# Patient Record
Sex: FEMALE | Race: WHITE | NOT HISPANIC OR LATINO | Employment: OTHER | URBAN - METROPOLITAN AREA
[De-identification: names, ages, dates, MRNs, and addresses within clinical notes are randomized per-mention and may not be internally consistent; named-entity substitution may affect disease eponyms.]

---

## 2017-05-01 ENCOUNTER — ALLSCRIPTS OFFICE VISIT (OUTPATIENT)
Dept: OTHER | Facility: OTHER | Age: 57
End: 2017-05-01

## 2017-06-11 ENCOUNTER — APPOINTMENT (OUTPATIENT)
Dept: LAB | Facility: MEDICAL CENTER | Age: 57
End: 2017-06-11
Payer: COMMERCIAL

## 2017-06-11 ENCOUNTER — TRANSCRIBE ORDERS (OUTPATIENT)
Dept: ADMINISTRATIVE | Facility: HOSPITAL | Age: 57
End: 2017-06-11

## 2017-06-11 DIAGNOSIS — E23.7 DISEASE OF PITUITARY GLAND (HCC): Primary | ICD-10-CM

## 2017-06-11 DIAGNOSIS — E23.7 DISEASE OF PITUITARY GLAND (HCC): ICD-10-CM

## 2017-06-11 LAB
ALBUMIN SERPL BCP-MCNC: 3.9 G/DL (ref 3.5–5)
ALP SERPL-CCNC: 78 U/L (ref 46–116)
ALT SERPL W P-5'-P-CCNC: 30 U/L (ref 12–78)
ANION GAP SERPL CALCULATED.3IONS-SCNC: 7 MMOL/L (ref 4–13)
AST SERPL W P-5'-P-CCNC: 20 U/L (ref 5–45)
BACTERIA UR QL AUTO: ABNORMAL /HPF
BASOPHILS # BLD AUTO: 0.05 THOUSANDS/ΜL (ref 0–0.1)
BASOPHILS NFR BLD AUTO: 1 % (ref 0–1)
BILIRUB SERPL-MCNC: 0.8 MG/DL (ref 0.2–1)
BILIRUB UR QL STRIP: NEGATIVE
BUN SERPL-MCNC: 18 MG/DL (ref 5–25)
CALCIUM SERPL-MCNC: 8.9 MG/DL (ref 8.3–10.1)
CHLORIDE SERPL-SCNC: 107 MMOL/L (ref 100–108)
CHOLEST SERPL-MCNC: 217 MG/DL (ref 50–200)
CLARITY UR: ABNORMAL
CO2 SERPL-SCNC: 28 MMOL/L (ref 21–32)
COLOR UR: ABNORMAL
CORTIS AM PEAK SERPL-MCNC: 15 UG/DL (ref 4.2–22.4)
CREAT SERPL-MCNC: 0.73 MG/DL (ref 0.6–1.3)
EOSINOPHIL # BLD AUTO: 0.33 THOUSAND/ΜL (ref 0–0.61)
EOSINOPHIL NFR BLD AUTO: 6 % (ref 0–6)
ERYTHROCYTE [DISTWIDTH] IN BLOOD BY AUTOMATED COUNT: 12.9 % (ref 11.6–15.1)
EST. AVERAGE GLUCOSE BLD GHB EST-MCNC: 103 MG/DL
GFR SERPL CREATININE-BSD FRML MDRD: >60 ML/MIN/1.73SQ M
GLUCOSE P FAST SERPL-MCNC: 84 MG/DL (ref 65–99)
GLUCOSE UR STRIP-MCNC: NEGATIVE MG/DL
HBA1C MFR BLD: 5.2 % (ref 4.2–6.3)
HCT VFR BLD AUTO: 44 % (ref 34.8–46.1)
HDLC SERPL-MCNC: 75 MG/DL (ref 40–60)
HGB BLD-MCNC: 14.4 G/DL (ref 11.5–15.4)
HGB UR QL STRIP.AUTO: ABNORMAL
KETONES UR STRIP-MCNC: NEGATIVE MG/DL
LDLC SERPL CALC-MCNC: 128 MG/DL (ref 0–100)
LEUKOCYTE ESTERASE UR QL STRIP: ABNORMAL
LYMPHOCYTES # BLD AUTO: 1.69 THOUSANDS/ΜL (ref 0.6–4.47)
LYMPHOCYTES NFR BLD AUTO: 31 % (ref 14–44)
MAGNESIUM SERPL-MCNC: 2.4 MG/DL (ref 1.6–2.6)
MCH RBC QN AUTO: 31 PG (ref 26.8–34.3)
MCHC RBC AUTO-ENTMCNC: 32.7 G/DL (ref 31.4–37.4)
MCV RBC AUTO: 95 FL (ref 82–98)
MONOCYTES # BLD AUTO: 0.7 THOUSAND/ΜL (ref 0.17–1.22)
MONOCYTES NFR BLD AUTO: 13 % (ref 4–12)
NEUTROPHILS # BLD AUTO: 2.75 THOUSANDS/ΜL (ref 1.85–7.62)
NEUTS SEG NFR BLD AUTO: 49 % (ref 43–75)
NITRITE UR QL STRIP: NEGATIVE
NON-SQ EPI CELLS URNS QL MICRO: ABNORMAL /HPF
NRBC BLD AUTO-RTO: 0 /100 WBCS
OTHER STN SPEC: ABNORMAL
PH UR STRIP.AUTO: 6 [PH] (ref 4.5–8)
PHOSPHATE SERPL-MCNC: 3.5 MG/DL (ref 2.7–4.5)
PLATELET # BLD AUTO: 196 THOUSANDS/UL (ref 149–390)
PMV BLD AUTO: 12 FL (ref 8.9–12.7)
POTASSIUM SERPL-SCNC: 3.7 MMOL/L (ref 3.5–5.3)
PROT SERPL-MCNC: 7.6 G/DL (ref 6.4–8.2)
PROT UR STRIP-MCNC: ABNORMAL MG/DL
RBC # BLD AUTO: 4.64 MILLION/UL (ref 3.81–5.12)
RBC #/AREA URNS AUTO: ABNORMAL /HPF
SODIUM SERPL-SCNC: 142 MMOL/L (ref 136–145)
SP GR UR STRIP.AUTO: 1.02 (ref 1–1.03)
T4 FREE SERPL-MCNC: 1.03 NG/DL (ref 0.76–1.46)
TRIGL SERPL-MCNC: 71 MG/DL
TSH SERPL DL<=0.05 MIU/L-ACNC: 1.57 UIU/ML (ref 0.36–3.74)
UROBILINOGEN UR QL STRIP.AUTO: 0.2 E.U./DL
WBC # BLD AUTO: 5.52 THOUSAND/UL (ref 4.31–10.16)
WBC #/AREA URNS AUTO: ABNORMAL /HPF

## 2017-06-11 PROCEDURE — 83036 HEMOGLOBIN GLYCOSYLATED A1C: CPT

## 2017-06-11 PROCEDURE — 82024 ASSAY OF ACTH: CPT

## 2017-06-11 PROCEDURE — 80061 LIPID PANEL: CPT

## 2017-06-11 PROCEDURE — 84100 ASSAY OF PHOSPHORUS: CPT

## 2017-06-11 PROCEDURE — 82533 TOTAL CORTISOL: CPT

## 2017-06-11 PROCEDURE — 80053 COMPREHEN METABOLIC PANEL: CPT

## 2017-06-11 PROCEDURE — 84443 ASSAY THYROID STIM HORMONE: CPT

## 2017-06-11 PROCEDURE — 36415 COLL VENOUS BLD VENIPUNCTURE: CPT

## 2017-06-11 PROCEDURE — 81001 URINALYSIS AUTO W/SCOPE: CPT | Performed by: SPECIALIST

## 2017-06-11 PROCEDURE — 83735 ASSAY OF MAGNESIUM: CPT

## 2017-06-11 PROCEDURE — 85025 COMPLETE CBC W/AUTO DIFF WBC: CPT

## 2017-06-11 PROCEDURE — 82941 ASSAY OF GASTRIN: CPT

## 2017-06-11 PROCEDURE — 84439 ASSAY OF FREE THYROXINE: CPT

## 2017-06-13 LAB
ACTH PLAS-MCNC: 15.9 PG/ML (ref 7.2–63.3)
GASTRIN SERPL-MCNC: 51 PG/ML (ref 0–115)

## 2017-07-13 ENCOUNTER — ALLSCRIPTS OFFICE VISIT (OUTPATIENT)
Dept: OTHER | Facility: OTHER | Age: 57
End: 2017-07-13

## 2017-07-29 ENCOUNTER — TRANSCRIBE ORDERS (OUTPATIENT)
Dept: ADMINISTRATIVE | Facility: HOSPITAL | Age: 57
End: 2017-07-29

## 2017-07-29 ENCOUNTER — APPOINTMENT (OUTPATIENT)
Dept: LAB | Facility: MEDICAL CENTER | Age: 57
End: 2017-07-29
Payer: COMMERCIAL

## 2017-07-29 DIAGNOSIS — R89.6 ABNORMAL CYTOLOGY: Primary | ICD-10-CM

## 2017-07-29 LAB
CREAT UR-MCNC: 124 MG/DL
MICROALBUMIN UR-MCNC: 6.6 MG/L (ref 0–20)
MICROALBUMIN/CREAT 24H UR: 5 MG/G CREATININE (ref 0–30)

## 2017-07-29 PROCEDURE — 82043 UR ALBUMIN QUANTITATIVE: CPT | Performed by: SPECIALIST

## 2017-07-29 PROCEDURE — 82570 ASSAY OF URINE CREATININE: CPT | Performed by: SPECIALIST

## 2017-08-02 ENCOUNTER — APPOINTMENT (OUTPATIENT)
Dept: LAB | Facility: MEDICAL CENTER | Age: 57
End: 2017-08-02
Payer: COMMERCIAL

## 2017-08-02 DIAGNOSIS — R89.6 ABNORMAL CYTOLOGY: ICD-10-CM

## 2017-08-02 DIAGNOSIS — R89.6 ABNORMAL BLADDER CYTOLOGY: Primary | ICD-10-CM

## 2017-08-02 LAB
BILIRUB UR QL STRIP: NEGATIVE
CLARITY UR: CLEAR
COLOR UR: YELLOW
GLUCOSE UR STRIP-MCNC: NEGATIVE MG/DL
HGB UR QL STRIP.AUTO: NEGATIVE
KETONES UR STRIP-MCNC: NEGATIVE MG/DL
LEUKOCYTE ESTERASE UR QL STRIP: NEGATIVE
NITRITE UR QL STRIP: NEGATIVE
PH UR STRIP.AUTO: 6.5 [PH] (ref 4.5–8)
PROT UR STRIP-MCNC: NEGATIVE MG/DL
SP GR UR STRIP.AUTO: 1.01 (ref 1–1.03)
UROBILINOGEN UR QL STRIP.AUTO: 0.2 E.U./DL

## 2017-08-02 PROCEDURE — 81003 URINALYSIS AUTO W/O SCOPE: CPT

## 2017-08-02 PROCEDURE — 88112 CYTOPATH CELL ENHANCE TECH: CPT

## 2017-10-17 ENCOUNTER — ALLSCRIPTS OFFICE VISIT (OUTPATIENT)
Dept: OTHER | Facility: OTHER | Age: 57
End: 2017-10-17

## 2017-10-18 NOTE — PROGRESS NOTES
Assessment  Assessed    1  Dyslipidemia (272 4) (E78 5)    Plan  Dyslipidemia    · (1) CK (CPK); Status:Active; Requested for:27Feb2018; Perform:[facility]; HEA:25VCK9038;DZVMMZT; For:Dyslipidemia; Ordered By:Laureano Jasmine;   · (1) COMPREHENSIVE METABOLIC PANEL; Status:Active; Requested for:27Feb2018; Perform:[facility]; EHY:84MWL6170;OEMAXNK; For:Dyslipidemia; Ordered By:Laureano Jasmine;   · (1) LIPID PANEL, FASTING; Status:Active; Requested for:27Feb2018; Perform:[facility]; CQL:09WBE8033;HVFGJTR; For:Dyslipidemia; Ordered By:Giamber, Eleuterio Scheuermann;   · EKG/ECG- POC; Status:Active - Perform Order; Requested for: With next appointment;    Perform: In Office; Due:17Oct2018; Ordered; For:Dyslipidemia; Ordered By:Laureano Jasmine;   · Follow-up visit in 1 year Evaluation and Treatment  Follow-up  Status: Hold For -  Scheduling  Requested for: 18SKX3619   Ordered; For: Dyslipidemia; Ordered By: Kay Schaefer Performed:  Due: 13JKC1325  Palpitations    · EKG/ECG- POC; Status:Complete;   Done: 73HVX7625   Perform: In Office; Due:17Oct2018; Last Updated Jacob Lyons; 10/17/2017 3:33:23 PM;Ordered; For:Palpitations; Ordered By:Giamber, Eleuterio Scheuermann;    Discussion/Summary  Cardiology Discussion Summary Free Text Note Form [facility]:   Patient sees us and rare occasion because of a elevated cholesterol level  Her LDL is 108  She's no documented vascular disease  He does not have diabetes  Her issues are with iron deficiency anemia and at the tertiary adenoma  We will see her on a when necessary basis or in a year  She uses a statin just several times per week  Primarily discussion of her issues  Her EKG is normal  seen October 17, 2017  LDL is 128 on Lipitor 10 mg every other day  There will be increased to 10 mg daily  She is asymptomatic  Her hiatal hernia repaired  She's no longer anemic  Chief Complaint  Chief Complaint Free Text Note Form: Patient is here for a yearly follow up   She has no cardiac complaints today  History of Present Illness  Cardiology [facility] Free Text Note Form [facility]: Patient sees us at long-term intervals  She recently saw for positive family history of vascular disease and showed an LDL of 192  She is no documented vascular disease  She is on Lipitor several times a week and her LDL is down to 108  I made no changes  our standpoint we've very little input to her care  issues recently have been severe iron deficiency anemia secondary to GERD and they're considering a procedure to reduce her hiatal hernia  addition she has a pituitary adenomatous being followed by endocrine  seen October 17, 2017  seen because of positive family history coronary disease and she has significantly elevated LDL  LDL is 128 on 10 mg of Lipitor every other day  Her increasing to take back to daily  did have significant anemia secondary to a hiatal hernia  She did have diet hernia repair  Fairly significant operation  She's done well since  Review of Systems  Cardiology Female ROS:     Cardiac: No complaints of chest pain, no palpitations, no fainting  Skin: No complaints of nonhealing sores or skin rash  Genitourinary: No complaints of recurrent urinary tract infections, frequent urination at night, difficult urination, blood in urine, kidney stones, loss of bladder control, kidney problems, denies any birth control or hormone replacement, is not post menopausal, not currently pregnant  Psychological: No complaints of feeling depressed, anxiety, panic attacks, or difficulty concentrating  Active Problems  Problems    1  Abnormal glandular Papanicolaou smear of cervix (795 00) (R87 619)   2  Acid reflux disease with ulcer (530 81,707 9) (K21 9)   3  Tiburcio ulcer (531 90) (K25 9)   4  Dyslipidemia (272 4) (E78 5)   5  Encounter for cervical Pap smear with pelvic exam (V76 2,V72 31) (Z01 419)   6   Encounter for routine gynecological examination with Papanicolaou smear of cervix (V72 31,V76 2) (Z01 419)   7  Encounter for screening mammogram for malignant neoplasm of breast (V76 12)   (Z12 31)   8  Hiatal hernia (553 3) (K44 9)   9  Hyperlipidemia (272 4) (E78 5)   10  Iron deficiency anemia due to chronic blood loss (280 0) (D50 0)   11  Menopausal symptoms (627 2) (N95 1)   12  Migraine, unspecified, not intractable, without status migrainosus (346 90) (G43 909)   13  Palpitations (785 1) (R00 2)   14  Pituitary adenoma (227 3) (D35 2)    Past Medical History  Problems    1  History of Hypercholesterolemia (272 0) (E78 00)   2  History of Pituitary adenoma (227 3) (D35 2)    Surgical History  Problems    1  History of Breast Surgery   2  History of Hernia Repair  Surgical History Reviewed: The surgical history was reviewed and updated today  Family History  Mother    1  Family history of Anxiety   2  Family history of Arthritis   3  Family history of Hypercholesterolemia   4  Family history of Hypertension  Father    5  Family history of Cancer of prostate   6  Family history of Dementia   7  Family history of Parkinson disease  Maternal Grandmother    8  Family history of   Paternal Grandmother    5  Family history of   Maternal Grandfather    10  Family history of   Paternal Grandfather    6  Family history of   Family History    12  Family history of Coronary Artery Disease (V17 49)    Social History  Problems    · Caffeine use (V49 89) (F15 90)   · Lives independently with spouse   · Lives with parents   · Marital history   · Never smoker   · Occupation   · Social alcohol use (Z78 9)  Social History Reviewed: The social history was reviewed and is unchanged  Current Meds   1  Aspirin 81 MG TABS; TAKE 1 TABLET DAILY; Therapy: [phone]) to Recorded   2  Atorvastatin Calcium 10 MG Oral Tablet; TAKE ONE TABLET BY MOUTH EVERY OTHER   DAY OR AS DIRECTED;    Therapy: 78RVZ0370 to (Evaluate:2017)  Requested for: 40STT0400; Last Rx:94Ntr1906 Ordered   3  Cymbalta 20 MG Oral Capsule Delayed Release Particles; TAKE 1 CAPSULE DAILY; Therapy: ([phone]) to Recorded   4  Multivitamins Oral Capsule; TAKE 1 CAPSULE Daily Recorded   5  Prempro 0 3-1 5 MG Oral Tablet; Take 1 tablet by mouth daily; Therapy: 19IUG6963 to (Sarahelvazoila Valentine)  Requested for: 44UDE4130; Last   Rx:28Nov2016 Ordered   6  SUMAtriptan Succinate 100 MG Oral Tablet; TAKE 1 TABLET BY MOUTH FOR MIGRAINE   RELIEF  MAY REPEAT 2 HOURS LATER  MAXIMUM 200MG/DAY; Therapy: 79KMZ1229 to (Last JV:33WTH2026)  Requested for: 50EBC7754 Ordered   7  Vitamin C 500 MG Oral Tablet; TAKE 1 TABLET DAILY; Therapy: ([phone]) to Recorded   8  Vitamin D 2000 UNIT Oral Tablet; TAKE 1 PO QD; Therapy: ([phone]) to Recorded   9  Vitamin E CAPS; Therapy: (Recorded:02Jun2016) to Recorded   10  Wellbutrin  MG Oral Tablet Extended Release 24 Hour; Take 1 tablet daily    Recorded    Allergies  Medication    1  No Known Drug Allergies    Vitals  Vital Signs    Recorded: 60JWW4983 03:32PM   Heart Rate 89   Systolic 970, LUE, Sitting   Diastolic 78, LUE, Sitting   Height 5 ft 6 in   Weight 157 lb 4 oz   BMI Calculated 25 38   BSA Calculated 1 81     Physical Exam    Constitutional   General appearance: No acute distress, well appearing and well nourished  Pulmonary   Auscultation of lungs: Clear to auscultation, no rales, no rhonchi, no wheezing, good air movement  Skin - Skin and subcutaneous tissue: Normal without rashes or lesions  Skin is warm and well perfused, normal turgor  Psychiatric - Orientation to person, place, and time: Normal -- Mood and affect: Normal       Future Appointments    Date/Time Provider Specialty Site   06/01/2018 09:00 AM Marilynn Das MD Neurology [facility] NEUROLOGY [facility]   01/12/2018 09:00 AM Dionicia Severin, M D   General Surgery [facility]   12/08/2017 08:30 AM Ender Henry DO Obstetrics/Gynecology [facility]  [address] [facility]     Signatures   Electronically signed by : KHADIJAH Mckeon ; Oct 17 2017  3:53PM EST                       (Author)

## 2017-11-30 ENCOUNTER — GENERIC CONVERSION - ENCOUNTER (OUTPATIENT)
Dept: OTHER | Facility: OTHER | Age: 57
End: 2017-11-30

## 2017-11-30 ENCOUNTER — HOSPITAL ENCOUNTER (OUTPATIENT)
Dept: MAMMOGRAPHY | Facility: MEDICAL CENTER | Age: 57
Discharge: HOME | End: 2017-11-30
Payer: COMMERCIAL

## 2017-11-30 ENCOUNTER — TRANSCRIBE ORDERS (OUTPATIENT)
Dept: ADMINISTRATIVE | Facility: HOSPITAL | Age: 57
End: 2017-11-30

## 2017-11-30 DIAGNOSIS — R92.8 OTHER ABNORMAL AND INCONCLUSIVE FINDINGS ON DIAGNOSTIC IMAGING OF BREAST: ICD-10-CM

## 2017-11-30 DIAGNOSIS — Z12.31 VISIT FOR SCREENING MAMMOGRAM: Primary | ICD-10-CM

## 2017-11-30 DIAGNOSIS — Z12.31 ENCOUNTER FOR SCREENING MAMMOGRAM FOR MALIGNANT NEOPLASM OF BREAST: ICD-10-CM

## 2017-11-30 DIAGNOSIS — Z12.31 VISIT FOR SCREENING MAMMOGRAM: ICD-10-CM

## 2017-11-30 PROCEDURE — G0202 SCR MAMMO BI INCL CAD: HCPCS

## 2017-11-30 PROCEDURE — 77063 BREAST TOMOSYNTHESIS BI: CPT

## 2017-12-01 ENCOUNTER — HOSPITAL ENCOUNTER (OUTPATIENT)
Dept: MAMMOGRAPHY | Facility: CLINIC | Age: 57
Discharge: HOME | End: 2017-12-01

## 2017-12-01 ENCOUNTER — HOSPITAL ENCOUNTER (OUTPATIENT)
Dept: ULTRASOUND IMAGING | Facility: CLINIC | Age: 57
Discharge: HOME | End: 2017-12-01
Payer: COMMERCIAL

## 2017-12-01 DIAGNOSIS — R92.8 OTHER ABNORMAL AND INCONCLUSIVE FINDINGS ON DIAGNOSTIC IMAGING OF BREAST: ICD-10-CM

## 2017-12-01 PROCEDURE — 76642 ULTRASOUND BREAST LIMITED: CPT

## 2017-12-27 ENCOUNTER — ALLSCRIPTS OFFICE VISIT (OUTPATIENT)
Dept: OTHER | Facility: OTHER | Age: 57
End: 2017-12-27

## 2017-12-27 PROCEDURE — G0145 SCR C/V CYTO,THINLAYER,RESCR: HCPCS | Performed by: OBSTETRICS & GYNECOLOGY

## 2017-12-28 ENCOUNTER — LAB REQUISITION (OUTPATIENT)
Dept: LAB | Facility: HOSPITAL | Age: 57
End: 2017-12-28
Payer: COMMERCIAL

## 2017-12-28 DIAGNOSIS — Z01.419 ENCOUNTER FOR GYNECOLOGICAL EXAMINATION WITHOUT ABNORMAL FINDING: ICD-10-CM

## 2018-01-02 LAB
LAB AP GYN PRIMARY INTERPRETATION: NORMAL
Lab: NORMAL

## 2018-01-05 ENCOUNTER — TRANSCRIBE ORDERS (OUTPATIENT)
Dept: ADMINISTRATIVE | Facility: HOSPITAL | Age: 58
End: 2018-01-05

## 2018-01-05 DIAGNOSIS — D35.2 BENIGN NEOPLASM OF PITUITARY GLAND AND CRANIOPHARYNGEAL DUCT (POUCH) (HCC): Primary | ICD-10-CM

## 2018-01-05 DIAGNOSIS — D35.3 BENIGN NEOPLASM OF PITUITARY GLAND AND CRANIOPHARYNGEAL DUCT (POUCH) (HCC): Primary | ICD-10-CM

## 2018-01-09 NOTE — MISCELLANEOUS
Message  Pt called office today because after she advanced her diet as advised by Dr Ridge Mayo She is having issues  She has to run to the bathroom 20 minutes after eating and this happens about 4-5 times a day  She still has left sided pain  It is sharp, stabbing, burning pain  She had some dry heaves yesterday but no vomiting  Denies fever  She said when she swallows it feels like something is moving up and down  I spoke to Dr Ridge Mayo and he said to advise her to take advil for the next 3-4 days 600mg around the clock  He said the Diarrhea can be normal after surgery because of the vegus nerve  He said if it isn't better in a week we will do a stool test for c-diff  Told pt if symptoms get worse or she develops a fever she should call the office immediately or go to the ER  Pt understood and said she would  Active Problems    1  Abnormal cervical Papanicolaou smear (795 00) (R87 619)   2  Acid reflux disease with ulcer (530 81,707 9) (K21 9)   3  Tiburcio ulcer (531 90) (K25 9)   4  Dyslipidemia (272 4) (E78 5)   5  Encounter for routine gynecological examination with Papanicolaou smear of cervix   (V72 31,V76 2) (Z01 419)   6  Encounter for screening mammogram for malignant neoplasm of breast (V76 12)   (Z12 31)   7  Hiatal hernia (553 3) (K44 9)   8  Hyperlipidemia (272 4) (E78 5)   9  Iron deficiency anemia due to chronic blood loss (280 0) (D50 0)   10  Menopausal symptoms (627 2) (N95 1)   11  Migraine headache (346 90) (G43 909)   12  Palpitations (785 1) (R00 2)   13  Pituitary adenoma (227 3) (D35 2)    Current Meds   1  Aspirin 81 MG TABS; TAKE 1 TABLET DAILY; Therapy: [phone]) to Recorded   2  Atorvastatin Calcium 10 MG Oral Tablet; TAKE ONE TABLET BY MOUTH EVERY OTHER   DAY OR AS DIRECTED; Therapy: 64OXQ5976 to (Evaluate:17Nov2016)  Requested for: 53GNW5676; Last   Rx:23Nov2015 Ordered   3   Cymbalta 20 MG Oral Capsule Delayed Release Particles (DULoxetine HCl); TAKE 1   CAPSULE DAILY; Therapy: ([phone]) to Recorded   4  Multivitamins Oral Capsule; TAKE 1 CAPSULE Daily Recorded   5  Pantoprazole Sodium 40 MG Oral Tablet Delayed Release; TAKE 1 TABLET TWICE   DAILY 30 MINUTES BEFORE BREAKFAST AND DINNER; Therapy: ([phone] 15) to Recorded   6  Prempro 0 3-1 5 MG Oral Tablet; TAKE 1 TABLET DAILY  Requested for: 10XNX0453;   Last Rx:17Mar2016 Ordered   7  SUMAtriptan Succinate 100 MG Oral Tablet; TAKE 1 TABLET BY MOUTH FOR MIGRAINE   RELIEF  MAY REPEAT 2 HOURS LATER  MAXIMUM 200MG/DAY; Therapy: 94CPU4091 to (Last JL:64GDO3659)  Requested for: 39HPX1566 Ordered   8  Vitamin C 500 MG Oral Tablet; TAKE 1 TABLET DAILY; Therapy: ([phone] 63 15) to Recorded   9  Vitamin D 2000 UNIT Oral Tablet; TAKE 1 PO QD; Therapy: (031 339 63 15) to Recorded   10  Vitamin E CAPS; Therapy: (Recorded:02Jun2016) to Recorded   11  Wellbutrin  MG Oral Tablet Extended Release 24 Hour (BuPROPion HCl ER (XL)); Take 1 tablet daily Recorded    Allergies    1   No Known Drug Allergies    Signatures   Electronically signed by : Norris Arias LPN; Sep 16 9270  7:84GN EST                       (Author)

## 2018-01-12 ENCOUNTER — HOSPITAL ENCOUNTER (OUTPATIENT)
Dept: ULTRASOUND IMAGING | Facility: MEDICAL CENTER | Age: 58
Discharge: HOME | End: 2018-01-12
Payer: COMMERCIAL

## 2018-01-12 ENCOUNTER — APPOINTMENT (OUTPATIENT)
Dept: LAB | Facility: MEDICAL CENTER | Age: 58
End: 2018-01-12
Payer: COMMERCIAL

## 2018-01-12 ENCOUNTER — GENERIC CONVERSION - ENCOUNTER (OUTPATIENT)
Dept: OTHER | Facility: OTHER | Age: 58
End: 2018-01-12

## 2018-01-12 VITALS
HEIGHT: 66 IN | BODY MASS INDEX: 25.23 KG/M2 | WEIGHT: 157 LBS | HEART RATE: 80 BPM | DIASTOLIC BLOOD PRESSURE: 70 MMHG | RESPIRATION RATE: 20 BRPM | TEMPERATURE: 98 F | SYSTOLIC BLOOD PRESSURE: 124 MMHG

## 2018-01-12 VITALS
BODY MASS INDEX: 25.27 KG/M2 | HEART RATE: 89 BPM | HEIGHT: 66 IN | SYSTOLIC BLOOD PRESSURE: 110 MMHG | WEIGHT: 157.25 LBS | DIASTOLIC BLOOD PRESSURE: 78 MMHG

## 2018-01-12 DIAGNOSIS — D35.2 BENIGN NEOPLASM OF PITUITARY GLAND AND CRANIOPHARYNGEAL DUCT (POUCH) (HCC): ICD-10-CM

## 2018-01-12 DIAGNOSIS — D35.3 BENIGN NEOPLASM OF PITUITARY GLAND AND CRANIOPHARYNGEAL DUCT (POUCH) (HCC): ICD-10-CM

## 2018-01-12 LAB
ALBUMIN SERPL BCP-MCNC: 3.6 G/DL (ref 3.5–5)
ALP SERPL-CCNC: 76 U/L (ref 46–116)
ALT SERPL W P-5'-P-CCNC: 26 U/L (ref 12–78)
ANION GAP SERPL CALCULATED.3IONS-SCNC: 5 MMOL/L (ref 4–13)
AST SERPL W P-5'-P-CCNC: 15 U/L (ref 5–45)
BACTERIA UR QL AUTO: ABNORMAL /HPF
BASOPHILS # BLD AUTO: 0.09 THOUSANDS/ΜL (ref 0–0.1)
BASOPHILS NFR BLD AUTO: 2 % (ref 0–1)
BILIRUB SERPL-MCNC: 0.43 MG/DL (ref 0.2–1)
BILIRUB UR QL STRIP: NEGATIVE
BUN SERPL-MCNC: 15 MG/DL (ref 5–25)
CALCIUM SERPL-MCNC: 8.9 MG/DL (ref 8.3–10.1)
CHLORIDE SERPL-SCNC: 109 MMOL/L (ref 100–108)
CLARITY UR: CLEAR
CO2 SERPL-SCNC: 28 MMOL/L (ref 21–32)
COLOR UR: YELLOW
CREAT SERPL-MCNC: 0.66 MG/DL (ref 0.6–1.3)
EOSINOPHIL # BLD AUTO: 0.37 THOUSAND/ΜL (ref 0–0.61)
EOSINOPHIL NFR BLD AUTO: 6 % (ref 0–6)
ERYTHROCYTE [DISTWIDTH] IN BLOOD BY AUTOMATED COUNT: 12.8 % (ref 11.6–15.1)
EST. AVERAGE GLUCOSE BLD GHB EST-MCNC: 108 MG/DL
GFR SERPL CREATININE-BSD FRML MDRD: 97 ML/MIN/1.73SQ M
GLUCOSE SERPL-MCNC: 80 MG/DL (ref 65–140)
GLUCOSE UR STRIP-MCNC: NEGATIVE MG/DL
HBA1C MFR BLD: 5.4 % (ref 4.2–6.3)
HCT VFR BLD AUTO: 40.3 % (ref 34.8–46.1)
HGB BLD-MCNC: 13.4 G/DL (ref 11.5–15.4)
HGB UR QL STRIP.AUTO: NEGATIVE
HYALINE CASTS #/AREA URNS LPF: ABNORMAL /LPF
KETONES UR STRIP-MCNC: NEGATIVE MG/DL
LEUKOCYTE ESTERASE UR QL STRIP: ABNORMAL
LYMPHOCYTES # BLD AUTO: 2.12 THOUSANDS/ΜL (ref 0.6–4.47)
LYMPHOCYTES NFR BLD AUTO: 36 % (ref 14–44)
MAGNESIUM SERPL-MCNC: 2.2 MG/DL (ref 1.6–2.6)
MCH RBC QN AUTO: 31.3 PG (ref 26.8–34.3)
MCHC RBC AUTO-ENTMCNC: 33.3 G/DL (ref 31.4–37.4)
MCV RBC AUTO: 94 FL (ref 82–98)
MONOCYTES # BLD AUTO: 0.68 THOUSAND/ΜL (ref 0.17–1.22)
MONOCYTES NFR BLD AUTO: 11 % (ref 4–12)
NEUTROPHILS # BLD AUTO: 2.68 THOUSANDS/ΜL (ref 1.85–7.62)
NEUTS SEG NFR BLD AUTO: 45 % (ref 43–75)
NITRITE UR QL STRIP: NEGATIVE
NON-SQ EPI CELLS URNS QL MICRO: ABNORMAL /HPF
NRBC BLD AUTO-RTO: 0 /100 WBCS
PH UR STRIP.AUTO: 6 [PH] (ref 4.5–8)
PHOSPHATE SERPL-MCNC: 3.4 MG/DL (ref 2.7–4.5)
PLATELET # BLD AUTO: 208 THOUSANDS/UL (ref 149–390)
PMV BLD AUTO: 11.9 FL (ref 8.9–12.7)
POTASSIUM SERPL-SCNC: 3.5 MMOL/L (ref 3.5–5.3)
PROT SERPL-MCNC: 7.2 G/DL (ref 6.4–8.2)
PROT UR STRIP-MCNC: NEGATIVE MG/DL
RBC # BLD AUTO: 4.28 MILLION/UL (ref 3.81–5.12)
RBC #/AREA URNS AUTO: ABNORMAL /HPF
SODIUM SERPL-SCNC: 142 MMOL/L (ref 136–145)
SP GR UR STRIP.AUTO: 1.02 (ref 1–1.03)
T4 FREE SERPL-MCNC: 0.97 NG/DL (ref 0.76–1.46)
TSH SERPL DL<=0.05 MIU/L-ACNC: 1.59 UIU/ML (ref 0.36–3.74)
UROBILINOGEN UR QL STRIP.AUTO: 0.2 E.U./DL
WBC # BLD AUTO: 5.95 THOUSAND/UL (ref 4.31–10.16)
WBC #/AREA URNS AUTO: ABNORMAL /HPF

## 2018-01-12 PROCEDURE — 85025 COMPLETE CBC W/AUTO DIFF WBC: CPT

## 2018-01-12 PROCEDURE — 84100 ASSAY OF PHOSPHORUS: CPT

## 2018-01-12 PROCEDURE — 76770 US EXAM ABDO BACK WALL COMP: CPT

## 2018-01-12 PROCEDURE — 80053 COMPREHEN METABOLIC PANEL: CPT

## 2018-01-12 PROCEDURE — 83735 ASSAY OF MAGNESIUM: CPT

## 2018-01-12 PROCEDURE — 81001 URINALYSIS AUTO W/SCOPE: CPT

## 2018-01-12 PROCEDURE — 83036 HEMOGLOBIN GLYCOSYLATED A1C: CPT

## 2018-01-12 PROCEDURE — 88112 CYTOPATH CELL ENHANCE TECH: CPT

## 2018-01-12 PROCEDURE — 84443 ASSAY THYROID STIM HORMONE: CPT

## 2018-01-12 PROCEDURE — 36415 COLL VENOUS BLD VENIPUNCTURE: CPT

## 2018-01-12 PROCEDURE — 84439 ASSAY OF FREE THYROXINE: CPT

## 2018-01-12 NOTE — CONSULTS
Chief Complaint  Patient is here for a yearly follow up  She has no cardiac complaints today  History of Present Illness  Patient sees us at long-term intervals  She recently saw for positive family history of vascular disease and showed an LDL of 192  She is no documented vascular disease  She is on Lipitor several times a week and her LDL is down to 108  I made no changes  From our standpoint we've very little input to her care  Her issues recently have been severe iron deficiency anemia secondary to GERD and they're considering a procedure to reduce her hiatal hernia  In addition she has a pituitary adenomatous being followed by endocrine  Patient seen October 17, 2017  Patient seen because of positive family history coronary disease and she has significantly elevated LDL  LDL is 128 on 10 mg of Lipitor every other day  Her increasing to take back to daily  She did have significant anemia secondary to a hiatal hernia  She did have diet hernia repair  Fairly significant operation  She's done well since  Review of Systems      Cardiac: No complaints of chest pain, no palpitations, no fainting  Skin: No complaints of nonhealing sores or skin rash  Genitourinary: No complaints of recurrent urinary tract infections, frequent urination at night, difficult urination, blood in urine, kidney stones, loss of bladder control, kidney problems, denies any birth control or hormone replacement, is not post menopausal, not currently pregnant  Psychological: No complaints of feeling depressed, anxiety, panic attacks, or difficulty concentrating  Active Problems    1  Abnormal glandular Papanicolaou smear of cervix (795 00) (R87 619)   2  Acid reflux disease with ulcer (530 81,707 9) (K21 9)   3  Tiburcio ulcer (531 90) (K25 9)   4  Dyslipidemia (272 4) (E78 5)   5  Encounter for cervical Pap smear with pelvic exam (V76 2,V72 31) (Z01 419)   6   Encounter for routine gynecological examination with Papanicolaou smear of cervix   (V72 31,V76 2) (Z01 419)   7  Encounter for screening mammogram for malignant neoplasm of breast (V76 12)   (Z12 31)   8  Hiatal hernia (553 3) (K44 9)   9  Hyperlipidemia (272 4) (E78 5)   10  Iron deficiency anemia due to chronic blood loss (280 0) (D50 0)   11  Menopausal symptoms (627 2) (N95 1)   12  Migraine, unspecified, not intractable, without status migrainosus (346 90) (G43 909)   13  Palpitations (785 1) (R00 2)   14  Pituitary adenoma (227 3) (D35 2)    Past Medical History    · History of Hypercholesterolemia (272 0) (E78 00)   · History of Pituitary adenoma (227 3) (D35 2)    Surgical History    · History of Breast Surgery   · History of Hernia Repair    The surgical history was reviewed and updated today  Family History    · Family history of Anxiety   · Family history of Arthritis   · Family history of Hypercholesterolemia   · Family history of Hypertension    · Family history of Cancer of prostate   · Family history of Dementia   · Family history of Parkinson disease    · Family history of     · Family history of     · Family history of     · Family history of     · Family history of Coronary Artery Disease (V17 49)    Social History    · Caffeine use (V49 89) (F15 90)   · Lives independently with spouse   · Lives with parents   · Marital history   · Never smoker   · Occupation   · Social alcohol use (Z78 9)  The social history was reviewed and is unchanged  Current Meds   1  Aspirin 81 MG TABS; TAKE 1 TABLET DAILY; Therapy: ([phone]) to Recorded   2  Atorvastatin Calcium 10 MG Oral Tablet; TAKE ONE TABLET BY MOUTH EVERY OTHER   DAY OR AS DIRECTED; Therapy: 72ENE3300 to (Evaluate:2017)  Requested for: 27KMW8997; Last   Rx:2016 Ordered   3  Cymbalta 20 MG Oral Capsule Delayed Release Particles; TAKE 1 CAPSULE DAILY; Therapy: ([phone]) to Recorded   4   Multivitamins Oral Capsule; TAKE 1 CAPSULE Daily Recorded   5  Prempro 0 3-1 5 MG Oral Tablet; Take 1 tablet by mouth daily; Therapy: 79ZVY4042 to (Marina Cortes)  Requested for: 37PFU1586; Last   Rx:28Nov2016 Ordered   6  SUMAtriptan Succinate 100 MG Oral Tablet; TAKE 1 TABLET BY MOUTH FOR MIGRAINE   RELIEF  MAY REPEAT 2 HOURS LATER  MAXIMUM 200MG/DAY; Therapy: 67QIR5142 to (Last UD:03PCS8059)  Requested for: 60AUH5709 Ordered   7  Vitamin C 500 MG Oral Tablet; TAKE 1 TABLET DAILY; Therapy: () to Recorded   8  Vitamin D 2000 UNIT Oral Tablet; TAKE 1 PO QD; Therapy: () to Recorded   9  Vitamin E CAPS; Therapy: (Recorded:02Jun2016) to Recorded   10  Wellbutrin  MG Oral Tablet Extended Release 24 Hour; Take 1 tablet daily    Recorded    Allergies    1  No Known Drug Allergies    Vitals   Recorded: 27FJI5468 03:32PM   Heart Rate 89   Systolic 222, LUE, Sitting   Diastolic 78, LUE, Sitting   Height 5 ft 6 in   Weight 157 lb 4 oz   BMI Calculated 25 38   BSA Calculated 1 81     Physical Exam    Constitutional   General appearance: No acute distress, well appearing and well nourished  Pulmonary   Auscultation of lungs: Clear to auscultation, no rales, no rhonchi, no wheezing, good air movement  Skin - Skin and subcutaneous tissue: Normal without rashes or lesions  Skin is warm and well perfused, normal turgor  Psychiatric - Orientation to person, place, and time: Normal  Mood and affect: Normal       Assessment    1  Dyslipidemia (272 4) (E78 5)    Plan  Dyslipidemia    · (1) CK (CPK); Status:Active; Requested for:27Feb2018; Perform:[facility]; NYO:40QBJ3162;NXTNLBG; For:Dyslipidemia; Ordered By:Laureano Jasmine;   · (1) COMPREHENSIVE METABOLIC PANEL; Status:Active; Requested for:27Feb2018; Perform:[facility]; PAX:56DZM2118;LSSVSEF; For:Dyslipidemia; Ordered By:Laureano Jasmine;   · (1) LIPID PANEL, FASTING; Status:Active; Requested for:27Feb2018;     Perform:  [facility]; ZLK:93TWI8764;ESSHLEX; For:Dyslipidemia; Ordered By:Germaine Jasmine;   · EKG/ECG- POC; Status:Active - Perform Order; Requested for: With next appointment;    Perform: In Office; Due:17Oct2018; Ordered; For:Dyslipidemia; Ordered By:Laureano Jasmine;   · Follow-up visit in 1 year Evaluation and Treatment  Follow-up  Status: Hold For -  Scheduling  Requested for: 77CXV5198   Ordered; For: Dyslipidemia; Ordered By: Segun Johnson Performed:  Due: 98DOC7154  Palpitations    · EKG/ECG- POC; Status:Complete;   Done: 82PYR1950   Perform: In Office; Due:17Oct2018; Last Updated Beth Tapia; 10/17/2017 3:33:23 PM;Ordered; For:Palpitations; Ordered By:Laureano Jasmine;    Discussion/Summary    Patient sees us and rare occasion because of a elevated cholesterol level  Her LDL is 108  She's no documented vascular disease  He does not have diabetes  Her issues are with iron deficiency anemia and at the tertiary adenoma  We will see her on a when necessary basis or in a year  She uses a statin just several times per week  Primarily discussion of her issues  Her EKG is normal    Patient seen October 17, 2017  LDL is 128 on Lipitor 10 mg every other day  There will be increased to 10 mg daily  She is asymptomatic  Her hiatal hernia repaired  She's no longer anemic        Future Appointments    Signatures   Electronically signed by : KHADIJAH Matthews ; Oct 17 2017  3:53PM EST                       (Author)

## 2018-01-13 VITALS
DIASTOLIC BLOOD PRESSURE: 60 MMHG | HEART RATE: 88 BPM | BODY MASS INDEX: 25.57 KG/M2 | HEIGHT: 66 IN | SYSTOLIC BLOOD PRESSURE: 102 MMHG | RESPIRATION RATE: 16 BRPM | WEIGHT: 159.13 LBS | OXYGEN SATURATION: 99 %

## 2018-01-13 NOTE — MISCELLANEOUS
Message  Return to work or school:   Itz Pop is under my professional care  She was seen in my office on 07/29/2016   She is able to return to work on  09/21/2016    She can perform work without limitations           Signatures   Electronically signed by : Mac Burton, ; Aug 25 2016  8:58AM EST                       (Author)

## 2018-01-13 NOTE — PROGRESS NOTES
Discussion/Summary  Discussion Summary:   Met with pt per Dr Gene Quiroga request to review post-operative diet progression with pt  Pt is not a bariatric surgery patient but is scheduled for paraesophageal hernia repair and fundoplication for hiatal hernia and GERD on 8/31/16  Printed out [employer] progression handout and reviewed progression in-detail with pt, making adjustments considering pt is not a bariatric surgery pt  (ok to have fruit juice, larger portions than 1/4 cup per meal, does not need protein drinks ) Provided written dates for diet stages on handouts  Pt verbalized understanding, no further questions at present time  Provided pt with contact information for future questions/concerns  Active Problems    1  Abnormal cervical Papanicolaou smear (795 00) (R87 619)   2  Acid reflux disease with ulcer (530 81,707 9) (K21 9)   3  Tiburcio ulcer (531 90) (K25 9)   4  Dyslipidemia (272 4) (E78 5)   5  Encounter for routine gynecological examination with Papanicolaou smear of cervix   (V72 31,V76 2) (Z01 419)   6  Encounter for screening mammogram for malignant neoplasm of breast (V76 12)   (Z12 31)   7  Hiatal hernia (553 3) (K44 9)   8  Hyperlipidemia (272 4) (E78 5)   9  Iron deficiency anemia due to chronic blood loss (280 0) (D50 0)   10  Menopausal symptoms (627 2) (N95 1)   11  Migraine headache (346 90) (G43 909)   12  Palpitations (785 1) (R00 2)   13  Pituitary adenoma (227 3) (D35 2)    Past Medical History    1  History of Hypercholesterolemia (272 0) (E78 0)   2  History of Pituitary adenoma (227 3) (D35 2)    Surgical History    1  History of Breast Surgery    Family History  Mother    1  Family history of Anxiety   2  Family history of Arthritis   3  Family history of Hypercholesterolemia   4  Family history of Hypertension  Father    5  Family history of Cancer of prostate   6  Family history of Dementia   7  Family history of Parkinson disease  Maternal Grandmother    8   Family history of   Paternal Grandmother    5  Family history of   Maternal Grandfather    10  Family history of   Paternal Grandfather    6  Family history of   Family History    12  Family history of Coronary Artery Disease (V17 49)    Social History    · Caffeine use (V49 89) (F15 90)   · Lives independently with spouse   · Lives with parents   · Marital history   · Never smoker   · Occupation   · Social alcohol use (Z78 9)    Current Meds   1  Aspirin 81 MG TABS; TAKE 1 TABLET DAILY; Therapy: ( ) to Recorded   2  Atorvastatin Calcium 10 MG Oral Tablet; TAKE ONE TABLET BY MOUTH EVERY OTHER   DAY OR AS DIRECTED; Therapy: 87ZTK6239 to (Evaluate:2016)  Requested for: 09XWE4831; Last   Rx:2015 Ordered   3  Cymbalta 20 MG Oral Capsule Delayed Release Particles; TAKE 1 CAPSULE DAILY; Therapy: ( ) to Recorded   4  Multivitamins Oral Capsule; TAKE 1 CAPSULE Daily Recorded   5  Oxycodone-Acetaminophen 5-325 MG Oral Tablet; TAKE 1 TABLET EVERY 4 TO 6   HOURS AS NEEDED FOR PAIN;   Therapy: 84TWO3628 to (Complete:60Mze1553); Last Rx:46Oxg8350; Status: ACTIVE -   Retrospective By Protocol Authorization Ordered   6  Pantoprazole Sodium 40 MG Oral Tablet Delayed Release; TAKE 1 TABLET TWICE DAILY   30 MINUTES BEFORE BREAKFAST AND DINNER; Therapy: ( ) to Recorded   7  Prempro 0 3-1 5 MG Oral Tablet; TAKE 1 TABLET DAILY  Requested for: 32EDL6020; Last   Rx:2016 Ordered   8  SUMAtriptan Succinate 100 MG Oral Tablet; TAKE 1 TABLET BY MOUTH FOR MIGRAINE   RELIEF  MAY REPEAT 2 HOURS LATER  MAXIMUM 200MG/DAY; Therapy: 59XGT3262 to (Last EX:40HLI5875)  Requested for: 57LDH9332 Ordered   9  Vitamin C 500 MG Oral Tablet; TAKE 1 TABLET DAILY; Therapy: ( ) to Recorded   10  Vitamin D 2000 UNIT Oral Tablet; TAKE 1 PO QD; Therapy: ( ) to Recorded   11  Vitamin E CAPS;     Therapy: (Recorded:2016) to Recorded   12  Wellbutrin  MG Oral Tablet Extended Release 24 Hour; Take 1 tablet daily    Recorded    Allergies    1  No Known Drug Allergies    Vitals  Signs   Recorded: 64BKJ4719 12:26PM   Height: 5 ft 6 in  Weight: 180 lb 8 oz  BMI Calculated: 29 13  BSA Calculated: 1 91    Future Appointments    Date/Time Provider Specialty Site   05/01/2017 04:00 PM Ivan Marr MD Neurology [facility] NEUROLOGY [facility]   09/08/2016 09:00 AM KHADIJAH Lou  [address] [facility]   08/16/2016 08:00 AM KHADIJAH العلي  Hematology Oncology [facility] MEDICAL ONCOLOGY   08/15/2016 03:20 PM Omero Casillas MD Gastroenterology Adult [facility] 9     Signatures   Electronically signed by : PATRICIA Moran RD; Jul 29 2016 12:25PM EST                       (Author)    Electronically signed by :  KHADIJAH Espino ; Aug  4 2016 10:10AM EST

## 2018-01-15 NOTE — MISCELLANEOUS
Message  9/6/2016 @ 1350 - post op follow up phone call completed  Pt is sipping liquids, using IS as instructed, reinforced importance of using IS to help prevent pneumonia  Ambulating about home without difficulty  Pain controlled with analgesia  Reaffirmed examples of liquid diet over the next week  Pt stated understanding about discharge instructions and medication adjustments  Pt educated on [id number] [facility]  Follow up appt with surgeon scheduled for the end of this week  Instructed to call with any additional questions or concerns  Active Problems    1  Abnormal cervical Papanicolaou smear (795 00) (R87 619)   2  Acid reflux disease with ulcer (530 81,707 9) (K21 9)   3  Tiburcio ulcer (531 90) (K25 9)   4  Dyslipidemia (272 4) (E78 5)   5  Encounter for routine gynecological examination with Papanicolaou smear of cervix   (V72 31,V76 2) (Z01 419)   6  Encounter for screening mammogram for malignant neoplasm of breast (V76 12)   (Z12 31)   7  Hiatal hernia (553 3) (K44 9)   8  Hyperlipidemia (272 4) (E78 5)   9  Iron deficiency anemia due to chronic blood loss (280 0) (D50 0)   10  Menopausal symptoms (627 2) (N95 1)   11  Migraine headache (346 90) (G43 909)   12  Palpitations (785 1) (R00 2)   13  Pituitary adenoma (227 3) (D35 2)    Current Meds   1  Aspirin 81 MG TABS; TAKE 1 TABLET DAILY; Therapy: ([phone]) to Recorded   2  Atorvastatin Calcium 10 MG Oral Tablet; TAKE ONE TABLET BY MOUTH EVERY OTHER   DAY OR AS DIRECTED; Therapy: 03VSR5133 to (Evaluate:17Nov2016)  Requested for: 67ZCP9963; Last   Rx:23Nov2015 Ordered   3  Cymbalta 20 MG Oral Capsule Delayed Release Particles (DULoxetine HCl); TAKE 1   CAPSULE DAILY; Therapy: ([phone]) to Recorded   4  Multivitamins Oral Capsule; TAKE 1 CAPSULE Daily Recorded   5  Pantoprazole Sodium 40 MG Oral Tablet Delayed Release; TAKE 1 TABLET TWICE   DAILY 30 MINUTES BEFORE BREAKFAST AND DINNER;    Therapy: (JNXCJHIU:44BQD0482) to Recorded   6  Prempro 0 3-1 5 MG Oral Tablet; TAKE 1 TABLET DAILY  Requested for: 37XNL4069;   Last Rx:17Mar2016 Ordered   7  SUMAtriptan Succinate 100 MG Oral Tablet; TAKE 1 TABLET BY MOUTH FOR MIGRAINE   RELIEF  MAY REPEAT 2 HOURS LATER  MAXIMUM 200MG/DAY; Therapy: 68YHN9575 to (Last XX:87DKG5566)  Requested for: 26MVF8353 Ordered   8  Vitamin C 500 MG Oral Tablet; TAKE 1 TABLET DAILY; Therapy: (40 159 282) to Recorded   9  Vitamin D 2000 UNIT Oral Tablet; TAKE 1 PO QD; Therapy: (85 604 282) to Recorded   10  Vitamin E CAPS; Therapy: (Recorded:02Jun2016) to Recorded   11  Wellbutrin  MG Oral Tablet Extended Release 24 Hour (BuPROPion HCl ER (XL)); Take 1 tablet daily Recorded    Allergies    1   No Known Drug Allergies    Signatures   Electronically signed by : Osman Cali, ; Sep  6 2016  1:58PM EST                       (Author)

## 2018-01-16 NOTE — RESULT NOTES
Verified Results  (1) TISSUE EXAM [id number] 07:50AM Elisa Francis     Test Name Result Flag Reference   LAB AP CASE REPORT (Report)     Surgical Pathology Report             Case: [id number]                   Authorizing Provider: Ellen Dawson MD      Collected:      05/11/2016 0750        Ordering Location:   [facility]    Received:      05/11/2016 [address]                            Pathologist:      Carolyn Black MD                                 Specimens:  A) - Duodenum, Duodenum r/o celiac cold biopsy                             B) - Stomach, Random gastric cold biopsy r/o H pylori                         C) - Polyp, Stomach/Small Intestine, Gastric polyp cold biopsy   LAB AP FINAL DIAGNOSIS (Report)     A  Duodenum (biopsy):  - Duodenal mucosa with no diagnostic abnormality  - No Marsh lesion  - Negative for dysplasia     B  Stomach (biopsy):  - Mild chronic inactive oxyntic and antral gastritis  - Detached fragment of intestinal/duodenal mucosa  - No H pylori identified on immunohistochemistry stain  - Negative for dysplasia     C  Stomach polyp (biopsy):  - Fundic gland polyp  - Negative for dysplasia     Interpretation performed at [facility], [address] [facility], [facility] [address]   LAB AP SURGICAL ADDITIONAL INFORMATION (Report)     These tests were developed and their performance characteristics   determined by [facility] or [facility]  They may not be cleared or approved by the [place]  Food and   Drug Administration  The FDA has determined that such clearance or   approval is not necessary  These tests are used for clinical purposes  They should not be regarded as investigational or for research  This   laboratory has been approved by CLIA 88, designated as a high-complexity   laboratory and is qualified to perform these tests  LAB AP GROSS DESCRIPTION (Report)     A   The specimen is received in formalin, labeled with the patient's name   and medical record number, and is designated Duodenum rule out celiac   biopsy  The specimen consists of 2 rubbery, tan-brown tissue fragments   measuring from 0 2 up to 0 3 cm in greatest dimension  Entirely submitted  One cassette  B  The specimen is received in formalin, labeled with the patient's name   and medical record number, and is designated random gastric biopsy rule   out H  pylori  The specimen consists of a single, rubbery, tan-brown   tissue fragment measuring up to 0 5 cm in greatest dimension  Entirely   submitted  One cassette  C  The specimen is received in formalin, labeled with the patient's name   and medical record number, and is designated gastric polyp biopsy  The   specimen consists of 2 rubbery and friable, tan-brown tissue fragments   measuring from 0 2 up to 0 3 cm in greatest dimension  Entirely submitted  One cassette  Note: The estimated total formalin fixation time based upon information   provided by the submitting clinician and the standard processing schedule   is 20 5 hours  SRS   LAB AP CLINICAL INFORMATION (Report)     Duodenum cold biopsy r/o celiac    EGD: Esophagus: The esophagus appeared to be normal  GE junction:   There was a 10 cm and large hiatus hernia visible in the GE junction  Stomach: Tiburcio's erosions  Multiple random biopsies was taken  A few   small polyps was found in the body of the stomach  Multiple biopsies was   taken   Duodenum: The duodenum appeared to be normal    Duodenum cold  biopsy r/o celiac

## 2018-01-18 ENCOUNTER — GENERIC CONVERSION - ENCOUNTER (OUTPATIENT)
Dept: OTHER | Facility: OTHER | Age: 58
End: 2018-01-18

## 2018-01-18 ENCOUNTER — HOSPITAL ENCOUNTER (OUTPATIENT)
Dept: RADIOLOGY | Facility: HOSPITAL | Age: 58
Discharge: HOME | End: 2018-01-18
Attending: SPECIALIST
Payer: COMMERCIAL

## 2018-01-18 DIAGNOSIS — D35.2 BENIGN NEOPLASM OF PITUITARY GLAND AND CRANIOPHARYNGEAL DUCT (POUCH) (HCC): ICD-10-CM

## 2018-01-18 DIAGNOSIS — D35.3 BENIGN NEOPLASM OF PITUITARY GLAND AND CRANIOPHARYNGEAL DUCT (POUCH) (HCC): ICD-10-CM

## 2018-01-18 PROCEDURE — A9585 GADOBUTROL INJECTION: HCPCS | Performed by: SPECIALIST

## 2018-01-18 PROCEDURE — 70553 MRI BRAIN STEM W/O & W/DYE: CPT

## 2018-01-18 RX ADMIN — GADOBUTROL 7 ML: 604.72 INJECTION INTRAVENOUS at 11:26

## 2018-01-18 NOTE — MISCELLANEOUS
Message  spoke with Hamzah Mccormack from GI lab/[place]  Gave her all info to call patient to set up [facility] and for Dr Nik Merchant to read results  [phone] (phone through to GI Lab)    Will wait for patient to call us back with dates she has for this study and also her UGI (she wanted to schedule this at home said she will call back with both dates for me to note )      Active Problems    1  Abnormal cervical Papanicolaou smear (795 00) (R87 619)   2  Acid reflux disease (530 81) (K21 9)   3  Dyslipidemia (272 4) (E78 5)   4  Encounter for routine gynecological examination with Papanicolaou smear of cervix   (V72 31,V76 2) (Z01 419)   5  Encounter for screening mammogram for malignant neoplasm of breast (V76 12)   (Z12 31)   6  Hiatal hernia (553 3) (K44 9)   7  Hyperlipidemia (272 4) (E78 5)   8  Iron deficiency anemia due to chronic blood loss (280 0) (D50 0)   9  Menopausal symptoms (627 2) (N95 1)   10  Migraine headache (346 90) (G43 909)   11  Palpitations (785 1) (R00 2)   12  Pituitary adenoma (227 3) (D35 2)    Current Meds   1  Aspirin 81 MG TABS; TAKE 1 TABLET DAILY; Therapy: [phone]) to Recorded   2  Atorvastatin Calcium 10 MG Oral Tablet; TAKE ONE TABLET BY MOUTH EVERY OTHER   DAY OR AS DIRECTED; Therapy: 97QWT3016 to (Evaluate:17Nov2016)  Requested for: 82ZEC7342; Last   Rx:23Nov2015 Ordered   3  Cymbalta 20 MG Oral Capsule Delayed Release Particles (DULoxetine HCl); TAKE 1   CAPSULE DAILY; Therapy: [phone]) to Recorded   4  Multivitamins Oral Capsule; TAKE 1 CAPSULE Daily Recorded   5  Pantoprazole Sodium 40 MG Oral Tablet Delayed Release; TAKE 1 TABLET TWICE   DAILY 30 MINUTES BEFORE BREAKFAST AND DINNER; Therapy: [phone]) to Recorded   6  Prempro 0 3-1 5 MG Oral Tablet; TAKE 1 TABLET DAILY  Requested for: 69JJZ9623;   Last Rx:17Mar2016 Ordered   7   SUMAtriptan Succinate 100 MG Oral Tablet; TAKE 1 TABLET BY MOUTH FOR MIGRAINE   RELIEF  MAY REPEAT 2 HOURS LATER  MAXIMUM 200MG/DAY; Therapy: 17MHS7165 to (Last AX:46ETR6291)  Requested for: 24SRH2013 Ordered   8  Suprep Bowel Prep Oral Solution; USE AS DIRECTED; Therapy: 22Apr2016 to (Last Rx:22Apr2016)  Requested for: 22Apr2016 Ordered   9  Vitamin C 500 MG Oral Tablet; TAKE 1 TABLET DAILY; Therapy: (29-29-69-33) to Recorded   10  Vitamin D 2000 UNIT Oral Tablet; TAKE 1 PO QD; Therapy: (29-29-69-33) to Recorded   11  Vitamin E CAPS; Therapy: (Recorded:02Jun2016) to Recorded   12  Wellbutrin  MG Oral Tablet Extended Release 24 Hour (BuPROPion HCl ER (XL)); Take 1 tablet daily Recorded    Allergies    1   No Known Drug Allergies    Signatures   Electronically signed by : Chaitanya Pritchard, ; Naresh  3 2016  8:45AM EST                       (Author)

## 2018-01-23 VITALS
DIASTOLIC BLOOD PRESSURE: 70 MMHG | SYSTOLIC BLOOD PRESSURE: 116 MMHG | BODY MASS INDEX: 25.39 KG/M2 | WEIGHT: 158 LBS | HEIGHT: 66 IN

## 2018-01-23 NOTE — CONSULTS
Chief Complaint  The patient presents for her routine exam       History of Present Illness  HPI: here for yearly gyn preventive , presently with cold symptoms   GYN HM, Adult Female [address]: The patient is being seen for a health maintenance evaluation  The last health maintenance visit was 12 month(s) ago  General Health: The patient's health since the last visit is described as good  She has regular dental visits  She denies vision problems  She denies hearing loss  Immunizations status: up to date  Lifestyle:  She consumes a diverse and healthy diet  She does not have any weight concerns  She exercises regularly  She does not use tobacco  She denies alcohol use  She denies drug use  Reproductive health:  she reports no menstrual problems  Screening: cancer screening reviewed and current  metabolic screening reviewed and current  risk screening reviewed and current  Review of Systems    Constitutional: No fever, no chills, feels well, no tiredness, no recent weight gain or loss  ENT: no ear ache, no loss of hearing, no nosebleeds or nasal discharge, no sore throat or hoarseness  Cardiovascular: no complaints of slow or fast heart rate, no chest pain, no palpitations, no leg claudication or lower extremity edema  Respiratory: no complaints of shortness of breath, no wheezing, no dyspnea on exertion, no orthopnea or PND  Breasts: no complaints of breast pain, breast lump or nipple discharge  Gastrointestinal: no complaints of abdominal pain, no constipation, no nausea or diarrhea, no vomiting, no bloody stools  Genitourinary: no complaints of dysuria, no incontinence, no pelvic pain, no dysmenorrhea, no vaginal discharge or abnormal vaginal bleeding  Musculoskeletal: no complaints of arthralgia, no myalgia, no joint swelling or stiffness, no limb pain or swelling  Integumentary: no complaints of skin rash or lesion, no itching or dry skin, no skin wounds     Neurological: no complaints of headache, no confusion, no numbness or tingling, no dizziness or fainting  Active Problems    1  Abnormal finding on breast imaging (793 89) (R92 8)   2  Abnormal glandular Papanicolaou smear of cervix (795 00) (R87 619)   3  Acid reflux disease with ulcer (530 81,707 9) (K21 9)   4  Tiburcio ulcer (531 90) (K25 9)   5  Dyslipidemia (272 4) (E78 5)   6  Encounter for cervical Pap smear with pelvic exam (V76 2,V72 31) (Z01 419)   7  Encounter for routine gynecological examination with Papanicolaou smear of cervix   (V72 31,V76 2) (Z01 419)   8  Encounter for screening mammogram for malignant neoplasm of breast (V76 12)   (Z12 31)   9  Hiatal hernia (553 3) (K44 9)   10  Hyperlipidemia (272 4) (E78 5)   11  Iron deficiency anemia due to chronic blood loss (280 0) (D50 0)   12  Menopausal symptoms (627 2) (N95 1)   13  Migraine, unspecified, not intractable, without status migrainosus (346 90) (G43 909)   14  Palpitations (785 1) (R00 2)   15  Pituitary adenoma (227 3) (D35 2)    Past Medical History    · History of Hypercholesterolemia (272 0) (E78 00)   · History of Pituitary adenoma (227 3) (D35 2)    Surgical History    · History of Breast Surgery   · History of Hernia Repair    Family History    · Family history of Anxiety   · Family history of Arthritis   · Family history of Hypercholesterolemia   · Family history of Hypertension    · Family history of Cancer of prostate   · Family history of Dementia   · Family history of Parkinson disease    · Family history of     · Family history of     · Family history of     · Family history of     · Family history of Coronary Artery Disease (V17 49)    Social History    · Caffeine use (V49 89) (F15 90)   · Lives independently with spouse   · Lives with parents   · Marital history   ·    · Never smoker   · Occupation   · R N    · Social alcohol use (Z78 9)    Current Meds   1   Aspirin 81 MG TABS; TAKE 1 TABLET DAILY; Therapy: ([phone]) to Recorded   2  Atorvastatin Calcium 10 MG Oral Tablet; TAKE ONE TABLET BY MOUTH EVERY OTHER   DAY OR AS DIRECTED; Therapy: 24OAL9648 to (Evaluate:12Nov2017)  Requested for: 04AJC4843; Last   Rx:17Nov2016 Ordered   3  Cymbalta 20 MG Oral Capsule Delayed Release Particles; TAKE 1 CAPSULE DAILY; Therapy: ([phone]) to Recorded   4  Multivitamins Oral Capsule; TAKE 1 CAPSULE Daily Recorded   5  Prempro 0 3-1 5 MG Oral Tablet; Take 1 tablet by mouth daily; Therapy: 36WEJ3237 to (Natty Lout)  Requested for: 20OKT1227; Last   Rx:28Nov2016 Ordered   6  SUMAtriptan Succinate 100 MG Oral Tablet; TAKE 1 TABLET BY MOUTH FOR MIGRAINE   RELIEF  MAY REPEAT 2 HOURS LATER  MAXIMUM 200MG/DAY; Therapy: 48SPJ8664 to (Last DL:18YAA2421)  Requested for: 57PQR8583 Ordered   7  Vitamin C 500 MG Oral Tablet; TAKE 1 TABLET DAILY; Therapy: ([phone]) to Recorded   8  Vitamin D 2000 UNIT Oral Tablet; TAKE 1 PO QD; Therapy: ([phone]) to Recorded   9  Vitamin E CAPS; Therapy: (Recorded:02Jun2016) to Recorded   10  Wellbutrin  MG Oral Tablet Extended Release 24 Hour; Take 1 tablet daily    Recorded    Allergies    1  No Known Drug Allergies    Vitals   Recorded: 82MHJ5614 77:31WZ   Systolic 241   Diastolic 70   Height 5 ft 6 in   Weight 158 lb    BMI Calculated 25 5   BSA Calculated 1 81   LMP 2007     Physical Exam    Constitutional   General appearance: No acute distress, well appearing and well nourished  Neck   Neck: Normal, supple, trachea midline, no masses  Thyroid: Normal, no thyromegaly  Pulmonary   Respiratory effort: No increased work of breathing or signs of respiratory distress  Auscultation of lungs: Clear to auscultation  Cardiovascular   Auscultation of heart: Normal rate and rhythm, normal S1 and S2, no murmurs  Peripheral vascular exam: Normal pulses Throughout      Genitourinary   External genitalia: Normal and no lesions appreciated  Vagina: Normal, no lesions or dryness appreciated  Urethra: Normal     Urethral meatus: Normal     Bladder: Normal, soft, non-tender and no prolapse or masses appreciated  Cervix: Normal, no palpable masses  Uterus: Normal, non-tender, not enlarged, and no palpable masses  Adnexa/parametria: Normal, non-tender and no fullness or masses appreciated  Anus, perineum, and rectum: Normal sphincter tone, no masses, and no prolapse  Chest   Breasts: Normal and no dimpling or skin changes noted  Abdomen   Abdomen: Normal, non-tender, and no organomegaly noted  Liver and spleen: No hepatomegaly or splenomegaly  Examination for hernias: No hernias appreciated  Stool sample for occult blood: Negative  Lymphatic   Palpation of lymph nodes in neck, axillae, groin and/or other locations: No lymphadenopathy or masses noted  Skin   Skin and subcutaneous tissue: Normal skin turgor and no rashes  Palpation of skin and subcutaneous tissue: Normal     Psychiatric   Orientation to person, place, and time: Normal     Mood and affect: Normal        Assessment    1  Encounter for cervical Pap smear with pelvic exam (V76 2,V72 31) (Z01 419)   2  Menopausal symptoms (627 2) (N95 1)   3  Screening for colon cancer (V76 51) (Z12 11)    Plan  Encounter for screening mammogram for malignant neoplasm of breast    · * MAMMO SCREENING BILATERAL W CAD; Status:Hold For - Scheduling,Retrospective  By Protocol Authorization; Requested for:98Beh2877;    Perform:[facility]; ZOF:[id number]; Last Updated By:Ivonne Herrera; 12/27/2017 4:30:45 PM;Ordered;  For:Encounter for screening mammogram for malignant neoplasm of breast; Ordered By:Denys Rodrigues;  Menopausal symptoms    · Prempro 0 3-1 5 MG Oral Tablet; Take 1 tablet by mouth daily   Rx By: Ernst Figueroa; Dispense: 90 Days ; #:90 Tablet;  Refill: 3; For: Menopausal symptoms; TANVI = N; Sent To: [facility] for colon cancer    · Follow-up visit in 1 year Evaluation and Treatment  Follow-up  Status: Hold For -  Scheduling  Requested for: 98Zox6344   Ordered; For: Screening for colon cancer; Ordered By: Angel Mccarthy Performed:  Due: 40CWQ5185    Discussion/Summary    Reviewed potential gyn emergencies; Rx for next screening mammogram and Rx for tee/prop sent to [facility] mail order; will heed pap; RTO 1year; 10 month old grandson with RSV        Future Appointments    Signatures   Electronically signed by : Alexis Mendoza DO; Dec 27 2017  5:09PM EST                       (Author)

## 2018-02-07 RX ORDER — BUPROPION HYDROCHLORIDE 300 MG/1
1 TABLET ORAL DAILY
COMMUNITY
End: 2021-10-01 | Stop reason: SDUPTHER

## 2018-02-08 ENCOUNTER — OFFICE VISIT (OUTPATIENT)
Dept: BARIATRICS | Facility: CLINIC | Age: 58
End: 2018-02-08
Payer: COMMERCIAL

## 2018-02-08 VITALS
WEIGHT: 151 LBS | SYSTOLIC BLOOD PRESSURE: 102 MMHG | HEIGHT: 66 IN | HEART RATE: 72 BPM | DIASTOLIC BLOOD PRESSURE: 68 MMHG | TEMPERATURE: 98.2 F | RESPIRATION RATE: 20 BRPM | BODY MASS INDEX: 24.27 KG/M2

## 2018-02-08 DIAGNOSIS — K21.9 GASTROESOPHAGEAL REFLUX DISEASE, ESOPHAGITIS PRESENCE NOT SPECIFIED: Primary | ICD-10-CM

## 2018-02-08 PROCEDURE — 99214 OFFICE O/P EST MOD 30 MIN: CPT | Performed by: SURGERY

## 2018-02-08 RX ORDER — OSELTAMIVIR PHOSPHATE 75 MG/1
CAPSULE ORAL
COMMUNITY
Start: 2018-01-23 | End: 2018-07-26

## 2018-02-08 NOTE — PROGRESS NOTES
Follow Up - Bariatric Surgery   Zora Renee 59 y o  female MRN: [id number]  Unit/Bed#:  Encounter: [id number]            Subjective:  Doing very well except for some occasional dry heaving denies chest pain regurgitation or heartburn    Objective:         Lab, Imaging and other studies: I have personally reviewed pertinent labs  Family History: non-contributory    Meds/Allergies   all medications and allergies reviewed    Vitals: Blood pressure 102/68, pulse 72, temperature 98 2 °F (36 8 °C), resp  rate 20, height 5' 6" (1 676 m), weight 68 5 kg (151 lb)  ,Body mass index is 24 37 kg/m²  [unfilled]    Invasive Devices          No matching active lines, drains, or airways          Physical Exam:     NAD  Abdomen soft non-tender, incisions well healed  No palpable hernias    Assessment/Plan:    patient is s/p paraesophageal hernia repair and Nissen fundoplication that was performed in August 2016 patient continues to do very well she is currently off medication and she denies chest pain heartburn or regurgitation    Follow-up in 1 year              Laverne Pulido MD

## 2018-02-22 DIAGNOSIS — G43.909 MIGRAINE WITHOUT STATUS MIGRAINOSUS, NOT INTRACTABLE, UNSPECIFIED MIGRAINE TYPE: Primary | ICD-10-CM

## 2018-02-22 RX ORDER — SUMATRIPTAN 100 MG/1
TABLET, FILM COATED ORAL
Qty: 27 TABLET | Refills: 0 | Status: SHIPPED | OUTPATIENT
Start: 2018-02-22 | End: 2018-05-15 | Stop reason: SDUPTHER

## 2018-02-27 DIAGNOSIS — E78.5 HYPERLIPIDEMIA: ICD-10-CM

## 2018-03-08 ENCOUNTER — TELEPHONE (OUTPATIENT)
Dept: NEUROLOGY | Facility: CLINIC | Age: 58
End: 2018-03-08

## 2018-03-12 ENCOUNTER — TELEPHONE (OUTPATIENT)
Dept: NEUROLOGY | Facility: CLINIC | Age: 58
End: 2018-03-12

## 2018-03-13 ENCOUNTER — TELEPHONE (OUTPATIENT)
Dept: NEUROLOGY | Facility: CLINIC | Age: 58
End: 2018-03-13

## 2018-05-15 DIAGNOSIS — G43.909 MIGRAINE WITHOUT STATUS MIGRAINOSUS, NOT INTRACTABLE, UNSPECIFIED MIGRAINE TYPE: ICD-10-CM

## 2018-05-15 RX ORDER — SUMATRIPTAN 100 MG/1
TABLET, FILM COATED ORAL
Qty: 27 TABLET | Refills: 0 | Status: SHIPPED | OUTPATIENT
Start: 2018-05-15 | End: 2018-07-26 | Stop reason: SDUPTHER

## 2018-07-26 ENCOUNTER — OFFICE VISIT (OUTPATIENT)
Dept: NEUROLOGY | Facility: CLINIC | Age: 58
End: 2018-07-26
Payer: COMMERCIAL

## 2018-07-26 VITALS
SYSTOLIC BLOOD PRESSURE: 97 MMHG | DIASTOLIC BLOOD PRESSURE: 58 MMHG | HEIGHT: 66 IN | HEART RATE: 80 BPM | WEIGHT: 150 LBS | BODY MASS INDEX: 24.11 KG/M2

## 2018-07-26 DIAGNOSIS — D35.2 PITUITARY ADENOMA (HCC): Primary | ICD-10-CM

## 2018-07-26 DIAGNOSIS — G43.909 MIGRAINE WITHOUT STATUS MIGRAINOSUS, NOT INTRACTABLE, UNSPECIFIED MIGRAINE TYPE: ICD-10-CM

## 2018-07-26 PROCEDURE — 99213 OFFICE O/P EST LOW 20 MIN: CPT | Performed by: PSYCHIATRY & NEUROLOGY

## 2018-07-26 RX ORDER — SUMATRIPTAN 100 MG/1
TABLET, FILM COATED ORAL
Qty: 27 TABLET | Refills: 1 | Status: SHIPPED | OUTPATIENT
Start: 2018-07-26 | End: 2019-01-25 | Stop reason: SDUPTHER

## 2018-07-26 NOTE — ASSESSMENT & PLAN NOTE
Overall doing well  Imitrex continues to work well for abortive treatment  If increase in frequency we can add a prophylactic medication

## 2018-07-26 NOTE — PROGRESS NOTES
Patient ID: Terrance Buckner is a 59 y o  female  Assessment/Plan:    Migraine  Overall doing well  Imitrex continues to work well for abortive treatment  If increase in frequency we can add a prophylactic medication  Pituitary adenoma ([address] )  MRI bria from Jan 2018 reviewed  No new symptoms  Diagnoses and all orders for this visit:    Pituitary adenoma ([address] )    Migraine without status migrainosus, not intractable, unspecified migraine type  -     SUMAtriptan (IMITREX) 100 mg tablet; Take 1 tablet by mouth for migraine relief  May repeat 2 hours later  Maximum 200mg/day  Subjective:    Ms Terrance Buckenr is a right handed woman with a history of a pituitary adenoma and migraines who presents today for follow up  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 on MRI which has been stable  She is seen by Dr Cyndie Andrea at  and the MRI is faxed down to them yearly  Surgery was not suggested given small size  She continues to follow with Dr Pilar Donovan in endocrinology  She sees Dr Little Suarez, neuroophthalmology in [place] twice a year  He recently found two cotton wool spots  To review, migraines started 25 years ago  MRI of the brain in Nov 2016 was stable  She continues to do well  She has migraines 4-5 times monthly  Typically posterior and temporal and dull but strong in nature with nausea and photophobia  Migraines vary in severity  Imitrex 100mg and Motrin 600mg is used as abortive treatment with good results within 45min  On rare occasion Imitrex does not work and it can continue to the next day  She lays down and covers her eye  Imitrex can cause tiredness     Stress and fatigue will bring on migraines  The following portions of the patient's history were reviewed and updated as appropriate: allergies, current medications and problem list          Objective:    Blood pressure 97/58, pulse 80, height 5' 6" (1 676 m), weight 68 kg (150 lb)      Physical Exam Constitutional: She appears well-developed  Eyes: EOM are normal  Pupils are equal, round, and reactive to light  Neurological: She has normal strength and normal reflexes  Gait normal    Psychiatric: Her speech is normal    Vitals reviewed  Neurological Exam    Mental Status  The patient is alert and oriented to person, place, time, and situation  Her recent and remote memory are normal  Her speech is normal  Her language is fluent with no aphasia  She has normal attention span and concentration  She follows multi-step commands  She has a normal fund of knowledge  Cranial Nerves  CN I: The patient has not tested  CN II: The patient's visual acuity and visual fields are normal   CN III, IV, VI: The patient's pupils are equally round and reactive to light and ocular movements are normal   CN V: The patient has normal facial sensation  CN VII:  The patient has symmetric facial movement  CN VIII:  The patient's hearing is normal   CN IX, X: The patient has symmetric palate movement  CN XI: The patient's shoulder shrug strength is normal   CN XII: The patient's tongue is midline without atrophy or fasciculations  Motor   Her overall muscle tone is normal throughout  Her strength is 5/5 throughout all four extremities  Sensory  The patient's sensation is to light touch  Reflexes  Deep tendon reflexes are 2+ and symmetric in all four extremities with downgoing toes bilaterally  Gait and Coordination  The patient has normal gait and station            ROS:    Review of Systems

## 2018-08-23 ENCOUNTER — TRANSCRIBE ORDERS (OUTPATIENT)
Dept: ADMINISTRATIVE | Facility: HOSPITAL | Age: 58
End: 2018-08-23

## 2018-08-23 ENCOUNTER — APPOINTMENT (OUTPATIENT)
Dept: LAB | Facility: MEDICAL CENTER | Age: 58
End: 2018-08-23
Payer: COMMERCIAL

## 2018-08-23 DIAGNOSIS — E23.7 PITUITARY ABNORMALITY (HCC): ICD-10-CM

## 2018-08-23 DIAGNOSIS — E78.5 HYPERLIPIDEMIA: ICD-10-CM

## 2018-08-23 DIAGNOSIS — Z00.8 HEALTH EXAMINATION IN POPULATION SURVEY: ICD-10-CM

## 2018-08-23 DIAGNOSIS — E23.7 PITUITARY ABNORMALITY (HCC): Primary | ICD-10-CM

## 2018-08-23 DIAGNOSIS — Z00.8 HEALTH EXAMINATION IN POPULATION SURVEY: Primary | ICD-10-CM

## 2018-08-23 LAB
ALBUMIN SERPL BCP-MCNC: 3.6 G/DL (ref 3.5–5)
ALP SERPL-CCNC: 77 U/L (ref 46–116)
ALT SERPL W P-5'-P-CCNC: 29 U/L (ref 12–78)
ANION GAP SERPL CALCULATED.3IONS-SCNC: 6 MMOL/L (ref 4–13)
AST SERPL W P-5'-P-CCNC: 17 U/L (ref 5–45)
BILIRUB SERPL-MCNC: 0.53 MG/DL (ref 0.2–1)
BUN SERPL-MCNC: 15 MG/DL (ref 5–25)
CALCIUM SERPL-MCNC: 8.9 MG/DL (ref 8.3–10.1)
CHLORIDE SERPL-SCNC: 105 MMOL/L (ref 100–108)
CHOLEST SERPL-MCNC: 177 MG/DL (ref 50–200)
CK SERPL-CCNC: 99 U/L (ref 26–192)
CO2 SERPL-SCNC: 29 MMOL/L (ref 21–32)
CORTIS AM PEAK SERPL-MCNC: 12.9 UG/DL (ref 4.2–22.4)
CREAT SERPL-MCNC: 0.73 MG/DL (ref 0.6–1.3)
EST. AVERAGE GLUCOSE BLD GHB EST-MCNC: 105 MG/DL
GFR SERPL CREATININE-BSD FRML MDRD: 90 ML/MIN/1.73SQ M
GLUCOSE P FAST SERPL-MCNC: 82 MG/DL (ref 65–99)
HBA1C MFR BLD: 5.3 % (ref 4.2–6.3)
HDLC SERPL-MCNC: 71 MG/DL (ref 40–60)
LDLC SERPL CALC-MCNC: 96 MG/DL (ref 0–100)
NONHDLC SERPL-MCNC: 106 MG/DL
POTASSIUM SERPL-SCNC: 4 MMOL/L (ref 3.5–5.3)
PROT SERPL-MCNC: 7.3 G/DL (ref 6.4–8.2)
SODIUM SERPL-SCNC: 140 MMOL/L (ref 136–145)
TRIGL SERPL-MCNC: 48 MG/DL

## 2018-08-23 PROCEDURE — 80061 LIPID PANEL: CPT

## 2018-08-23 PROCEDURE — 36415 COLL VENOUS BLD VENIPUNCTURE: CPT

## 2018-08-23 PROCEDURE — 82550 ASSAY OF CK (CPK): CPT

## 2018-08-23 PROCEDURE — 80053 COMPREHEN METABOLIC PANEL: CPT

## 2018-08-23 PROCEDURE — 82533 TOTAL CORTISOL: CPT

## 2018-08-23 PROCEDURE — 82024 ASSAY OF ACTH: CPT

## 2018-08-23 PROCEDURE — 83036 HEMOGLOBIN GLYCOSYLATED A1C: CPT

## 2018-08-24 LAB — ACTH PLAS-MCNC: 13.1 PG/ML (ref 7.2–63.3)

## 2018-10-09 ENCOUNTER — OFFICE VISIT (OUTPATIENT)
Dept: CARDIOLOGY CLINIC | Facility: CLINIC | Age: 58
End: 2018-10-09
Payer: COMMERCIAL

## 2018-10-09 VITALS
BODY MASS INDEX: 24.64 KG/M2 | DIASTOLIC BLOOD PRESSURE: 72 MMHG | HEIGHT: 66 IN | OXYGEN SATURATION: 98 % | SYSTOLIC BLOOD PRESSURE: 108 MMHG | WEIGHT: 153.3 LBS | HEART RATE: 73 BPM

## 2018-10-09 DIAGNOSIS — E78.5 DYSLIPIDEMIA: Primary | ICD-10-CM

## 2018-10-09 DIAGNOSIS — E78.2 MIXED HYPERLIPIDEMIA: ICD-10-CM

## 2018-10-09 PROCEDURE — 99214 OFFICE O/P EST MOD 30 MIN: CPT | Performed by: INTERNAL MEDICINE

## 2018-10-09 PROCEDURE — 93000 ELECTROCARDIOGRAM COMPLETE: CPT | Performed by: INTERNAL MEDICINE

## 2018-10-09 NOTE — PROGRESS NOTES
Follow-up - Cardiology   Abdias Oakley 59 y o  female MRN: [id number]        Problems    Problem List Items Addressed This Visit     None      Visit Diagnoses     Dyslipidemia    -  Primary    Relevant Orders    POCT ECG    Mixed hyperlipidemia                Plan        History of Present Illness  Cardiology HPI Free Text Note Form [facility]: Patient sees us at long-term intervals  She recently saw for positive family history of vascular disease and showed an LDL of 192  She is no documented vascular disease  She is on Lipitor several times a week and her LDL is down to 108  I made no changes  our standpoint we've very little input to her care  issues recently have been severe iron deficiency anemia secondary to GERD and they're considering a procedure to reduce her hiatal hernia  addition she has a pituitary adenomatous being followed by endocrine  seen October 17, 2017  seen because of positive family history coronary disease and she has significantly elevated LDL  LDL is 128 on 10 mg of Lipitor every other day  Her increasing to take back to daily  did have significant anemia secondary to a hiatal hernia  She did have diet hernia repair  Fairly significant operation  She's done well since  Patient seen October 9, 2018  See her primarily for mild hyperlipidemia positive family history coronary disease  She also has a very small pituitary tumor is followed by Endocrine  It is not endocrine active  Her LDL is 96 on therapy  She is basically asymptomatic  She has some fleeting type of chest pain which is a very very low probability being anginal in nature  She seen basically on a yearly basis  Blood pressures been excellent  HPI: Abdias Oakley is a 59y o  year old female        Review of Systems   Constitutional: Negative  Endocrine: Negative  Genitourinary: Negative  Neurological: Negative  Hematological: Negative            Past Medical History:   Diagnosis Date    Anemia     iron deficiency, pt gets infusions once a week    Anxiety     Tiburcio ulcer     Depression     GERD (gastroesophageal reflux disease)     Hiatal hernia     Hyperlipidemia     Migraine     Mitral valve prolapse     Murmur     Palpitations     occas    Pituitary adenoma (HCC)     Sleep apnea     mild, no CPAP    Vitamin B12 deficiency     Wears glasses      History   Alcohol Use    Yes     Comment: Social     History   Drug Use No     History   Smoking Status    Never Smoker   Smokeless Tobacco    Never Used       Allergies:  No Known Allergies    Medications:     Current Outpatient Prescriptions:     ascorbic acid (VITAMIN C) 500 mg tablet, Take 500 mg by mouth 2 (two) times a day  , Disp: , Rfl:     aspirin 81 MG tablet, Take 1 tablet by mouth daily, Disp: , Rfl:     atorvastatin (LIPITOR) 10 mg tablet, Take 10 mg by mouth daily  , Disp: , Rfl:     buPROPion (WELLBUTRIN XL) 300 mg 24 hr tablet, Take 1 tablet by mouth daily, Disp: , Rfl:     Cholecalciferol (VITAMIN D) 2000 UNITS CAPS, Take 1 tablet by mouth daily  , Disp: , Rfl:     DULoxetine (CYMBALTA) 20 mg capsule, Take 20 mg by mouth daily  , Disp: , Rfl:     estrogen, conjugated,-medroxyprogesterone (PREMPRO) 0 3-1 5 MG per tablet, Take 1 tablet by mouth daily, Disp: , Rfl:     multivitamin (THERAGRAN) TABS, Take 1 tablet by mouth daily  , Disp: , Rfl:     SUMAtriptan (IMITREX) 100 mg tablet, Take 1 tablet by mouth for migraine relief  May repeat 2 hours later  Maximum 200mg/day , Disp: 27 tablet, Rfl: 1    vitamin E, tocopherol, 400 units capsule, Take 400 Units by mouth daily  , Disp: , Rfl:       Physical Exam   Constitutional: She is oriented to person, place, and time  She appears well-developed  Cardiovascular: Regular rhythm  No murmur heard  Pulmonary/Chest: Breath sounds normal    Musculoskeletal: She exhibits no edema  Neurological: She is oriented to person, place, and time           Laboratory Studies:  CMP:    NT-proBNP: No results found for: NTBNP   Coags:    Lipid Profile:   Lab Results   Component Value Date    CHOL 175 06/27/2015     Lab Results   Component Value Date    HDL 71 (H) 08/23/2018     Lab Results   Component Value Date    LDLCALC 96 08/23/2018     Lab Results   Component Value Date    TRIG 48 08/23/2018       Cardiac testing:     EKG reviewed personally:   Normal EKG    No results found for this or any previous visit  No results found for this or any previous visit  No results found for this or any previous visit  No results found for this or any previous visit  Lala Garza MD    Portions of the record may have been created with voice recognition software   Occasional wrong word or "sound a like" substitutions may have occurred due to the inherent limitations of voice recognition software   Read the chart carefully and recognize, using context, where substitutions have occurred

## 2018-11-29 ENCOUNTER — OFFICE VISIT (OUTPATIENT)
Dept: GASTROENTEROLOGY | Facility: MEDICAL CENTER | Age: 58
End: 2018-11-29
Payer: COMMERCIAL

## 2018-11-29 VITALS
DIASTOLIC BLOOD PRESSURE: 62 MMHG | SYSTOLIC BLOOD PRESSURE: 112 MMHG | HEIGHT: 66 IN | WEIGHT: 153 LBS | BODY MASS INDEX: 24.59 KG/M2 | HEART RATE: 75 BPM | TEMPERATURE: 97.3 F

## 2018-11-29 DIAGNOSIS — D50.9 IRON DEFICIENCY ANEMIA, UNSPECIFIED IRON DEFICIENCY ANEMIA TYPE: ICD-10-CM

## 2018-11-29 DIAGNOSIS — K44.9 PARAESOPHAGEAL HERNIA: Primary | ICD-10-CM

## 2018-11-29 DIAGNOSIS — K21.9 GASTROESOPHAGEAL REFLUX DISEASE WITHOUT ESOPHAGITIS: ICD-10-CM

## 2018-11-29 PROCEDURE — 99214 OFFICE O/P EST MOD 30 MIN: CPT | Performed by: INTERNAL MEDICINE

## 2018-11-29 NOTE — LETTER
November 29, 2018     Denise Meyer, [address] [place] [id number]    Patient: Darina Primrose   YOB: 1960   Date of Visit: 11/29/2018       Dear Dr Brigette Hardin:    Thank you for referring Darina Primrose to me for evaluation  Below are my notes for this consultation  If you have questions, please do not hesitate to call me  I look forward to following your patient along with you  Sincerely,        Steve Sorensen MD        CC: MD Alyce Lorenzo MD Lunda Genin, MD  11/29/2018  8:59 AM  Sign at close encounter  [facility] - Outpatient Follow-up Note  Darina Primrose 59 y o  female MRN: [id number]  Encounter: [id number]          ASSESSMENT AND PLAN:      1  Paraesophageal hernia  Her paraesophageal hernia was surgically repaired and she seems to be doing well clinically since then  I suspect the Tiburcio's erosions due to the large hernia with the cause of her iron deficiency anemia  Her occasional diarrhea symptoms after meals may be due to an exaggerated gastrocolic reflex after her Nissen fundoplication  2  Iron deficiency anemia, unspecified iron deficiency anemia type  I will check her iron studies and her fecal immunochemical test because of her history of iron deficiency anemia and to make sure this has resolved  If not, we could consider repeating her upper endoscopy and colonoscopy  If not, it would be reasonable to repeat her colonoscopy in 2021 which would be five years after her last one   - Occult Bloood,Fecal Immunochemical; Future  - Iron Panel; Future    3   Gastroesophageal reflux disease without esophagitis  Since her reflux symptoms are mild, she can take Zantac or Pepcid as needed but I would not suggest a regular proton pump inhibitor     ______________________________________________________________________    SUBJECTIVE:  She presents for follow-up of her iron deficiency anemia, reflux, and her history of paraesophageal hernia  During her last upper endoscopy about two years ago she was noted to have a large hiatal hernia with Tiburcio's erosions and her colonoscopy was unremarkable  She had a Nissen fundoplication performed and has not noted any bleeding since then  Her hemoglobin was normal at 13 4 the last time it was checked  She has not had any melena, rectal bleeding, or hematemesis  She occasionally has reflux symptoms for which she has not been taking any regular medication she denies any dysphagia symptoms but occasionally has diarrhea after eating  REVIEW OF SYSTEMS IS OTHERWISE NEGATIVE  Historical Information   Past Medical History:   Diagnosis Date    Anemia     iron deficiency, pt gets infusions once a week    Anxiety     Tiburcio ulcer     Depression     GERD (gastroesophageal reflux disease)     Hiatal hernia     Hyperlipidemia     Migraine     Mitral valve prolapse     Murmur     Palpitations     occas    Pituitary adenoma (HCC)     Sleep apnea     mild, no CPAP    Vitamin B12 deficiency     Wears glasses      Past Surgical History:   Procedure Laterality Date    BREAST SURGERY      L breast cyst removal-benign    DE QUERVAIN'S RELEASE Bilateral     EGD AND COLONOSCOPY N/A 5/11/2016    Procedure: EGD AND COLONOSCOPY;  Surgeon: Perla Zarate MD;  Location: [facility] GI LAB;   Service:     AK LAP, REPAIR PARAESOPHAGEAL HERNIA, INCL FUNDOPLASTY W/ MESH N/A 8/31/2016    Procedure: REPAIR HERNIA PARAESOPHAGEAL  with bio A mesh LAPAROSCOPIC NISSEN FUNDOPLICATION Laparoscopic Gastroplexy Intraop EGD #[id number];  Surgeon: Catherine Duggan MD;  Location: [facility] OR;  Service: Bariatrics    TONSILLECTOMY      WRIST SURGERY Bilateral     daquero vein release     Social History   History   Alcohol Use    Yes     Comment: Social     History   Drug Use No     History   Smoking Status    Never Smoker   Smokeless Tobacco    Never Used     Family History   Problem Relation Age of Onset    Anxiety disorder Mother     Arthritis Mother     Hyperlipidemia Mother     Hypertension Mother     Prostate cancer Father     Dementia Father     Parkinsonism Father        Meds/Allergies       Current Outpatient Prescriptions:     ascorbic acid (VITAMIN C) 500 mg tablet    aspirin 81 MG tablet    atorvastatin (LIPITOR) 10 mg tablet    buPROPion (WELLBUTRIN XL) 300 mg 24 hr tablet    Cholecalciferol (VITAMIN D) 2000 UNITS CAPS    DULoxetine (CYMBALTA) 20 mg capsule    estrogen, conjugated,-medroxyprogesterone (PREMPRO) 0 3-1 5 MG per tablet    multivitamin (THERAGRAN) TABS    SUMAtriptan (IMITREX) 100 mg tablet    vitamin E, tocopherol, 400 units capsule    No Known Allergies        Objective     Blood pressure 112/62, pulse 75, temperature (!) 97 3 °F (36 3 °C), temperature source Tympanic, height 5' 6" (1 676 m), weight 69 4 kg (153 lb)  Body mass index is 24 69 kg/m²  PHYSICAL EXAM:      General Appearance:   Alert, cooperative, no distress   HEENT:   Normocephalic, atraumatic, anicteric      Neck:  Supple, symmetrical, trachea midline   Lungs:   Clear to auscultation bilaterally; no rales, rhonchi or wheezing; respirations unlabored    Heart[de-identified]   Regular rate and rhythm; no murmur, rub, or gallop  Abdomen:   Soft, non-tender, non-distended; normal bowel sounds; no masses, no organomegaly    Genitalia:   Deferred    Rectal:   Deferred    Extremities:  No cyanosis, clubbing or edema    Pulses:  2+ and symmetric    Skin:  No jaundice, rashes, or lesions    Lymph nodes:  No palpable cervical lymphadenopathy        Lab Results:   No visits with results within 1 Day(s) from this visit  Latest known visit with results is:   Appointment on 08/23/2018   Component Date Value    Hemoglobin A1C 08/23/2018 5 3     EAG 08/23/2018 105          Radiology Results:   No results found

## 2018-11-29 NOTE — PROGRESS NOTES
[facility] - Outpatient Follow-up Note  Pedro Pablo Perez 59 y o  female MRN: [id number]  Encounter: [id number]          ASSESSMENT AND PLAN:      1  Paraesophageal hernia  Her paraesophageal hernia was surgically repaired and she seems to be doing well clinically since then  I suspect the Tiburcio's erosions due to the large hernia with the cause of her iron deficiency anemia  Her occasional diarrhea symptoms after meals may be due to an exaggerated gastrocolic reflex after her Nissen fundoplication  2  Iron deficiency anemia, unspecified iron deficiency anemia type  I will check her iron studies and her fecal immunochemical test because of her history of iron deficiency anemia and to make sure this has resolved  If not, we could consider repeating her upper endoscopy and colonoscopy  If not, it would be reasonable to repeat her colonoscopy in 2021 which would be five years after her last one   - Occult Bloood,Fecal Immunochemical; Future  - Iron Panel; Future    3  Gastroesophageal reflux disease without esophagitis  Since her reflux symptoms are mild, she can take Zantac or Pepcid as needed but I would not suggest a regular proton pump inhibitor     ______________________________________________________________________    SUBJECTIVE:  She presents for follow-up of her iron deficiency anemia, reflux, and her history of paraesophageal hernia  During her last upper endoscopy about two years ago she was noted to have a large hiatal hernia with Tiburcio's erosions and her colonoscopy was unremarkable  She had a Nissen fundoplication performed and has not noted any bleeding since then  Her hemoglobin was normal at 13 4 the last time it was checked  She has not had any melena, rectal bleeding, or hematemesis    She occasionally has reflux symptoms for which she has not been taking any regular medication she denies any dysphagia symptoms but occasionally has diarrhea after eating  REVIEW OF SYSTEMS IS OTHERWISE NEGATIVE  Historical Information   Past Medical History:   Diagnosis Date    Anemia     iron deficiency, pt gets infusions once a week    Anxiety     Tiburcio ulcer     Depression     GERD (gastroesophageal reflux disease)     Hiatal hernia     Hyperlipidemia     Migraine     Mitral valve prolapse     Murmur     Palpitations     occas    Pituitary adenoma (HCC)     Sleep apnea     mild, no CPAP    Vitamin B12 deficiency     Wears glasses      Past Surgical History:   Procedure Laterality Date    BREAST SURGERY      L breast cyst removal-benign    DE QUERVAIN'S RELEASE Bilateral     EGD AND COLONOSCOPY N/A 5/11/2016    Procedure: EGD AND COLONOSCOPY;  Surgeon: Tony Han MD;  Location: [facility] GI LAB;   Service:     IA LAP, REPAIR PARAESOPHAGEAL HERNIA, INCL FUNDOPLASTY W/ MESH N/A 8/31/2016    Procedure: REPAIR HERNIA PARAESOPHAGEAL  with bio A mesh LAPAROSCOPIC NISSEN FUNDOPLICATION Laparoscopic Gastroplexy Intraop EGD #[id number];  Surgeon: Gi Salcedo MD;  Location: [facility] OR;  Service: Bariatrics    TONSILLECTOMY      WRIST SURGERY Bilateral     daquero vein release     Social History   History   Alcohol Use    Yes     Comment: Social     History   Drug Use No     History   Smoking Status    Never Smoker   Smokeless Tobacco    Never Used     Family History   Problem Relation Age of Onset    Anxiety disorder Mother     Arthritis Mother     Hyperlipidemia Mother     Hypertension Mother     Prostate cancer Father     Dementia Father     Parkinsonism Father        Meds/Allergies       Current Outpatient Prescriptions:     ascorbic acid (VITAMIN C) 500 mg tablet    aspirin 81 MG tablet    atorvastatin (LIPITOR) 10 mg tablet    buPROPion (WELLBUTRIN XL) 300 mg 24 hr tablet    Cholecalciferol (VITAMIN D) 2000 UNITS CAPS    DULoxetine (CYMBALTA) 20 mg capsule    estrogen, conjugated,-medroxyprogesterone (PREMPRO) 0 3-1 5 MG per tablet    multivitamin (THERAGRAN) TABS    SUMAtriptan (IMITREX) 100 mg tablet    vitamin E, tocopherol, 400 units capsule    No Known Allergies        Objective     Blood pressure 112/62, pulse 75, temperature (!) 97 3 °F (36 3 °C), temperature source Tympanic, height 5' 6" (1 676 m), weight 69 4 kg (153 lb)  Body mass index is 24 69 kg/m²  PHYSICAL EXAM:      General Appearance:   Alert, cooperative, no distress   HEENT:   Normocephalic, atraumatic, anicteric      Neck:  Supple, symmetrical, trachea midline   Lungs:   Clear to auscultation bilaterally; no rales, rhonchi or wheezing; respirations unlabored    Heart[de-identified]   Regular rate and rhythm; no murmur, rub, or gallop  Abdomen:   Soft, non-tender, non-distended; normal bowel sounds; no masses, no organomegaly    Genitalia:   Deferred    Rectal:   Deferred    Extremities:  No cyanosis, clubbing or edema    Pulses:  2+ and symmetric    Skin:  No jaundice, rashes, or lesions    Lymph nodes:  No palpable cervical lymphadenopathy        Lab Results:   No visits with results within 1 Day(s) from this visit  Latest known visit with results is:   Appointment on 08/23/2018   Component Date Value    Hemoglobin A1C 08/23/2018 5 3     EAG 08/23/2018 105          Radiology Results:   No results found

## 2018-12-04 ENCOUNTER — HOSPITAL ENCOUNTER (OUTPATIENT)
Dept: MAMMOGRAPHY | Facility: MEDICAL CENTER | Age: 58
Discharge: HOME | End: 2018-12-04
Payer: COMMERCIAL

## 2018-12-04 VITALS — HEIGHT: 67 IN | WEIGHT: 150 LBS | BODY MASS INDEX: 23.54 KG/M2

## 2018-12-04 DIAGNOSIS — Z12.31 VISIT FOR SCREENING MAMMOGRAM: ICD-10-CM

## 2018-12-04 PROCEDURE — 77063 BREAST TOMOSYNTHESIS BI: CPT

## 2018-12-04 PROCEDURE — 77067 SCR MAMMO BI INCL CAD: CPT

## 2018-12-05 ENCOUNTER — ANNUAL EXAM (OUTPATIENT)
Dept: OBGYN CLINIC | Facility: MEDICAL CENTER | Age: 58
End: 2018-12-05
Payer: COMMERCIAL

## 2018-12-05 VITALS — BODY MASS INDEX: 23.49 KG/M2 | WEIGHT: 150 LBS | DIASTOLIC BLOOD PRESSURE: 80 MMHG | SYSTOLIC BLOOD PRESSURE: 122 MMHG

## 2018-12-05 DIAGNOSIS — Z12.4 ENCOUNTER FOR PAPANICOLAOU SMEAR FOR CERVICAL CANCER SCREENING: Primary | ICD-10-CM

## 2018-12-05 DIAGNOSIS — Z01.419 ENCNTR FOR GYN EXAM (GENERAL) (ROUTINE) W/O ABN FINDINGS: ICD-10-CM

## 2018-12-05 DIAGNOSIS — Z12.39 SCREENING FOR MALIGNANT NEOPLASM OF BREAST: ICD-10-CM

## 2018-12-05 PROBLEM — M18.9 OSTEOARTHRITIS OF CARPOMETACARPAL (CMC) JOINT OF THUMB: Status: ACTIVE | Noted: 2018-01-17

## 2018-12-05 PROCEDURE — 99396 PREV VISIT EST AGE 40-64: CPT | Performed by: OBSTETRICS & GYNECOLOGY

## 2018-12-05 PROCEDURE — G0145 SCR C/V CYTO,THINLAYER,RESCR: HCPCS | Performed by: OBSTETRICS & GYNECOLOGY

## 2018-12-05 NOTE — PROGRESS NOTES
ASSESSMENT & PLAN: Connie Merrill was seen today for gynecologic exam     Diagnoses and all orders for this visit:    Encounter for Papanicolaou smear for cervical cancer screening  -     Liquid-based pap, screening    Encntr for gyn exam (general) (routine) w/o abn findings    Screening for malignant neoplasm of breast  -     Mammo screening bilateral w 3d & cad; Future    Discussion/Summary:  Pt  Here for yearly gyn preventive exam and requests a yearly pap smear; no new issues since last visit; will heed pap  1   Routine well woman exam done today  2  Pap and HPV:  The patient's pap is up to date  Pap and cotesting was done today  Current ASCCP Guidelines reviewed  3   Mammogram was ordered  4  Colonoscopy is up to date  4  The following were reviewed in today's visit: breast self exam, adequate intake of calcium and vitamin D, exercise and healthy diet  5  F/u 1 year      CC:  Annual Gynecologic Examination    HPI: Jeromy Alamo is a 59 y o  who presents for annual gynecologic examination  She has the following concerns:  none    Health Maintenance:    Patient describes her health as good  Patient does not have weight concerns  She exercises 7 days per week with work and caretaker   She doeswears her seatbelt routinely  She does perform regular monthly self breast exams  She does feel safe at home  Patients does not follow a  diet  Patient gets 4 servings of dairy or calcium rich foods a day      Last pap: 2017  Last mammogram: 2018  Last colonoscopy: on 5 year plan  Patient Active Problem List   Diagnosis    Paraesophageal hernia    Migraine    Pituitary adenoma ([address] )    Gastroesophageal reflux disease without esophagitis    Iron deficiency anemia    Acid reflux disease with ulcer    Tiburcio ulcer    Dyslipidemia    Hiatal hernia    Hyperlipidemia    Iron deficiency anemia due to chronic blood loss    Menopausal symptoms    Migraine, unspecified, not intractable, without status migrainosus    Osteoarthritis of carpometacarpal (CMC) joint of thumb       Past Medical History:   Diagnosis Date    Anemia     iron deficiency, pt gets infusions once a week    Anxiety     Tiburcio ulcer     Depression     GERD (gastroesophageal reflux disease)     Hiatal hernia     Hyperlipidemia     Migraine     Mitral valve prolapse     Murmur     Palpitations     occas    Pituitary adenoma (HCC)     Sleep apnea     mild, no CPAP    Vitamin B12 deficiency     Wears glasses        Past Surgical History:   Procedure Laterality Date    BREAST BIOPSY Left 1996    benign    BREAST SURGERY      L breast cyst removal-benign    DE QUERVAIN'S RELEASE Bilateral     EGD AND COLONOSCOPY N/A 5/11/2016    Procedure: EGD AND COLONOSCOPY;  Surgeon: Roberto Edwards MD;  Location: [facility] GI LAB; Service:     RI LAP, REPAIR PARAESOPHAGEAL HERNIA, INCL FUNDOPLASTY W/ MESH N/A 8/31/2016    Procedure: REPAIR HERNIA PARAESOPHAGEAL  with bio A mesh LAPAROSCOPIC NISSEN FUNDOPLICATION Laparoscopic Gastroplexy Intraop EGD #[id number];  Surgeon: Liz Baltazar MD;  Location: [facility] OR;  Service: Bariatrics    TONSILLECTOMY      WRIST SURGERY Bilateral     daquero vein release       Past OB/Gyn History:    History of abnormal pap smears: No    Patient is currently sexually active  monogamous    Patient's family planning method is post menopausal status      Family History   Problem Relation Age of Onset    Anxiety disorder Mother     Arthritis Mother     Hyperlipidemia Mother     Hypertension Mother     Prostate cancer Father 76    Dementia Father    Roseanna Hunter Parkinsonism Father     Other Father 80        bladder cancer    Ovarian cancer Maternal Grandmother 72    Breast cancer Paternal Aunt 66    Breast cancer Cousin 36    Colon cancer Paternal Aunt 46    Pancreatic cancer Paternal Aunt 67       Social History:  Social History     Social History    Marital status: /Civil Union     Spouse name: N/A  Number of children: N/A    Years of education: N/A     Occupational History    Not on file  Social History Main Topics    Smoking status: Never Smoker    Smokeless tobacco: Never Used    Alcohol use Yes      Comment: Social    Drug use: No    Sexual activity: Yes     Other Topics Concern    Not on file     Social History Narrative    No narrative on file     Presently lives with family  Patient is currently employed   No Known Allergies      Current Outpatient Prescriptions:     ascorbic acid (VITAMIN C) 500 mg tablet, Take 500 mg by mouth 2 (two) times a day  , Disp: , Rfl:     aspirin 81 MG tablet, Take 1 tablet by mouth daily, Disp: , Rfl:     atorvastatin (LIPITOR) 10 mg tablet, Take 10 mg by mouth daily  , Disp: , Rfl:     buPROPion (WELLBUTRIN XL) 300 mg 24 hr tablet, Take 1 tablet by mouth daily, Disp: , Rfl:     Cholecalciferol (VITAMIN D) 2000 UNITS CAPS, Take 1 tablet by mouth daily  , Disp: , Rfl:     DULoxetine (CYMBALTA) 20 mg capsule, Take 20 mg by mouth daily  , Disp: , Rfl:     estrogen, conjugated,-medroxyprogesterone (PREMPRO) 0 3-1 5 MG per tablet, Take 1 tablet by mouth daily, Disp: , Rfl:     multivitamin (THERAGRAN) TABS, Take 1 tablet by mouth daily  , Disp: , Rfl:     SUMAtriptan (IMITREX) 100 mg tablet, Take 1 tablet by mouth for migraine relief  May repeat 2 hours later  Maximum 200mg/day , Disp: 27 tablet, Rfl: 1    vitamin E, tocopherol, 400 units capsule, Take 400 Units by mouth daily  , Disp: , Rfl:     Review of Systems  Constitutional :no fever, feels well, no tiredness, no recent weight gain or loss  ENT: no ear ache, no loss of hearing, no nosebleeds or nasal discharge, no sore throat or hoarseness  Cardiovascular: no complaints of slow or fast heart beat, no chest pain, no palpitations, no leg claudication or lower extremity edema    Respiratory: no complaints of shortness of shortness of breath, no AGUAYO  Breasts:no complaints of breast pain, breast lump, or nipple discharge  Gastrointestinal: no complaints of abdominal pain, constipation, nausea, vomiting, or diarrhea or bloody stools  Genitourinary : no complaints of dysuria, incontinence, pelvic pain, no dysmenorrhea, vaginal discharge or abnormal vaginal bleeding and as noted in HPI  Musculoskeletal: no complaints of arthralgia, no myalgia, no joint swelling or stiffness, no limb pain or swelling  Integumentary: no complaints of skin rash or lesion, itching or dry skin  Neurological: no complaints of headache, no confusion, no numbness or tingling, no dizziness or fainting    Physical Exam:     /80   Wt 68 kg (150 lb)   BMI 23 49 kg/m²     General: appears stated age, cooperative, alert normal mood and affect   Psychiatric oriented to person, place and time  Mood and affect normal   Neck: normal, supple,trachea midline, no masses  Thyroid: normal, no thyromegaly   Heart: regular rate and rhythm, S1, S2 normal, no murmur, click, rub or gallop   Lungs: clear to auscultation bilaterally, no increased work of breathing or signs of respiratory distress   Breasts: normal, no dimpling or skin changes noted   Abdomen: soft, non-tender, without masses or organomegaly   Vulva: normal , no lesions   Vagina: normal , no lesions or dryness   Urethra: normal   Urethal meatus normal   Bladder Normal, soft, non-tender and no prolapse or masses appreciated   Cervix: normal, no palpable masses ;mecand c pap done by itz Mark  Student, Thomas (with my direct supervision)   Uterus: normal , non-tender, not enlarged, no palpable masses   Adnexa: normal, non-tender without fullness or masses;hemoccult neg  Lymphatic Palpation of lymph nodes in neck, axilla, groin and/or other locations: no lymphadenopathy or masses noted   Skin Normal skin turgor and no rashes    Palpation of skin and subcutaneous tissue normal

## 2018-12-05 NOTE — PATIENT INSTRUCTIONS
Works home care at [employer]; has 2 grand children , ages 1 5 and 3 5; parents live with pt   And , 78 and 89

## 2018-12-10 LAB
LAB AP GYN PRIMARY INTERPRETATION: NORMAL
Lab: NORMAL

## 2019-01-14 ENCOUNTER — TRANSCRIBE ORDERS (OUTPATIENT)
Dept: ADMINISTRATIVE | Facility: HOSPITAL | Age: 59
End: 2019-01-14

## 2019-01-14 DIAGNOSIS — E23.7 DISEASE OF PITUITARY GLAND (HCC): Primary | ICD-10-CM

## 2019-01-17 ENCOUNTER — LAB (OUTPATIENT)
Dept: LAB | Facility: MEDICAL CENTER | Age: 59
End: 2019-01-17
Payer: COMMERCIAL

## 2019-01-17 ENCOUNTER — TRANSCRIBE ORDERS (OUTPATIENT)
Dept: ADMINISTRATIVE | Facility: HOSPITAL | Age: 59
End: 2019-01-17

## 2019-01-17 DIAGNOSIS — D50.9 IRON DEFICIENCY ANEMIA, UNSPECIFIED IRON DEFICIENCY ANEMIA TYPE: ICD-10-CM

## 2019-01-17 DIAGNOSIS — E23.7 DISEASE OF PITUITARY GLAND (HCC): ICD-10-CM

## 2019-01-17 DIAGNOSIS — E23.7 DISEASE OF PITUITARY GLAND (HCC): Primary | ICD-10-CM

## 2019-01-17 DIAGNOSIS — E78.2 MIXED HYPERLIPIDEMIA: ICD-10-CM

## 2019-01-17 LAB
ALBUMIN SERPL BCP-MCNC: 3.7 G/DL (ref 3.5–5)
ALP SERPL-CCNC: 81 U/L (ref 46–116)
ALT SERPL W P-5'-P-CCNC: 28 U/L (ref 12–78)
ANION GAP SERPL CALCULATED.3IONS-SCNC: 7 MMOL/L (ref 4–13)
AST SERPL W P-5'-P-CCNC: 15 U/L (ref 5–45)
BASOPHILS # BLD AUTO: 0.06 THOUSANDS/ΜL (ref 0–0.1)
BASOPHILS NFR BLD AUTO: 1 % (ref 0–1)
BILIRUB SERPL-MCNC: 0.52 MG/DL (ref 0.2–1)
BUN SERPL-MCNC: 16 MG/DL (ref 5–25)
CALCIUM SERPL-MCNC: 8.6 MG/DL (ref 8.3–10.1)
CHLORIDE SERPL-SCNC: 105 MMOL/L (ref 100–108)
CO2 SERPL-SCNC: 27 MMOL/L (ref 21–32)
CORTIS AM PEAK SERPL-MCNC: 17.2 UG/DL (ref 4.2–22.4)
CREAT SERPL-MCNC: 0.71 MG/DL (ref 0.6–1.3)
CREAT UR-MCNC: 198 MG/DL
EOSINOPHIL # BLD AUTO: 0.16 THOUSAND/ΜL (ref 0–0.61)
EOSINOPHIL NFR BLD AUTO: 3 % (ref 0–6)
ERYTHROCYTE [DISTWIDTH] IN BLOOD BY AUTOMATED COUNT: 12.1 % (ref 11.6–15.1)
EST. AVERAGE GLUCOSE BLD GHB EST-MCNC: 114 MG/DL
FERRITIN SERPL-MCNC: 52 NG/ML (ref 8–388)
GFR SERPL CREATININE-BSD FRML MDRD: 93 ML/MIN/1.73SQ M
GLUCOSE P FAST SERPL-MCNC: 109 MG/DL (ref 65–99)
HBA1C MFR BLD: 5.6 % (ref 4.2–6.3)
HCT VFR BLD AUTO: 41.2 % (ref 34.8–46.1)
HGB BLD-MCNC: 13.2 G/DL (ref 11.5–15.4)
IMM GRANULOCYTES # BLD AUTO: 0.01 THOUSAND/UL (ref 0–0.2)
IMM GRANULOCYTES NFR BLD AUTO: 0 % (ref 0–2)
IRON SATN MFR SERPL: 21 %
IRON SERPL-MCNC: 67 UG/DL (ref 50–170)
LYMPHOCYTES # BLD AUTO: 1.92 THOUSANDS/ΜL (ref 0.6–4.47)
LYMPHOCYTES NFR BLD AUTO: 35 % (ref 14–44)
MAGNESIUM SERPL-MCNC: 2.4 MG/DL (ref 1.6–2.6)
MCH RBC QN AUTO: 31.4 PG (ref 26.8–34.3)
MCHC RBC AUTO-ENTMCNC: 32 G/DL (ref 31.4–37.4)
MCV RBC AUTO: 98 FL (ref 82–98)
MICROALBUMIN UR-MCNC: 12.5 MG/L (ref 0–20)
MICROALBUMIN/CREAT 24H UR: 6 MG/G CREATININE (ref 0–30)
MONOCYTES # BLD AUTO: 0.44 THOUSAND/ΜL (ref 0.17–1.22)
MONOCYTES NFR BLD AUTO: 8 % (ref 4–12)
NEUTROPHILS # BLD AUTO: 2.93 THOUSANDS/ΜL (ref 1.85–7.62)
NEUTS SEG NFR BLD AUTO: 53 % (ref 43–75)
NRBC BLD AUTO-RTO: 0 /100 WBCS
PHOSPHATE SERPL-MCNC: 4.1 MG/DL (ref 2.3–4.1)
PLATELET # BLD AUTO: 181 THOUSANDS/UL (ref 149–390)
PMV BLD AUTO: 11.6 FL (ref 8.9–12.7)
POTASSIUM SERPL-SCNC: 3.6 MMOL/L (ref 3.5–5.3)
PROLACTIN SERPL-MCNC: 14.8 NG/ML
PROT SERPL-MCNC: 7.2 G/DL (ref 6.4–8.2)
RBC # BLD AUTO: 4.21 MILLION/UL (ref 3.81–5.12)
SODIUM SERPL-SCNC: 139 MMOL/L (ref 136–145)
T4 FREE SERPL-MCNC: 0.95 NG/DL (ref 0.76–1.46)
TIBC SERPL-MCNC: 322 UG/DL (ref 250–450)
TSH SERPL DL<=0.05 MIU/L-ACNC: 1.79 UIU/ML (ref 0.36–3.74)
WBC # BLD AUTO: 5.52 THOUSAND/UL (ref 4.31–10.16)

## 2019-01-17 PROCEDURE — 82728 ASSAY OF FERRITIN: CPT

## 2019-01-17 PROCEDURE — 83003 ASSAY GROWTH HORMONE (HGH): CPT

## 2019-01-17 PROCEDURE — 82043 UR ALBUMIN QUANTITATIVE: CPT | Performed by: SPECIALIST

## 2019-01-17 PROCEDURE — 85025 COMPLETE CBC W/AUTO DIFF WBC: CPT

## 2019-01-17 PROCEDURE — 84305 ASSAY OF SOMATOMEDIN: CPT

## 2019-01-17 PROCEDURE — 36415 COLL VENOUS BLD VENIPUNCTURE: CPT

## 2019-01-17 PROCEDURE — 83036 HEMOGLOBIN GLYCOSYLATED A1C: CPT

## 2019-01-17 PROCEDURE — 82024 ASSAY OF ACTH: CPT

## 2019-01-17 PROCEDURE — 82570 ASSAY OF URINE CREATININE: CPT | Performed by: SPECIALIST

## 2019-01-17 PROCEDURE — 84439 ASSAY OF FREE THYROXINE: CPT

## 2019-01-17 PROCEDURE — 80053 COMPREHEN METABOLIC PANEL: CPT

## 2019-01-17 PROCEDURE — 84100 ASSAY OF PHOSPHORUS: CPT

## 2019-01-17 PROCEDURE — 83540 ASSAY OF IRON: CPT

## 2019-01-17 PROCEDURE — 83735 ASSAY OF MAGNESIUM: CPT

## 2019-01-17 PROCEDURE — 84146 ASSAY OF PROLACTIN: CPT

## 2019-01-17 PROCEDURE — 84443 ASSAY THYROID STIM HORMONE: CPT

## 2019-01-17 PROCEDURE — 82533 TOTAL CORTISOL: CPT

## 2019-01-17 PROCEDURE — 83550 IRON BINDING TEST: CPT

## 2019-01-18 LAB — ACTH PLAS-MCNC: 11.8 PG/ML (ref 7.2–63.3)

## 2019-01-19 LAB
GH SERPL-MCNC: 4 NG/ML (ref 0–10)
IGF-I SERPL-MCNC: 98 NG/ML (ref 46–172)

## 2019-01-25 DIAGNOSIS — N95.1 VASOMOTOR SYMPTOMS DUE TO MENOPAUSE: Primary | ICD-10-CM

## 2019-01-25 DIAGNOSIS — G43.909 MIGRAINE WITHOUT STATUS MIGRAINOSUS, NOT INTRACTABLE, UNSPECIFIED MIGRAINE TYPE: ICD-10-CM

## 2019-01-25 RX ORDER — ESTROGEN,CON/M-PROGEST ACET 0.3-1.5MG
TABLET ORAL
Qty: 84 TABLET | Refills: 0 | Status: SHIPPED | OUTPATIENT
Start: 2019-01-25 | End: 2019-04-30 | Stop reason: SDUPTHER

## 2019-01-28 ENCOUNTER — HOSPITAL ENCOUNTER (OUTPATIENT)
Dept: RADIOLOGY | Facility: HOSPITAL | Age: 59
Discharge: HOME | End: 2019-01-28
Attending: SPECIALIST
Payer: COMMERCIAL

## 2019-01-28 DIAGNOSIS — E23.7 DISEASE OF PITUITARY GLAND (HCC): ICD-10-CM

## 2019-01-28 PROCEDURE — 70553 MRI BRAIN STEM W/O & W/DYE: CPT

## 2019-01-28 PROCEDURE — A9585 GADOBUTROL INJECTION: HCPCS | Performed by: SPECIALIST

## 2019-01-28 RX ORDER — SUMATRIPTAN 100 MG/1
TABLET, FILM COATED ORAL
Qty: 27 TABLET | Refills: 0 | Status: SHIPPED | OUTPATIENT
Start: 2019-01-28 | End: 2021-05-26 | Stop reason: SDUPTHER

## 2019-01-28 RX ADMIN — GADOBUTROL 8 ML: 604.72 INJECTION INTRAVENOUS at 16:20

## 2019-02-07 ENCOUNTER — OFFICE VISIT (OUTPATIENT)
Dept: BARIATRICS | Facility: CLINIC | Age: 59
End: 2019-02-07
Payer: COMMERCIAL

## 2019-02-07 VITALS
HEIGHT: 66 IN | HEART RATE: 80 BPM | SYSTOLIC BLOOD PRESSURE: 118 MMHG | WEIGHT: 150.5 LBS | TEMPERATURE: 97.9 F | BODY MASS INDEX: 24.19 KG/M2 | DIASTOLIC BLOOD PRESSURE: 82 MMHG

## 2019-02-07 DIAGNOSIS — K44.9 PARAESOPHAGEAL HERNIA: ICD-10-CM

## 2019-02-07 DIAGNOSIS — K21.9 GASTROESOPHAGEAL REFLUX DISEASE WITHOUT ESOPHAGITIS: ICD-10-CM

## 2019-02-07 DIAGNOSIS — K21.9 GASTROESOPHAGEAL REFLUX DISEASE, ESOPHAGITIS PRESENCE NOT SPECIFIED: ICD-10-CM

## 2019-02-07 DIAGNOSIS — K44.9 HIATAL HERNIA: Primary | ICD-10-CM

## 2019-02-07 PROCEDURE — 99213 OFFICE O/P EST LOW 20 MIN: CPT | Performed by: SURGERY

## 2019-02-07 NOTE — PROGRESS NOTES
Assessment/Plan:     Patient ID: Oral Tolbert is a 59 y o  female  Paraesophageal Hernia  -S/p Paraesophageal Hernia Repair with Nissen Fundoplication and Mesh with Dr Marie Figures on 8/31/2016  Overall doing Well  - Reports no dysphagia, reflux, N/V  - Tolerating regular diet  - Not currently on anti-acid medication  - Maintaining healthy weight  - Follow up as needed     Subjective:      Patient ID: Oral Tolbert is a 59 y o  female  -s/p Paraesophageal Hernia Repair with Nissen Fundoplication with Dr Marie Figures 8/31/2016  Tolerating regular diet without dysphagia, reflux, N/V  Reports family has recently complained that she is loosing too much weight and appears "gaunt" since passing of her father in July  Our records indicate that patient weighted within 1-2 pounds of weight recorded in our office at last year's annual check up  Discussed with patient that she was overweight at initial pre-op screening with a BMI of 29 and now has a healthy BMI that she should maintain  Current BMI is Body mass index is 24 29 kg/m²  The following portions of the patient's history were reviewed and updated as appropriate: allergies, current medications, past family history, past medical history, past social history, past surgical history and problem list     Review of Systems   Constitutional: Positive for appetite change  Negative for chills, fatigue, fever and unexpected weight change  HENT: Negative for drooling, postnasal drip, trouble swallowing and voice change  Respiratory: Negative for chest tightness and shortness of breath  Cardiovascular: Negative for chest pain  Gastrointestinal: Negative for abdominal distention, abdominal pain, blood in stool, constipation, diarrhea, nausea and vomiting  Musculoskeletal: Negative  Neurological: Negative for dizziness, syncope, weakness, light-headedness and numbness  Psychiatric/Behavioral: Negative    Negative for decreased concentration, self-injury and suicidal ideas  The patient is not nervous/anxious  Objective:    /82 (BP Location: Left arm, Patient Position: Sitting, Cuff Size: Adult)   Pulse 80   Temp 97 9 °F (36 6 °C) (Tympanic)   Ht 5' 6" (1 676 m)   Wt 68 3 kg (150 lb 8 oz)   BMI 24 29 kg/m²      Physical Exam   Constitutional: She is oriented to person, place, and time  She appears well-developed and well-nourished  No distress  HENT:   Head: Normocephalic and atraumatic  Eyes: Conjunctivae and EOM are normal  No scleral icterus  Neck: Normal range of motion  Neck supple  Pulmonary/Chest: Effort normal  No respiratory distress  Abdominal: Soft  She exhibits no distension and no mass  There is no tenderness  Musculoskeletal: Normal range of motion  Neurological: She is alert and oriented to person, place, and time  Skin: Skin is warm and dry  Psychiatric: She has a normal mood and affect  Her behavior is normal    Vitals reviewed  BARRIERS: None identified    GOALS:   · Continued/Maintain healthy weight loss with good nutrition intakes  · Adequate hydration with at least 64oz  fluid intake  · Exercise as tolerated  · Follow-up as needed if symptoms worsen  · Call our office if you have any problems with abdominal pain especially associated with fever, chills, nausea, vomiting or any other concerns  Keep up the good work!

## 2019-03-19 DIAGNOSIS — E78.5 HYPERLIPIDEMIA, UNSPECIFIED HYPERLIPIDEMIA TYPE: Primary | ICD-10-CM

## 2019-03-19 RX ORDER — ATORVASTATIN CALCIUM 10 MG/1
10 TABLET, FILM COATED ORAL DAILY
Qty: 90 TABLET | Refills: 3 | Status: SHIPPED | OUTPATIENT
Start: 2019-03-19 | End: 2020-04-13

## 2019-04-30 DIAGNOSIS — N95.1 VASOMOTOR SYMPTOMS DUE TO MENOPAUSE: ICD-10-CM

## 2019-04-30 RX ORDER — ESTROGEN,CON/M-PROGEST ACET 0.3-1.5MG
TABLET ORAL
Qty: 84 TABLET | Refills: 0 | Status: SHIPPED | OUTPATIENT
Start: 2019-04-30 | End: 2019-07-29 | Stop reason: SDUPTHER

## 2019-06-17 ENCOUNTER — OFFICE VISIT (OUTPATIENT)
Dept: URGENT CARE | Facility: MEDICAL CENTER | Age: 59
End: 2019-06-17
Payer: COMMERCIAL

## 2019-06-17 VITALS
HEIGHT: 67 IN | HEART RATE: 84 BPM | OXYGEN SATURATION: 99 % | RESPIRATION RATE: 16 BRPM | WEIGHT: 150 LBS | DIASTOLIC BLOOD PRESSURE: 76 MMHG | BODY MASS INDEX: 23.54 KG/M2 | SYSTOLIC BLOOD PRESSURE: 119 MMHG | TEMPERATURE: 98.7 F

## 2019-06-17 DIAGNOSIS — R05.9 COUGH: ICD-10-CM

## 2019-06-17 DIAGNOSIS — J02.9 SORE THROAT: ICD-10-CM

## 2019-06-17 DIAGNOSIS — J02.9 PHARYNGITIS, UNSPECIFIED ETIOLOGY: Primary | ICD-10-CM

## 2019-06-17 DIAGNOSIS — J02.9 ACUTE PHARYNGITIS, UNSPECIFIED ETIOLOGY: ICD-10-CM

## 2019-06-17 LAB — S PYO AG THROAT QL: NEGATIVE

## 2019-06-17 PROCEDURE — S9088 SERVICES PROVIDED IN URGENT: HCPCS | Performed by: FAMILY MEDICINE

## 2019-06-17 PROCEDURE — 99213 OFFICE O/P EST LOW 20 MIN: CPT | Performed by: FAMILY MEDICINE

## 2019-06-17 PROCEDURE — 87070 CULTURE OTHR SPECIMN AEROBIC: CPT | Performed by: FAMILY MEDICINE

## 2019-06-17 PROCEDURE — 87880 STREP A ASSAY W/OPTIC: CPT | Performed by: FAMILY MEDICINE

## 2019-06-17 RX ORDER — LIDOCAINE HYDROCHLORIDE 20 MG/ML
15 SOLUTION OROPHARYNGEAL 4 TIMES DAILY PRN
Qty: 200 ML | Refills: 0 | Status: SHIPPED | OUTPATIENT
Start: 2019-06-17 | End: 2019-11-13 | Stop reason: ALTCHOICE

## 2019-06-20 LAB — BACTERIA THROAT CULT: NORMAL

## 2019-07-26 DIAGNOSIS — N95.1 VASOMOTOR SYMPTOMS DUE TO MENOPAUSE: ICD-10-CM

## 2019-07-29 DIAGNOSIS — N95.1 VASOMOTOR SYMPTOMS DUE TO MENOPAUSE: ICD-10-CM

## 2019-07-30 RX ORDER — ESTROGEN,CON/M-PROGEST ACET 0.3-1.5MG
TABLET ORAL
Qty: 84 TABLET | Refills: 0 | Status: SHIPPED | OUTPATIENT
Start: 2019-07-30 | End: 2019-09-26 | Stop reason: SDUPTHER

## 2019-09-26 DIAGNOSIS — N95.1 VASOMOTOR SYMPTOMS DUE TO MENOPAUSE: ICD-10-CM

## 2019-09-26 RX ORDER — ESTROGEN,CON/M-PROGEST ACET 0.3-1.5MG
TABLET ORAL
Qty: 84 TABLET | Refills: 0 | Status: SHIPPED | OUTPATIENT
Start: 2019-09-26 | End: 2019-12-30 | Stop reason: SDUPTHER

## 2019-10-01 DIAGNOSIS — N95.1 VASOMOTOR SYMPTOMS DUE TO MENOPAUSE: ICD-10-CM

## 2019-10-28 ENCOUNTER — TRANSCRIBE ORDERS (OUTPATIENT)
Dept: ADMINISTRATIVE | Facility: HOSPITAL | Age: 59
End: 2019-10-28

## 2019-10-28 DIAGNOSIS — Z12.31 ENCOUNTER FOR SCREENING MAMMOGRAM FOR MALIGNANT NEOPLASM OF BREAST: Primary | ICD-10-CM

## 2019-11-01 ENCOUNTER — APPOINTMENT (OUTPATIENT)
Dept: LAB | Facility: MEDICAL CENTER | Age: 59
End: 2019-11-01
Payer: COMMERCIAL

## 2019-11-01 ENCOUNTER — TRANSCRIBE ORDERS (OUTPATIENT)
Dept: ADMINISTRATIVE | Facility: HOSPITAL | Age: 59
End: 2019-11-01

## 2019-11-01 DIAGNOSIS — E78.2 MIXED HYPERLIPIDEMIA: Primary | ICD-10-CM

## 2019-11-01 DIAGNOSIS — E78.2 MIXED HYPERLIPIDEMIA: ICD-10-CM

## 2019-11-01 LAB
ALBUMIN SERPL BCP-MCNC: 3.8 G/DL (ref 3.5–5)
ALP SERPL-CCNC: 70 U/L (ref 46–116)
ALT SERPL W P-5'-P-CCNC: 28 U/L (ref 12–78)
ANION GAP SERPL CALCULATED.3IONS-SCNC: 5 MMOL/L (ref 4–13)
AST SERPL W P-5'-P-CCNC: 17 U/L (ref 5–45)
BASOPHILS # BLD AUTO: 0.07 THOUSANDS/ΜL (ref 0–0.1)
BASOPHILS NFR BLD AUTO: 1 % (ref 0–1)
BILIRUB SERPL-MCNC: 0.65 MG/DL (ref 0.2–1)
BUN SERPL-MCNC: 15 MG/DL (ref 5–25)
CALCIUM SERPL-MCNC: 8.8 MG/DL (ref 8.3–10.1)
CHLORIDE SERPL-SCNC: 109 MMOL/L (ref 100–108)
CHOLEST SERPL-MCNC: 167 MG/DL (ref 50–200)
CK SERPL-CCNC: 116 U/L (ref 26–192)
CO2 SERPL-SCNC: 26 MMOL/L (ref 21–32)
CREAT SERPL-MCNC: 0.67 MG/DL (ref 0.6–1.3)
EOSINOPHIL # BLD AUTO: 0.31 THOUSAND/ΜL (ref 0–0.61)
EOSINOPHIL NFR BLD AUTO: 5 % (ref 0–6)
ERYTHROCYTE [DISTWIDTH] IN BLOOD BY AUTOMATED COUNT: 12 % (ref 11.6–15.1)
GFR SERPL CREATININE-BSD FRML MDRD: 95 ML/MIN/1.73SQ M
GLUCOSE P FAST SERPL-MCNC: 84 MG/DL (ref 65–99)
HCT VFR BLD AUTO: 40.6 % (ref 34.8–46.1)
HDLC SERPL-MCNC: 80 MG/DL
HGB BLD-MCNC: 13 G/DL (ref 11.5–15.4)
IMM GRANULOCYTES # BLD AUTO: 0.01 THOUSAND/UL (ref 0–0.2)
IMM GRANULOCYTES NFR BLD AUTO: 0 % (ref 0–2)
LDLC SERPL CALC-MCNC: 79 MG/DL (ref 0–100)
LYMPHOCYTES # BLD AUTO: 1.91 THOUSANDS/ΜL (ref 0.6–4.47)
LYMPHOCYTES NFR BLD AUTO: 34 % (ref 14–44)
MCH RBC QN AUTO: 31.6 PG (ref 26.8–34.3)
MCHC RBC AUTO-ENTMCNC: 32 G/DL (ref 31.4–37.4)
MCV RBC AUTO: 99 FL (ref 82–98)
MONOCYTES # BLD AUTO: 0.64 THOUSAND/ΜL (ref 0.17–1.22)
MONOCYTES NFR BLD AUTO: 11 % (ref 4–12)
NEUTROPHILS # BLD AUTO: 2.76 THOUSANDS/ΜL (ref 1.85–7.62)
NEUTS SEG NFR BLD AUTO: 49 % (ref 43–75)
NONHDLC SERPL-MCNC: 87 MG/DL
NRBC BLD AUTO-RTO: 0 /100 WBCS
PLATELET # BLD AUTO: 169 THOUSANDS/UL (ref 149–390)
PMV BLD AUTO: 11.3 FL (ref 8.9–12.7)
POTASSIUM SERPL-SCNC: 3.6 MMOL/L (ref 3.5–5.3)
PROT SERPL-MCNC: 7.3 G/DL (ref 6.4–8.2)
RBC # BLD AUTO: 4.12 MILLION/UL (ref 3.81–5.12)
SODIUM SERPL-SCNC: 140 MMOL/L (ref 136–145)
TRIGL SERPL-MCNC: 42 MG/DL
WBC # BLD AUTO: 5.7 THOUSAND/UL (ref 4.31–10.16)

## 2019-11-01 PROCEDURE — 85025 COMPLETE CBC W/AUTO DIFF WBC: CPT

## 2019-11-01 PROCEDURE — 36415 COLL VENOUS BLD VENIPUNCTURE: CPT

## 2019-11-01 PROCEDURE — 82550 ASSAY OF CK (CPK): CPT

## 2019-11-01 PROCEDURE — 80061 LIPID PANEL: CPT

## 2019-11-01 PROCEDURE — 80053 COMPREHEN METABOLIC PANEL: CPT

## 2019-11-05 ENCOUNTER — OFFICE VISIT (OUTPATIENT)
Dept: CARDIOLOGY CLINIC | Facility: CLINIC | Age: 59
End: 2019-11-05

## 2019-11-05 VITALS
BODY MASS INDEX: 25.43 KG/M2 | OXYGEN SATURATION: 98 % | SYSTOLIC BLOOD PRESSURE: 110 MMHG | HEIGHT: 66 IN | DIASTOLIC BLOOD PRESSURE: 70 MMHG | WEIGHT: 158.2 LBS | HEART RATE: 77 BPM

## 2019-11-05 DIAGNOSIS — R07.89 PRESSURE IN CHEST: ICD-10-CM

## 2019-11-05 DIAGNOSIS — E78.5 HYPERLIPIDEMIA, UNSPECIFIED HYPERLIPIDEMIA TYPE: Primary | ICD-10-CM

## 2019-11-05 PROCEDURE — 99214 OFFICE O/P EST MOD 30 MIN: CPT | Performed by: INTERNAL MEDICINE

## 2019-11-05 NOTE — PROGRESS NOTES
Follow-up - Cardiology   Jose J Abraham 62 y o  female MRN: [id number]        Problems    Problem List Items Addressed This Visit        Other    Hyperlipidemia - Primary            Plan patient seen November 5, 2019  She has been followed by a pituitary tumor  MRIs coming up  Endocrine is following  She had hiatal hernia surgery years ago for iron deficiency anemia  She is doing well in that regard  She has hyperlipidemia  That is being treated  Last LDL is 79  She has been having some chest pressure  It is not necessarily related to exertion  We will order a stress echo  She did have cataract surgery as well as hand surgery  HPI: Jose J Abraham is a 62y o  year old female    History of Present Illness  Cardiology HPI Free Text Note Form [facility]: Patient sees us at long-term intervals  She recently saw for positive family history of vascular disease and showed an LDL of 192  She is no documented vascular disease  She is on Lipitor several times a week and her LDL is down to 108  I made no changes  our standpoint we've very little input to her care  issues recently have been severe iron deficiency anemia secondary to GERD and they're considering a procedure to reduce her hiatal hernia  addition she has a pituitary adenomatous being followed by endocrine  seen October 17, 2017  seen because of positive family history coronary disease and she has significantly elevated LDL  LDL is 128 on 10 mg of Lipitor every other day  Her increasing to take back to daily  did have significant anemia secondary to a hiatal hernia  She did have diet hernia repair  Fairly significant operation  She's done well since          Patient seen October 9, 2018  See her primarily for mild hyperlipidemia positive family history coronary disease  She also has a very small pituitary tumor is followed by Endocrine  It is not endocrine active  Her LDL is 96 on therapy  She is basically asymptomatic    She has some fleeting type of chest pain which is a very very low probability being anginal in nature  She seen basically on a yearly basis  Blood pressures been excellent            Review of Systems   Constitutional: Negative  Eyes:        Scheduled for cataracts   Respiratory: Negative  Cardiovascular: Negative  Endocrine:        Pituitary to her being followed with MRIs   Hematological: Negative  Past Medical History:   Diagnosis Date    Anemia     iron deficiency, pt gets infusions once a week    Anxiety     Tiburcio ulcer     Depression     GERD (gastroesophageal reflux disease)     Hiatal hernia     Hyperlipidemia     Migraine     Mitral valve prolapse     Murmur     Palpitations     occas    Paraesophageal hernia     Pituitary adenoma (HCC)     Sleep apnea     mild, no CPAP    Vitamin B12 deficiency     Wears glasses      Social History     Substance and Sexual Activity   Alcohol Use Yes    Comment: social     Social History     Substance and Sexual Activity   Drug Use No     Social History     Tobacco Use   Smoking Status Never Smoker   Smokeless Tobacco Never Used       Allergies:  No Known Allergies    Medications:     Current Outpatient Medications:     ascorbic acid (VITAMIN C) 500 mg tablet, Take 500 mg by mouth 2 (two) times a day  , Disp: , Rfl:     aspirin 81 MG tablet, Take 1 tablet by mouth daily, Disp: , Rfl:     atorvastatin (LIPITOR) 10 mg tablet, Take 1 tablet (10 mg total) by mouth daily, Disp: 90 tablet, Rfl: 3    buPROPion (WELLBUTRIN XL) 300 mg 24 hr tablet, Take 1 tablet by mouth daily, Disp: , Rfl:     Cholecalciferol (VITAMIN D) 2000 UNITS CAPS, Take 1 tablet by mouth daily  , Disp: , Rfl:     DULoxetine (CYMBALTA) 20 mg capsule, Take 20 mg by mouth daily  , Disp: , Rfl:     estrogen, conjugated,-medroxyprogesterone (PREMPRO) 0 3-1 5 MG per tablet, Take 1 tablet by mouth daily, Disp: 84 tablet, Rfl: 3    Lidocaine Viscous HCl (XYLOCAINE) 2 % mucosal solution, Swish and spit 15 mL 4 (four) times a day as needed for mild pain or moderate pain, Disp: 200 mL, Rfl: 0    multivitamin (THERAGRAN) TABS, Take 1 tablet by mouth daily  , Disp: , Rfl:     PREMPRO 0 3-1 5 MG per tablet, TAKE ONE TABLET BY MOUTH EVERY DAY, Disp: 84 tablet, Rfl: 0    SUMAtriptan (IMITREX) 100 mg tablet, Take 1 tablet by mouth for migraine relief  May repeat 2 hours later  Maximum 200mg/day , Disp: 27 tablet, Rfl: 0    vitamin E, tocopherol, 400 units capsule, Take 400 Units by mouth daily  , Disp: , Rfl:       Physical Exam   Constitutional: She is oriented to person, place, and time  She appears well-developed and well-nourished  No distress  Cardiovascular: Normal rate, regular rhythm and normal heart sounds  Exam reveals no friction rub  No murmur heard  Pulmonary/Chest: Effort normal and breath sounds normal    Musculoskeletal: Normal range of motion  She exhibits no edema, tenderness or deformity  Neurological: She is alert and oriented to person, place, and time  Skin: Skin is warm and dry  No rash noted  No erythema  No pallor  Laboratory Studies:  CMP:  Results from last 7 days   Lab Units 11/01/19  1318   POTASSIUM mmol/L 3 6   CHLORIDE mmol/L 109*   CO2 mmol/L 26   BUN mg/dL 15   CREATININE mg/dL 0 67   CALCIUM mg/dL 8 8   AST U/L 17   ALT U/L 28   ALK PHOS U/L 70   EGFR ml/min/1 73sq m 95     NT-proBNP: No results found for: NTBNP   Coags:    Lipid Profile:   Lab Results   Component Value Date    CHOL 175 06/27/2015     Lab Results   Component Value Date    HDL 80 11/01/2019     Lab Results   Component Value Date    LDLCALC 79 11/01/2019     Lab Results   Component Value Date    TRIG 42 11/01/2019       Cardiac testing:     EKG reviewed personally:  EKG is normal    No results found for this or any previous visit  No results found for this or any previous visit  No results found for this or any previous visit  No results found for this or any previous visit      Jenny Starch Felicitas Luis MD    Portions of the record may have been created with voice recognition software   Occasional wrong word or "sound a like" substitutions may have occurred due to the inherent limitations of voice recognition software   Read the chart carefully and recognize, using context, where substitutions have occurred

## 2019-11-13 NOTE — PRE-PROCEDURE INSTRUCTIONS
Pre-Surgery Instructions:   Medication Instructions    ascorbic acid (VITAMIN C) 500 mg tablet Patient was instructed by Physician and understands   aspirin 81 MG tablet Patient was instructed by Physician and understands   atorvastatin (LIPITOR) 10 mg tablet Patient was instructed by Physician and understands   buPROPion (WELLBUTRIN XL) 300 mg 24 hr tablet Patient was instructed by Physician and understands   Cholecalciferol (VITAMIN D) 2000 UNITS CAPS Patient was instructed by Physician and understands   DULoxetine (CYMBALTA) 20 mg capsule Patient was instructed by Physician and understands   multivitamin [facility]) TABS Patient was instructed by Physician and understands   PREMPRO 0 3-1 5 MG per tablet Patient was instructed by Physician and understands   SUMAtriptan (IMITREX) 100 mg tablet Patient was instructed by Physician and understands   vitamin E, tocopherol, 400 units capsule Patient was instructed by Physician and understands  [facility] preop instructions reviewed with pt  Pt has surgical soap

## 2019-11-20 ENCOUNTER — ANESTHESIA EVENT (OUTPATIENT)
Dept: PERIOP | Facility: HOSPITAL | Age: 59
End: 2019-11-20
Payer: COMMERCIAL

## 2019-11-21 ENCOUNTER — ANESTHESIA (OUTPATIENT)
Dept: PERIOP | Facility: HOSPITAL | Age: 59
End: 2019-11-21
Payer: COMMERCIAL

## 2019-11-21 ENCOUNTER — HOSPITAL ENCOUNTER (OUTPATIENT)
Dept: RADIOLOGY | Facility: HOSPITAL | Age: 59
Setting detail: OUTPATIENT SURGERY
Discharge: HOME | End: 2019-11-21
Payer: COMMERCIAL

## 2019-11-21 ENCOUNTER — HOSPITAL ENCOUNTER (OUTPATIENT)
Facility: HOSPITAL | Age: 59
Setting detail: OUTPATIENT SURGERY
Discharge: HOME | End: 2019-11-21
Attending: ORTHOPAEDIC SURGERY | Admitting: ORTHOPAEDIC SURGERY
Payer: COMMERCIAL

## 2019-11-21 VITALS
TEMPERATURE: 97.4 F | DIASTOLIC BLOOD PRESSURE: 73 MMHG | BODY MASS INDEX: 24.33 KG/M2 | HEART RATE: 84 BPM | SYSTOLIC BLOOD PRESSURE: 118 MMHG | HEIGHT: 67 IN | WEIGHT: 155 LBS | OXYGEN SATURATION: 100 % | RESPIRATION RATE: 16 BRPM

## 2019-11-21 DIAGNOSIS — M18.12 UNILATERAL PRIMARY OSTEOARTHRITIS OF FIRST CARPOMETACARPAL JOINT, LEFT HAND: ICD-10-CM

## 2019-11-21 PROCEDURE — 73120 X-RAY EXAM OF HAND: CPT

## 2019-11-21 PROCEDURE — C1713 ANCHOR/SCREW BN/BN,TIS/BN: HCPCS | Performed by: ORTHOPAEDIC SURGERY

## 2019-11-21 DEVICE — ANCHOR SUT 3.5 X 8.5MM W/ FORKED EYELET SWIVELOCK SL: Type: IMPLANTABLE DEVICE | Site: HAND | Status: FUNCTIONAL

## 2019-11-21 RX ORDER — FENTANYL CITRATE/PF 50 MCG/ML
25 SYRINGE (ML) INJECTION
Status: DISCONTINUED | OUTPATIENT
Start: 2019-11-21 | End: 2019-11-21 | Stop reason: HOSPADM

## 2019-11-21 RX ORDER — FENTANYL CITRATE 50 UG/ML
INJECTION, SOLUTION INTRAMUSCULAR; INTRAVENOUS
Status: COMPLETED | OUTPATIENT
Start: 2019-11-21 | End: 2019-11-21

## 2019-11-21 RX ORDER — EPHEDRINE SULFATE 50 MG/ML
INJECTION INTRAVENOUS AS NEEDED
Status: DISCONTINUED | OUTPATIENT
Start: 2019-11-21 | End: 2019-11-21 | Stop reason: SURG

## 2019-11-21 RX ORDER — MIDAZOLAM HYDROCHLORIDE 2 MG/2ML
INJECTION, SOLUTION INTRAMUSCULAR; INTRAVENOUS
Status: COMPLETED | OUTPATIENT
Start: 2019-11-21 | End: 2019-11-21

## 2019-11-21 RX ORDER — PROPOFOL 10 MG/ML
INJECTION, EMULSION INTRAVENOUS CONTINUOUS PRN
Status: DISCONTINUED | OUTPATIENT
Start: 2019-11-21 | End: 2019-11-21 | Stop reason: SURG

## 2019-11-21 RX ORDER — CEFAZOLIN SODIUM 1 G/50ML
1000 SOLUTION INTRAVENOUS ONCE
Status: COMPLETED | OUTPATIENT
Start: 2019-11-21 | End: 2019-11-21

## 2019-11-21 RX ORDER — ONDANSETRON 2 MG/ML
4 INJECTION INTRAMUSCULAR; INTRAVENOUS ONCE AS NEEDED
Status: DISCONTINUED | OUTPATIENT
Start: 2019-11-21 | End: 2019-11-21 | Stop reason: HOSPADM

## 2019-11-21 RX ORDER — ROPIVACAINE HYDROCHLORIDE 5 MG/ML
INJECTION, SOLUTION EPIDURAL; INFILTRATION; PERINEURAL
Status: COMPLETED | OUTPATIENT
Start: 2019-11-21 | End: 2019-11-21

## 2019-11-21 RX ORDER — ONDANSETRON 2 MG/ML
INJECTION INTRAMUSCULAR; INTRAVENOUS AS NEEDED
Status: DISCONTINUED | OUTPATIENT
Start: 2019-11-21 | End: 2019-11-21 | Stop reason: SURG

## 2019-11-21 RX ORDER — SODIUM CHLORIDE 9 MG/ML
125 INJECTION, SOLUTION INTRAVENOUS CONTINUOUS
Status: DISCONTINUED | OUTPATIENT
Start: 2019-11-21 | End: 2019-11-21 | Stop reason: HOSPADM

## 2019-11-21 RX ORDER — MAGNESIUM HYDROXIDE 1200 MG/15ML
LIQUID ORAL AS NEEDED
Status: DISCONTINUED | OUTPATIENT
Start: 2019-11-21 | End: 2019-11-21 | Stop reason: HOSPADM

## 2019-11-21 RX ADMIN — SODIUM CHLORIDE 125 ML/HR: 0.9 INJECTION, SOLUTION INTRAVENOUS at 12:24

## 2019-11-21 RX ADMIN — EPHEDRINE SULFATE 5 MG: 50 INJECTION, SOLUTION INTRAVENOUS at 15:00

## 2019-11-21 RX ADMIN — EPHEDRINE SULFATE 10 MG: 50 INJECTION, SOLUTION INTRAVENOUS at 14:33

## 2019-11-21 RX ADMIN — EPHEDRINE SULFATE 15 MG: 50 INJECTION, SOLUTION INTRAVENOUS at 14:52

## 2019-11-21 RX ADMIN — EPHEDRINE SULFATE 5 MG: 50 INJECTION, SOLUTION INTRAVENOUS at 14:34

## 2019-11-21 RX ADMIN — CEFAZOLIN SODIUM 1000 MG: 1 SOLUTION INTRAVENOUS at 13:42

## 2019-11-21 RX ADMIN — SODIUM CHLORIDE: 0.9 INJECTION, SOLUTION INTRAVENOUS at 14:20

## 2019-11-21 RX ADMIN — LIDOCAINE HYDROCHLORIDE 60 MG: 20 INJECTION, SOLUTION INTRAVENOUS at 14:06

## 2019-11-21 RX ADMIN — ONDANSETRON 4 MG: 2 INJECTION INTRAMUSCULAR; INTRAVENOUS at 14:56

## 2019-11-21 RX ADMIN — FENTANYL CITRATE 50 MCG: 50 INJECTION, SOLUTION INTRAMUSCULAR; INTRAVENOUS at 12:58

## 2019-11-21 RX ADMIN — EPHEDRINE SULFATE 15 MG: 50 INJECTION, SOLUTION INTRAVENOUS at 14:39

## 2019-11-21 RX ADMIN — PROPOFOL 120 MCG/KG/MIN: 10 INJECTION, EMULSION INTRAVENOUS at 14:06

## 2019-11-21 RX ADMIN — MIDAZOLAM 4 MG: 1 INJECTION INTRAMUSCULAR; INTRAVENOUS at 12:58

## 2019-11-21 RX ADMIN — ROPIVACAINE HYDROCHLORIDE 30 ML: 5 INJECTION, SOLUTION EPIDURAL; INFILTRATION; PERINEURAL at 12:58

## 2019-11-21 NOTE — ANESTHESIA PROCEDURE NOTES
Peripheral Block    Patient location during procedure: holding area  Start time: 11/21/2019 12:58 PM  Reason for block: at surgeon's request  Staffing  Anesthesiologist: Karolynn Bosworth, DO  Performed: anesthesiologist   Preanesthetic Checklist  Completed: patient identified, site marked, surgical consent, pre-op evaluation, timeout performed, IV checked, risks and benefits discussed and monitors and equipment checked  Peripheral Block  Patient position: supine  Prep: ChloraPrep  Patient monitoring: heart rate, cardiac monitor, continuous pulse ox and frequent blood pressure checks  Block type: supraclavicular  Laterality: left  Injection technique: single-shot  Procedures: ultrasound guided, Ultrasound guidance required for the procedure to increase accuracy and safety of medication placement and decrease risk of complications   and nerve stimulator  Ultrasound permanent image savedropivacaine (NAROPIN) 0 5 % perineural infiltration, 30 mL  midazolam (VERSED) 2 mg/2 mL IV, 4 mg  fentaNYL 50 mcg/mL IV, 50 mcg  Needle  Needle type: Stimuplex   Needle gauge: 22 G  Needle length: 5 cm  Needle localization: nerve stimulator and ultrasound guidance  Test dose: negative  Assessment  Injection assessment: incremental injection  Paresthesia pain: none  Heart rate change: no  Slow fractionated injection: yes  Post-procedure:  site cleaned  patient tolerated the procedure well with no immediate complications

## 2019-11-21 NOTE — ANESTHESIA POSTPROCEDURE EVALUATION
Post-Op Assessment Note    CV Status:  Stable  Pain Score: 1    Pain management: adequate     Mental Status:  Alert and awake   Hydration Status:  Euvolemic   PONV Controlled:  Controlled   Airway Patency:  Patent   Post Op Vitals Reviewed: Yes      Staff: Anesthesiologist           BP      Temp     Pulse     Resp      SpO2

## 2019-11-21 NOTE — ANESTHESIA PREPROCEDURE EVALUATION
Review of Systems/Medical History  Patient summary reviewed  Chart reviewed      Cardiovascular  Negative cardio ROS Hyperlipidemia,    Pulmonary  No sleep apnea ,        GI/Hepatic    PUD, GERD well controlled,  Hiatal hernia,             Endo/Other  Negative endo/other ROS      GYN       Hematology  Anemia iron deficiency anemia,     Musculoskeletal    Arthritis     Neurology    Headaches,   Comment: Pituitary adenoma Psychology   Anxiety, Depression ,              Physical Exam    Airway    Mallampati score: II  TM Distance: >3 FB  Neck ROM: full     Dental   No notable dental hx     Cardiovascular  Comment: Negative ROS, Rhythm: regular, Rate: normal, Cardiovascular exam normal    Pulmonary  Pulmonary exam normal Breath sounds clear to auscultation,     Other Findings        Anesthesia Plan  ASA Score- 2     Anesthesia Type- general and regional with ASA Monitors  Additional Monitors:   Airway Plan:         Plan Factors-Patient not instructed to abstain from smoking on day of procedure  Patient did not smoke on day of surgery  Induction- intravenous  Postoperative Plan-     Informed Consent- Anesthetic plan and risks discussed with patient and spouse

## 2019-11-21 NOTE — INTERIM OP NOTE
THUMB CMC ARTHROPLASTY, RELEASE DEQUERVAINS AND TENDON GRAFT  Postoperative Note  PATIENT NAME: Jessie Funez  : 1960  MRN: [id number]  [facility] OR ROOM 03    Surgery Date: 2019    Preop Diagnosis:  Unilateral primary osteoarthritis of first carpometacarpal joint, left hand [M18 12]    Post-Op Diagnosis Codes:     * Unilateral primary osteoarthritis of first carpometacarpal joint, left hand [M18 12]    Procedure(s) (LRB):  THUMB CMC ARTHROPLASTY (Left)  RELEASE DEQUERVAINS AND TENDON GRAFT (Left)    Surgeon(s) and Role:     * Barron Botello MD - Primary    Specimens:  None  Estimated Blood Loss:   Minimal    Anesthesia Type:   Choice     Findings:   Complications:   None    SIGNATURE: Barron Botello MD   DATE: 2019   TIME: 12:34 PM

## 2019-12-04 ENCOUNTER — OFFICE VISIT (OUTPATIENT)
Dept: OCCUPATIONAL THERAPY | Facility: MEDICAL CENTER | Age: 59
End: 2019-12-04
Payer: COMMERCIAL

## 2019-12-04 DIAGNOSIS — M18.12 PRIMARY OSTEOARTHRITIS OF FIRST CARPOMETACARPAL JOINT OF LEFT HAND: Primary | ICD-10-CM

## 2019-12-04 PROCEDURE — L3808 WHFO, RIGID W/O JOINTS: HCPCS | Performed by: OCCUPATIONAL THERAPIST

## 2019-12-04 NOTE — LETTER
January 22, 2020    MD [address] [place] [address]    Patient: Jose J Abraham   YOB: 1960   Date of Visit: 12/4/2019     Encounter Diagnosis     ICD-10-CM    1  Primary osteoarthritis of first carpometacarpal joint of left hand M18 12        Dear Dr Erik Robbins:    Thank you for your recent referral of Jose J Abraham  Please review the attached evaluation summary from Corrina's recent visit  Please verify that you agree with the plan of care by signing the attached order  If you have any questions or concerns, please do not hesitate to call  I sincerely appreciate the opportunity to share in the care of one of your patients and hope to have another opportunity to work with you in the near future  Sincerely,    Yahir Flores, OT      Referring Provider:     I certify that I have read the below Plan of Care and certify the need for these services furnished under this plan of treatment while under my care  MD [address] [facility]: [phone]        Orthosis    Diagnosis:   1  Primary osteoarthritis of first carpometacarpal joint of left hand       Indication: Motion Blocking and Arthroplasty     Location: Left  wrist and thumb  Supplies: Custom Fit Orthotic, Skin coverage  and Dressing   Orthosis type: radial thumb spica  Wearing Schedule: Remove with Protected Technique Only as Needed  Describe Position: neutral with MP in slight flexion    Precautions: Universal (skin contact/breakdown) and CMC TA    Patient or Caregiver expresses understanding of wearing Schedule and Precautions? Yes  Patient or Caregiver able to don/doff orthotic independently? Yes    Written orders provided to patient?  Yes  Orders Obtained: Written  Orders Obtained from: Dr Erik Robbins    Return for evaluation and treatment Yes

## 2019-12-04 NOTE — PROGRESS NOTES
Orthosis    Diagnosis:   1  Primary osteoarthritis of first carpometacarpal joint of left hand       Indication: Motion Blocking and Arthroplasty     Location: Left  wrist and thumb  Supplies: Custom Fit Orthotic, Skin coverage  and Dressing   Orthosis type: radial thumb spica  Wearing Schedule: Remove with Protected Technique Only as Needed  Describe Position: neutral with MP in slight flexion    Precautions: Universal (skin contact/breakdown) and CMC TA    Patient or Caregiver expresses understanding of wearing Schedule and Precautions? Yes  Patient or Caregiver able to don/doff orthotic independently? Yes    Written orders provided to patient?  Yes  Orders Obtained: Written  Orders Obtained from: Dr Corey Melo    Return for evaluation and treatment Yes

## 2019-12-05 ENCOUNTER — ANNUAL EXAM (OUTPATIENT)
Dept: OBGYN CLINIC | Facility: MEDICAL CENTER | Age: 59
End: 2019-12-05
Payer: COMMERCIAL

## 2019-12-05 VITALS
HEIGHT: 66 IN | BODY MASS INDEX: 24.59 KG/M2 | SYSTOLIC BLOOD PRESSURE: 118 MMHG | WEIGHT: 153 LBS | DIASTOLIC BLOOD PRESSURE: 82 MMHG

## 2019-12-05 DIAGNOSIS — Z01.419 ENCOUNTER FOR GYNECOLOGICAL EXAMINATION WITH PAPANICOLAOU SMEAR OF CERVIX: Primary | ICD-10-CM

## 2019-12-05 DIAGNOSIS — Z12.39 SCREENING FOR MALIGNANT NEOPLASM OF BREAST: ICD-10-CM

## 2019-12-05 PROCEDURE — G0145 SCR C/V CYTO,THINLAYER,RESCR: HCPCS | Performed by: OBSTETRICS & GYNECOLOGY

## 2019-12-05 PROCEDURE — 99396 PREV VISIT EST AGE 40-64: CPT | Performed by: OBSTETRICS & GYNECOLOGY

## 2019-12-05 NOTE — OP NOTE
OPERATIVE REPORT  PATIENT NAME: Chencho Chowdhury    :  1960  MRN: [id number]  Pt Location: [facility] OR ROOM 03    SURGERY DATE: 2019    Surgeon(s) and Role:     * Pennie De La Fuente MD - Primary      Preop Diagnosis:  Primary osteoarthritis of first carpometacarpal joint of left hand [M18 12]     Post-Op Diagnosis Codes:     * Primary osteoarthritis of first carpometacarpal joint of left hand [M18 12]     Procedure(s) (LRB):  THUMB CMC ARTHROPLASTY (Left)  RELEASE DEQUERVAINS (Left)  TENDON GRAFT HAND (Left)     Specimen(s):  * No specimens in log *     Estimated Blood Loss:   5 mL     Drains:  * No LDAs found *     Anesthesia Type:   Regional with Sedation     Operative Indications:  Primary osteoarthritis of first carpometacarpal joint of leftt hand [M18 12]     The patient is a 62year old female with trapeziometacarpal arthritis refractory to conservative treatment including injections  Surgical treatment was offered as above  The planned procedure as well as the expected benefits and potential risks of the surgery including, but not limited to infection, bleeding, nerve damage, tendon damage, need for repeat surgery, incomplete release of symptoms, and persistent pain were explained to the patient  The patient was agreeable to surgery  I also discussed the patient's postoperative protocol      Operative Findings:  Cmc Arthritis     Complications:   none     Procedure and Technique:  The patient's operative sites were initialed by me in the preoperative holding area  A regional block was administered to the patient by the anesthesia staff  The patient was then taken to the operating room and placed on the operating table in supine position  After administration of IV sedation, the left upper extremity was prepped and draped in a standard sterile fashion  The limb was exsanguinated  The tourniquet was inflated to 250 mmHg  A longitudinal incision was made over the dorsum of the thumb CMC joint  Incision was carried down through the skin only  Blunt dissection was performed within the subcutaneous tissue to identify, mobilize, and protect any branch of the dorsal radial sensory nerve  The EPB tendon was identified and noted to be a suitable graft source so was tenotomized distally and proximally  A longitudinal capsulotomy was performed  Sharp dissection was performed around the base of the thumb metacarpal and the trapezium  The trapezium was then removed in it's entirety  The FCR tendon was identified and noted to be intact  Firm metacarpal suspension was then achieved with the EPB tendon graft and an arthrex suture tape  Each end was inserted into the bone using a swivel lock according to protocol  One end was placed into the base of the thumb metacarpal and the second into the index finger metacarpal  They were placed according to protocol which involved placing a guide wire, checking position fluoroscopically and then drilling over the guide wire  After insertion of the graft, the thumb metacarpal was well suspended and there was a full arthroplasty space noted  The wound was irrigated and closed  The capsule was closed using a cerclauge PDS suture  The skin was closed using a running subcuticular monocryl stitch  The first dorsal compartment was released in order to facilitate exposure  A sterile dressing and a short-arm thumb spica splint were applied  Tourniquet was released  The patient was then awakened from anesthesia and transferred to the recovery room in stable condition having tolerated this procedure well   Please note the surgery was performed with the assistance of fluoroscopic evaluation             SIGNATURE: Wing Samantha MD  DATE: December 5, 2019  TIME: 4:41 PM

## 2019-12-05 NOTE — PROGRESS NOTES
ASSESSMENT & PLAN: Jermain Garsia was seen today for gynecologic exam     Diagnoses and all orders for this visit:    Encounter for gynecological examination with Papanicolaou smear of cervix    Screening for malignant neoplasm of breast  -     Mammo screening bilateral w 3d & cad; Future    Discussion/Summary:  Patient here for yearly gyn preventive exam and requests yearly pap only  1   Routine well woman exam done today  2  Pap and HPV:  The patient's pap is up to date  Pap and cotesting was done today  Current ASCCP Guidelines reviewed  3   Mammogram was ordered  4  Colorectal cancer screening is up to date  4  The following were reviewed in today's visit: breast self exam, adequate intake of calcium and vitamin D, exercise and healthy diet  5  F/u 1 year  CC:  Annual Gynecologic Examination    HPI: Marli Kimble is a 62 y o  who presents for annual gynecologic examination  She has the following concerns:  None, may retire in   Health Maintenance:    Patient describes her health as good  Patient does not have weight concerns  She exercises 7 days per week with work and walking  She does wear her seatbelt routinely  She does perform regular monthly self breast exams  She does feel safe at home  Patients does not follow a  diet  Patient gets 5 servings of dairy or calcium rich foods a day      Last pap: 2018  Last mammogram: 2018  Last colorectal cancer screenin    Patient Active Problem List   Diagnosis    Migraine    [facility] adenoma ([facility] [address]    Iron deficiency anemia    Tiburcio ulcer    Dyslipidemia    Hyperlipidemia    Iron deficiency anemia due to chronic blood loss    Menopausal symptoms    Migraine, unspecified, not intractable, without status migrainosus    Osteoarthritis of carpometacarpal (CMC) joint of thumb       Past Medical History:   Diagnosis Date    Anxiety     Arthritis     Tiburcio ulcer     Depression     Hyperlipidemia     Migraine     Mitral valve prolapse     Murmur     Pituitary adenoma ([address] )     Wears glasses        Past Surgical History:   Procedure Laterality Date    BREAST BIOPSY Left 1996    benign    BREAST SURGERY      L breast cyst removal-benign    COLONOSCOPY      DE QUERVAIN'S RELEASE Bilateral     EGD AND COLONOSCOPY N/A 5/11/2016    Procedure: EGD AND COLONOSCOPY;  Surgeon: Sixto Arambula MD;  Location: [facility]; Service:     NM INCIS TENDON SHEATH,RADIAL STYLOID Left 11/21/2019    Procedure: Genevie Books AND TENDON GRAFT;  Surgeon: Mariela Cummins MD;  Location: [facility];  Service: Orthopedics    NM LAP, REPAIR PARAESOPHAGEAL HERNIA, INCL FUNDOPLASTY W/ MESH N/A 8/31/2016    Procedure: REPAIR HERNIA PARAESOPHAGEAL  with bio A mesh LAPAROSCOPIC NISSEN FUNDOPLICATION Laparoscopic Gastroplexy Intraop EGD #[id number];  Surgeon: Chance Conley MD;  Location: [facility];  Service: Bariatrics    NM REPAIR INTERCARP/CARP-METACARP JT Left 11/21/2019    Procedure: THUMB [address];  Surgeon: Mariela Cummins MD;  Location: [facility];  Service: Orthopedics    TONSILLECTOMY      WRIST SURGERY Bilateral     daquero vein release       Past OB/Gyn History:    History of abnormal pap smears: No    Patient is currently sexually active  monogamous  Patient's family planning method is post menopausal status      Family History   Problem Relation Age of Onset    Anxiety disorder Mother     Arthritis Mother     Hyperlipidemia Mother     Hypertension Mother    Dilan Butler Prostate cancer Father 76    Dementia Father    Dilan Butler Parkinsonism Father     Other Father 80        bladder cancer    Ovarian cancer Maternal Grandmother 72    Breast cancer Paternal Aunt 66    Breast cancer Cousin 36    Colon cancer Paternal Aunt 46    Pancreatic cancer Paternal Aunt 67       Social History:  Social History     Socioeconomic History    Marital status: /Civil Union     Spouse name: Not on file    Number of children: Not on file  Years of education: Not on file    Highest education level: Not on file   Occupational History    Not on file   Social Needs    Financial resource strain: Not on file    Food insecurity:     Worry: Not on file     Inability: Not on file    Transportation needs:     Medical: Not on file     Non-medical: Not on file   Tobacco Use    Smoking status: Never Smoker    Smokeless tobacco: Never Used   Substance and Sexual Activity    Alcohol use: Yes     Frequency: Monthly or less     Comment: social    Drug use: No    Sexual activity: Yes     Partners: Male     Comment:    Lifestyle    Physical activity:     Days per week: Not on file     Minutes per session: Not on file    Stress: Not on file   Relationships    Social connections:     Talks on phone: Not on file     Gets together: Not on file     Attends Judaism service: Not on file     Active member of club or organization: Not on file     Attends meetings of clubs or organizations: Not on file     Relationship status: Not on file    Intimate partner violence:     Fear of current or ex partner: Not on file     Emotionally abused: Not on file     Physically abused: Not on file     Forced sexual activity: Not on file   Other Topics Concern    Not on file   Social History Narrative    Not on file     Presently lives with   Patient is currently employed   No Known Allergies      Current Outpatient Medications:     ascorbic acid (VITAMIN C) 500 mg tablet, Take 500 mg by mouth 2 (two) times a day  , Disp: , Rfl:     aspirin 81 MG tablet, Take 1 tablet by mouth daily, Disp: , Rfl:     atorvastatin (LIPITOR) 10 mg tablet, Take 1 tablet (10 mg total) by mouth daily, Disp: 90 tablet, Rfl: 3    buPROPion (WELLBUTRIN XL) 300 mg 24 hr tablet, Take 1 tablet by mouth daily, Disp: , Rfl:     Cholecalciferol (VITAMIN D) 2000 UNITS CAPS, Take 1 tablet by mouth daily  , Disp: , Rfl:     DULoxetine (CYMBALTA) 20 mg capsule, Take 20 mg by mouth daily , Disp: , Rfl:     multivitamin (THERAGRAN) TABS, Take 1 tablet by mouth daily  , Disp: , Rfl:     PREMPRO 0 3-1 5 MG per tablet, TAKE ONE TABLET BY MOUTH EVERY DAY, Disp: 84 tablet, Rfl: 0    SUMAtriptan (IMITREX) 100 mg tablet, Take 1 tablet by mouth for migraine relief  May repeat 2 hours later  Maximum 200mg/day  (Patient taking differently: once as needed Take 1 tablet by mouth for migraine relief  May repeat 2 hours later  Maximum 200mg/day ), Disp: 27 tablet, Rfl: 0    vitamin E, tocopherol, 400 units capsule, Take 400 Units by mouth daily  , Disp: , Rfl:     Review of Systems  Constitutional :no fever, feels well, no tiredness, no recent weight gain or loss  ENT: no ear ache, no loss of hearing, no nosebleeds or nasal discharge, no sore throat or hoarseness  Cardiovascular: no complaints of slow or fast heart beat, no chest pain, no palpitations, no leg claudication or lower extremity edema  Respiratory: no complaints of shortness of shortness of breath, no AGUAYO  Breasts:no complaints of breast pain, breast lump, or nipple discharge  Gastrointestinal: no complaints of abdominal pain, constipation, nausea, vomiting, or diarrhea or bloody stools  Genitourinary : no complaints of dysuria, incontinence, pelvic pain, no dysmenorrhea, vaginal discharge or abnormal vaginal bleeding and as noted in HPI  Musculoskeletal: no complaints of arthralgia, no myalgia, no joint swelling or stiffness, no limb pain or swelling  Integumentary: no complaints of skin rash or lesion, itching or dry skin  Neurological: no complaints of headache, no confusion, no numbness or tingling, no dizziness or fainting    Physical Exam:     /82   Ht 5' 5 5" (1 664 m)   Wt 69 4 kg (153 lb)   BMI 25 07 kg/m²     General: appears stated age, cooperative, alert normal mood and affect   Psychiatric oriented to person, place and time  Mood and affect normal   Neck: normal, supple,trachea midline, no masses    Thyroid: normal, no thyromegaly   Heart: regular rate and rhythm, S1, S2 normal, no murmur, click, rub or gallop   Lungs: clear to auscultation bilaterally, no increased work of breathing or signs of respiratory distress   Breasts: normal, no dimpling or skin changes noted   Abdomen: soft, non-tender, without masses or organomegaly   Vulva: normal , no lesions   Vagina: normal , no lesions or dryness   Urethra: normal   Urethal meatus normal   Bladder Normal, soft, non-tender and no prolapse or masses appreciated   Cervix: normal, no palpable masses ; ec and c pap done (no HPV culture)   Uterus: normal , non-tender, not enlarged, no palpable masses   Adnexa: normal, non-tender without fullness or masses   Lymphatic Palpation of lymph nodes in neck, axilla, groin and/or other locations: no lymphadenopathy or masses noted   Skin Normal skin turgor and no rashes    Palpation of skin and subcutaneous tissue normal

## 2019-12-06 ENCOUNTER — OFFICE VISIT (OUTPATIENT)
Dept: GASTROENTEROLOGY | Facility: MEDICAL CENTER | Age: 59
End: 2019-12-06
Payer: COMMERCIAL

## 2019-12-06 ENCOUNTER — HOSPITAL ENCOUNTER (OUTPATIENT)
Dept: MAMMOGRAPHY | Facility: CLINIC | Age: 59
Discharge: HOME | End: 2019-12-06
Payer: COMMERCIAL

## 2019-12-06 VITALS
HEIGHT: 67 IN | SYSTOLIC BLOOD PRESSURE: 114 MMHG | DIASTOLIC BLOOD PRESSURE: 72 MMHG | BODY MASS INDEX: 24.17 KG/M2 | TEMPERATURE: 96.9 F | HEART RATE: 75 BPM | WEIGHT: 154 LBS

## 2019-12-06 VITALS — BODY MASS INDEX: 24.17 KG/M2 | HEIGHT: 67 IN | WEIGHT: 154 LBS

## 2019-12-06 DIAGNOSIS — D50.9 IRON DEFICIENCY ANEMIA, UNSPECIFIED IRON DEFICIENCY ANEMIA TYPE: Primary | ICD-10-CM

## 2019-12-06 DIAGNOSIS — K59.00 CONSTIPATION, UNSPECIFIED CONSTIPATION TYPE: ICD-10-CM

## 2019-12-06 DIAGNOSIS — Z12.31 ENCOUNTER FOR SCREENING MAMMOGRAM FOR MALIGNANT NEOPLASM OF BREAST: ICD-10-CM

## 2019-12-06 PROCEDURE — 77063 BREAST TOMOSYNTHESIS BI: CPT

## 2019-12-06 PROCEDURE — 99214 OFFICE O/P EST MOD 30 MIN: CPT | Performed by: INTERNAL MEDICINE

## 2019-12-06 PROCEDURE — 77067 SCR MAMMO BI INCL CAD: CPT

## 2019-12-06 RX ORDER — SODIUM, POTASSIUM,MAG SULFATES 17.5-3.13G
180 SOLUTION, RECONSTITUTED, ORAL ORAL ONCE
Qty: 180 ML | Refills: 0 | Status: SHIPPED | OUTPATIENT
Start: 2019-12-06 | End: 2020-05-15 | Stop reason: ALTCHOICE

## 2019-12-06 NOTE — PATIENT INSTRUCTIONS
Patient stated that she will call back to schedule her procedure and follow up after    Rosalba Kingdom

## 2019-12-10 LAB
LAB AP GYN PRIMARY INTERPRETATION: NORMAL
Lab: NORMAL

## 2019-12-23 ENCOUNTER — HOSPITAL ENCOUNTER (OUTPATIENT)
Dept: NON INVASIVE DIAGNOSTICS | Facility: CLINIC | Age: 59
Discharge: HOME | End: 2019-12-23
Payer: COMMERCIAL

## 2019-12-23 DIAGNOSIS — E78.5 HYPERLIPIDEMIA, UNSPECIFIED HYPERLIPIDEMIA TYPE: ICD-10-CM

## 2019-12-23 DIAGNOSIS — R07.89 PRESSURE IN CHEST: ICD-10-CM

## 2019-12-23 LAB
CHEST PAIN STATEMENT: NORMAL
MAX DIASTOLIC BP: 88 MMHG
MAX HEART RATE: 157 BPM
MAX PREDICTED HEART RATE: 159 BPM
MAX. SYSTOLIC BP: 148 MMHG
PROTOCOL NAME: NORMAL
REASON FOR TERMINATION: NORMAL
TARGET HR FORMULA: NORMAL
TEST INDICATION: NORMAL
TIME IN EXERCISE PHASE: NORMAL

## 2019-12-23 PROCEDURE — 93350 STRESS TTE ONLY: CPT

## 2019-12-23 PROCEDURE — 93351 STRESS TTE COMPLETE: CPT | Performed by: INTERNAL MEDICINE

## 2019-12-24 ENCOUNTER — TRANSCRIBE ORDERS (OUTPATIENT)
Dept: PHYSICAL THERAPY | Facility: MEDICAL CENTER | Age: 59
End: 2019-12-24

## 2019-12-24 ENCOUNTER — EVALUATION (OUTPATIENT)
Dept: PHYSICAL THERAPY | Facility: MEDICAL CENTER | Age: 59
End: 2019-12-24
Payer: COMMERCIAL

## 2019-12-24 DIAGNOSIS — M18.12 LOCALIZED PRIMARY OSTEOARTHRITIS OF CARPOMETACARPAL JOINT OF LEFT THUMB: Primary | ICD-10-CM

## 2019-12-24 PROCEDURE — 97140 MANUAL THERAPY 1/> REGIONS: CPT

## 2019-12-24 PROCEDURE — G8985 CARRY GOAL STATUS: HCPCS

## 2019-12-24 PROCEDURE — G8984 CARRY CURRENT STATUS: HCPCS

## 2019-12-24 PROCEDURE — 97161 PT EVAL LOW COMPLEX 20 MIN: CPT

## 2019-12-24 NOTE — PROGRESS NOTES
PT Evaluation     Today's date: 2019  Patient name: Chencho Chowdhury  : 1960  MRN: [id number]  Referring provider: Satish Romo MD  Dx:   Encounter Diagnosis     ICD-10-CM    1  Localized primary osteoarthritis of carpometacarpal joint of left thumb M18 12                   Assessment  Assessment details: Pt is a 62year old RHD female who presents to PT following L thumb CMC joint arthroplasty on 19  She presents with good healing of her incision, NEI  She has mild edema to her dorsal thumb and mild to moderate LOM in her thumb with increased stiffness at her MCP jt  She should benefit from skilled PT to help reduce edema and promote healing, increase ROM, improve strength when appropriate, and improve overall functioning to allow her to reutrn to previous activities  Thank you kindly for your referral    Impairments: abnormal or restricted ROM, activity intolerance, impaired physical strength and pain with function  Understanding of Dx/Px/POC: good   Prognosis: good    Goals  STG (2-3 weeks)  1: Decrease pain 2 grades on VAS  2: Decrease edema by 25%  3: Increase Th AROM 10-15 degrees  4: pt independent in HEP    LTG (4-6 weeks)  1: Improve FOTO 10 pts  2: Full AROM thumb  3: Pt able to tolerate light gripping and pinching activities  4:  strength within 10-15 lbs of uninvolved side  5:  At least 10 lbs pinch strength    Plan  Plan details: Initiate PT as per POC  Patient would benefit from: skilled physical therapy  Planned modality interventions: cryotherapy and thermotherapy: hydrocollator packs  Planned therapy interventions: joint mobilization, manual therapy, massage, patient education, strengthening, stretching, therapeutic activities, therapeutic exercise, home exercise program, functional ROM exercises, graded activity, graded exercise, fine motor coordination training and flexibility  Frequency: 2x week  Duration in visits: 12  Duration in weeks: 6  Plan of Care beginning date: 2019  Plan of Care expiration date: 2020  Treatment plan discussed with: patient        Subjective Evaluation    History of Present Illness  Date of onset: 2019  Mechanism of injury: surgery  Mechanism of injury: L th  [facility]  Thumb is feeling really stiff  Able to type at work- light duty  Has been wearing splint with all activities            Not a recurrent problem   Quality of life: good    Pain  Current pain ratin  At best pain ratin  At worst pain ratin  Location: dorsal thumb  Quality: dull ache and discomfort  Relieving factors: rest and support  Progression: improved    Social Support    Employment status: working (intake at hospital- computer work)  Hand dominance: right  Exercise history: reading, baking, household chores    Patient Goals  Patient goals for therapy: decreased pain, increased strength, independence with ADLs/IADLs, increased motion and decreased edema          Objective     Observations     Left Wrist/Hand   Positive for incision       Additional Observation Details  Mild edema to dorsal thumb, healing incision, closed, NEI    Palpation     Additional Palpation Details  Mild tenderness in 1st dorsal compartment  Mild atrophy    Neurological Testing     Sensation     Wrist/Hand   Left   Intact: light touch    Active Range of Motion     Left Wrist   Wrist flexion: 70 degrees   Wrist extension: 55 degrees   Radial deviation: 25 degrees   Ulnar deviation: 30 degrees     Left Thumb   Flexion     MP: 20 degrees    DIP: 40 degrees  Extension     CMC: 45 degrees  Palmar Abduction     CMC: 45 degrees  Opposition: P3 of SF    Right Thumb   Flexion     MP: 45    DIP: 55  Extension     CMC: 50  Palmar Abduction    CMC: 55  Opposition: Base of SF    Passive Range of Motion     Left Thumb   Flexion     MP: 30    DIP: 60    Additional Passive Range of Motion Details  MP joint firm end feel             Precautions: DOS: 19      Manual              STM ELLIOT thumb, wrist                                                        Exercise Diary              Towel bunches             Pegs grasping             Grooved pegs             wrist AROM with cone                                                                                                                                                                                      HEP: Thumb AROM, ELLIOT, STM                                           Modalities              MH

## 2019-12-24 NOTE — LETTER
2019    MD Sharona Perez [address] [place] [address]    Patient: Ashwini Paredes   YOB: 1960   Date of Visit: 2019     Encounter Diagnosis     ICD-10-CM    1  Localized primary osteoarthritis of carpometacarpal joint of left thumb M18 12        Dear Dr Shailesh Garcia:    Thank you for your recent referral of Ashwini Paredes  Please review the attached evaluation summary from Corrina's recent visit  Please verify that you agree with the plan of care by signing the attached order  If you have any questions or concerns, please do not hesitate to call  I sincerely appreciate the opportunity to share in the care of one of your patients and hope to have another opportunity to work with you in the near future  Sincerely,    Majo Burns, PT      Referring Provider:      I certify that I have read the below Plan of Care and certify the need for these services furnished under this plan of treatment while under my care  MD Sharona Perez [address] [facility]: [phone]          PT Evaluation     Today's date: 2019  Patient name: Ashwini Paredes  : 1960  MRN: [id number]  Referring provider: Lord Nicolas MD  Dx:   Encounter Diagnosis     ICD-10-CM    1  Localized primary osteoarthritis of carpometacarpal joint of left thumb M18 12                   Assessment  Assessment details: Pt is a 62year old RHD female who presents to PT following L thumb CMC joint arthroplasty on 19  She presents with good healing of her incision, NEI  She has mild edema to her dorsal thumb and mild to moderate LOM in her thumb with increased stiffness at her MCP jt  She should benefit from skilled PT to help reduce edema and promote healing, increase ROM, improve strength when appropriate, and improve overall functioning to allow her to reutrn to previous activities   Thank you kindly for your referral    Impairments: abnormal or restricted ROM, activity intolerance, impaired physical strength and pain with function  Understanding of Dx/Px/POC: good   Prognosis: good    Goals  STG (2-3 weeks)  1: Decrease pain 2 grades on VAS  2: Decrease edema by 25%  3: Increase Th AROM 10-15 degrees  4: pt independent in HEP    LTG (4-6 weeks)  1: Improve FOTO 10 pts  2: Full AROM thumb  3: Pt able to tolerate light gripping and pinching activities  4:  strength within 10-15 lbs of uninvolved side  5:  At least 10 lbs pinch strength    Plan  Plan details: Initiate PT as per POC  Patient would benefit from: skilled physical therapy  Planned modality interventions: cryotherapy and thermotherapy: hydrocollator packs  Planned therapy interventions: joint mobilization, manual therapy, massage, patient education, strengthening, stretching, therapeutic activities, therapeutic exercise, home exercise program, functional ROM exercises, graded activity, graded exercise, fine motor coordination training and flexibility  Frequency: 2x week  Duration in visits: 12  Duration in weeks: 6  Plan of Care beginning date: 2019  Plan of Care expiration date: 2020  Treatment plan discussed with: patient        Subjective Evaluation    History of Present Illness  Date of onset: 2019  Mechanism of injury: surgery  Mechanism of injury: L th  [facility]  Thumb is feeling really stiff  Able to type at work- light duty  Has been wearing splint with all activities            Not a recurrent problem   Quality of life: good    Pain  Current pain ratin  At best pain ratin  At worst pain ratin  Location: dorsal thumb  Quality: dull ache and discomfort  Relieving factors: rest and support  Progression: improved    Social Support    Employment status: working (intake at hospital- computer work)  Hand dominance: right  Exercise history: reading, baking, household chores    Patient Goals  Patient goals for therapy: decreased pain, increased strength, independence with ADLs/IADLs, increased motion and decreased edema          Objective     Observations     Left Wrist/Hand   Positive for incision       Additional Observation Details  Mild edema to dorsal thumb, healing incision, closed, NEI    Palpation     Additional Palpation Details  Mild tenderness in 1st dorsal compartment  Mild atrophy    Neurological Testing     Sensation     Wrist/Hand   Left   Intact: light touch    Active Range of Motion     Left Wrist   Wrist flexion: 70 degrees   Wrist extension: 55 degrees   Radial deviation: 25 degrees   Ulnar deviation: 30 degrees     Left Thumb   Flexion     MP: 20 degrees    DIP: 40 degrees  Extension     CMC: 45 degrees  Palmar Abduction     CMC: 45 degrees  Opposition: P3 of SF    Right Thumb   Flexion     MP: 45    DIP: 55  Extension     CMC: 50  Palmar Abduction    CMC: 55  Opposition: Base of SF    Passive Range of Motion     Left Thumb   Flexion     MP: 30    DIP: 60    Additional Passive Range of Motion Details  MP joint firm end feel             Precautions: DOS: 11/21/19      Manual              STM             AAROM thumb, wrist                                                        Exercise Diary              Towel bunches             Pegs grasping             Grooved pegs             wrist AROM with cone                                                                                                                                                                                      HEP: Thumb AROM, AAROM, STM                                           Modalities              MH

## 2019-12-26 ENCOUNTER — OFFICE VISIT (OUTPATIENT)
Dept: PHYSICAL THERAPY | Facility: MEDICAL CENTER | Age: 59
End: 2019-12-26
Payer: COMMERCIAL

## 2019-12-26 DIAGNOSIS — M18.12 LOCALIZED PRIMARY OSTEOARTHRITIS OF CARPOMETACARPAL JOINT OF LEFT THUMB: Primary | ICD-10-CM

## 2019-12-26 PROCEDURE — 97010 HOT OR COLD PACKS THERAPY: CPT

## 2019-12-26 PROCEDURE — 97140 MANUAL THERAPY 1/> REGIONS: CPT

## 2019-12-26 PROCEDURE — 97110 THERAPEUTIC EXERCISES: CPT

## 2019-12-26 NOTE — PROGRESS NOTES
Daily Note     Today's date: 2019  Patient name: Amanda Dodge  : 1960  MRN: [id number]  Referring provider: Jodie Terry MD  Dx:   Encounter Diagnosis     ICD-10-CM    1  Localized primary osteoarthritis of carpometacarpal joint of left thumb M18 12                   Subjective: Pt reports her thumb is feeling sore after doing the exercises  Objective: See treatment diary below      Assessment: Tolerated treatment well  Patient exhibited good technique with therapeutic exercises and would benefit from continued PT Initiated program    Pt still has moderate stiffness in MP joint  Pt tolerated exercises well  Plan: Continue per plan of care        Precautions: DOS: 19      Manual              STM 5            AAROM thumb, wrist 10                                       15                Exercise Diary              Towel bunches 2 min            Pegs grasping 3 min            Grooved pegs 1 board            wrist AROM with cone 2x10                                                                                                                                                                                     HEP: Thumb AROM, AAROM, STM                           10                Modalities              MH 10 min

## 2019-12-30 ENCOUNTER — TELEPHONE (OUTPATIENT)
Dept: OBGYN CLINIC | Facility: CLINIC | Age: 59
End: 2019-12-30

## 2019-12-30 DIAGNOSIS — N95.1 VASOMOTOR SYMPTOMS DUE TO MENOPAUSE: ICD-10-CM

## 2019-12-30 NOTE — TELEPHONE ENCOUNTER
Anabelle MONROE 37 calling for a refill on her Prempro 0 3-1 5 mg tablets going to [facility] [address] [facility] mail order [phone] thanks BJ

## 2020-01-02 ENCOUNTER — OFFICE VISIT (OUTPATIENT)
Dept: PHYSICAL THERAPY | Facility: MEDICAL CENTER | Age: 60
End: 2020-01-02
Payer: COMMERCIAL

## 2020-01-02 DIAGNOSIS — M18.12 LOCALIZED PRIMARY OSTEOARTHRITIS OF CARPOMETACARPAL JOINT OF LEFT THUMB: Primary | ICD-10-CM

## 2020-01-02 PROCEDURE — 97010 HOT OR COLD PACKS THERAPY: CPT | Performed by: PHYSICAL THERAPIST

## 2020-01-02 PROCEDURE — 97110 THERAPEUTIC EXERCISES: CPT | Performed by: PHYSICAL THERAPIST

## 2020-01-02 PROCEDURE — 97140 MANUAL THERAPY 1/> REGIONS: CPT | Performed by: PHYSICAL THERAPIST

## 2020-01-02 NOTE — PROGRESS NOTES
Daily Note     Today's date: 2020  Patient name: Son Oquendo  : 1960  MRN: [id number]  Referring provider: David Edward MD  Dx:   Encounter Diagnosis     ICD-10-CM    1  Localized primary osteoarthritis of carpometacarpal joint of left thumb M18 12                   Subjective: Patient reports she followed up with Dr Krystian Hunt on Monday and he was pleased with her progress  She reports she was cleared to begin strengthening  Objective: See treatment diary below      Assessment: Tolerated treatment well  Stiffness in MP joint persists  Challenged with grooved pegs and fatigued with this exercise  Patient will benefit from continued PT for appropriate progression of strengthening  Plan: Continue per plan of care        Precautions: DOS: 19      Manual             STM 5 5           AAROM thumb, wrist 10 10                                      15 15               Exercise Diary             Towel bunches 2 min 2 min           Pegs grasping 3 min 3 min           Grooved pegs 1 board 1 board           wrist AROM with cone 2x10 2x10                                                                                                                                                                                    HEP: Thumb AROM, AAROM, STM                           10                Modalities   1/           MH 10 min 10 min

## 2020-01-07 ENCOUNTER — OFFICE VISIT (OUTPATIENT)
Dept: PHYSICAL THERAPY | Facility: MEDICAL CENTER | Age: 60
End: 2020-01-07
Payer: COMMERCIAL

## 2020-01-07 DIAGNOSIS — M18.12 LOCALIZED PRIMARY OSTEOARTHRITIS OF CARPOMETACARPAL JOINT OF LEFT THUMB: Primary | ICD-10-CM

## 2020-01-07 PROCEDURE — 97110 THERAPEUTIC EXERCISES: CPT

## 2020-01-07 PROCEDURE — 97010 HOT OR COLD PACKS THERAPY: CPT

## 2020-01-07 PROCEDURE — 97140 MANUAL THERAPY 1/> REGIONS: CPT

## 2020-01-07 NOTE — PROGRESS NOTES
Daily Note     Today's date: 2020  Patient name: Khushboo Mann  : 1960  MRN: [id number]  Referring provider: Adalberto Clemente MD  Dx:   Encounter Diagnosis     ICD-10-CM    1  Localized primary osteoarthritis of carpometacarpal joint of left thumb M18 12                   Subjective: Pt reports her thumb has been feeling good  Has stiffness at MP joint of thumb      Objective: See treatment diary below      Assessment: Tolerated treatment well  Patient exhibited good technique with therapeutic exercises and would benefit from continued PT Pt has most limitation at MP joint of her thumb, about 30 degrees flexion, otherwise PROM full opp to base of SF  Plan: Continue per plan of care        Precautions: DOS: 19      Manual            STM 5 5 5          AAROM thumb, wrist 10 10 10                                     15 15 15              Exercise Diary            Towel bunches 2 min 2 min 2 min          Pegs grasping 3 min 3 min NP          Grooved pegs 1 board 1 board 1 board          wrist AROM with cone 2x10 2x10 2x10          Nuts/bolts   4 min                                                                                                                                                                      HEP: Thumb AROM, AAROM, STM                           10  10              Modalities            MH 10 min 10 min 10 min

## 2020-01-09 ENCOUNTER — OFFICE VISIT (OUTPATIENT)
Dept: PHYSICAL THERAPY | Facility: MEDICAL CENTER | Age: 60
End: 2020-01-09
Payer: COMMERCIAL

## 2020-01-09 DIAGNOSIS — M18.12 LOCALIZED PRIMARY OSTEOARTHRITIS OF CARPOMETACARPAL JOINT OF LEFT THUMB: Primary | ICD-10-CM

## 2020-01-09 PROCEDURE — 97140 MANUAL THERAPY 1/> REGIONS: CPT | Performed by: PHYSICAL THERAPIST

## 2020-01-09 PROCEDURE — 97010 HOT OR COLD PACKS THERAPY: CPT | Performed by: PHYSICAL THERAPIST

## 2020-01-09 PROCEDURE — 97110 THERAPEUTIC EXERCISES: CPT | Performed by: PHYSICAL THERAPIST

## 2020-01-09 NOTE — PROGRESS NOTES
Daily Note     Today's date: 2020  Patient name: Francheska Gillis  : 1960  MRN: [id number]  Referring provider: Lester Ivey MD  Dx:   Encounter Diagnosis     ICD-10-CM    1  Localized primary osteoarthritis of carpometacarpal joint of left thumb M18 12                   Subjective: Patient's only complaint at this time is the persistent stiffness  Objective: See treatment diary below      Assessment: Tolerated treatment well  Patient exhibited good technique with therapeutic exercises and would benefit from continued PT MP joint stiffness persists however improved with manual interventions  Plan: Continue per plan of care        Precautions: DOS: 19      Manual           STM 5 5 5 5         AAROM thumb, wrist 10 10 10 10                                    15 15 15 15             Exercise Diary           Towel bunches 2 min 2 min 2 min 2 min         Pegs grasping 3 min 3 min NP NP         Grooved pegs 1 board 1 board 1 board 1 board         wrist AROM with cone 2x10 2x10 2x10 2x10         Nuts/bolts   4 min 4 min                                                                                                                                                                     HEP: Thumb AROM, AAROM, STM                           10  10              Modalities           MH 10 min 10 min 10 min 10 min

## 2020-01-10 ENCOUNTER — TELEPHONE (OUTPATIENT)
Dept: GASTROENTEROLOGY | Facility: MEDICAL CENTER | Age: 60
End: 2020-01-10

## 2020-01-10 NOTE — TELEPHONE ENCOUNTER
Pt is scheduled with dr Celeste Zapata for colon at [facility] on 2/21/20, pt has blue folder with prep instructions from office visit  Patient is aware she will need a  to and from   She will get a call the day before with an exact time for arrival

## 2020-01-13 ENCOUNTER — APPOINTMENT (OUTPATIENT)
Dept: PHYSICAL THERAPY | Facility: MEDICAL CENTER | Age: 60
End: 2020-01-13
Payer: COMMERCIAL

## 2020-01-14 ENCOUNTER — OFFICE VISIT (OUTPATIENT)
Dept: PHYSICAL THERAPY | Facility: MEDICAL CENTER | Age: 60
End: 2020-01-14
Payer: COMMERCIAL

## 2020-01-14 DIAGNOSIS — M18.12 LOCALIZED PRIMARY OSTEOARTHRITIS OF CARPOMETACARPAL JOINT OF LEFT THUMB: Primary | ICD-10-CM

## 2020-01-14 PROCEDURE — 97010 HOT OR COLD PACKS THERAPY: CPT | Performed by: PHYSICAL THERAPIST

## 2020-01-14 PROCEDURE — 97140 MANUAL THERAPY 1/> REGIONS: CPT | Performed by: PHYSICAL THERAPIST

## 2020-01-14 PROCEDURE — 97110 THERAPEUTIC EXERCISES: CPT | Performed by: PHYSICAL THERAPIST

## 2020-01-14 NOTE — PROGRESS NOTES
Daily Note     Today's date: 2020  Patient name: Anabelle Hoffmann  : 1960  MRN: [id number]  Referring provider: Katerina Robertson MD  Dx:   Encounter Diagnosis     ICD-10-CM    1  Localized primary osteoarthritis of carpometacarpal joint of left thumb M18 12                   Subjective: Patient continues to have reports of stiffness from being immobilized  Objective: See treatment diary below      Assessment: Tolerated treatment well  Patient exhibited good technique with therapeutic exercises and would benefit from continued PT  Positive response to manual interventions with decreased stiffness and improved mobility  Patient reported fatigue with grooved pegs when cued to decrease use of shoulder  Plan: Continue per plan of care        Precautions: DOS: 19      Manual          STM 5 5 5 5 5        AAROM thumb, wrist 10 10 10 10 10                                   15 15 15 15 15            Exercise Diary          Towel bunches 2 min 2 min 2 min 2 min 2 min        Pegs grasping 3 min 3 min NP NP NP        Grooved pegs 1 board 1 board 1 board 1 board 1 board        wrist AROM with cone 2x10 2x10 2x10 2x10 2x10        Nuts/bolts   4 min 4 min 4 min                                                                                                                                                                    HEP: Thumb AROM, AAROM, STM                           10  10              Modalities          MH 10 min 10 min 10 min 10 min 10 min

## 2020-01-16 ENCOUNTER — OFFICE VISIT (OUTPATIENT)
Dept: PHYSICAL THERAPY | Facility: MEDICAL CENTER | Age: 60
End: 2020-01-16
Payer: COMMERCIAL

## 2020-01-16 DIAGNOSIS — M18.12 LOCALIZED PRIMARY OSTEOARTHRITIS OF CARPOMETACARPAL JOINT OF LEFT THUMB: Primary | ICD-10-CM

## 2020-01-16 PROCEDURE — 97010 HOT OR COLD PACKS THERAPY: CPT

## 2020-01-16 PROCEDURE — 97140 MANUAL THERAPY 1/> REGIONS: CPT

## 2020-01-16 NOTE — PROGRESS NOTES
Daily Note     Today's date: 2020  Patient name: Paris Mckeon  : 1960  MRN: [id number]  Referring provider: Emelia Cohen MD  Dx:   Encounter Diagnosis     ICD-10-CM    1  Localized primary osteoarthritis of carpometacarpal joint of left thumb M18 12                   Subjective: Pt reports her thumb is feeling better just still stiff when trying to actively move it  Feels almost numb like its not my thumb      Objective: See treatment diary below      Assessment: Tolerated treatment well  Patient exhibited good technique with therapeutic exercises and would benefit from continued PT  Able to passively oppose L thumb to base of SF  Full wrist ROM  Added distal strengthening to digit this visit, pt tolerated well  Advised pt to remove splint when she is resting at home  Will initiate light strengthening next week    Plan: Continue per plan of care        Precautions: DOS: 19      Manual         STM 5 5 5 5 5 5       AAROM thumb, wrist 10 10 10 10 10 10                                  15 15 15 15 15 15           Exercise Diary         Towel bunches 2 min 2 min 2 min 2 min 2 min 2 min       Pegs grasping 3 min 3 min NP NP NP        Grooved pegs 1 board 1 board 1 board 1 board 1 board 1 board       wrist AROM with cone 2x10 2x10 2x10 2x10 2x10 1# 2x10       Nuts/bolts   4 min 4 min 4 min NP       fingerweb      Red 30x                                                                                                                                                      HEP: Thumb AROM, AAROM, STM                           10  10              Modalities         MH 10 min 10 min 10 min 10 min 10 min 10 min

## 2020-01-21 ENCOUNTER — APPOINTMENT (OUTPATIENT)
Dept: PHYSICAL THERAPY | Facility: MEDICAL CENTER | Age: 60
End: 2020-01-21
Payer: COMMERCIAL

## 2020-01-22 ENCOUNTER — APPOINTMENT (OUTPATIENT)
Dept: PHYSICAL THERAPY | Facility: MEDICAL CENTER | Age: 60
End: 2020-01-22
Payer: COMMERCIAL

## 2020-01-23 ENCOUNTER — APPOINTMENT (OUTPATIENT)
Dept: PHYSICAL THERAPY | Facility: MEDICAL CENTER | Age: 60
End: 2020-01-23
Payer: COMMERCIAL

## 2020-01-24 ENCOUNTER — TELEPHONE (OUTPATIENT)
Dept: GASTROENTEROLOGY | Facility: MEDICAL CENTER | Age: 60
End: 2020-01-24

## 2020-01-24 NOTE — TELEPHONE ENCOUNTER
Pt called to reschedule I moved pt to 5/8/20 with joselyn at [facility]

## 2020-01-28 ENCOUNTER — OFFICE VISIT (OUTPATIENT)
Dept: PHYSICAL THERAPY | Facility: MEDICAL CENTER | Age: 60
End: 2020-01-28
Payer: COMMERCIAL

## 2020-01-28 DIAGNOSIS — M18.12 LOCALIZED PRIMARY OSTEOARTHRITIS OF CARPOMETACARPAL JOINT OF LEFT THUMB: Primary | ICD-10-CM

## 2020-01-28 PROCEDURE — 97110 THERAPEUTIC EXERCISES: CPT | Performed by: PHYSICAL THERAPIST

## 2020-01-28 PROCEDURE — 97140 MANUAL THERAPY 1/> REGIONS: CPT | Performed by: PHYSICAL THERAPIST

## 2020-01-28 PROCEDURE — 97010 HOT OR COLD PACKS THERAPY: CPT | Performed by: PHYSICAL THERAPIST

## 2020-01-28 NOTE — PROGRESS NOTES
Daily Note     Today's date: 2020  Patient name: Efren Bernal  : 1960  MRN: [id number]  Referring provider: Sy Khan MD  Dx:   Encounter Diagnosis     ICD-10-CM    1  Localized primary osteoarthritis of carpometacarpal joint of left thumb M18 12                   Subjective: Patient reports improvements in activity performance at home such as light dish washing, washing her hair, and holding a book  She states she feels good but stiffens up when wearing her splint  Objective: See treatment diary below      Assessment: Patient appears to be progressing MCP ROM at this time, improved with manual interventions  Able to oppose thumb to small finger, challenged with fine motor coordination  Patient will benefit from continued PT services to appropriate progression of strengthening  Plan: Continue per plan of care        Precautions: DOS: 19      Manual        STM 5 5 5 5 5 5 5      AAROM thumb, wrist 10 10 10 10 10 10 10                                 15 15 15 15 15 15 15          Exercise Diary        Towel bunches 2 min 2 min 2 min 2 min 2 min 2 min 2 min      Pegs grasping 3 min 3 min NP NP NP        Grooved pegs 1 board 1 board 1 board 1 board 1 board 1 board 1 board      wrist AROM with cone 2x10 2x10 2x10 2x10 2x10 1# 2x10 2# 2x10      Nuts/bolts   4 min 4 min 4 min NP       fingerweb      Red 30x Red 30x                                                                                                                                                     HEP: Thumb AROM, AAROM, STM                           10  10              Modalities        MH 10 min 10 min 10 min 10 min 10 min 10 min 10 min

## 2020-01-30 ENCOUNTER — OFFICE VISIT (OUTPATIENT)
Dept: PHYSICAL THERAPY | Facility: MEDICAL CENTER | Age: 60
End: 2020-01-30
Payer: COMMERCIAL

## 2020-01-30 DIAGNOSIS — M18.12 LOCALIZED PRIMARY OSTEOARTHRITIS OF CARPOMETACARPAL JOINT OF LEFT THUMB: Primary | ICD-10-CM

## 2020-01-30 PROCEDURE — 97110 THERAPEUTIC EXERCISES: CPT | Performed by: PHYSICAL THERAPIST

## 2020-01-30 PROCEDURE — 97010 HOT OR COLD PACKS THERAPY: CPT | Performed by: PHYSICAL THERAPIST

## 2020-01-30 PROCEDURE — 97140 MANUAL THERAPY 1/> REGIONS: CPT | Performed by: PHYSICAL THERAPIST

## 2020-01-30 NOTE — PROGRESS NOTES
Daily Note     Today's date: 2020  Patient name: Genaro Purdy  : 1960  MRN: [id number]  Referring provider: Slime Gillis MD  Dx:   Encounter Diagnosis     ICD-10-CM    1  Localized primary osteoarthritis of carpometacarpal joint of left thumb M18 12                   Subjective: Patient states she feels most stiff when in her splint but overall reports improvement in activity tolerance  Objective: See treatment diary below      Assessment: Positive response to manual interventions with improved mobility and decreased stiffness reported  Progressed light strengthening with good tolerance  Patient demonstrated good technique  Patient fatigued post treatment session  Monitor response nv  Plan: Continue per plan of care        Precautions: DOS: 19      Manual       STM 5 5 5 5 5 5 5 5     AAROM thumb, wrist 10 10 10 10 10 10 10 10                                15 15 15 15 15 15 15 15         Exercise Diary       Towel bunches 2 min 2 min 2 min 2 min 2 min 2 min 2 min 2 min     Pegs grasping 3 min 3 min NP NP NP        Grooved pegs 1 board 1 board 1 board 1 board 1 board 1 board 1 board 1 board     wrist AROM with cone 2x10 2x10 2x10 2x10 2x10 1# 2x10 2# 2x10 2# 2x10     Nuts/bolts   4 min 4 min 4 min NP       fingerweb      Red 30x Red 30x Red x30     Puppet hand        x20     Putty press        yellow x2'     Flex bar        yellow x 15 ea                                                                                                             HEP: Thumb AROM, AAROM, STM                           10  10              Modalities       MH 10 min 10 min 10 min 10 min 10 min 10 min 10 min 10 min

## 2020-02-04 ENCOUNTER — OFFICE VISIT (OUTPATIENT)
Dept: PHYSICAL THERAPY | Facility: MEDICAL CENTER | Age: 60
End: 2020-02-04
Payer: COMMERCIAL

## 2020-02-04 DIAGNOSIS — M18.12 LOCALIZED PRIMARY OSTEOARTHRITIS OF CARPOMETACARPAL JOINT OF LEFT THUMB: Primary | ICD-10-CM

## 2020-02-04 PROCEDURE — 97110 THERAPEUTIC EXERCISES: CPT

## 2020-02-04 PROCEDURE — 97140 MANUAL THERAPY 1/> REGIONS: CPT

## 2020-02-04 PROCEDURE — 97010 HOT OR COLD PACKS THERAPY: CPT

## 2020-02-04 NOTE — PROGRESS NOTES
Daily Note     Today's date: 2020  Patient name: Linda Gaines  : 1960  MRN: [id number]  Referring provider: Pedro Howell MD  Dx:   Encounter Diagnosis     ICD-10-CM    1  Localized primary osteoarthritis of carpometacarpal joint of left thumb M18 12                   Subjective: Pt reports she overworked her thumb over weekend  Was doing thumb exercises, pushing into a ball, a little sore this morning   Objective: See treatment diary below      Assessment: Tolerated treatment well  Patient exhibited good technique with therapeutic exercises and would benefit from continued PT  MP flexion PROM to 40 degrees, gradually improving  Increased resistance to Pt's program, pt tolerated well  Declined CP, planning to use heat at home  Plan: Continue per plan of care        Precautions: DOS: 19  RTD     Manual   2/4    STM 5 5 5 5 5 5 5 5     AAROM thumb, wrist 10 10 10 10 10 10 10 10 10                               15 15 15 15 15 15 15 15 10        Exercise Diary   2/4    Towel bunches 2 min 2 min 2 min 2 min 2 min 2 min 2 min 2 min NP    Pegs grasping 3 min 3 min NP NP NP        Grooved pegs 1 board 1 board 1 board 1 board 1 board 1 board 1 board 1 board NP    wrist AROM with cone 2x10 2x10 2x10 2x10 2x10 1# 2x10 2# 2x10 2# 2x10 2# 2x10    Nuts/bolts   4 min 4 min 4 min NP       fingerweb      Red 30x Red 30x Red x30 Green 30x    Puppet hand        x20 20x    Putty press        yellow x2' yellow3 min    Flex bar sup/pron- towel twist        yellow x 15 ea Red 20x                                                                                                            HEP: Thumb AROM, AAROM, STM                           10  10      15        Modalities   2/4    MH 10 min 10 min 10 min 10 min 10 min 10 min 10 min 10 min 10 min

## 2020-02-06 ENCOUNTER — OFFICE VISIT (OUTPATIENT)
Dept: PHYSICAL THERAPY | Facility: MEDICAL CENTER | Age: 60
End: 2020-02-06
Payer: COMMERCIAL

## 2020-02-06 DIAGNOSIS — M18.12 LOCALIZED PRIMARY OSTEOARTHRITIS OF CARPOMETACARPAL JOINT OF LEFT THUMB: Primary | ICD-10-CM

## 2020-02-06 PROCEDURE — 97110 THERAPEUTIC EXERCISES: CPT

## 2020-02-06 PROCEDURE — 97010 HOT OR COLD PACKS THERAPY: CPT

## 2020-02-06 PROCEDURE — 97112 NEUROMUSCULAR REEDUCATION: CPT

## 2020-02-06 PROCEDURE — 97140 MANUAL THERAPY 1/> REGIONS: CPT

## 2020-02-06 NOTE — PROGRESS NOTES
Daily Note     Today's date: 2020  Patient name: Kirstie Kinney  : 1960  MRN: [id number]  Referring provider: Lisset Hood MD  Dx:   Encounter Diagnosis     ICD-10-CM    1  Localized primary osteoarthritis of carpometacarpal joint of left thumb M18 12                   Subjective: Pt reported she had a lot so soreness in her thumb after last session  Had to take Motrin      Objective: See treatment diary below      Assessment: Tolerated treatment well  Patient exhibited good technique with therapeutic exercises and would benefit from continued PT Corrected her form with putty press-was using more of her thumb  Held green clothespins to help reduce soreness  Pt's MP joint still very stiff but able to passively fully oppose tip of thumb to base of SF  Plan: Continue per plan of care        Precautions: DOS: 19  RTD     Manual   2/4 2/6   STM 5 5 5 5 5 5 5 5     AAROM thumb, wrist 10 10 10 10 10 10 10 10 10 10                              15 15 15 15 15 15 15 15 10 10       Exercise Diary   2/4 2/6   Towel bunches 2 min 2 min 2 min 2 min 2 min 2 min 2 min 2 min NP    /Pegs grasping 3 min 3 min NP NP NP        Grooved pegs min 1 board 1 board 1 board 1 board 1 board 1 board 1 board 1 board NP    wrist AROM with cone 2x10 2x10 2x10 2x10 2x10 1# 2x10 2# 2x10 2# 2x10 2# 2x10 2# 2x10   Nuts/bolts   4 min 4 min 4 min NP       fingerweb      Red 30x Red 30x Red x30 Green 30x Green 30x   Puppet hand        x20 20x 20x   Putty press        yellow x2' yellow3 min yellow3 min   Flex bar sup/pron- towel twist        yellow x 15 ea Red 20x Red 20x   clothespins         Red/green 4 min Red/green 4 min                                                                                              HEP: Thumb AROM, AAROM, STM                           10  10      15 10/10       Modalities   2/4 2/6   MH 10 min 10 min 10 min 10 min 10 min 10 min 10 min 10 min 10 min 10 min

## 2020-02-11 ENCOUNTER — OFFICE VISIT (OUTPATIENT)
Dept: PHYSICAL THERAPY | Facility: MEDICAL CENTER | Age: 60
End: 2020-02-11
Payer: COMMERCIAL

## 2020-02-11 DIAGNOSIS — M18.12 LOCALIZED PRIMARY OSTEOARTHRITIS OF CARPOMETACARPAL JOINT OF LEFT THUMB: Primary | ICD-10-CM

## 2020-02-11 PROCEDURE — 97112 NEUROMUSCULAR REEDUCATION: CPT

## 2020-02-11 PROCEDURE — 97110 THERAPEUTIC EXERCISES: CPT

## 2020-02-11 PROCEDURE — 97140 MANUAL THERAPY 1/> REGIONS: CPT

## 2020-02-11 NOTE — PROGRESS NOTES
Daily Note     Today's date: 2020  Patient name: Linda Gaines  : 1960  MRN: [id number]  Referring provider: Pedro Howell MD  Dx:   Encounter Diagnosis     ICD-10-CM    1  Localized primary osteoarthritis of carpometacarpal joint of left thumb M18 12                   Subjective: Pt reports she has been sore over the past couple days but has been using her L hand much more  Biggest complaint is that it feels weak  Objective: See treatment diary below      Assessment: Tolerated treatment well  Patient exhibited good technique with therapeutic exercises and would benefit from continued PT Added putty exercises for home  Pt able to demonstrate each  Emphasized importance of not overdoing exercises  Added CP post TE's to help reduce soreness  Plan: Continue per plan of care        Precautions: DOS: 19  RTD     Manual              STM 5            AAROM thumb, wrist 10             Ex instruction 5                          20                Exercise Diary              Towel bunches 2 min                                      wrist PRE 2# 2x10                         fingerweb Green 30x            Puppet hand Red 30x            Putty press Yelow 3 min            Flex bar sup/pron- towel twist Red 20x            clothespins Red 3 min                                                                                                       HEP: Thumb AROM, AAROM, STM                           10/10                Modalities              MH 10 min

## 2020-02-13 ENCOUNTER — OFFICE VISIT (OUTPATIENT)
Dept: PHYSICAL THERAPY | Facility: MEDICAL CENTER | Age: 60
End: 2020-02-13
Payer: COMMERCIAL

## 2020-02-13 DIAGNOSIS — M18.12 LOCALIZED PRIMARY OSTEOARTHRITIS OF CARPOMETACARPAL JOINT OF LEFT THUMB: Primary | ICD-10-CM

## 2020-02-13 PROCEDURE — 97140 MANUAL THERAPY 1/> REGIONS: CPT

## 2020-02-13 NOTE — PROGRESS NOTES
Daily Note     Today's date: 2020  Patient name: Jose Fay  : 1960  MRN: [id number]  Referring provider: Darlin oSmmers MD  Dx:   Encounter Diagnosis     ICD-10-CM    1  Localized primary osteoarthritis of carpometacarpal joint of left thumb M18 12                   Subjective: Tried out the putty, went okay  Objective: See treatment diary below      Assessment: Tolerated treatment well  Patient exhibited good technique with therapeutic exercises and would benefit from continued PT ROM stable  Pt tolerated exercises well  Pt reported feeling some locking in thumb while performing clothespins in lateral pinch  Plan: Continue per plan of care        Precautions: DOS: 19  RTD     Manual             STM 5 5           AAROM thumb, wrist 10 10            Ex instruction 5                          20 15               Exercise Diary             Towel bunches 2 min 2 min                                     wrist PRE 2# 2x10 2# 2x10                        fingerweb Green 30x Green 30x           Puppet hand Red 30x Red 30x           Putty press Yelow 3 min yellow 3 min           Flex bar sup/pron- towel twist Red 20x Red 20x           clothespins Red 3 min Red 3 min                                                                                                      HEP: Thumb AROM, AAROM, STM                           10/10 10/10 ( uncharged)               Modalities             MH 10 min 10 min

## 2020-02-18 ENCOUNTER — OFFICE VISIT (OUTPATIENT)
Dept: PHYSICAL THERAPY | Facility: MEDICAL CENTER | Age: 60
End: 2020-02-18
Payer: COMMERCIAL

## 2020-02-18 DIAGNOSIS — M18.12 LOCALIZED PRIMARY OSTEOARTHRITIS OF CARPOMETACARPAL JOINT OF LEFT THUMB: Primary | ICD-10-CM

## 2020-02-18 PROCEDURE — 97110 THERAPEUTIC EXERCISES: CPT

## 2020-02-18 PROCEDURE — 97140 MANUAL THERAPY 1/> REGIONS: CPT

## 2020-02-18 NOTE — PROGRESS NOTES
Daily Note     Today's date: 2020  Patient name: Pavel Amin  : 1960  MRN: [id number]  Referring provider: Becki Keenan MD  Dx:   Encounter Diagnosis     ICD-10-CM    1  Localized primary osteoarthritis of carpometacarpal joint of left thumb M18 12                   Subjective: Pt reports thumb tends to be sore after putty exercises  Objective: See treatment diary below      Assessment: Tolerated treatment well  Patient exhibited good technique with therapeutic exercises and would benefit from continued PT   PROM MP flexion 50 degrees- improvement in MP motion compared to last measurement  Pt declined PC post session  Plan: Continue per plan of care        Precautions: DOS: 19  RTD     Manual            STM 5 5 5          AAROM thumb, wrist 10 10 10           Ex instruction 5                          20 15 15              Exercise Diary            Towel bunches 2 min 2 min 2 min                                    wrist PRE 2# 2x10 2# 2x10 2# 2x10                       fingerweb Green 30x Green 30x Green 30x          Puppet hand Red 30x Red 30x Red 30x          Putty press Yelow 3 min yellow 3 min yellow 3 min          Flex bar sup/pron- towel twist Red 20x Red 20x Red 20x          clothespins Red 3 min Red 3 min Red 3 min                                                                                                     HEP: Thumb AROM, AAROM, STM                           10/10 10/10 ( uncharged) 10/10              Modalities            MH 10 min 10 min 10 min

## 2020-02-20 ENCOUNTER — TRANSCRIBE ORDERS (OUTPATIENT)
Dept: PHYSICAL THERAPY | Facility: MEDICAL CENTER | Age: 60
End: 2020-02-20

## 2020-02-20 ENCOUNTER — OFFICE VISIT (OUTPATIENT)
Dept: PHYSICAL THERAPY | Facility: MEDICAL CENTER | Age: 60
End: 2020-02-20
Payer: COMMERCIAL

## 2020-02-20 DIAGNOSIS — M18.12 LOCALIZED PRIMARY OSTEOARTHRITIS OF CARPOMETACARPAL JOINT OF LEFT THUMB: Primary | ICD-10-CM

## 2020-02-20 PROCEDURE — 97140 MANUAL THERAPY 1/> REGIONS: CPT

## 2020-02-20 NOTE — LETTER
2020    Maria E Bell MD  [facility] [place] [address]    Patient: Francheska Gillis   YOB: 1960   Date of Visit: 2020     Encounter Diagnosis     ICD-10-CM    1  Localized primary osteoarthritis of carpometacarpal joint of left thumb M18 12        Dear Dr Maritza Puri:    Thank you for your recent referral of Francheska Gillis  Please review the attached evaluation summary from Corrina's recent visit  Please verify that you agree with the plan of care by signing the attached order  If you have any questions or concerns, please do not hesitate to call  I sincerely appreciate the opportunity to share in the care of one of your patients and hope to have another opportunity to work with you in the near future  Sincerely,    Malinda Street, PT      Referring Provider:      I certify that I have read the below Plan of Care and certify the need for these services furnished under this plan of treatment while under my care  Maria E Bell MD  [facility] 31: [phone]          PT Re-Evaluation     Today's date: 2020  Patient name: Francheska Gillis  : 1960  MRN: [id number]  Referring provider: Lester Ivey MD  Dx:   Encounter Diagnosis     ICD-10-CM    1  Localized primary osteoarthritis of carpometacarpal joint of left thumb M18 12                   Assessment  Assessment details: Pt has been progressing well with therapy for her L thumb  She has functional ranges in her thumb with residual stiffness in her MP joint  We have been working on her strength in her thumb and wrist, however pt still has some weakness in her  and pinch  Recommend another 2-3 weeks of therapy to better her strength and then D/C to home program  Thank you     Impairments: abnormal or restricted ROM, activity intolerance, impaired physical strength and pain with function  Understanding of Dx/Px/POC: good   Prognosis: good    Goals  STG (2-3 weeks)  1: Decrease pain 2 grades on VAS- met  2: Decrease edema by 25%- met  3: Increase Th AROM 10-15 degrees - met  4: pt independent in HEP- met    LTG (4-6 weeks)  1: Improve FOTO 10 pts- not met  2: Full AROM thumb- partially met  3: Pt able to tolerate light gripping and pinching activities- partially met  4:  strength within 10-15 lbs of uninvolved side- partially met  5: At least 10 lbs pinch strength- not met    Plan  Plan details: Continue PT as per POC  Patient would benefit from: skilled physical therapy  Planned modality interventions: cryotherapy and thermotherapy: hydrocollator packs  Planned therapy interventions: joint mobilization, manual therapy, massage, patient education, strengthening, stretching, therapeutic activities, therapeutic exercise, home exercise program, functional ROM exercises, graded activity, graded exercise, fine motor coordination training and flexibility  Frequency: 2x week  Duration in visits: 6  Duration in weeks: 3  Plan of Care beginning date: 2020  Plan of Care expiration date: 3/12/2020  Treatment plan discussed with: patient        Subjective Evaluation    History of Present Illness  Date of onset: 2019  Mechanism of injury: surgery  Mechanism of injury: L  [facility] joint arthroplasty  Thumb is feeling really stiff  Able to type at work- light duty  Has been wearing splint with all activities  Re-eval: Pt reports her symptoms have been improving  She still has issues with pinching activities, she feels she lacks good strength             Not a recurrent problem   Quality of life: good    Pain  Current pain ratin  At best pain ratin  At worst pain ratin  Location: dorsal thumb  Quality: dull ache and discomfort  Relieving factors: rest and support  Progression: improved    Social Support    Employment status: working (intake at hospital- computer work)  Hand dominance: right  Exercise history: reading, baking, household chores    Patient Goals  Patient goals for therapy: decreased pain, increased strength, independence with ADLs/IADLs, increased motion and decreased edema          Objective     Observations     Additional Observation Details  Incision healed well       Palpation     Additional Palpation Details  Mild tenderness in 1st dorsal compartment      Neurological Testing     Sensation     Wrist/Hand   Left   Intact: light touch    Active Range of Motion     Left Wrist   Wrist flexion: 70 degrees   Wrist extension: 55 degrees   Radial deviation: 25 degrees   Ulnar deviation: 30 degrees     Left Thumb   Flexion     MP: 40 degrees    DIP: 55 degrees  Extension     CMC: 50 degrees  Palmar Abduction     CMC: 50 degrees  Opposition: P1 of SF    Right Thumb   Flexion     MP: 45    DIP: 55  Extension     CMC: 50  Palmar Abduction    CMC: 55  Opposition: Base of SF    Passive Range of Motion     Left Thumb   Flexion     MP: 45    DIP: 60    Additional Passive Range of Motion Details  MP joint firm end feel    Strength/Myotome Testing     Left Wrist/Hand      (2nd hand position)     Trial 1: 35    Thumb Strength  Key/Lateral Pinch     [place] 1: 9  Palmar/Three-Point Pinch     Trial 1: 8    Right Wrist/Hand      (2nd hand position)     Trial 1: 50    Thumb Strength   Key/Lateral Pinch     Trial 1: 12  Palmar/Three-Point Pinch     Trial 1: 13    Additional Strength Details  Th ext 4  abd 4+             Precautions: DOS: 11/21/19      Manual  2/11 2/13 2/18 2/20         STM 5 5 5 5         AAROM thumb, wrist 10 10 10 10          Ex instruction 5                          20 15 15 15             Exercise Diary  2/11 2/13 2/18 2/20         Towel bunches 2 min 2 min 2 min 2 min                                   wrist PRE 2# 2x10 2# 2x10 2# 2x10 2# 2x10                      fingerweb Green 30x Green 30x Green 30x Green 30x         Puppet hand Red 30x Red 30x Red 30x Red 30x         Putty press Renee Jonny 3 min yellow 3 min yellow 3 min yellow 3 min         Flex bar sup/pron- towel twist Red 20x Red 20x Red 20x Red 20x         clothespins Red 3 min Red 3 min Red 3 min Red 3min                                                                                                    HEP: Thumb AROM, AAROM, STM                           10/10 10/10 ( uncharged) 10/10 10/10 (uncharged)             Modalities  2/11 2/13 2/18 2/20         MH 10 min 10 min 10 min 10 min

## 2020-02-20 NOTE — PROGRESS NOTES
PT Re-Evaluation     Today's date: 2020  Patient name: Zeferino Braga  : 1960  MRN: [id number]  Referring provider: Jacky Ellis MD  Dx:   Encounter Diagnosis     ICD-10-CM    1  Localized primary osteoarthritis of carpometacarpal joint of left thumb M18 12                   Assessment  Assessment details: Pt has been progressing well with therapy for her L thumb  She has functional ranges in her thumb with residual stiffness in her MP joint  We have been working on her strength in her thumb and wrist, however pt still has some weakness in her  and pinch  Recommend another 2-3 weeks of therapy to better her strength and then D/C to home program  Thank you  Impairments: abnormal or restricted ROM, activity intolerance, impaired physical strength and pain with function  Understanding of Dx/Px/POC: good   Prognosis: good    Goals  STG (2-3 weeks)  1: Decrease pain 2 grades on VAS- met  2: Decrease edema by 25%- met  3: Increase Th AROM 10-15 degrees - met  4: pt independent in HEP- met    LTG (4-6 weeks)  1: Improve FOTO 10 pts- not met  2: Full AROM thumb- partially met  3: Pt able to tolerate light gripping and pinching activities- partially met  4:  strength within 10-15 lbs of uninvolved side- partially met  5:  At least 10 lbs pinch strength- not met    Plan  Plan details: Continue PT as per POC  Patient would benefit from: skilled physical therapy  Planned modality interventions: cryotherapy and thermotherapy: hydrocollator packs  Planned therapy interventions: joint mobilization, manual therapy, massage, patient education, strengthening, stretching, therapeutic activities, therapeutic exercise, home exercise program, functional ROM exercises, graded activity, graded exercise, fine motor coordination training and flexibility  Frequency: 2x week  Duration in visits: 6  Duration in weeks: 3  Plan of Care beginning date: 2020  Plan of Care expiration date: 3/12/2020  Treatment plan discussed with: patient        Subjective Evaluation    History of Present Illness  Date of onset: 2019  Mechanism of injury: surgery  Mechanism of injury: L th 1st [facility] joint arthroplasty  Thumb is feeling really stiff  Able to type at work- light duty  Has been wearing splint with all activities  Re-eval: Pt reports her symptoms have been improving  She still has issues with pinching activities, she feels she lacks good strength  Not a recurrent problem   Quality of life: good    Pain  Current pain ratin  At best pain ratin  At worst pain ratin  Location: dorsal thumb  Quality: dull ache and discomfort  Relieving factors: rest and support  Progression: improved    Social Support    Employment status: working (intake at hospital- computer work)  Hand dominance: right  Exercise history: reading, baking, household chores    Patient Goals  Patient goals for therapy: decreased pain, increased strength, independence with ADLs/IADLs, increased motion and decreased edema          Objective     Observations     Additional Observation Details  Incision healed well       Palpation     Additional Palpation Details  Mild tenderness in 1st dorsal compartment      Neurological Testing     Sensation     Wrist/Hand   Left   Intact: light touch    Active Range of Motion     Left Wrist   Wrist flexion: 70 degrees   Wrist extension: 55 degrees   Radial deviation: 25 degrees   Ulnar deviation: 30 degrees     Left Thumb   Flexion     MP: 40 degrees    DIP: 55 degrees  Extension     CMC: 50 degrees  Palmar Abduction     CMC: 50 degrees  Opposition: P1 of SF    Right Thumb   Flexion     MP: 45    DIP: 55  Extension     CMC: 50  Palmar Abduction    CMC: 55  Opposition: Base of SF    Passive Range of Motion     Left Thumb   Flexion     MP: 45    DIP: 60    Additional Passive Range of Motion Details  MP joint firm end feel    Strength/Myotome Testing     Left Wrist/Hand      (2nd hand position)     Trial 1: 35    Thumb Strength  Key/Lateral Pinch     [place] 1: 9  Palmar/Three-Point Pinch     Trial 1: 8    Right Wrist/Hand      (2nd hand position)     Trial 1: 50    Thumb Strength   Key/Lateral Pinch     Trial 1: 12  Palmar/Three-Point Pinch     Trial 1: 13    Additional Strength Details  Th ext 4  abd 4+             Precautions: DOS: 11/21/19      Manual  2/11 2/13 2/18 2/20         STM 5 5 5 5         AAROM thumb, wrist 10 10 10 10          Ex instruction 5                          20 15 15 15             Exercise Diary  2/11 2/13 2/18 2/20         Towel bunches 2 min 2 min 2 min 2 min                                   wrist PRE 2# 2x10 2# 2x10 2# 2x10 2# 2x10                      fingerweb Green 30x Green 30x Green 30x Green 30x         Puppet hand Red 30x Red 30x Red 30x Red 30x         Putty press Yelow 3 min yellow 3 min yellow 3 min yellow 3 min         Flex bar sup/pron- towel twist Red 20x Red 20x Red 20x Red 20x         clothespins Red 3 min Red 3 min Red 3 min Red 3min                                                                                                    HEP: Thumb AROM, AAROM, STM                           10/10 10/10 ( uncharged) 10/10 10/10 (uncharged)             Modalities  2/11 2/13 2/18 2/20         MH 10 min 10 min 10 min 10 min

## 2020-02-25 ENCOUNTER — OFFICE VISIT (OUTPATIENT)
Dept: PHYSICAL THERAPY | Facility: MEDICAL CENTER | Age: 60
End: 2020-02-25
Payer: COMMERCIAL

## 2020-02-25 DIAGNOSIS — M18.12 LOCALIZED PRIMARY OSTEOARTHRITIS OF CARPOMETACARPAL JOINT OF LEFT THUMB: Primary | ICD-10-CM

## 2020-02-25 PROCEDURE — 97140 MANUAL THERAPY 1/> REGIONS: CPT

## 2020-02-25 PROCEDURE — 97110 THERAPEUTIC EXERCISES: CPT

## 2020-02-25 PROCEDURE — 97010 HOT OR COLD PACKS THERAPY: CPT

## 2020-02-25 NOTE — PROGRESS NOTES
Daily Note     Today's date: 2020  Patient name: Jose Fay  : 1960  MRN: [id number]  Referring provider: Darlin Sommers MD  Dx:   Encounter Diagnosis     ICD-10-CM    1  Localized primary osteoarthritis of carpometacarpal joint of left thumb M18 12                   Subjective: Pt reports she saw  yesterday  Cristian Cuellar with her to continue therapy to better her strength  Pt feels she still is weak with lateral pinch      Objective: See treatment diary below      Assessment: Tolerated treatment well  Patient exhibited good technique with therapeutic exercises ROM stable, added hand helper and increased resistance to her program, pt tolerated well  Still has difficulty with green clothespin  Emphazised performing lateral pinch at home with putty, pt agreed  Plan: Continue per plan of care        Precautions: DOS: 19      Manual          STM 5 5 5 5 5        AAROM thumb, wrist 10 10 10 10 10         Ex instruction 5                          20 15 15 15 15            Exercise Diary          Towel bunches 2 min 2 min 2 min 2 min NP                                  wrist PRE 2# 2x10 2# 2x10 2# 2x10 2# 2x10 3# 2x10                     fingerweb Green 30x Green 30x Green 30x Green 30x Green 30x        Puppet hand Red 30x Red 30x Red 30x Red 30x Red 30x        Putty press Yelow 3 min yellow 3 min yellow 3 min yellow 3 min yellow3 min        Flex bar sup/pron- towel twist Red 20x Red 20x Red 20x Red 20x Red 20x        clothespins Red 3 min Red 3 min Red 3 min Red 3min Red 3 min        Hand helper     1 red/  green 20x                                                                                      HEP: Thumb AROM, AAROM, STM                           10/10 10/10 ( uncharged) 10/10 10/10 (uncharged) 10/10            Modalities          MH 10 min 10 min 10 min 10 min 10 min

## 2020-02-27 ENCOUNTER — OFFICE VISIT (OUTPATIENT)
Dept: PHYSICAL THERAPY | Facility: MEDICAL CENTER | Age: 60
End: 2020-02-27
Payer: COMMERCIAL

## 2020-02-27 DIAGNOSIS — M18.12 LOCALIZED PRIMARY OSTEOARTHRITIS OF CARPOMETACARPAL JOINT OF LEFT THUMB: Primary | ICD-10-CM

## 2020-02-27 PROCEDURE — 97140 MANUAL THERAPY 1/> REGIONS: CPT

## 2020-02-27 PROCEDURE — 97110 THERAPEUTIC EXERCISES: CPT

## 2020-02-27 NOTE — PROGRESS NOTES
Daily Note     Today's date: 2020  Patient name: Jose Fay  : 1960  MRN: [id number]  Referring provider: Darlin Sommers MD  Dx:   Encounter Diagnosis     ICD-10-CM    1  Localized primary osteoarthritis of carpometacarpal joint of left thumb M18 12                   Subjective: Pt has noticed a difference with not wearing splint  Pt feels a little more sore, she had to take a motrin prior to appts  Objective: See treatment diary below      Assessment: Tolerated treatment well  Patient exhibited good technique with therapeutic exercises and would benefit from continued PT  Pt's ROM continuing to improve  Added digiweb for thumb extension strengthening, pt tolerated well  No c/o soreness after session  Plan: Continue per plan of care        Precautions: DOS: 19      Manual         STM 5 5 5 5 5        AAROM thumb, wrist 10 10 10 10 10 10        Ex instruction 5                          20 15 15 15 15 15           Exercise Diary         Towel bunches 2 min 2 min 2 min 2 min NP                                  wrist PRE 2# 2x10 2# 2x10 2# 2x10 2# 2x10 3# 2x10 3# 2x10                    fingerweb Green 30x Green 30x Green 30x Green 30x Green 30x Green 30x       Puppet hand Red 30x Red 30x Red 30x Red 30x Red 30x Red 30x       Putty press Yelow 3 min yellow 3 min yellow 3 min yellow 3 min yellow3 min yellow 3 min       Flex bar sup/pron- towel twist Red 20x Red 20x Red 20x Red 20x Red 20x Red 20x       clothespins Red 3 min Red 3 min Red 3 min Red 3min Red 3 min Red/green 3 min       Hand helper     1 red/  green 20x 1 red/1green 30x       digiweb thumb ext      yellow 30x                                                                        HEP: Thumb AROM, AAROM, STM                           10/10 10/10 ( uncharged) 10/10 10/10 (uncharged) 10/10 10/10           Modalities         MH 10 min 10 min 10 min 10 min 10 min 10 min

## 2020-03-03 ENCOUNTER — OFFICE VISIT (OUTPATIENT)
Dept: PHYSICAL THERAPY | Facility: MEDICAL CENTER | Age: 60
End: 2020-03-03
Payer: COMMERCIAL

## 2020-03-03 DIAGNOSIS — M18.12 LOCALIZED PRIMARY OSTEOARTHRITIS OF CARPOMETACARPAL JOINT OF LEFT THUMB: Primary | ICD-10-CM

## 2020-03-03 PROCEDURE — 97110 THERAPEUTIC EXERCISES: CPT

## 2020-03-03 PROCEDURE — 97112 NEUROMUSCULAR REEDUCATION: CPT

## 2020-03-03 PROCEDURE — 97140 MANUAL THERAPY 1/> REGIONS: CPT

## 2020-03-03 NOTE — PROGRESS NOTES
Daily Note     Today's date: 3/3/2020  Patient name: Linda Gaines  : 1960  MRN: [id number]  Referring provider: Pedro Howell MD  Dx:   Encounter Diagnosis     ICD-10-CM    1  Localized primary osteoarthritis of carpometacarpal joint of left thumb M18 12                   Subjective: Pt reports she is able to do more with her thumb but she tried grabbing a dinner plate last night and the weight was too much for her thumb  Objective: See treatment diary below      Assessment: Tolerated treatment well  Patient exhibited good technique with therapeutic exercises and would benefit from continued PT   Added plate flip for opposition strength  Pt had mild stiffness in her MP joint  Plan: Continue per plan of care        Precautions: DOS: 19      Manual   3/3      STM 5 5 5 5 5  5      AAROM thumb, wrist 10 10 10 10 10 10 10       Ex instruction 5                          20 15 15 15 15 15 15          Exercise Diary   3/3      Towel bunches 2 min 2 min 2 min 2 min NP                                  wrist PRE 2# 2x10 2# 2x10 2# 2x10 2# 2x10 3# 2x10 3# 2x10 3# 2x10                   fingerweb Green 30x Green 30x Green 30x Green 30x Green 30x Green 30x Blue 30x      Puppet hand Red 30x Red 30x Red 30x Red 30x Red 30x Red 30x Red 30x      Putty press Yelow 3 min yellow 3 min yellow 3 min yellow 3 min yellow3 min yellow 3 min yellow 3min      Flex bar sup/pron- towel twist Red 20x Red 20x Red 20x Red 20x Red 20x Red 20x Red 20x      clothespins Red 3 min Red 3 min Red 3 min Red 3min Red 3 min Red/green 3 min Red/green 3 min      Hand helper     1 red/  green 20x 1 red/1green 30x 1 red/ 1 green 30x      digiweb thumb ext      yellow 30x yellow 30x      Plate flip       2 5 # 2 min                                                          HEP: Thumb AROM, AAROM, STM                           10/10 10/10 ( uncharged) 10/10 10/10 (uncharged) 10/10 10/10 15/10          Modalities  2/11 2/13 2/18 2/20 2/25 2/27 3/3       10 min 10 min 10 min 10 min 10 min 10 min 10 min

## 2020-03-05 ENCOUNTER — TRANSCRIBE ORDERS (OUTPATIENT)
Dept: ADMINISTRATIVE | Facility: HOSPITAL | Age: 60
End: 2020-03-05

## 2020-03-05 ENCOUNTER — OFFICE VISIT (OUTPATIENT)
Dept: PHYSICAL THERAPY | Facility: MEDICAL CENTER | Age: 60
End: 2020-03-05
Payer: COMMERCIAL

## 2020-03-05 DIAGNOSIS — E23.7 DISEASE OF PITUITARY GLAND (HCC): Primary | ICD-10-CM

## 2020-03-05 DIAGNOSIS — D35.3 BENIGN NEOPLASM OF PITUITARY GLAND AND CRANIOPHARYNGEAL DUCT (POUCH) (HCC): ICD-10-CM

## 2020-03-05 DIAGNOSIS — M18.12 LOCALIZED PRIMARY OSTEOARTHRITIS OF CARPOMETACARPAL JOINT OF LEFT THUMB: Primary | ICD-10-CM

## 2020-03-05 DIAGNOSIS — E66.8 OBESITY OF ENDOCRINE ORIGIN: ICD-10-CM

## 2020-03-05 DIAGNOSIS — D35.2 BENIGN NEOPLASM OF PITUITARY GLAND AND CRANIOPHARYNGEAL DUCT (POUCH) (HCC): ICD-10-CM

## 2020-03-05 PROCEDURE — 97140 MANUAL THERAPY 1/> REGIONS: CPT

## 2020-03-06 PROCEDURE — 97010 HOT OR COLD PACKS THERAPY: CPT

## 2020-03-06 NOTE — PROGRESS NOTES
Daily Note     Today's date: 3/6/2020  Patient name: Jose J Abraham  : 1960  MRN: [id number]  Referring provider: Zita Etienne MD  Dx:   Encounter Diagnosis     ICD-10-CM    1  Localized primary osteoarthritis of carpometacarpal joint of left thumb M18 12                   Subjective: Pt reports her thumb has been feeling less sore since being out of the brace  Still has trouble handling heavier dishes with her L thumb      Objective: See treatment diary below      Assessment: Tolerated treatment well  Patient exhibited good technique with therapeutic exercises and would benefit from continued PT Full passive opposition to base of SF  Pt had some tenderness into 1st interwebspace during exercises  Overall, pt tolerated session well  two more visits then D/C to home program        Plan: Continue per plan of care        Precautions: DOS: 19      Manual  2/11 2/13 2/18 2/20 2/25 2/27 3/3 3/5     STM 5 5 5 5 5  5 5     AAROM thumb, wrist 10 10 10 10 10 10 10 10      Ex instruction 5                          20 15 15 15 15 15 15 15         Exercise Diary   3/3 3/5     Towel bunches 2 min 2 min 2 min 2 min NP                                  wrist PRE 2# 2x10 2# 2x10 2# 2x10 2# 2x10 3# 2x10 3# 2x10 3# 2x10 3# 2x10                  fingerweb Green 30x Green 30x Green 30x Green 30x Green 30x Green 30x Blue 30x Blue 30x     Puppet hand Red 30x Red 30x Red 30x Red 30x Red 30x Red 30x Red 30x Red 30x     Putty press Yelow 3 min yellow 3 min yellow 3 min yellow 3 min yellow3 min yellow 3 min yellow 3min yellow 3 min     Flex bar sup/pron- towel twist Red 20x Red 20x Red 20x Red 20x Red 20x Red 20x Red 20x Red 30x     clothespins Red 3 min Red 3 min Red 3 min Red 3min Red 3 min Red/green 3 min Red/green 3 min Red/green 3 min     Hand helper     1 red/  green 20x 1 red/1green 30x 1 red/ 1 green 30x 1 red/ 1 green 30x     digiweb thumb ext      yellow 30x yellow 30x yellow 30x Plate flip       2 5 # 2 min 2 5# 2 min                                                         HEP: Thumb AROM, AAROM, STM                           10/10 10/10 ( uncharged) 10/10 10/10 (uncharged) 10/10 10/10 15/10 15/10 no charge         Modalities  2/11 2/13 2/18 2/20 2/25 2/27 3/3 3/5     MH 10 min 10 min 10 min 10 min 10 min 10 min 10 min 10 min

## 2020-03-09 ENCOUNTER — APPOINTMENT (OUTPATIENT)
Dept: LAB | Facility: MEDICAL CENTER | Age: 60
End: 2020-03-09
Payer: COMMERCIAL

## 2020-03-09 ENCOUNTER — OFFICE VISIT (OUTPATIENT)
Dept: PHYSICAL THERAPY | Facility: MEDICAL CENTER | Age: 60
End: 2020-03-09
Payer: COMMERCIAL

## 2020-03-09 DIAGNOSIS — E23.7 DISEASE OF PITUITARY GLAND (HCC): ICD-10-CM

## 2020-03-09 DIAGNOSIS — M18.12 LOCALIZED PRIMARY OSTEOARTHRITIS OF CARPOMETACARPAL JOINT OF LEFT THUMB: Primary | ICD-10-CM

## 2020-03-09 DIAGNOSIS — E66.8 OBESITY OF ENDOCRINE ORIGIN: ICD-10-CM

## 2020-03-09 LAB
ALBUMIN SERPL BCP-MCNC: 3.8 G/DL (ref 3.5–5)
ALP SERPL-CCNC: 77 U/L (ref 46–116)
ALT SERPL W P-5'-P-CCNC: 26 U/L (ref 12–78)
ANION GAP SERPL CALCULATED.3IONS-SCNC: 6 MMOL/L (ref 4–13)
AST SERPL W P-5'-P-CCNC: 17 U/L (ref 5–45)
BASOPHILS # BLD AUTO: 0.06 THOUSANDS/ΜL (ref 0–0.1)
BASOPHILS NFR BLD AUTO: 1 % (ref 0–1)
BILIRUB SERPL-MCNC: 0.53 MG/DL (ref 0.2–1)
BUN SERPL-MCNC: 15 MG/DL (ref 5–25)
CALCIUM SERPL-MCNC: 9.1 MG/DL (ref 8.3–10.1)
CHLORIDE SERPL-SCNC: 110 MMOL/L (ref 100–108)
CHOLEST SERPL-MCNC: 184 MG/DL (ref 50–200)
CO2 SERPL-SCNC: 26 MMOL/L (ref 21–32)
CORTIS AM PEAK SERPL-MCNC: 9.6 UG/DL (ref 4.2–22.4)
CREAT SERPL-MCNC: 0.7 MG/DL (ref 0.6–1.3)
CREAT UR-MCNC: 205 MG/DL
EOSINOPHIL # BLD AUTO: 0.22 THOUSAND/ΜL (ref 0–0.61)
EOSINOPHIL NFR BLD AUTO: 4 % (ref 0–6)
ERYTHROCYTE [DISTWIDTH] IN BLOOD BY AUTOMATED COUNT: 12.5 % (ref 11.6–15.1)
EST. AVERAGE GLUCOSE BLD GHB EST-MCNC: 100 MG/DL
ESTRADIOL SERPL-MCNC: 32 PG/ML
FSH SERPL-ACNC: 91.7 MIU/ML
GFR SERPL CREATININE-BSD FRML MDRD: 94 ML/MIN/1.73SQ M
GLUCOSE P FAST SERPL-MCNC: 90 MG/DL (ref 65–99)
HBA1C MFR BLD: 5.1 %
HCT VFR BLD AUTO: 42.2 % (ref 34.8–46.1)
HDLC SERPL-MCNC: 78 MG/DL
HGB BLD-MCNC: 13.1 G/DL (ref 11.5–15.4)
IMM GRANULOCYTES # BLD AUTO: 0.02 THOUSAND/UL (ref 0–0.2)
IMM GRANULOCYTES NFR BLD AUTO: 0 % (ref 0–2)
LDLC SERPL CALC-MCNC: 96 MG/DL (ref 0–100)
LH SERPL-ACNC: 53.1 MIU/ML
LYMPHOCYTES # BLD AUTO: 1.37 THOUSANDS/ΜL (ref 0.6–4.47)
LYMPHOCYTES NFR BLD AUTO: 22 % (ref 14–44)
MAGNESIUM SERPL-MCNC: 2.3 MG/DL (ref 1.6–2.6)
MCH RBC QN AUTO: 30.9 PG (ref 26.8–34.3)
MCHC RBC AUTO-ENTMCNC: 31 G/DL (ref 31.4–37.4)
MCV RBC AUTO: 100 FL (ref 82–98)
MICROALBUMIN UR-MCNC: 13.3 MG/L (ref 0–20)
MICROALBUMIN/CREAT 24H UR: 6 MG/G CREATININE (ref 0–30)
MONOCYTES # BLD AUTO: 0.56 THOUSAND/ΜL (ref 0.17–1.22)
MONOCYTES NFR BLD AUTO: 9 % (ref 4–12)
NEUTROPHILS # BLD AUTO: 4.03 THOUSANDS/ΜL (ref 1.85–7.62)
NEUTS SEG NFR BLD AUTO: 64 % (ref 43–75)
NONHDLC SERPL-MCNC: 106 MG/DL
NRBC BLD AUTO-RTO: 0 /100 WBCS
PHOSPHATE SERPL-MCNC: 3.7 MG/DL (ref 2.3–4.1)
PLATELET # BLD AUTO: 189 THOUSANDS/UL (ref 149–390)
PMV BLD AUTO: 11.1 FL (ref 8.9–12.7)
POTASSIUM SERPL-SCNC: 4 MMOL/L (ref 3.5–5.3)
PROT SERPL-MCNC: 7.2 G/DL (ref 6.4–8.2)
RBC # BLD AUTO: 4.24 MILLION/UL (ref 3.81–5.12)
SODIUM SERPL-SCNC: 142 MMOL/L (ref 136–145)
T4 FREE SERPL-MCNC: 0.92 NG/DL (ref 0.76–1.46)
TRIGL SERPL-MCNC: 50 MG/DL
TSH SERPL DL<=0.05 MIU/L-ACNC: 1.37 UIU/ML (ref 0.36–3.74)
WBC # BLD AUTO: 6.26 THOUSAND/UL (ref 4.31–10.16)

## 2020-03-09 PROCEDURE — 83835 ASSAY OF METANEPHRINES: CPT

## 2020-03-09 PROCEDURE — 82533 TOTAL CORTISOL: CPT

## 2020-03-09 PROCEDURE — 97110 THERAPEUTIC EXERCISES: CPT

## 2020-03-09 PROCEDURE — 85025 COMPLETE CBC W/AUTO DIFF WBC: CPT

## 2020-03-09 PROCEDURE — 84439 ASSAY OF FREE THYROXINE: CPT

## 2020-03-09 PROCEDURE — 83002 ASSAY OF GONADOTROPIN (LH): CPT

## 2020-03-09 PROCEDURE — 84100 ASSAY OF PHOSPHORUS: CPT

## 2020-03-09 PROCEDURE — 84305 ASSAY OF SOMATOMEDIN: CPT

## 2020-03-09 PROCEDURE — 36415 COLL VENOUS BLD VENIPUNCTURE: CPT | Performed by: INTERNAL MEDICINE

## 2020-03-09 PROCEDURE — 80061 LIPID PANEL: CPT | Performed by: INTERNAL MEDICINE

## 2020-03-09 PROCEDURE — 82043 UR ALBUMIN QUANTITATIVE: CPT | Performed by: SPECIALIST

## 2020-03-09 PROCEDURE — 82024 ASSAY OF ACTH: CPT

## 2020-03-09 PROCEDURE — 82570 ASSAY OF URINE CREATININE: CPT | Performed by: SPECIALIST

## 2020-03-09 PROCEDURE — 82670 ASSAY OF TOTAL ESTRADIOL: CPT

## 2020-03-09 PROCEDURE — 97010 HOT OR COLD PACKS THERAPY: CPT

## 2020-03-09 PROCEDURE — 83001 ASSAY OF GONADOTROPIN (FSH): CPT

## 2020-03-09 PROCEDURE — 84443 ASSAY THYROID STIM HORMONE: CPT

## 2020-03-09 PROCEDURE — 80053 COMPREHEN METABOLIC PANEL: CPT | Performed by: INTERNAL MEDICINE

## 2020-03-09 PROCEDURE — 83735 ASSAY OF MAGNESIUM: CPT

## 2020-03-09 PROCEDURE — 83036 HEMOGLOBIN GLYCOSYLATED A1C: CPT

## 2020-03-09 PROCEDURE — 97140 MANUAL THERAPY 1/> REGIONS: CPT

## 2020-03-09 NOTE — PROGRESS NOTES
Daily Note     Today's date: 3/9/2020  Patient name: Jose J Abraham  : 1960  MRN: [id number]  Referring provider: Zita Etienne MD  Dx:   Encounter Diagnosis     ICD-10-CM    1  Localized primary osteoarthritis of carpometacarpal joint of left thumb M18 12                   Subjective: Pt notes she still has trouble with lateral pinch with heavier items  Has been using heat at night  Overall its getting more flexible and able to manipulate items better  Objective: See treatment diary below      Assessment: Tolerated treatment well  Patient exhibited good technique with therapeutic exercises and would benefit from continued PT pt able to actively oppose to mid P1 of SF  Plan: Continue per plan of care  D/C NV        Precautions: DOS: 19      Manual  2/11 2/13 2/18 2/20 2/25 2/27 3/3 3/5 3/9    STM 5 5 5 5 5  5 5 5    AAROM thumb, wrist 10 10 10 10 10 10 10 10 10     Ex instruction 5                          20 15 15 15 15 15 15 15 15        Exercise Diary  2/11 2/13 2/18 2/20 2/25 2/27 3/3 3/5 3/9    Towel bunches 2 min 2 min 2 min 2 min NP                                  wrist PRE 2# 2x10 2# 2x10 2# 2x10 2# 2x10 3# 2x10 3# 2x10 3# 2x10 3# 2x10 3# 2x10                 fingerweb Green 30x Green 30x Green 30x Green 30x Green 30x Green 30x Blue 30x Blue 30x Blue 30x    Puppet hand Red 30x Red 30x Red 30x Red 30x Red 30x Red 30x Red 30x Red 30x Red 30x    Putty press Yelow 3 min yellow 3 min yellow 3 min yellow 3 min yellow3 min yellow 3 min yellow 3min yellow 3 min yellow 3 min    Flex bar sup/pron- towel twist Red 20x Red 20x Red 20x Red 20x Red 20x Red 20x Red 20x Red 30x Red 30x    clothespins Red 3 min Red 3 min Red 3 min Red 3min Red 3 min Red/green 3 min Red/green 3 min Red/green 3 min Red/green 3 min    Hand helper     1 red/  green 20x 1 red/1green 30x 1 red/ 1 green 30x 1 red/ 1 green 30x 1 red/ 1 green /1 yellow 30x    digiweb thumb ext      yellow 30x yellow 30x yellow 30x yellow 30x    Plate flip       2 5 # 2 min 2 5# 2 min 2 5# 2 min                                                        HEP: Thumb AROM, AAROM, STM                           10/10 10/10 ( uncharged) 10/10 10/10 (uncharged) 10/10 10/10 15/10 15/10 no charge 10/10        Modalities  2/11 2/13 2/18 2/20 2/25 2/27 3/3 3/5 3/9    MH 10 min 10 min 10 min 10 min 10 min 10 min 10 min 10 min 10 min

## 2020-03-10 ENCOUNTER — OFFICE VISIT (OUTPATIENT)
Dept: PHYSICAL THERAPY | Facility: MEDICAL CENTER | Age: 60
End: 2020-03-10
Payer: COMMERCIAL

## 2020-03-10 DIAGNOSIS — M18.12 LOCALIZED PRIMARY OSTEOARTHRITIS OF CARPOMETACARPAL JOINT OF LEFT THUMB: Primary | ICD-10-CM

## 2020-03-10 PROCEDURE — 97010 HOT OR COLD PACKS THERAPY: CPT

## 2020-03-10 PROCEDURE — 97110 THERAPEUTIC EXERCISES: CPT

## 2020-03-10 PROCEDURE — 97140 MANUAL THERAPY 1/> REGIONS: CPT

## 2020-03-10 NOTE — PROGRESS NOTES
Daily Note/Discharge     Today's date: 3/10/2020  Patient name: Jonas Wrne  : 1960  MRN: [id number]  Referring provider: Rochelle Quevedo MD  Dx:   Encounter Diagnosis     ICD-10-CM    1  Localized primary osteoarthritis of carpometacarpal joint of left thumb M18 12                   Subjective: Pt reports she still has trouble with lateral pinch  Thumb still feels very weak  Objective: See treatment diary below      Assessment: Tolerated treatment well  Patient exhibited good technique with therapeutic exercises and would benefit from continued PT  STG (2-3 weeks)  1: Decrease pain 2 grades on VAS- met  2: Decrease edema by 25%- met  3: Increase Th AROM 10-15 degrees - met  4: pt independent in HEP- met    LTG (4-6 weeks)  1: Improve FOTO 10 pts- met  2: Full AROM thumb- partially met  3: Pt able to tolerate light gripping and pinching activities- partially met  4:  strength within 10-15 lbs of uninvolved side- met  5: At least 10 lbs pinch strength- not met  PROM MP/IP:  45/70   II L/R 35/50  Lp 3/17  3-pt   Provided pt with heavier resistance putty since Pt doesnt feel progress with the yellow performing the lateral pinch and pull  Pt should be bale to to continue to make gains with her home program  Pt is ready for D/C       Plan: D/C to HEP     Precautions: DOS: 19      Manual  2/11 2/13 2/18 2/20 2/25 2/27 3/3 3/5 3/9 3/10   STM 5 5 5 5 5  5 5 5    AAROM thumb, wrist 10 10 10 10 10 10 10 10 10     Ex instruction 5                          20 15 15 15 15 15 15 15 15        Exercise Diary  2/11 2/13 2/18 2/20 2/25 2/27 3/3 3/5 3/9    Towel bunches 2 min 2 min 2 min 2 min NP                                  wrist PRE 2# 2x10 2# 2x10 2# 2x10 2# 2x10 3# 2x10 3# 2x10 3# 2x10 3# 2x10 3# 2x10                 fingerweb Green 30x Green 30x Green 30x Green 30x Green 30x Green 30x Blue 30x Blue 30x Blue 30x    Puppet hand Red 30x Red 30x Red 30x Red 30x Red 30x Red 30x Red 30x Red 30x Red 30x    Putty press Yelow 3 min yellow 3 min yellow 3 min yellow 3 min yellow3 min yellow 3 min yellow 3min yellow 3 min yellow 3 min    Flex bar sup/pron- towel twist Red 20x Red 20x Red 20x Red 20x Red 20x Red 20x Red 20x Red 30x Red 30x    clothespins Red 3 min Red 3 min Red 3 min Red 3min Red 3 min Red/green 3 min Red/green 3 min Red/green 3 min Red/green 3 min    Hand helper     1 red/  green 20x 1 red/1green 30x 1 red/ 1 green 30x 1 red/ 1 green 30x 1 red/ 1 green /1 yellow 30x    digiweb thumb ext      yellow 30x yellow 30x yellow 30x yellow 30x    Plate flip       2 5 # 2 min 2 5# 2 min 2 5# 2 min                                                        HEP: Thumb AROM, AAROM, STM                           10/10 10/10 ( uncharged) 10/10 10/10 (uncharged) 10/10 10/10 15/10 15/10 no charge 10/10        Modalities  2/11 2/13 2/18 2/20 2/25 2/27 3/3 3/5 3/9    MH 10 min 10 min 10 min 10 min 10 min 10 min 10 min 10 min 10 min

## 2020-03-11 LAB
ACTH PLAS-MCNC: 8.8 PG/ML (ref 7.2–63.3)
IGF-I SERPL-MCNC: 82 NG/ML (ref 42–169)

## 2020-03-12 ENCOUNTER — APPOINTMENT (OUTPATIENT)
Dept: PHYSICAL THERAPY | Facility: MEDICAL CENTER | Age: 60
End: 2020-03-12
Payer: COMMERCIAL

## 2020-03-12 LAB
METANEPH FREE SERPL-MCNC: 23 PG/ML (ref 0–62)
NORMETANEPHRINE SERPL-MCNC: 108 PG/ML (ref 0–145)

## 2020-04-08 ENCOUNTER — TELEPHONE (OUTPATIENT)
Dept: RADIOLOGY | Facility: HOSPITAL | Age: 60
End: 2020-04-08

## 2020-04-10 DIAGNOSIS — E78.5 HYPERLIPIDEMIA, UNSPECIFIED HYPERLIPIDEMIA TYPE: ICD-10-CM

## 2020-04-13 RX ORDER — ATORVASTATIN CALCIUM 10 MG/1
TABLET, FILM COATED ORAL
Qty: 90 TABLET | Refills: 0 | Status: SHIPPED | OUTPATIENT
Start: 2020-04-13 | End: 2020-07-02

## 2020-04-17 ENCOUNTER — TELEPHONE (OUTPATIENT)
Dept: GASTROENTEROLOGY | Facility: MEDICAL CENTER | Age: 60
End: 2020-04-17

## 2020-04-27 ENCOUNTER — PREP FOR PROCEDURE (OUTPATIENT)
Dept: GASTROENTEROLOGY | Facility: MEDICAL CENTER | Age: 60
End: 2020-04-27

## 2020-04-27 DIAGNOSIS — K59.00 CONSTIPATION, UNSPECIFIED CONSTIPATION TYPE: Primary | ICD-10-CM

## 2020-04-27 DIAGNOSIS — D50.9 IRON DEFICIENCY ANEMIA, UNSPECIFIED IRON DEFICIENCY ANEMIA TYPE: ICD-10-CM

## 2020-05-05 ENCOUNTER — ANESTHESIA EVENT (OUTPATIENT)
Dept: GASTROENTEROLOGY | Facility: MEDICAL CENTER | Age: 60
End: 2020-05-05

## 2020-05-05 ENCOUNTER — PREP FOR PROCEDURE (OUTPATIENT)
Dept: GASTROENTEROLOGY | Facility: MEDICAL CENTER | Age: 60
End: 2020-05-05

## 2020-05-05 DIAGNOSIS — Z20.822 ENCOUNTER FOR LABORATORY TESTING FOR COVID-19 VIRUS: Primary | ICD-10-CM

## 2020-05-07 DIAGNOSIS — Z20.822 ENCOUNTER FOR LABORATORY TESTING FOR COVID-19 VIRUS: ICD-10-CM

## 2020-05-07 PROCEDURE — U0003 INFECTIOUS AGENT DETECTION BY NUCLEIC ACID (DNA OR RNA); SEVERE ACUTE RESPIRATORY SYNDROME CORONAVIRUS 2 (SARS-COV-2) (CORONAVIRUS DISEASE [COVID-19]), AMPLIFIED PROBE TECHNIQUE, MAKING USE OF HIGH THROUGHPUT TECHNOLOGIES AS DESCRIBED BY CMS-2020-01-R: HCPCS

## 2020-05-08 LAB
INPATIENT: NORMAL
SARS-COV-2 RNA SPEC QL NAA+PROBE: NOT DETECTED

## 2020-05-15 ENCOUNTER — HOSPITAL ENCOUNTER (OUTPATIENT)
Dept: GASTROENTEROLOGY | Facility: MEDICAL CENTER | Age: 60
Setting detail: OUTPATIENT SURGERY
Discharge: HOME | End: 2020-05-15
Attending: INTERNAL MEDICINE | Admitting: INTERNAL MEDICINE
Payer: COMMERCIAL

## 2020-05-15 ENCOUNTER — ANESTHESIA (OUTPATIENT)
Dept: GASTROENTEROLOGY | Facility: MEDICAL CENTER | Age: 60
End: 2020-05-15

## 2020-05-15 VITALS
SYSTOLIC BLOOD PRESSURE: 125 MMHG | HEIGHT: 67 IN | BODY MASS INDEX: 24.17 KG/M2 | RESPIRATION RATE: 20 BRPM | HEART RATE: 78 BPM | TEMPERATURE: 98.7 F | OXYGEN SATURATION: 94 % | WEIGHT: 154 LBS | DIASTOLIC BLOOD PRESSURE: 58 MMHG

## 2020-05-15 DIAGNOSIS — D50.9 IRON DEFICIENCY ANEMIA, UNSPECIFIED IRON DEFICIENCY ANEMIA TYPE: ICD-10-CM

## 2020-05-15 DIAGNOSIS — K59.00 CONSTIPATION, UNSPECIFIED CONSTIPATION TYPE: ICD-10-CM

## 2020-05-15 PROCEDURE — 88305 TISSUE EXAM BY PATHOLOGIST: CPT | Performed by: PATHOLOGY

## 2020-05-15 PROCEDURE — NC001 PR NO CHARGE: Performed by: INTERNAL MEDICINE

## 2020-05-15 PROCEDURE — 45380 COLONOSCOPY AND BIOPSY: CPT | Performed by: INTERNAL MEDICINE

## 2020-05-15 PROCEDURE — 43239 EGD BIOPSY SINGLE/MULTIPLE: CPT | Performed by: INTERNAL MEDICINE

## 2020-05-15 RX ORDER — SODIUM CHLORIDE 9 MG/ML
125 INJECTION, SOLUTION INTRAVENOUS CONTINUOUS
Status: DISCONTINUED | OUTPATIENT
Start: 2020-05-15 | End: 2020-05-15

## 2020-05-15 RX ORDER — LIDOCAINE HYDROCHLORIDE 20 MG/ML
INJECTION, SOLUTION EPIDURAL; INFILTRATION; INTRACAUDAL; PERINEURAL AS NEEDED
Status: DISCONTINUED | OUTPATIENT
Start: 2020-05-15 | End: 2020-05-15 | Stop reason: SURG

## 2020-05-15 RX ORDER — PROPOFOL 10 MG/ML
INJECTION, EMULSION INTRAVENOUS CONTINUOUS PRN
Status: DISCONTINUED | OUTPATIENT
Start: 2020-05-15 | End: 2020-05-15 | Stop reason: SURG

## 2020-05-15 RX ORDER — PROPOFOL 10 MG/ML
INJECTION, EMULSION INTRAVENOUS AS NEEDED
Status: DISCONTINUED | OUTPATIENT
Start: 2020-05-15 | End: 2020-05-15 | Stop reason: SURG

## 2020-05-15 RX ADMIN — LIDOCAINE HYDROCHLORIDE 5 ML: 20 INJECTION, SOLUTION EPIDURAL; INFILTRATION; INTRACAUDAL; PERINEURAL at 07:40

## 2020-05-15 RX ADMIN — PROPOFOL 150 MCG/KG/MIN: 10 INJECTION, EMULSION INTRAVENOUS at 07:40

## 2020-05-15 RX ADMIN — SODIUM CHLORIDE 125 ML/HR: 0.9 INJECTION, SOLUTION INTRAVENOUS at 07:17

## 2020-05-15 RX ADMIN — SODIUM CHLORIDE: 0.9 INJECTION, SOLUTION INTRAVENOUS at 08:00

## 2020-05-15 RX ADMIN — PROPOFOL 140 MG: 10 INJECTION, EMULSION INTRAVENOUS at 07:40

## 2020-05-20 ENCOUNTER — HOSPITAL ENCOUNTER (OUTPATIENT)
Dept: RADIOLOGY | Facility: HOSPITAL | Age: 60
Discharge: HOME | End: 2020-05-20
Attending: SPECIALIST
Payer: COMMERCIAL

## 2020-05-20 DIAGNOSIS — D35.2 BENIGN NEOPLASM OF PITUITARY GLAND AND CRANIOPHARYNGEAL DUCT (POUCH) (HCC): ICD-10-CM

## 2020-05-20 DIAGNOSIS — D35.3 BENIGN NEOPLASM OF PITUITARY GLAND AND CRANIOPHARYNGEAL DUCT (POUCH) (HCC): ICD-10-CM

## 2020-05-20 PROCEDURE — A9585 GADOBUTROL INJECTION: HCPCS | Performed by: SPECIALIST

## 2020-05-20 PROCEDURE — 70553 MRI BRAIN STEM W/O & W/DYE: CPT

## 2020-05-20 RX ADMIN — GADOBUTROL 7 ML: 604.72 INJECTION INTRAVENOUS at 17:27

## 2020-05-21 ENCOUNTER — HOSPITAL ENCOUNTER (OUTPATIENT)
Dept: RADIOLOGY | Facility: HOSPITAL | Age: 60
Discharge: HOME | End: 2020-05-21
Attending: SURGERY
Payer: COMMERCIAL

## 2020-05-21 PROCEDURE — 74240 X-RAY XM UPR GI TRC 1CNTRST: CPT

## 2020-05-27 ENCOUNTER — TELEPHONE (OUTPATIENT)
Dept: BARIATRICS | Facility: CLINIC | Age: 60
End: 2020-05-27

## 2020-05-28 ENCOUNTER — OFFICE VISIT (OUTPATIENT)
Dept: BARIATRICS | Facility: CLINIC | Age: 60
End: 2020-05-28
Payer: COMMERCIAL

## 2020-05-28 ENCOUNTER — TRANSCRIBE ORDERS (OUTPATIENT)
Dept: ADMISSIONS | Facility: HOSPITAL | Age: 60
End: 2020-05-28

## 2020-05-28 VITALS
HEIGHT: 66 IN | TEMPERATURE: 97.7 F | DIASTOLIC BLOOD PRESSURE: 70 MMHG | WEIGHT: 160 LBS | SYSTOLIC BLOOD PRESSURE: 120 MMHG | BODY MASS INDEX: 25.71 KG/M2 | HEART RATE: 76 BPM

## 2020-05-28 DIAGNOSIS — K21.9 GASTROESOPHAGEAL REFLUX DISEASE WITHOUT ESOPHAGITIS: Primary | ICD-10-CM

## 2020-05-28 DIAGNOSIS — Z87.19 H/O HIATAL HERNIA: ICD-10-CM

## 2020-05-28 DIAGNOSIS — K44.9 HIATAL HERNIA: ICD-10-CM

## 2020-05-28 PROCEDURE — 99214 OFFICE O/P EST MOD 30 MIN: CPT | Performed by: SURGERY

## 2020-06-03 ENCOUNTER — TELEPHONE (OUTPATIENT)
Dept: GASTROENTEROLOGY | Facility: AMBULARY SURGERY CENTER | Age: 60
End: 2020-06-03

## 2020-06-03 DIAGNOSIS — K21.9 GASTROESOPHAGEAL REFLUX DISEASE WITHOUT ESOPHAGITIS: Primary | ICD-10-CM

## 2020-06-03 RX ORDER — OMEPRAZOLE 40 MG/1
40 CAPSULE, DELAYED RELEASE ORAL
Qty: 180 CAPSULE | Refills: 1 | Status: SHIPPED | OUTPATIENT
Start: 2020-06-03 | End: 2020-07-10

## 2020-06-03 NOTE — TELEPHONE ENCOUNTER
Patients GI provider:  Dr Wiggins Frame    Number to return call: [phone]    Reason for call: Pt calling because when she had her VR visit Dr Kaden Petersen suggested that pt should start omeprazole BID   Please send rx [facility] to [facility]     Scheduled procedure/appointment date if applicable:

## 2020-06-05 ENCOUNTER — DOCUMENTATION (OUTPATIENT)
Dept: BARIATRICS | Facility: CLINIC | Age: 60
End: 2020-06-05

## 2020-06-05 DIAGNOSIS — K21.9 GERD (GASTROESOPHAGEAL REFLUX DISEASE): Primary | ICD-10-CM

## 2020-06-10 DIAGNOSIS — K21.9 GASTROESOPHAGEAL REFLUX DISEASE, ESOPHAGITIS PRESENCE NOT SPECIFIED: Primary | ICD-10-CM

## 2020-06-11 ENCOUNTER — TELEPHONE (OUTPATIENT)
Dept: BARIATRICS | Facility: CLINIC | Age: 60
End: 2020-06-11

## 2020-06-11 DIAGNOSIS — K21.9 GERD (GASTROESOPHAGEAL REFLUX DISEASE): Primary | ICD-10-CM

## 2020-06-11 DIAGNOSIS — R11.2 NAUSEA & VOMITING: ICD-10-CM

## 2020-06-11 RX ORDER — ONDANSETRON 4 MG/1
4 TABLET, ORALLY DISINTEGRATING ORAL EVERY 6 HOURS PRN
Qty: 20 TABLET | Refills: 0 | Status: SHIPPED | OUTPATIENT
Start: 2020-06-11 | End: 2020-09-29

## 2020-06-11 RX ORDER — METOCLOPRAMIDE 5 MG/1
5 TABLET ORAL EVERY 8 HOURS PRN
Qty: 20 TABLET | Refills: 0 | Status: SHIPPED | OUTPATIENT
Start: 2020-06-11 | End: 2020-09-29

## 2020-06-22 DIAGNOSIS — Z11.59 SPECIAL SCREENING EXAMINATION FOR UNSPECIFIED VIRAL DISEASE: Primary | ICD-10-CM

## 2020-06-26 DIAGNOSIS — Z11.59 SPECIAL SCREENING EXAMINATION FOR UNSPECIFIED VIRAL DISEASE: Primary | ICD-10-CM

## 2020-06-27 DIAGNOSIS — Z11.59 SPECIAL SCREENING EXAMINATION FOR UNSPECIFIED VIRAL DISEASE: ICD-10-CM

## 2020-06-27 PROCEDURE — U0003 INFECTIOUS AGENT DETECTION BY NUCLEIC ACID (DNA OR RNA); SEVERE ACUTE RESPIRATORY SYNDROME CORONAVIRUS 2 (SARS-COV-2) (CORONAVIRUS DISEASE [COVID-19]), AMPLIFIED PROBE TECHNIQUE, MAKING USE OF HIGH THROUGHPUT TECHNOLOGIES AS DESCRIBED BY CMS-2020-01-R: HCPCS

## 2020-06-29 ENCOUNTER — HOSPITAL ENCOUNTER (OUTPATIENT)
Dept: GASTROENTEROLOGY | Facility: HOSPITAL | Age: 60
Discharge: HOME | End: 2020-06-29
Payer: COMMERCIAL

## 2020-06-29 VITALS
DIASTOLIC BLOOD PRESSURE: 83 MMHG | HEART RATE: 77 BPM | TEMPERATURE: 98.3 F | RESPIRATION RATE: 16 BRPM | OXYGEN SATURATION: 98 % | SYSTOLIC BLOOD PRESSURE: 128 MMHG

## 2020-06-29 DIAGNOSIS — K21.9 GASTROESOPHAGEAL REFLUX DISEASE, ESOPHAGITIS PRESENCE NOT SPECIFIED: ICD-10-CM

## 2020-06-29 LAB — SARS-COV-2 RNA RESP QL NAA+PROBE: NEGATIVE

## 2020-06-29 PROCEDURE — U0003 INFECTIOUS AGENT DETECTION BY NUCLEIC ACID (DNA OR RNA); SEVERE ACUTE RESPIRATORY SYNDROME CORONAVIRUS 2 (SARS-COV-2) (CORONAVIRUS DISEASE [COVID-19]), AMPLIFIED PROBE TECHNIQUE, MAKING USE OF HIGH THROUGHPUT TECHNOLOGIES AS DESCRIBED BY CMS-2020-01-R: HCPCS | Performed by: INTERNAL MEDICINE

## 2020-06-29 PROCEDURE — 91038 ESOPH IMPED FUNCT TEST > 1HR: CPT

## 2020-06-29 NOTE — PERIOPERATIVE NURSING NOTE
Patient brought in room and educated on procedure  Lidocaine 2% topical solution inserted via left  nostril and PH 44 probe inserted via nostril and secured  [place] monitor teach back done and patient verbalized understanding  Discharged instructions given to patient and instructed to return the next day to have the probe remove  Patient left in stable condition

## 2020-06-30 LAB
INPATIENT: NORMAL
SARS-COV-2 RNA SPEC QL NAA+PROBE: NOT DETECTED

## 2020-07-02 DIAGNOSIS — E78.5 HYPERLIPIDEMIA, UNSPECIFIED HYPERLIPIDEMIA TYPE: ICD-10-CM

## 2020-07-02 RX ORDER — ATORVASTATIN CALCIUM 10 MG/1
TABLET, FILM COATED ORAL
Qty: 90 TABLET | Refills: 0 | Status: SHIPPED | OUTPATIENT
Start: 2020-07-02 | End: 2020-09-28

## 2020-07-09 ENCOUNTER — DOCUMENTATION (OUTPATIENT)
Dept: BARIATRICS | Facility: CLINIC | Age: 60
End: 2020-07-09

## 2020-07-09 ENCOUNTER — OFFICE VISIT (OUTPATIENT)
Dept: BARIATRICS | Facility: CLINIC | Age: 60
End: 2020-07-09
Payer: COMMERCIAL

## 2020-07-09 VITALS
WEIGHT: 158.5 LBS | HEART RATE: 93 BPM | RESPIRATION RATE: 18 BRPM | TEMPERATURE: 98.4 F | SYSTOLIC BLOOD PRESSURE: 124 MMHG | HEIGHT: 66 IN | BODY MASS INDEX: 25.47 KG/M2 | DIASTOLIC BLOOD PRESSURE: 78 MMHG

## 2020-07-09 DIAGNOSIS — Z01.818 PREOPERATIVE TESTING: Primary | ICD-10-CM

## 2020-07-09 DIAGNOSIS — K44.9 HIATAL HERNIA: ICD-10-CM

## 2020-07-09 DIAGNOSIS — K21.9 GASTROESOPHAGEAL REFLUX DISEASE, ESOPHAGITIS PRESENCE NOT SPECIFIED: Primary | ICD-10-CM

## 2020-07-09 DIAGNOSIS — Z98.890 HISTORY OF FUNDOPLICATION: ICD-10-CM

## 2020-07-09 PROCEDURE — 99214 OFFICE O/P EST MOD 30 MIN: CPT | Performed by: SURGERY

## 2020-07-09 PROCEDURE — 91038 ESOPH IMPED FUNCT TEST > 1HR: CPT | Performed by: INTERNAL MEDICINE

## 2020-07-09 NOTE — H&P (VIEW-ONLY)
Final H&P - BARIATRIC SURGERY  Sanjana Saenz 62 y o  female MRN: [id number]  Unit/Bed#:  Encounter: [id number]      HPI:  Sanjana Saenz is a 62 y o  female status post lap PEHR and Nissen fundoplication by Dr Louise Bravo in 2016  She is being seen for radiographic signs of slippage of the wrap and potentially recurrent hiatal hernia  She is here to discuss the results of her recent repeat pH study  Subjective     The patient is referred to us by Dr Neela Villarreal from GI  Duration of symptoms: 6 months  Main symptoms: Mild dysphagia (irrespective of solid or liquid food), mild intermittent reflux, intermittent chest pain, mild intermittent nausea w/retching  Current treatment: Nothing    Of note the initial symptoms before surgery in 2016 were reflux, dysphagia and anemia 2/2 to large Tiburcio's lesions  Since she saw us in the office on 5/28/20202, she had a episode of nausea with several vomiting episodes  She has continued to intermittently awaken w/mild chest pain and heartburn  Patient denies any smoking or alcohol use  Review of Systems   Constitutional: Negative  HENT: Negative  Eyes: Negative  Respiratory: Negative  Cardiovascular: Negative  Gastrointestinal: Negative  Endocrine: Negative  Genitourinary: Negative  Musculoskeletal: Negative  Skin: Negative  Allergic/Immunologic: Negative  Neurological: Negative  Hematological: Negative  Psychiatric/Behavioral: Negative          Historical Information   Past Medical History:   Diagnosis Date    Anxiety     Arthritis     Tiburcio ulcer     Depression     Hyperlipidemia     Migraine     Mitral valve prolapse     Murmur     Pituitary adenoma ([address] )     Wears glasses      Past Surgical History:   Procedure Laterality Date    BREAST BIOPSY Left 1996    benign    BREAST SURGERY      L breast cyst removal-benign    CATARACT EXTRACTION, BILATERAL  01/2020    COLONOSCOPY      DE QUERVAIN'S RELEASE Bilateral     EGD AND COLONOSCOPY N/A 5/11/2016    Procedure: EGD AND COLONOSCOPY;  Surgeon: Jason Lozoya MD;  Location: [facility]; Service:     ND INCIS TENDON SHEATH,RADIAL STYLOID Left 11/21/2019    Procedure: RELEASE DEQUERVAINS AND TENDON GRAFT;  Surgeon: Narciso Wallace MD;  Location[facility];  Service: Orthopedics    ND LAP, REPAIR PARAESOPHAGEAL HERNIA, INCL FUNDOPLASTY W/ MESH N/A 8/31/2016    Procedure: REPAIR HERNIA PARAESOPHAGEAL  with bio A mesh LAPAROSCOPIC NISSEN FUNDOPLICATION Laparoscopic Gastroplexy Intraop EGD #[id number];  Surgeon: Yakelin Shea MD;  Location: [facility];  Service: Bariatrics    ND REPAIR INTERCARP/CARP-METACARP JT Left 11/21/2019    Procedure: THUMB Aia 16 ARTHROPLASTY;  Surgeon: Narciso Wallace MD;  Location: [facility];  Service: Orthopedics    TONSILLECTOMY      WRIST SURGERY Bilateral     daquero vein release     Social History   Social History     Substance and Sexual Activity   Alcohol Use Yes    Frequency: Monthly or less    Comment: social     Social History     Substance and Sexual Activity   Drug Use No     Social History     Tobacco Use   Smoking Status Never Smoker   Smokeless Tobacco Never Used       Objective       Current Vitals:   Blood Pressure: 124/78 (07/09/20 1142)  Pulse: 93 (07/09/20 1142)  Temperature: 98 4 °F (36 9 °C) (07/09/20 1142)  Respirations: 18 (07/09/20 1142)  Height: 5' 6" (167 6 cm) (07/09/20 1142)  Weight - Scale: 71 9 kg (158 lb 8 oz) (07/09/20 1142)    Invasive Devices     None                 Physical Exam   Constitutional: She is oriented to person, place, and time  She appears well-developed and well-nourished  HENT:   Head: Normocephalic and atraumatic  Nose: Nose normal    Eyes: Conjunctivae and EOM are normal    Neck: Normal range of motion  Cardiovascular: Normal rate, regular rhythm, normal heart sounds and intact distal pulses  Pulmonary/Chest: Effort normal and breath sounds normal    Abdominal: Soft   Bowel sounds are normal  She exhibits no distension and no mass  There is no tenderness  There is no rebound and no guarding  No hernia  Well healed lap incisions  No palpable ventral hernias  Musculoskeletal: Normal range of motion  Neurological: She is alert and oriented to person, place, and time  Skin: Skin is warm  Psychiatric: She has a normal mood and affect  Her behavior is normal  Judgment and thought content normal      Pathology, and Other Studies: I have personally reviewed pertinent films in PACS    Assessment/PLAN:    Kell Padron is a 62 y o  female  status post lap PEHR and Nissen fundoplication by Dr Jonnie Alejandra in 2016  She is being seen for radiographic signs of slippage of the wrap and potentially recurrent hiatal hernia    ---------------------------------------------------    The patient's workup thus far was reviewed, and includes:    UGI (5/21/20)     FINDINGS:     Postsurgical changes of Nissen fundoplication are present   The fundoplication appears slipped inferiorly and there is recurrent hiatal hernia      The esophagus is normal in caliber   Esophageal motility is normal and emptying of contrast from the esophagus is prompt   There is no mucosal mass, ulceration or fold thickening identified      Contrast empties promptly into the duodenum   The duodenum is normal in caliber   The ligament of Treitz/duodenojejunal junction lies in a normal position      Gastroesophageal reflux was not observed        IMPRESSION:     Postsurgical changes of fundoplication with inferior slippage of fundoplication and recurrent hiatal hernia      EGD (5/15/2020 - Dr Hector Biggs)     FINDINGS:  · Large hiatal hernia  · Ten or more sessile polyps measuring smaller than 5 mm in the lower third of the esophagus; performed cold biopsy  · The stomach and duodenum appeared normal     IMPRESSION:  Multiple tiny benign-appearing esophageal polyps biopsied  Recurrence of large hiatal hernia but no Tiburcio's erosions noted      RECOMMENDATION:  Await pathology results  Colonoscopy to follow     Pathology     B  Stomach, biopsy:       - Antral and oxyntic mucosa with chronic inactive gastritis       - No Helicobacter pylori organisms are identified on the routine stain       - No intestinal metaplasia, dysplasia or malignancy is identified      C  Esophageal polyp, biopsy:       - Eosinophil-rich esophagitis (focally greater than 10 eosinophils per high-power field)       - No glandular mucosa is identified        - No dysplasia or malignancy is identified      PH study (7/9/2020)    Indication- reflux     Acid exposure time is 9 6% recumbent position and 1 1% and upright position with overall  acid exposure time 4%  DeMeester score is 20  107 episodes of reflux with 99 upright position and 8 in recumbent position  73 episodes in upright position were either acid or weakly acid while 27 episodes were weakly alkaline  8 episodes of reflux were either acid or weakly acid in recumbent position     Symptom correlation was not significant symptoms of belching, sneeze, hiccups     Study positive for recumbent acid reflux    --------------------------------------------------------------------    Given her mild intermittent symptoms, along with a positive pH study, we will elect to take down the patient's fundoplication, repair the recurrent hiatal hernia, and place LINX device to prevent reflux         Heaven Zuniga MD  Bariatric Surgery Fellow  7/9/2020  12:39 PM

## 2020-07-09 NOTE — PROGRESS NOTES
Reviewed post-operative LINX Diet progression with pt  Reviewed eating six small meals per day, solid food, every two hours starting on day two post-operative  Encouraged taking small bites, chewing well, sipping liquids with meals to facilitate swallowing  Discussed post-operative dysphagia: Beginning 7-10 days after surgery, peaking by week six, ideally resolved by week 12 post-operative    Provided pt with contact information and handouts  for further dietary questions

## 2020-07-09 NOTE — PROGRESS NOTES
Final H&P - BARIATRIC SURGERY  Parker Piedra 62 y o  female MRN: [id number]  Unit/Bed#:  Encounter: [id number]      HPI:  Parker Piedra is a 62 y o  female status post lap PEHR and Nissen fundoplication by Dr Julia Pryor in 2016  She is being seen for radiographic signs of slippage of the wrap and potentially recurrent hiatal hernia  She is here to discuss the results of her recent repeat pH study  Subjective     The patient is referred to us by Dr Marcelina Parker from GI  Duration of symptoms: 6 months  Main symptoms: Mild dysphagia (irrespective of solid or liquid food), mild intermittent reflux, intermittent chest pain, mild intermittent nausea w/retching  Current treatment: Nothing    Of note the initial symptoms before surgery in 2016 were reflux, dysphagia and anemia 2/2 to large Tiburcio's lesions  Since she saw us in the office on 5/28/20202, she had a episode of nausea with several vomiting episodes  She has continued to intermittently awaken w/mild chest pain and heartburn  Patient denies any smoking or alcohol use  Review of Systems   Constitutional: Negative  HENT: Negative  Eyes: Negative  Respiratory: Negative  Cardiovascular: Negative  Gastrointestinal: Negative  Endocrine: Negative  Genitourinary: Negative  Musculoskeletal: Negative  Skin: Negative  Allergic/Immunologic: Negative  Neurological: Negative  Hematological: Negative  Psychiatric/Behavioral: Negative          Historical Information   Past Medical History:   Diagnosis Date    Anxiety     Arthritis     Tiburcio ulcer     Depression     Hyperlipidemia     Migraine     Mitral valve prolapse     Murmur     Pituitary adenoma ([address] )     Wears glasses      Past Surgical History:   Procedure Laterality Date    BREAST BIOPSY Left 1996    benign    BREAST SURGERY      L breast cyst removal-benign    CATARACT EXTRACTION, BILATERAL  01/2020    COLONOSCOPY      DE QUERVAIN'S RELEASE Bilateral     EGD AND COLONOSCOPY N/A 5/11/2016    Procedure: EGD AND COLONOSCOPY;  Surgeon: Perla Zarate MD;  Location: [facility]; Service:     UT INCIS TENDON SHEATH,RADIAL STYLOID Left 11/21/2019    Procedure: RELEASE DEQUERVAINS AND TENDON GRAFT;  Surgeon: Red Dill MD;  [facility];  Service: Orthopedics    UT LAP, REPAIR PARAESOPHAGEAL HERNIA, INCL FUNDOPLASTY W/ MESH N/A 8/31/2016    Procedure: REPAIR HERNIA PARAESOPHAGEAL  with bio A mesh LAPAROSCOPIC NISSEN FUNDOPLICATION Laparoscopic Gastroplexy Intraop EGD #[id number];  Surgeon: Catherine Duggan MD;  [facility];  Service: Bariatrics    UT REPAIR INTERCARP/CARP-METACARP JT Left 11/21/2019    Procedure: THUMB Aia 16 ARTHROPLASTY;  Surgeon: Red Dill MD;  [facility];  Service: Orthopedics    TONSILLECTOMY      WRIST SURGERY Bilateral     daquero vein release     Social History   Social History     Substance and Sexual Activity   Alcohol Use Yes    Frequency: Monthly or less    Comment: social     Social History     Substance and Sexual Activity   Drug Use No     Social History     Tobacco Use   Smoking Status Never Smoker   Smokeless Tobacco Never Used       Objective       Current Vitals:   Blood Pressure: 124/78 (07/09/20 1142)  Pulse: 93 (07/09/20 1142)  Temperature: 98 4 °F (36 9 °C) (07/09/20 1142)  Respirations: 18 (07/09/20 1142)  Height: 5' 6" (167 6 cm) (07/09/20 1142)  Weight - Scale: 71 9 kg (158 lb 8 oz) (07/09/20 1142)    Invasive Devices     None                 Physical Exam   Constitutional: She is oriented to person, place, and time  She appears well-developed and well-nourished  HENT:   Head: Normocephalic and atraumatic  Nose: Nose normal    Eyes: Conjunctivae and EOM are normal    Neck: Normal range of motion  Cardiovascular: Normal rate, regular rhythm, normal heart sounds and intact distal pulses  Pulmonary/Chest: Effort normal and breath sounds normal    Abdominal: Soft   Bowel sounds are normal  She exhibits no distension and no mass  There is no tenderness  There is no rebound and no guarding  No hernia  Well healed lap incisions  No palpable ventral hernias  Musculoskeletal: Normal range of motion  Neurological: She is alert and oriented to person, place, and time  Skin: Skin is warm  Psychiatric: She has a normal mood and affect  Her behavior is normal  Judgment and thought content normal      Pathology, and Other Studies: I have personally reviewed pertinent films in PACS    Assessment/PLAN:    Abdias Oakley is a 62 y o  female  status post lap PEHR and Nissen fundoplication by Dr Yolanda Payton in 2016  She is being seen for radiographic signs of slippage of the wrap and potentially recurrent hiatal hernia    ---------------------------------------------------    The patient's workup thus far was reviewed, and includes:    UGI (5/21/20)     FINDINGS:     Postsurgical changes of Nissen fundoplication are present   The fundoplication appears slipped inferiorly and there is recurrent hiatal hernia      The esophagus is normal in caliber   Esophageal motility is normal and emptying of contrast from the esophagus is prompt   There is no mucosal mass, ulceration or fold thickening identified      Contrast empties promptly into the duodenum   The duodenum is normal in caliber   The ligament of Treitz/duodenojejunal junction lies in a normal position      Gastroesophageal reflux was not observed        IMPRESSION:     Postsurgical changes of fundoplication with inferior slippage of fundoplication and recurrent hiatal hernia      EGD (5/15/2020 - Dr Ramakrishna Brown)     FINDINGS:  · Large hiatal hernia  · Ten or more sessile polyps measuring smaller than 5 mm in the lower third of the esophagus; performed cold biopsy  · The stomach and duodenum appeared normal     IMPRESSION:  Multiple tiny benign-appearing esophageal polyps biopsied  Recurrence of large hiatal hernia but no Tiburcio's erosions noted      RECOMMENDATION:  Await pathology results  Colonoscopy to follow     Pathology     B  Stomach, biopsy:       - Antral and oxyntic mucosa with chronic inactive gastritis       - No Helicobacter pylori organisms are identified on the routine stain       - No intestinal metaplasia, dysplasia or malignancy is identified      C  Esophageal polyp, biopsy:       - Eosinophil-rich esophagitis (focally greater than 10 eosinophils per high-power field)       - No glandular mucosa is identified        - No dysplasia or malignancy is identified      PH study (7/9/2020)    Indication- reflux     Acid exposure time is 9 6% recumbent position and 1 1% and upright position with overall  acid exposure time 4%  DeMeester score is 20  107 episodes of reflux with 99 upright position and 8 in recumbent position  73 episodes in upright position were either acid or weakly acid while 27 episodes were weakly alkaline  8 episodes of reflux were either acid or weakly acid in recumbent position     Symptom correlation was not significant symptoms of belching, sneeze, hiccups     Study positive for recumbent acid reflux    --------------------------------------------------------------------    Given her mild intermittent symptoms, along with a positive pH study, we will elect to take down the patient's fundoplication, repair the recurrent hiatal hernia, and place LINX device to prevent reflux         Ashley Peralta MD  Bariatric Surgery Fellow  7/9/2020  12:39 PM

## 2020-07-10 DIAGNOSIS — K25.9 CAMERON ULCER: Primary | ICD-10-CM

## 2020-07-10 RX ORDER — OXYCODONE HYDROCHLORIDE 5 MG/1
5 TABLET ORAL EVERY 4 HOURS PRN
Qty: 10 TABLET | Refills: 0 | Status: SHIPPED | OUTPATIENT
Start: 2020-07-10 | End: 2020-09-29

## 2020-07-10 RX ORDER — OMEPRAZOLE 20 MG/1
20 CAPSULE, DELAYED RELEASE ORAL DAILY
Qty: 30 CAPSULE | Refills: 0 | Status: SHIPPED | OUTPATIENT
Start: 2020-07-10 | End: 2020-09-29

## 2020-07-13 DIAGNOSIS — Z20.822 ENCOUNTER FOR LABORATORY TESTING FOR SEVERE ACUTE RESPIRATORY SYNDROME CORONAVIRUS 2 (SARS-COV-2): ICD-10-CM

## 2020-07-13 PROCEDURE — U0003 INFECTIOUS AGENT DETECTION BY NUCLEIC ACID (DNA OR RNA); SEVERE ACUTE RESPIRATORY SYNDROME CORONAVIRUS 2 (SARS-COV-2) (CORONAVIRUS DISEASE [COVID-19]), AMPLIFIED PROBE TECHNIQUE, MAKING USE OF HIGH THROUGHPUT TECHNOLOGIES AS DESCRIBED BY CMS-2020-01-R: HCPCS

## 2020-07-13 NOTE — PRE-PROCEDURE INSTRUCTIONS
Pre-Surgery Instructions:   Medication Instructions    ascorbic acid (VITAMIN C) 500 mg tablet Instructed patient per Anesthesia Guidelines   aspirin 81 MG tablet Instructed patient per Anesthesia Guidelines   atorvastatin (LIPITOR) 10 mg tablet Instructed patient per Anesthesia Guidelines   buPROPion (WELLBUTRIN XL) 300 mg 24 hr tablet Instructed patient per Anesthesia Guidelines   Cholecalciferol (VITAMIN D) 2000 UNITS CAPS Instructed patient per Anesthesia Guidelines   DULoxetine (CYMBALTA) 20 mg capsule Instructed patient per Anesthesia Guidelines   estrogen, conjugated,-medroxyprogesterone (PREMPRO) 0 3-1 5 MG per tablet Instructed patient per Anesthesia Guidelines   metoclopramide (REGLAN) 5 mg tablet Instructed patient per Anesthesia Guidelines   multivitamin (THERAGRAN) TABS Instructed patient per Anesthesia Guidelines   omeprazole (PriLOSEC) 20 mg delayed release capsule Instructed patient per Anesthesia Guidelines   ondansetron (ZOFRAN-ODT) 4 mg disintegrating tablet Instructed patient per Anesthesia Guidelines   SUMAtriptan (IMITREX) 100 mg tablet Instructed patient per Anesthesia Guidelines   vitamin E, tocopherol, 400 units capsule Instructed patient per Anesthesia Guidelines  Patient prefers not to take any medication the morning of surgery  Instructed no aspirin, NSAIDs, vitamins, or supplements 1 week before surgery

## 2020-07-14 LAB — SARS-COV-2 RNA SPEC QL NAA+PROBE: NOT DETECTED

## 2020-07-15 ENCOUNTER — CLINICAL SUPPORT (OUTPATIENT)
Dept: URGENT CARE | Facility: MEDICAL CENTER | Age: 60
End: 2020-07-15
Payer: COMMERCIAL

## 2020-07-15 DIAGNOSIS — Z01.818 PREOPERATIVE TESTING: ICD-10-CM

## 2020-07-15 PROCEDURE — 93005 ELECTROCARDIOGRAM TRACING: CPT

## 2020-07-16 LAB
ATRIAL RATE: 65 BPM
ATRIAL RATE: 65 BPM
P AXIS: 7 DEGREES
P AXIS: 7 DEGREES
PR INTERVAL: 150 MS
PR INTERVAL: 150 MS
QRS AXIS: 29 DEGREES
QRS AXIS: 29 DEGREES
QRSD INTERVAL: 82 MS
QRSD INTERVAL: 82 MS
QT INTERVAL: 436 MS
QT INTERVAL: 436 MS
QTC INTERVAL: 453 MS
QTC INTERVAL: 453 MS
T WAVE AXIS: 34 DEGREES
T WAVE AXIS: 34 DEGREES
VENTRICULAR RATE: 65 BPM
VENTRICULAR RATE: 65 BPM

## 2020-07-16 PROCEDURE — 93010 ELECTROCARDIOGRAM REPORT: CPT | Performed by: INTERNAL MEDICINE

## 2020-07-17 ENCOUNTER — ANESTHESIA EVENT (OUTPATIENT)
Dept: PERIOP | Facility: HOSPITAL | Age: 60
End: 2020-07-17
Payer: COMMERCIAL

## 2020-07-20 ENCOUNTER — ANESTHESIA (OUTPATIENT)
Dept: PERIOP | Facility: HOSPITAL | Age: 60
End: 2020-07-20
Payer: COMMERCIAL

## 2020-07-20 ENCOUNTER — HOSPITAL ENCOUNTER (OUTPATIENT)
Facility: HOSPITAL | Age: 60
Setting detail: OUTPATIENT SURGERY
Discharge: HOME | End: 2020-07-21
Attending: SURGERY | Admitting: SURGERY
Payer: COMMERCIAL

## 2020-07-20 DIAGNOSIS — K21.9 GASTROESOPHAGEAL REFLUX DISEASE: ICD-10-CM

## 2020-07-20 DIAGNOSIS — Z98.890 STATUS POST NISSEN FUNDOPLICATION (WITHOUT GASTROSTOMY TUBE) PROCEDURE: ICD-10-CM

## 2020-07-20 DIAGNOSIS — R11.2 NAUSEA AND VOMITING: ICD-10-CM

## 2020-07-20 DIAGNOSIS — R07.9 CHEST PAIN: ICD-10-CM

## 2020-07-20 DIAGNOSIS — R12 HEARTBURN: ICD-10-CM

## 2020-07-20 DIAGNOSIS — R13.19 ESOPHAGEAL DYSPHAGIA: ICD-10-CM

## 2020-07-20 PROCEDURE — 43281 LAP PARAESOPHAG HERN REPAIR: CPT | Performed by: SURGERY

## 2020-07-20 PROCEDURE — C9290 INJ, BUPIVACAINE LIPOSOME: HCPCS | Performed by: SURGERY

## 2020-07-20 PROCEDURE — 88307 TISSUE EXAM BY PATHOLOGIST: CPT | Performed by: PATHOLOGY

## 2020-07-20 DEVICE — LINX REFLUX MANAGEMENT SYSTEM
Type: IMPLANTABLE DEVICE | Site: ABDOMEN | Status: FUNCTIONAL
Brand: LINX

## 2020-07-20 RX ORDER — SIMETHICONE 80 MG
80 TABLET,CHEWABLE ORAL 4 TIMES DAILY PRN
Status: DISCONTINUED | OUTPATIENT
Start: 2020-07-20 | End: 2020-07-21 | Stop reason: HOSPADM

## 2020-07-20 RX ORDER — METOCLOPRAMIDE HYDROCHLORIDE 5 MG/ML
10 INJECTION INTRAMUSCULAR; INTRAVENOUS EVERY 6 HOURS PRN
Status: DISCONTINUED | OUTPATIENT
Start: 2020-07-20 | End: 2020-07-21 | Stop reason: HOSPADM

## 2020-07-20 RX ORDER — FENTANYL CITRATE 50 UG/ML
INJECTION, SOLUTION INTRAMUSCULAR; INTRAVENOUS AS NEEDED
Status: DISCONTINUED | OUTPATIENT
Start: 2020-07-20 | End: 2020-07-20 | Stop reason: SURG

## 2020-07-20 RX ORDER — BUPIVACAINE HYDROCHLORIDE AND EPINEPHRINE 2.5; 5 MG/ML; UG/ML
INJECTION, SOLUTION EPIDURAL; INFILTRATION; INTRACAUDAL; PERINEURAL AS NEEDED
Status: DISCONTINUED | OUTPATIENT
Start: 2020-07-20 | End: 2020-07-20 | Stop reason: HOSPADM

## 2020-07-20 RX ORDER — ACETAMINOPHEN 160 MG/5ML
975 SUSPENSION, ORAL (FINAL DOSE FORM) ORAL EVERY 8 HOURS
Status: DISCONTINUED | OUTPATIENT
Start: 2020-07-20 | End: 2020-07-21 | Stop reason: HOSPADM

## 2020-07-20 RX ORDER — NEOSTIGMINE METHYLSULFATE 1 MG/ML
INJECTION INTRAVENOUS AS NEEDED
Status: DISCONTINUED | OUTPATIENT
Start: 2020-07-20 | End: 2020-07-20 | Stop reason: SURG

## 2020-07-20 RX ORDER — SCOLOPAMINE TRANSDERMAL SYSTEM 1 MG/1
1 PATCH, EXTENDED RELEASE TRANSDERMAL ONCE
Status: DISCONTINUED | OUTPATIENT
Start: 2020-07-20 | End: 2020-07-21 | Stop reason: HOSPADM

## 2020-07-20 RX ORDER — HYDROMORPHONE HCL/PF 1 MG/ML
0.5 SYRINGE (ML) INJECTION
Status: DISCONTINUED | OUTPATIENT
Start: 2020-07-20 | End: 2020-07-20 | Stop reason: HOSPADM

## 2020-07-20 RX ORDER — DEXAMETHASONE SODIUM PHOSPHATE 4 MG/ML
INJECTION, SOLUTION INTRA-ARTICULAR; INTRALESIONAL; INTRAMUSCULAR; INTRAVENOUS; SOFT TISSUE AS NEEDED
Status: DISCONTINUED | OUTPATIENT
Start: 2020-07-20 | End: 2020-07-20 | Stop reason: SURG

## 2020-07-20 RX ORDER — EPHEDRINE SULFATE 50 MG/ML
INJECTION INTRAVENOUS AS NEEDED
Status: DISCONTINUED | OUTPATIENT
Start: 2020-07-20 | End: 2020-07-20 | Stop reason: SURG

## 2020-07-20 RX ORDER — BUPROPION HYDROCHLORIDE 150 MG/1
300 TABLET ORAL DAILY
Status: DISCONTINUED | OUTPATIENT
Start: 2020-07-21 | End: 2020-07-21 | Stop reason: HOSPADM

## 2020-07-20 RX ORDER — SODIUM CHLORIDE, SODIUM LACTATE, POTASSIUM CHLORIDE, CALCIUM CHLORIDE 600; 310; 30; 20 MG/100ML; MG/100ML; MG/100ML; MG/100ML
INJECTION, SOLUTION INTRAVENOUS CONTINUOUS PRN
Status: DISCONTINUED | OUTPATIENT
Start: 2020-07-20 | End: 2020-07-20 | Stop reason: SURG

## 2020-07-20 RX ORDER — MAGNESIUM HYDROXIDE 1200 MG/15ML
LIQUID ORAL AS NEEDED
Status: DISCONTINUED | OUTPATIENT
Start: 2020-07-20 | End: 2020-07-20 | Stop reason: HOSPADM

## 2020-07-20 RX ORDER — ACETAMINOPHEN 325 MG/1
975 TABLET ORAL ONCE
Status: COMPLETED | OUTPATIENT
Start: 2020-07-20 | End: 2020-07-20

## 2020-07-20 RX ORDER — CEFAZOLIN SODIUM 2 G/50ML
2000 SOLUTION INTRAVENOUS ONCE
Status: COMPLETED | OUTPATIENT
Start: 2020-07-20 | End: 2020-07-20

## 2020-07-20 RX ORDER — DEXMEDETOMIDINE HYDROCHLORIDE 100 UG/ML
INJECTION, SOLUTION INTRAVENOUS AS NEEDED
Status: DISCONTINUED | OUTPATIENT
Start: 2020-07-20 | End: 2020-07-20 | Stop reason: SURG

## 2020-07-20 RX ORDER — OXYCODONE HCL 5 MG/5 ML
5 SOLUTION, ORAL ORAL EVERY 4 HOURS PRN
Status: DISCONTINUED | OUTPATIENT
Start: 2020-07-20 | End: 2020-07-21 | Stop reason: HOSPADM

## 2020-07-20 RX ORDER — GABAPENTIN 300 MG/1
300 CAPSULE ORAL ONCE
Status: COMPLETED | OUTPATIENT
Start: 2020-07-20 | End: 2020-07-20

## 2020-07-20 RX ORDER — SODIUM CHLORIDE, SODIUM LACTATE, POTASSIUM CHLORIDE, CALCIUM CHLORIDE 600; 310; 30; 20 MG/100ML; MG/100ML; MG/100ML; MG/100ML
100 INJECTION, SOLUTION INTRAVENOUS CONTINUOUS
Status: DISCONTINUED | OUTPATIENT
Start: 2020-07-20 | End: 2020-07-21 | Stop reason: HOSPADM

## 2020-07-20 RX ORDER — ATORVASTATIN CALCIUM 10 MG/1
10 TABLET, FILM COATED ORAL
Status: DISCONTINUED | OUTPATIENT
Start: 2020-07-20 | End: 2020-07-21 | Stop reason: HOSPADM

## 2020-07-20 RX ORDER — GLYCOPYRROLATE 0.2 MG/ML
INJECTION INTRAMUSCULAR; INTRAVENOUS AS NEEDED
Status: DISCONTINUED | OUTPATIENT
Start: 2020-07-20 | End: 2020-07-20 | Stop reason: SURG

## 2020-07-20 RX ORDER — ROCURONIUM BROMIDE 10 MG/ML
INJECTION, SOLUTION INTRAVENOUS AS NEEDED
Status: DISCONTINUED | OUTPATIENT
Start: 2020-07-20 | End: 2020-07-20 | Stop reason: SURG

## 2020-07-20 RX ORDER — DULOXETIN HYDROCHLORIDE 20 MG/1
20 CAPSULE, DELAYED RELEASE ORAL DAILY
Status: DISCONTINUED | OUTPATIENT
Start: 2020-07-21 | End: 2020-07-21 | Stop reason: HOSPADM

## 2020-07-20 RX ORDER — HYDROMORPHONE HCL 110MG/55ML
PATIENT CONTROLLED ANALGESIA SYRINGE INTRAVENOUS AS NEEDED
Status: DISCONTINUED | OUTPATIENT
Start: 2020-07-20 | End: 2020-07-20 | Stop reason: SURG

## 2020-07-20 RX ORDER — SCOLOPAMINE TRANSDERMAL SYSTEM 1 MG/1
1 PATCH, EXTENDED RELEASE TRANSDERMAL
Status: DISCONTINUED | OUTPATIENT
Start: 2020-07-23 | End: 2020-07-21 | Stop reason: HOSPADM

## 2020-07-20 RX ORDER — MIDAZOLAM HYDROCHLORIDE 2 MG/2ML
INJECTION, SOLUTION INTRAMUSCULAR; INTRAVENOUS AS NEEDED
Status: DISCONTINUED | OUTPATIENT
Start: 2020-07-20 | End: 2020-07-20 | Stop reason: SURG

## 2020-07-20 RX ORDER — ONDANSETRON 2 MG/ML
4 INJECTION INTRAMUSCULAR; INTRAVENOUS ONCE AS NEEDED
Status: DISCONTINUED | OUTPATIENT
Start: 2020-07-20 | End: 2020-07-20 | Stop reason: HOSPADM

## 2020-07-20 RX ORDER — MORPHINE SULFATE 4 MG/ML
4 INJECTION, SOLUTION INTRAMUSCULAR; INTRAVENOUS EVERY 2 HOUR PRN
Status: DISCONTINUED | OUTPATIENT
Start: 2020-07-20 | End: 2020-07-21 | Stop reason: HOSPADM

## 2020-07-20 RX ORDER — SODIUM CHLORIDE 9 MG/ML
125 INJECTION, SOLUTION INTRAVENOUS CONTINUOUS
Status: DISCONTINUED | OUTPATIENT
Start: 2020-07-20 | End: 2020-07-20

## 2020-07-20 RX ORDER — ONDANSETRON 2 MG/ML
4 INJECTION INTRAMUSCULAR; INTRAVENOUS EVERY 6 HOURS SCHEDULED
Status: DISCONTINUED | OUTPATIENT
Start: 2020-07-20 | End: 2020-07-21 | Stop reason: HOSPADM

## 2020-07-20 RX ORDER — HEPARIN SODIUM 5000 [USP'U]/ML
5000 INJECTION, SOLUTION INTRAVENOUS; SUBCUTANEOUS
Status: COMPLETED | OUTPATIENT
Start: 2020-07-20 | End: 2020-07-20

## 2020-07-20 RX ORDER — ACETAMINOPHEN 325 MG/1
975 TABLET ORAL EVERY 8 HOURS
Status: DISCONTINUED | OUTPATIENT
Start: 2020-07-20 | End: 2020-07-21 | Stop reason: HOSPADM

## 2020-07-20 RX ORDER — DIPHENHYDRAMINE HYDROCHLORIDE 50 MG/ML
25 INJECTION INTRAMUSCULAR; INTRAVENOUS EVERY 6 HOURS PRN
Status: DISCONTINUED | OUTPATIENT
Start: 2020-07-20 | End: 2020-07-21 | Stop reason: HOSPADM

## 2020-07-20 RX ORDER — HYDROMORPHONE HCL/PF 1 MG/ML
1 SYRINGE (ML) INJECTION EVERY 4 HOURS PRN
Status: DISCONTINUED | OUTPATIENT
Start: 2020-07-20 | End: 2020-07-21 | Stop reason: HOSPADM

## 2020-07-20 RX ORDER — PROPOFOL 10 MG/ML
INJECTION, EMULSION INTRAVENOUS AS NEEDED
Status: DISCONTINUED | OUTPATIENT
Start: 2020-07-20 | End: 2020-07-20 | Stop reason: SURG

## 2020-07-20 RX ORDER — FENTANYL CITRATE/PF 50 MCG/ML
50 SYRINGE (ML) INJECTION
Status: DISCONTINUED | OUTPATIENT
Start: 2020-07-20 | End: 2020-07-20 | Stop reason: HOSPADM

## 2020-07-20 RX ORDER — CELECOXIB 200 MG/1
200 CAPSULE ORAL ONCE
Status: COMPLETED | OUTPATIENT
Start: 2020-07-20 | End: 2020-07-20

## 2020-07-20 RX ORDER — ONDANSETRON 2 MG/ML
INJECTION INTRAMUSCULAR; INTRAVENOUS AS NEEDED
Status: DISCONTINUED | OUTPATIENT
Start: 2020-07-20 | End: 2020-07-20 | Stop reason: SURG

## 2020-07-20 RX ORDER — PROMETHAZINE HYDROCHLORIDE 25 MG/ML
12.5 INJECTION, SOLUTION INTRAMUSCULAR; INTRAVENOUS EVERY 6 HOURS PRN
Status: DISCONTINUED | OUTPATIENT
Start: 2020-07-20 | End: 2020-07-21 | Stop reason: HOSPADM

## 2020-07-20 RX ORDER — MEPERIDINE HYDROCHLORIDE 25 MG/ML
12.5 INJECTION INTRAMUSCULAR; INTRAVENOUS; SUBCUTANEOUS ONCE AS NEEDED
Status: DISCONTINUED | OUTPATIENT
Start: 2020-07-20 | End: 2020-07-20 | Stop reason: HOSPADM

## 2020-07-20 RX ADMIN — FENTANYL CITRATE 50 MCG: 50 INJECTION INTRAMUSCULAR; INTRAVENOUS at 16:05

## 2020-07-20 RX ADMIN — GABAPENTIN 300 MG: 300 CAPSULE ORAL at 11:39

## 2020-07-20 RX ADMIN — ONDANSETRON 4 MG: 2 INJECTION INTRAMUSCULAR; INTRAVENOUS at 17:42

## 2020-07-20 RX ADMIN — DEXMEDETOMIDINE HYDROCHLORIDE 20 MCG: 100 INJECTION, SOLUTION INTRAVENOUS at 13:41

## 2020-07-20 RX ADMIN — PHENYLEPHRINE HYDROCHLORIDE 200 MCG: 10 INJECTION INTRAVENOUS at 14:49

## 2020-07-20 RX ADMIN — SCOPALAMINE 1 PATCH: 1 PATCH, EXTENDED RELEASE TRANSDERMAL at 11:40

## 2020-07-20 RX ADMIN — EPHEDRINE SULFATE 10 MG: 50 INJECTION, SOLUTION INTRAVENOUS at 13:56

## 2020-07-20 RX ADMIN — EPHEDRINE SULFATE 10 MG: 50 INJECTION, SOLUTION INTRAVENOUS at 14:21

## 2020-07-20 RX ADMIN — ROCURONIUM BROMIDE 50 MG: 10 INJECTION, SOLUTION INTRAVENOUS at 12:58

## 2020-07-20 RX ADMIN — SODIUM CHLORIDE 125 ML/HR: 0.9 INJECTION, SOLUTION INTRAVENOUS at 11:54

## 2020-07-20 RX ADMIN — ROCURONIUM BROMIDE 10 MG: 10 INJECTION, SOLUTION INTRAVENOUS at 13:49

## 2020-07-20 RX ADMIN — SODIUM CHLORIDE, SODIUM LACTATE, POTASSIUM CHLORIDE, AND CALCIUM CHLORIDE 100 ML/HR: .6; .31; .03; .02 INJECTION, SOLUTION INTRAVENOUS at 16:25

## 2020-07-20 RX ADMIN — PHENYLEPHRINE HYDROCHLORIDE 100 MCG: 10 INJECTION INTRAVENOUS at 13:04

## 2020-07-20 RX ADMIN — PHENYLEPHRINE HYDROCHLORIDE 100 MCG: 10 INJECTION INTRAVENOUS at 13:12

## 2020-07-20 RX ADMIN — PHENYLEPHRINE HYDROCHLORIDE 200 MCG: 10 INJECTION INTRAVENOUS at 14:41

## 2020-07-20 RX ADMIN — ACETAMINOPHEN 975 MG: 325 TABLET ORAL at 11:40

## 2020-07-20 RX ADMIN — FENTANYL CITRATE 50 MCG: 50 INJECTION, SOLUTION INTRAMUSCULAR; INTRAVENOUS at 13:40

## 2020-07-20 RX ADMIN — PHENYLEPHRINE HYDROCHLORIDE 100 MCG: 10 INJECTION INTRAVENOUS at 14:16

## 2020-07-20 RX ADMIN — MORPHINE SULFATE 4 MG: 4 INJECTION INTRAVENOUS at 19:43

## 2020-07-20 RX ADMIN — MIDAZOLAM 2 MG: 1 INJECTION INTRAMUSCULAR; INTRAVENOUS at 12:51

## 2020-07-20 RX ADMIN — DEXAMETHASONE SODIUM PHOSPHATE 4 MG: 4 INJECTION, SOLUTION INTRAMUSCULAR; INTRAVENOUS at 13:03

## 2020-07-20 RX ADMIN — ONDANSETRON 4 MG: 2 INJECTION INTRAMUSCULAR; INTRAVENOUS at 15:09

## 2020-07-20 RX ADMIN — HEPARIN SODIUM 5000 UNITS: 5000 INJECTION INTRAVENOUS; SUBCUTANEOUS at 11:52

## 2020-07-20 RX ADMIN — EPHEDRINE SULFATE 10 MG: 50 INJECTION, SOLUTION INTRAVENOUS at 14:18

## 2020-07-20 RX ADMIN — SODIUM CHLORIDE: 0.9 INJECTION, SOLUTION INTRAVENOUS at 14:37

## 2020-07-20 RX ADMIN — PHENYLEPHRINE HYDROCHLORIDE 100 MCG: 10 INJECTION INTRAVENOUS at 13:31

## 2020-07-20 RX ADMIN — DEXMEDETOMIDINE HYDROCHLORIDE 20 MCG: 100 INJECTION, SOLUTION INTRAVENOUS at 15:09

## 2020-07-20 RX ADMIN — PHENYLEPHRINE HYDROCHLORIDE 150 MCG: 10 INJECTION INTRAVENOUS at 13:34

## 2020-07-20 RX ADMIN — PROPOFOL 200 MG: 10 INJECTION, EMULSION INTRAVENOUS at 12:58

## 2020-07-20 RX ADMIN — ONDANSETRON 4 MG: 2 INJECTION INTRAMUSCULAR; INTRAVENOUS at 23:17

## 2020-07-20 RX ADMIN — ROCURONIUM BROMIDE 10 MG: 10 INJECTION, SOLUTION INTRAVENOUS at 14:37

## 2020-07-20 RX ADMIN — GLYCOPYRROLATE 0.4 MG: 0.2 INJECTION, SOLUTION INTRAMUSCULAR; INTRAVENOUS at 15:15

## 2020-07-20 RX ADMIN — FENTANYL CITRATE 50 MCG: 50 INJECTION INTRAMUSCULAR; INTRAVENOUS at 15:54

## 2020-07-20 RX ADMIN — FAMOTIDINE 20 MG: 10 INJECTION, SOLUTION INTRAVENOUS at 17:42

## 2020-07-20 RX ADMIN — CEFAZOLIN SODIUM 2000 MG: 2 SOLUTION INTRAVENOUS at 12:19

## 2020-07-20 RX ADMIN — ACETAMINOPHEN 975 MG: 325 SUSPENSION ORAL at 23:10

## 2020-07-20 RX ADMIN — MORPHINE SULFATE 4 MG: 4 INJECTION INTRAVENOUS at 23:17

## 2020-07-20 RX ADMIN — HYDROMORPHONE HYDROCHLORIDE 0.5 MG: 2 INJECTION, SOLUTION INTRAMUSCULAR; INTRAVENOUS; SUBCUTANEOUS at 14:41

## 2020-07-20 RX ADMIN — EPHEDRINE SULFATE 10 MG: 50 INJECTION, SOLUTION INTRAVENOUS at 13:25

## 2020-07-20 RX ADMIN — PHENYLEPHRINE HYDROCHLORIDE 100 MCG: 10 INJECTION INTRAVENOUS at 13:59

## 2020-07-20 RX ADMIN — SODIUM CHLORIDE, SODIUM LACTATE, POTASSIUM CHLORIDE, AND CALCIUM CHLORIDE: .6; .31; .03; .02 INJECTION, SOLUTION INTRAVENOUS at 12:58

## 2020-07-20 RX ADMIN — NEOSTIGMINE METHYLSULFATE 3 MG: 1 INJECTION, SOLUTION INTRAVENOUS at 15:15

## 2020-07-20 RX ADMIN — FENTANYL CITRATE 100 MCG: 50 INJECTION, SOLUTION INTRAMUSCULAR; INTRAVENOUS at 12:58

## 2020-07-20 RX ADMIN — CELECOXIB 200 MG: 200 CAPSULE ORAL at 11:40

## 2020-07-20 RX ADMIN — FENTANYL CITRATE 50 MCG: 50 INJECTION, SOLUTION INTRAMUSCULAR; INTRAVENOUS at 15:16

## 2020-07-20 RX ADMIN — MORPHINE SULFATE 4 MG: 4 INJECTION INTRAVENOUS at 17:43

## 2020-07-20 RX ADMIN — PHENYLEPHRINE HYDROCHLORIDE 100 MCG: 10 INJECTION INTRAVENOUS at 14:20

## 2020-07-20 RX ADMIN — LIDOCAINE HYDROCHLORIDE 100 MG: 20 INJECTION, SOLUTION INTRAVENOUS at 12:58

## 2020-07-20 NOTE — ANESTHESIA PREPROCEDURE EVALUATION
Review of Systems/Medical History          Cardiovascular  Hyperlipidemia,    Pulmonary  Negative pulmonary ROS        GI/Hepatic    PUD, GERD poorly controlled,  Hiatal hernia,        Negative  ROS        Endo/Other  Negative endo/other ROS      GYN  Negative gynecology ROS          Hematology  Negative hematology ROS      Musculoskeletal    Arthritis     Neurology    Headaches,   Comment: MIfraine hx    Last occurred 1 week ago Psychology   Anxiety, Depression , being treated for depression,              Physical Exam    Airway    Mallampati score: III  TM Distance: >3 FB  Neck ROM: full     Dental   Comment: Bonding on upper front teeth,     Cardiovascular  Rhythm: regular, Rate: normal, Cardiovascular exam normal    Pulmonary  Pulmonary exam normal Breath sounds clear to auscultation,     Other Findings        Anesthesia Plan  ASA Score- 2     Anesthesia Type- general with ASA Monitors  Additional Monitors:   Airway Plan: ETT  Plan Factors-    Induction- intravenous  Postoperative Plan- Plan for postoperative opioid use  Informed Consent- Anesthetic plan and risks discussed with patient

## 2020-07-20 NOTE — PLAN OF CARE
Problem: PAIN - ADULT  Goal: Verbalizes/displays adequate comfort level or baseline comfort level  Description  Interventions:  - Encourage patient to monitor pain and request assistance  - Assess pain using appropriate pain scale  - Administer analgesics based on type and severity of pain and evaluate response  - Implement non-pharmacological measures as appropriate and evaluate response  - Consider cultural and social influences on pain and pain management  - Notify physician/advanced practitioner if interventions unsuccessful or patient reports new pain  Outcome: Progressing     Problem: INFECTION - ADULT  Goal: Absence or prevention of progression during hospitalization  Description  INTERVENTIONS:  - Assess and monitor for signs and symptoms of infection  - Monitor lab/diagnostic results  - Monitor all insertion sites, i e  indwelling lines, tubes, and drains  - Monitor endotracheal if appropriate and nasal secretions for changes in amount and color  - [place] appropriate cooling/warming therapies per order  - Administer medications as ordered  - Instruct and encourage patient and family to use good hand hygiene technique  - Identify and instruct in appropriate isolation precautions for identified infection/condition  Outcome: Progressing  Goal: Absence of fever/infection during neutropenic period  Description  INTERVENTIONS:  - Monitor WBC    Outcome: Progressing     Problem: SAFETY ADULT  Goal: Patient will remain free of falls  Description  INTERVENTIONS:  - Assess patient frequently for physical needs  -  Identify cognitive and physical deficits and behaviors that affect risk of falls    -  [place] fall precautions as indicated by assessment   - Educate patient/family on patient safety including physical limitations  - Instruct patient to call for assistance with activity based on assessment  - Modify environment to reduce risk of injury  - Consider OT/PT consult to assist with strengthening/mobility  Outcome: Progressing  Goal: Maintain or return to baseline ADL function  Description  INTERVENTIONS:  -  Assess patient's ability to carry out ADLs; assess patient's baseline for ADL function and identify physical deficits which impact ability to perform ADLs (bathing, care of mouth/teeth, toileting, grooming, dressing, etc )  - Assess/evaluate cause of self-care deficits   - Assess range of motion  - Assess patient's mobility; develop plan if impaired  - Assess patient's need for assistive devices and provide as appropriate  - Encourage maximum independence but intervene and supervise when necessary  - Involve family in performance of ADLs  - Assess for home care needs following discharge   - Consider OT consult to assist with ADL evaluation and planning for discharge  - Provide patient education as appropriate  Outcome: Progressing  Goal: Maintain or return mobility status to optimal level  Description  INTERVENTIONS:  - Assess patient's baseline mobility status (ambulation, transfers, stairs, etc )    - Identify cognitive and physical deficits and behaviors that affect mobility  - Identify mobility aids required to assist with transfers and/or ambulation (gait belt, sit-to-stand, lift, walker, cane, etc )  - [place] fall precautions as indicated by assessment  - Record patient progress and toleration of activity level on Mobility SBAR; progress patient to next Phase/Stage  - Instruct patient to call for assistance with activity based on assessment  - Consider rehabilitation consult to assist with strengthening/weightbearing, etc   Outcome: Progressing     Problem: DISCHARGE PLANNING  Goal: Discharge to home or other facility with appropriate resources  Description  INTERVENTIONS:  - Identify barriers to discharge w/patient and caregiver  - Arrange for needed discharge resources and transportation as appropriate  - Identify discharge learning needs (meds, wound care, etc )  - Arrange for interpretive services to assist at discharge as needed  - Refer to Case Management Department for coordinating discharge planning if the patient needs post-hospital services based on physician/advanced practitioner order or complex needs related to functional status, cognitive ability, or social support system  Outcome: Progressing     Problem: Knowledge Deficit  Goal: Patient/family/caregiver demonstrates understanding of disease process, treatment plan, medications, and discharge instructions  Description  Complete learning assessment and assess knowledge base    Interventions:  - Provide teaching at level of understanding  - Provide teaching via preferred learning methods  Outcome: Progressing     Problem: GASTROINTESTINAL - ADULT  Goal: Minimal or absence of nausea and/or vomiting  Description  INTERVENTIONS:  - Administer IV fluids if ordered to ensure adequate hydration  - Maintain NPO status until nausea and vomiting are resolved  - Nasogastric tube if ordered  - Administer ordered antiemetic medications as needed  - Provide nonpharmacologic comfort measures as appropriate  - Advance diet as tolerated, if ordered  - Consider nutrition services referral to assist patient with adequate nutrition and appropriate food choices  Outcome: Progressing     Problem: GENITOURINARY - ADULT  Goal: Absence of urinary retention  Description  INTERVENTIONS:  - Assess patients ability to void and empty bladder  - Monitor I/O  - Bladder scan as needed  - Discuss with physician/AP medications to alleviate retention as needed  - Discuss catheterization for long term situations as appropriate  Outcome: Progressing     Problem: METABOLIC, FLUID AND ELECTROLYTES - ADULT  Goal: Electrolytes maintained within normal limits  Description  INTERVENTIONS:  - Monitor labs and assess patient for signs and symptoms of electrolyte imbalances  - Administer electrolyte replacement as ordered  - Monitor response to electrolyte replacements, including repeat lab results as appropriate  - Instruct patient on fluid and nutrition as appropriate  Outcome: Progressing     Problem: SKIN/TISSUE INTEGRITY - ADULT  Goal: Incision(s), wounds(s) or drain site(s) healing without S/S of infection  Description  INTERVENTIONS  - Assess and document risk factors for skin impairment   - Assess and document dressing, incision, wound bed, drain sites and surrounding tissue  - Consider nutrition services referral as needed  - Oral mucous membranes remain intact  - Provide patient/ family education  Outcome: Progressing

## 2020-07-20 NOTE — OP NOTE
OPERATIVE REPORT  PATIENT NAME: Kalpana Smith    :  1960  MRN: [id number]  Pt Location: [facility] OR ROOM 07    SURGERY DATE: 2020    Surgeon(s) and Role:     * Sonia Escobedo MD - Primary     * Soni Barbosa MD - Assisting    Preop Diagnosis:  Gastroesophageal reflux disease [K21 9]  Heartburn [R12]  Esophageal dysphagia [R13 10]  Chest pain [R07 9]  Nausea and vomiting [R11 2]  Status post Nissen fundoplication (without gastrostomy tube) procedure [Z98 890]    Post-Op Diagnosis Codes:     * Gastroesophageal reflux disease [K21 9]     * Heartburn [R12]     * Esophageal dysphagia [R13 10]     * Chest pain [R07 9]     * Nausea and vomiting [R11 2]     * Status post Nissen fundoplication (without gastrostomy tube) procedure [Z98 890]    Procedure(s) (LRB):  MAGNETIC SPHINCTER AUGMENT, LINX PLACEMENT (N/A)  REPAIR HERNIA PARAESOPHAGEAL  LAPAROSCOPIC (N/A)    Specimen(s):  * No specimens in log *    Estimated Blood Loss:  20 ml      Drains:  * No LDAs found *    Anesthesia Type:   General    Operative Indications:  Gastroesophageal reflux disease [K21 9]  Heartburn [R12]  Esophageal dysphagia [R13 10]  Chest pain [R07 9]  Nausea and vomiting [R11 2]  Status post Nissen fundoplication (without gastrostomy tube) procedure [Z98 890]      Operative Findings:  Nissen fundoplication slippage  Recurrent paraesophageal hernia    Complications:   None    Procedure and Technique:  Laparoscopic lysis of adhesions  Laparoscopic Nissen fundoplication takedown  Laparoscopic repair of paraesophageal hernia with intraop endoscopy  Laparoscopic Carly gastroplasty  Laparoscopic magnetic sphincter augmentation with LINX   I was present for the entire procedure    Patient Disposition:  hemodynamically stable     No qualified resident was available  Assistance was necessary for traction and counter traction    Indication for the procedure:  Patient is a 26-year-old female status post laparoscopic paraesophageal hernia repair and Nissen fundoplication, she was found to have evidence of recurrent paraesophageal hernia initial destruction on upper GI and upper endoscopy  Patient was consented for laparoscopic redo of hernia repair and magnetic sphincter augmentation risks and benefits of the procedure were explained to the patient given the revisional nature of the procedure including risk of vagal nerve injury chronic diarrhea and chronic gas bloat syndrome  Description of the procedure: The patient was brought to the operating room and placed in a supine position  The patient received a dose of IV antibiotics and a dose of subcutaneous Lovenox, prior to the procedure  The patient was induced under general endotracheal anesthesia  The abdominal wall was prepped and draped under sterile conditions in the usual fashion  The procedure was started by obtaining access to the abdominal cavity using a Veress needle to the left side of the midline around 6 inches from the xiphoid process the abdominal cavity was insufflated with CO2 to a pressure of 15 mmHg  After that, the abdomen was entered with a 12 mm trocar using an Optiview trocar under direct visualization  At that point, a  12-mm trocar was placed on the right side of the abdominal wall, under direct visualization, and two 12-mm trocars were placed on the left side of the abdominal wall, also under direct visualization  The patient was then placed in a reverse Trendelenburg position  A Tamara retractor was placed through a small stab incision below the xiphoid and was used to retract the left lobe of the liver in a medial fashion  An OG tube was placed to decompress the stomach and then removed immediately    There was evidence of dense intra-abdominal adhesions between the left lobe of the liver and stomach adhesions were taken down combination of sharp dissection number of was then able to identify the wrap which appeared to be partially disrupted with a combination of sharp dissection and with the use of the Harmonic scalpel the fundoplication was completely taken down  The part of the procedure was very tedious and took significant time however we were able to complete the part of the procedure without any injury to the stomach wall or the esophageal wall  After the fundoplication was taken down there was evidence of part of the fundus that had herniated into the chest cavity posteriorly, the part of the fundus was reduced and then the angle of His was then dissected using blunt dissection and the harmonic scalpel  The phrenoesophageal ligament was also dissected anteriorly while keeping the esophagus  out of harm's way  The decision was then made to repair the hernia posteriorly  Right lisa and left lisa were dissected away from the hernia sac and skeletonized all the way down to the confluence  Esophagus was kept out of harm's way during the dissection  Right vagus nerve was identified and preserved however left vagus nerve was not identified  Dissection was carried all the way up to the left level of the pulmonary vein and keira to obtain more length of the esophagus  The dissection was completed circumferentially around the esophagus  We had at least 5 cm of the lower esophagus in an intra-abdominal location by the end of the dissection  Hernia sac was completely dissected and excised  Right lisa and left lisa were then approximated using 0 Ethibond sutures placed in an interrupted fashion  Decision was made to perform a fundectomy to lengthen the esophagus to avoid any recurrence in the future  With a 39 [place] bougie in place the fundus of the stomach which was previously used to perform the Nissen fundoplication was resected using a 60 stapler with a purple load  The staple line was imbricated with a running 2 0 V lock suture    An upper endoscopy was then performed to oseas the Z-line and then esophagus was measured at the Z-line using the Sizer provided with the LINX device and a 16 LINX device was placed around the esophagus and locked in place without any difficulties  The camera port was then closed using #1 Vicryl in a simple fashion  All the trocars were removed under direct visualization, and the skin edges were approximated using 4-0 Monocryl in an interrupted, inverted fashion  Histoacryl was then applied to the skin incisions  The patient was extubated and transferred to the PACU in stable condition       SIGNATURE: Jeovany Whaley MD  DATE: July 20, 2020  TIME: 3:10 PM

## 2020-07-20 NOTE — INTERVAL H&P NOTE
H&P reviewed  After examining the patient I find no changes in the patients condition since the H&P had been written    I had a discussion 1 more time with the patient regarding the nature of the procedure after a long discussion in the office on multiple occasions we elected to proceed with a redo hernia repair magnetic sphincter augmentation instead of a redo fundoplication, patient initially was asymptomatic and the recurrence was seen on endoscopy however after seeing the patient on multiple occasions and after talking to her it seems that patient was having breakthrough symptoms and therefor and after a long discussion regarding risks and benefits of revisional procedures we decided to proceed with revision and magnetic sphincter augmentation    Vitals:    07/20/20 1120   BP: 131/69   Pulse: 69   Temp: 97 5 °F (36 4 °C)   SpO2: 99%

## 2020-07-21 ENCOUNTER — APPOINTMENT (OUTPATIENT)
Dept: RADIOLOGY | Facility: HOSPITAL | Age: 60
End: 2020-07-21
Payer: COMMERCIAL

## 2020-07-21 VITALS
BODY MASS INDEX: 25.39 KG/M2 | RESPIRATION RATE: 18 BRPM | DIASTOLIC BLOOD PRESSURE: 65 MMHG | TEMPERATURE: 98.2 F | SYSTOLIC BLOOD PRESSURE: 107 MMHG | OXYGEN SATURATION: 98 % | HEIGHT: 66 IN | WEIGHT: 158 LBS | HEART RATE: 71 BPM

## 2020-07-21 LAB
ANION GAP SERPL CALCULATED.3IONS-SCNC: 8 MMOL/L (ref 4–13)
BUN SERPL-MCNC: 11 MG/DL (ref 5–25)
CALCIUM SERPL-MCNC: 8.1 MG/DL (ref 8.3–10.1)
CHLORIDE SERPL-SCNC: 107 MMOL/L (ref 100–108)
CO2 SERPL-SCNC: 26 MMOL/L (ref 21–32)
CREAT SERPL-MCNC: 0.57 MG/DL (ref 0.6–1.3)
ERYTHROCYTE [DISTWIDTH] IN BLOOD BY AUTOMATED COUNT: 11.9 % (ref 11.6–15.1)
GFR SERPL CREATININE-BSD FRML MDRD: 100 ML/MIN/1.73SQ M
GLUCOSE P FAST SERPL-MCNC: 89 MG/DL (ref 65–99)
GLUCOSE SERPL-MCNC: 89 MG/DL (ref 65–140)
HCT VFR BLD AUTO: 38 % (ref 34.8–46.1)
HGB BLD-MCNC: 11.8 G/DL (ref 11.5–15.4)
MCH RBC QN AUTO: 31.6 PG (ref 26.8–34.3)
MCHC RBC AUTO-ENTMCNC: 31.1 G/DL (ref 31.4–37.4)
MCV RBC AUTO: 102 FL (ref 82–98)
PLATELET # BLD AUTO: 157 THOUSANDS/UL (ref 149–390)
PMV BLD AUTO: 10.7 FL (ref 8.9–12.7)
POTASSIUM SERPL-SCNC: 4.2 MMOL/L (ref 3.5–5.3)
RBC # BLD AUTO: 3.73 MILLION/UL (ref 3.81–5.12)
SODIUM SERPL-SCNC: 141 MMOL/L (ref 136–145)
WBC # BLD AUTO: 11.34 THOUSAND/UL (ref 4.31–10.16)

## 2020-07-21 PROCEDURE — 85027 COMPLETE CBC AUTOMATED: CPT | Performed by: SURGERY

## 2020-07-21 PROCEDURE — 80048 BASIC METABOLIC PNL TOTAL CA: CPT | Performed by: SURGERY

## 2020-07-21 PROCEDURE — 74240 X-RAY XM UPR GI TRC 1CNTRST: CPT

## 2020-07-21 PROCEDURE — 99024 POSTOP FOLLOW-UP VISIT: CPT | Performed by: SURGERY

## 2020-07-21 RX ORDER — ONDANSETRON 4 MG/1
4 TABLET, ORALLY DISINTEGRATING ORAL EVERY 6 HOURS PRN
Qty: 20 TABLET | Refills: 0 | Status: SHIPPED | OUTPATIENT
Start: 2020-07-21 | End: 2020-09-29

## 2020-07-21 RX ORDER — LIDOCAINE HYDROCHLORIDE 20 MG/ML
2 INJECTION, SOLUTION INFILTRATION; PERINEURAL ONCE
Status: DISCONTINUED | OUTPATIENT
Start: 2020-07-21 | End: 2020-07-21 | Stop reason: HOSPADM

## 2020-07-21 RX ORDER — KETOROLAC TROMETHAMINE 30 MG/ML
15 INJECTION, SOLUTION INTRAMUSCULAR; INTRAVENOUS EVERY 6 HOURS SCHEDULED
Status: DISCONTINUED | OUTPATIENT
Start: 2020-07-21 | End: 2020-07-21 | Stop reason: HOSPADM

## 2020-07-21 RX ADMIN — KETOROLAC TROMETHAMINE 15 MG: 30 INJECTION, SOLUTION INTRAMUSCULAR at 12:48

## 2020-07-21 RX ADMIN — MORPHINE SULFATE 4 MG: 4 INJECTION INTRAVENOUS at 02:16

## 2020-07-21 RX ADMIN — ONDANSETRON 4 MG: 2 INJECTION INTRAMUSCULAR; INTRAVENOUS at 05:18

## 2020-07-21 RX ADMIN — IOHEXOL 100 ML: 350 INJECTION, SOLUTION INTRAVENOUS at 08:45

## 2020-07-21 RX ADMIN — BUPROPION HYDROCHLORIDE 300 MG: 150 TABLET, FILM COATED, EXTENDED RELEASE ORAL at 09:13

## 2020-07-21 RX ADMIN — KETOROLAC TROMETHAMINE 15 MG: 30 INJECTION, SOLUTION INTRAMUSCULAR at 09:20

## 2020-07-21 RX ADMIN — SODIUM CHLORIDE, SODIUM LACTATE, POTASSIUM CHLORIDE, AND CALCIUM CHLORIDE 100 ML/HR: .6; .31; .03; .02 INJECTION, SOLUTION INTRAVENOUS at 02:05

## 2020-07-21 RX ADMIN — MORPHINE SULFATE 4 MG: 4 INJECTION INTRAVENOUS at 05:17

## 2020-07-21 RX ADMIN — ONDANSETRON 4 MG: 2 INJECTION INTRAMUSCULAR; INTRAVENOUS at 12:48

## 2020-07-21 RX ADMIN — FAMOTIDINE 20 MG: 10 INJECTION, SOLUTION INTRAVENOUS at 09:21

## 2020-07-21 RX ADMIN — MORPHINE SULFATE 4 MG: 4 INJECTION INTRAVENOUS at 08:05

## 2020-07-21 RX ADMIN — DULOXETINE 20 MG: 20 CAPSULE, DELAYED RELEASE ORAL at 09:13

## 2020-07-21 NOTE — DISCHARGE INSTRUCTIONS
Bariatric/ Reflux Surgery  Hospital Discharge Instructions  1  ACTIVITY:  a  Progress as feels comfortable - a good rule is:  if you are doing something and it begins to hurt, stop doing the activity  Walk every hour while at home  b  Denise Guajardo may walk stairs if you do so slowly  c  You may shower 48 hours after surgery  d  Use your incentive spirometer 10 times per hour while awake for 1 week  e  Do NOT drive for 48 hours after surgery  No driving 24 hours after taking certain prescription pain medications   Examples of such medication are Percocet, Darvocet, Oxycodone, Tylenol #3, and Tylenol with Codeine  2  DIET  a  Stay on a liquid diet for 7 days after your surgery date, sipping slowly  Refer to your manual for examples of choices  Remember to keep your liquids sugar free or low calorie  You may have protein drinks  Make sure to drink 48 to 64 ounces per day of fluids  b  Denise Guajardo may advance to a pureed diet one week after surgery as instructed by your diet progression pamphlet  Once you get approval from your surgeon at your first post operative visit you may advance to the soft diet  3  MEDICATIONS:  a  The abdominal nerve block will wear off during the first 1-2 days that you are home, and you may become sore  Continue to take your Tylenol and your pain medication as instructed  b  Resume vitamins and minerals when you get home  c  Anti-acid Medication as per prescription  d  Other medications as indicated on the Physician Patient Discharge Instructions form given to you at the time of discharge  e  DO NOT TAKE BIRTH CONTROL(BC) MEDICATIONS, INSERT BC VAGINAL RINGS, OR PLACE IUD OR ANY OTHER BC METHODS UNTIL 31 DAYS FROM DAY OF DISCHARGE FROM HOSPITAL  THIS PLACES YOU AT HIGH RISK FOR A POTENTIALLY LIFE THREATENING BLOOD CLOT  Remember to always use barrier methods for birth control and speak to your GYN about using two forms of birth control to start 31 days after surgery   It is very important to avoid pregnancy until at least 18-24 months after surgery  4  INCISION CARE  a  You may shower and get incisions wet 2 days after surgery  No soaking tub baths or swimming for 30 days after surgery  Keep abdominal area and incisions clean  Use soap and water to create a good lather and rinse off  Do not scrub incisions  b  If you have a drain, empty the drain as the nurses instructed  5  FOLLOW-UP APPOINTMENT should be made for one week after discharge  Call surgeons office at [phone] to schedule an appointment      6  CALL YOUR DOCTOR FOR:  pain not controlled by pain medications, a temperature greater than 101 5° F, any increase or change in drainage or redness from any incision, any vomiting or inability to keep liquids down, shortness of breath, shoulder pain, or bleeding

## 2020-07-21 NOTE — NURSING NOTE
Discharge instructions reviewed with patient  Spouse present  Both verbalized understanding  Belongings with pt  Escorted by PCA

## 2020-07-21 NOTE — DISCHARGE SUMMARY
Discharge Summary - Francisco Roman 62 y o  female MRN: [id number]    Unit/Bed#: E5 -01 Encounter: [id number]      Pre-Operative Diagnosis: Pre-Op Diagnosis Codes:     * Gastroesophageal reflux disease [K21 9]     * Heartburn [R12]     * Esophageal dysphagia [R13 10]     * Chest pain [R07 9]     * Nausea and vomiting [R11 2]     * Status post Nissen fundoplication (without gastrostomy tube) procedure [Z98 890]    Post-Operative Diagnosis: Post-Op Diagnosis Codes:     * Gastroesophageal reflux disease [K21 9]     * Heartburn [R12]     * Esophageal dysphagia [R13 10]     * Chest pain [R07 9]     * Nausea and vomiting [R11 2]     * Status post Nissen fundoplication (without gastrostomy tube) procedure [Z98 890]    Procedures Performed:  Procedure(s):  MAGNETIC SPHINCTER AUGMENT, LINX PLACEMENT  REPAIR HERNIA PARAESOPHAGEAL  LAPAROSCOPIC, PARTIAL GASTRECTOMY    Surgeon: Lizette Oconnell MD    See H & P for full details of admission and Operative Note for full details of operations performed  Patient tolerated surgery well without complications  In the morning postoperative Day 1, the patient had mild nausea and abdominal pain  Tolerated a clear liquid diet without vomiting  Able to ambulate and voiding independently  Patient was deemed ready for discharge home  Patient was seen and examined prior to discharge  Provisions for Follow-Up Care:  See After Visit Summary/Discharge Instructions for information related to follow-up care and home orders  Disposition: Home, in stable condition  Planned Readmission: No    Discharge Medications:  See After Visit Summary/Discharge Instructions for reconciled discharge medications provided to patient and family  Post Operative instructions: Reviewed with patient and/or family      Signature:   Oscar Armenta PA-C  Date: 7/21/2020 Time: 9:47 AM

## 2020-07-21 NOTE — DISCHARGE INSTR - AVS FIRST PAGE
your pain medications from [facility] in [address]   Take Tylenol as instructed  Stay hydrated and follow your discharge diet progression   Mild nausea is ok as long as you can drink fluids  Take your omeprazole daily  Crush or cut your pills and open capsules, mix with liquid to drink    Follow up with Dr Curtis Acuña within the next week

## 2020-07-22 ENCOUNTER — TELEPHONE (OUTPATIENT)
Dept: MEDSURG UNIT | Facility: HOSPITAL | Age: 60
End: 2020-07-22

## 2020-07-22 NOTE — TELEPHONE ENCOUNTER
Post op follow up phone call completed  Pt is tolerating soft diet  Using IS as instructed, reinforced importance of using IS to help prevent pneumonia  Ambulating about home without difficulty  Pain persists but patient is not taking tylenol 975mg q8h and only using oxycodone  Instructed to take tylenol and use oxycodone if needed in between  Reafirmed examples of soft diet over the next week  Pt stated understanding about discharge instructions and medication adjustments  Follow up appt with surgeon scheduled for next week  Instructed to call with any additional questions or concerns

## 2020-07-28 NOTE — PROGRESS NOTES
Weight Management Nutrition Class     Diagnosis: GERD    Bariatric Surgeon: Dr Vince Toribio    Surgery: paraesophageal hernia repair, LINX    Class: first post op note    Topics discussed today include:     Provided pt with LINX brochure and LINX color diet progression handout  Discussed diet for paraesophageal hernia repair and LINX procedure including 24 hours of sips of full liquid diet and 12 weeks of small meals of solid foods, eating every two hours, sips of liquids to help with meals, chew well, eat slowly  Discussed possible post-operative dysphagia and when to call the office  Patient was able to verbalize basic diet (protein, fluid, vitamin and mineral) recommendations and possible nutrition-related complications   Yes

## 2020-07-30 ENCOUNTER — OFFICE VISIT (OUTPATIENT)
Dept: BARIATRICS | Facility: CLINIC | Age: 60
End: 2020-07-30

## 2020-07-30 VITALS
TEMPERATURE: 98.4 F | BODY MASS INDEX: 24.43 KG/M2 | HEART RATE: 89 BPM | DIASTOLIC BLOOD PRESSURE: 78 MMHG | WEIGHT: 152 LBS | SYSTOLIC BLOOD PRESSURE: 122 MMHG | HEIGHT: 66 IN

## 2020-07-30 DIAGNOSIS — K21.9 GASTROESOPHAGEAL REFLUX DISEASE WITHOUT ESOPHAGITIS: Primary | ICD-10-CM

## 2020-07-30 DIAGNOSIS — K21.9 GERD (GASTROESOPHAGEAL REFLUX DISEASE): Primary | ICD-10-CM

## 2020-07-30 DIAGNOSIS — K21.9 GASTROESOPHAGEAL REFLUX DISEASE WITHOUT ESOPHAGITIS: ICD-10-CM

## 2020-07-30 PROCEDURE — 99024 POSTOP FOLLOW-UP VISIT: CPT | Performed by: SURGERY

## 2020-07-30 PROCEDURE — RECHECK: Performed by: DIETITIAN, REGISTERED

## 2020-07-30 RX ORDER — PREDNISOLONE SODIUM PHOSPHATE 15 MG/5ML
1 SOLUTION ORAL DAILY
Qty: 120 ML | Refills: 0 | Status: SHIPPED | OUTPATIENT
Start: 2020-07-30 | End: 2020-09-29

## 2020-07-30 RX ORDER — PREDNISOLONE SODIUM PHOSPHATE 15 MG/5ML
1 SOLUTION ORAL DAILY
Qty: 120 ML | Refills: 0 | Status: SHIPPED | OUTPATIENT
Start: 2020-07-30 | End: 2020-07-30 | Stop reason: SDUPTHER

## 2020-07-30 NOTE — PROGRESS NOTES
OFFICE VISIT - BARIATRIC SURGERY  Ashanti Ortega 56 y o  female MRN: [id number]  Unit/Bed#:  Encounter: [id number]      HPI:  Ashanti Ortega is a 56 y o  female status post PEHR and Nissen takedown w/placement of Linx device by Dr Debra Blanchard on 7/20/20  Comes to the office today for her first postop visit  Subjective     Patient reports doing well  She is having mild-mod dysphagia with intermittent nausea  Incisional pain is adequately controlled w/Tylenol  Tolerating a soft diet, and having regular bowel function  Ambulation is not impaired, no PPI is being taken  Her preoperative symptoms have resolved in 80%  Review of Systems   Constitutional: Negative  HENT: Negative  Eyes: Negative  Respiratory: Negative  Cardiovascular: Negative  Gastrointestinal: Negative  Endocrine: Negative  Genitourinary: Negative  Musculoskeletal: Negative  Skin: Negative  Allergic/Immunologic: Negative  Neurological: Negative  Hematological: Negative  Psychiatric/Behavioral: Negative  Historical Information   Past Medical History:   Diagnosis Date    Anxiety     Arthritis     Tiburcio ulcer     Depression     GERD (gastroesophageal reflux disease)     Hyperlipidemia     Migraine     Mitral valve prolapse     Murmur     Pituitary adenoma [facility])      Past Surgical History:   Procedure Laterality Date    BREAST BIOPSY Left 1996    benign    BREAST SURGERY      L breast cyst removal-benign    CATARACT EXTRACTION Bilateral     CATARACT EXTRACTION, BILATERAL  01/2020    COLONOSCOPY      DE QUERVAIN'S RELEASE Bilateral     EGD AND COLONOSCOPY N/A 5/11/2016    Procedure: EGD AND COLONOSCOPY;  Surgeon: Jesus Alberto Harper MD;  Location: [facility] GI LAB;   Service:     LAPAROSCOPIC MAGNETIC SPHINCTER AUGMENTATION N/A 7/20/2020    Procedure: MAGNETIC SPHINCTER AUGMENT, LINX PLACEMENT;  Surgeon: Raulito Pierce MD;  Location: [facility] OR;  Service: Erika Hearn PARAESOPHAGEAL HERNIA REPAIR N/A 7/20/2020    Procedure: REPAIR HERNIA PARAESOPHAGEAL  LAPAROSCOPIC, PARTIAL GASTRECTOMY;  Surgeon: Malissa Anne MD;  [facility];  Service: [address] STYLOID Left 11/21/2019    Procedure: [place]i [place] AND TENDON GRAFT;  Surgeon: Carey Flores MD;  [facility];  Service: Orthopedics    [place] LAP, REPAIR PARAESOPHAGEAL HERNIA, INCL FUNDOPLASTY W/ MESH N/A 8/31/2016    Procedure: REPAIR HERNIA PARAESOPHAGEAL  with bio A mesh LAPAROSCOPIC NISSEN FUNDOPLICATION Laparoscopic Gastroplexy Intraop EGD #[id number];  Surgeon: Malissa Anne MD;  [facility];  Service: Bariatrics    [place] REPAIR INTERCARP/CARP-METACARP JT Left 11/21/2019    Procedure: THUMB [address];  Surgeon: Carey Flores MD;  [facility];  Service: Orthopedics    TONSILLECTOMY      WRIST SURGERY Bilateral     daquero vein release     Social History   Social History     Substance and Sexual Activity   Alcohol Use Yes    Frequency: Monthly or less    Drinks per session: 1 or 2    Comment: social     Social History     Substance and Sexual Activity   Drug Use No     Social History     Tobacco Use   Smoking Status Never Smoker   Smokeless Tobacco Never Used       Objective       Current Vitals:   Blood Pressure: 122/78 (07/30/20 1112)  Pulse: 89 (07/30/20 1112)  Temperature: 98 4 °F (36 9 °C) (07/30/20 1112)  Temp Source: Temporal (07/30/20 1112)  Height: 5' 6" (167 6 cm) (07/30/20 1112)  Weight - Scale: 68 9 kg (152 lb) (07/30/20 1112)    Invasive Devices     None                 Physical Exam   Constitutional: She is oriented to person, place, and time  She appears well-developed and well-nourished  HENT:   Head: Normocephalic and atraumatic  Nose: Nose normal    Eyes: Conjunctivae and EOM are normal    Neck: Normal range of motion  Cardiovascular: Normal rate  Pulmonary/Chest: Effort normal    Abdominal: Soft   Bowel sounds are normal  She exhibits no distension and no mass  There is no tenderness  There is no rebound and no guarding  No hernia  All laparoscopic incisions are healing well, with no signs of infection  There are no palpable incisional hernias  Deep palpation is not painful in all 4 quadrants, and there are no peritoneal findings  Musculoskeletal: Normal range of motion  Neurological: She is alert and oriented to person, place, and time  Skin: Skin is warm  Psychiatric: She has a normal mood and affect  Her behavior is normal  Judgment and thought content normal          Pathology, and Other Studies: I have personally reviewed pertinent films in PACS    Assessment/PLAN:    Lus Holstein is a 56 y o  female status post PEHR and Nissen takedown w/placement of Linx device by Dr Percy Mane on 7/20/20  Comes to the office today for her first postop visit  Doing well post op  No major issues  Pathology reviewed and discussed with patient  Negative for abnormal findings  The patient may now increase physical activity as tolerated with no heavy lifting for an additional 4 weeks  Diet will be advanced today as instructed by our dietitians and the patient binder  ---------------------------------------------------  We will order a 5d course of steroids  Follow up in 1 week                Citallli Jimenez MD  Bariatric Surgery Fellow  7/30/2020  11:21 AM

## 2020-07-30 NOTE — LETTER
July 30, 2020     Dee Blood MD  [address]    Patient: Nii Moore   YOB: 1960   Date of Visit: 7/30/2020       Dear Dr Pankaj Gold: Thank you for referring Nii Moore to me for evaluation  Below are my notes for this consultation  If you have questions, please do not hesitate to call me  I look forward to following your patient along with you  Sincerely,        Amaury Cox MD        CC: No Recipients  Hardy Parker MD  7/30/2020 11:37 AM  Cosign Needed Addendum    OFFICE VISIT - BARIATRIC SURGERY  Nii Moore 56 y o  female MRN: [id number]  Unit/Bed#:  Encounter: [id number]      HPI:  Nii Moore is a 56 y o  female status post PEHR and Nissen takedown w/placement of Linx device by Dr Israel Petty on 7/20/20  Comes to the office today for her first postop visit  Subjective     Patient reports doing well  She is having mild-mod dysphagia with intermittent nausea  Incisional pain is adequately controlled w/Tylenol  Tolerating a soft diet, and having regular bowel function  Ambulation is not impaired, no PPI is being taken  Her preoperative symptoms have resolved in 80%  Review of Systems   Constitutional: Negative  HENT: Negative  Eyes: Negative  Respiratory: Negative  Cardiovascular: Negative  Gastrointestinal: Negative  Endocrine: Negative  Genitourinary: Negative  Musculoskeletal: Negative  Skin: Negative  Allergic/Immunologic: Negative  Neurological: Negative  Hematological: Negative  Psychiatric/Behavioral: Negative          Historical Information   Past Medical History:   Diagnosis Date    Anxiety     Arthritis     Tiburcio ulcer     Depression     GERD (gastroesophageal reflux disease)     Hyperlipidemia     Migraine     Mitral valve prolapse     Murmur     Pituitary adenoma [facility])      Past Surgical History:   Procedure Laterality Date    BREAST BIOPSY Left 1996 benign    BREAST SURGERY      L breast cyst removal-benign    CATARACT EXTRACTION Bilateral     CATARACT EXTRACTION, BILATERAL  01/2020    COLONOSCOPY      DE QUERVAIN'S RELEASE Bilateral     EGD AND COLONOSCOPY N/A 5/11/2016    Procedure: EGD AND COLONOSCOPY;  Surgeon: Anatoliy Miller MD;  Location: [facility] GI LAB;   Service:     LAPAROSCOPIC MAGNETIC SPHINCTER AUGMENTATION N/A 7/20/2020    Procedure: MAGNETIC SPHINCTER AUGMENT, LINX PLACEMENT;  Surgeon: Jeovany Whaley MD;  [facility];  Service: Bariatrics    PARAESOPHAGEAL HERNIA REPAIR N/A 7/20/2020    Procedure: REPAIR HERNIA PARAESOPHAGEAL  LAPAROSCOPIC, PARTIAL GASTRECTOMY;  Surgeon: Jeovany Whaley MD;  [facility];  Service: Bariatrics    [address] STYLOID Left 11/21/2019    Procedure: Pearlean Gutting AND TENDON GRAFT;  Surgeon: Anahi Page MD;  [facility];  Service: Orthopedics    NM LAP, REPAIR PARAESOPHAGEAL HERNIA, INCL FUNDOPLASTY W/ MESH N/A 8/31/2016    Procedure: REPAIR HERNIA PARAESOPHAGEAL  with bio A mesh LAPAROSCOPIC NISSEN FUNDOPLICATION Laparoscopic Gastroplexy Intraop EGD #[id number];  Surgeon: Jeovany Whaley MD;  [facility];  Service: Bariatrics    NM REPAIR INTERCARP/CARP-METACARP JT Left 11/21/2019    Procedure: THUMB [address];  Surgeon: Anahi Page MD;  [facility];  Service: Orthopedics    TONSILLECTOMY      WRIST SURGERY Bilateral     daquero vein release     Social History   Social History     Substance and Sexual Activity   Alcohol Use Yes    Frequency: Monthly or less    Drinks per session: 1 or 2    Comment: social     Social History     Substance and Sexual Activity   Drug Use No     Social History     Tobacco Use   Smoking Status Never Smoker   Smokeless Tobacco Never Used       Objective       Current Vitals:   Blood Pressure: 122/78 (07/30/20 1112)  Pulse: 89 (07/30/20 1112)  Temperature: 98 4 °F (36 9 °C) (07/30/20 1112)  Temp Source: Temporal (07/30/20 1112)  Height: 5' 6" (167 6 cm) (07/30/20 1112)  Weight - Scale: 68 9 kg (152 lb) (07/30/20 1112)    Invasive Devices     None                 Physical Exam   Constitutional: She is oriented to person, place, and time  She appears well-developed and well-nourished  HENT:   Head: Normocephalic and atraumatic  Nose: Nose normal    Eyes: Conjunctivae and EOM are normal    Neck: Normal range of motion  Cardiovascular: Normal rate  Pulmonary/Chest: Effort normal    Abdominal: Soft  Bowel sounds are normal  She exhibits no distension and no mass  There is no tenderness  There is no rebound and no guarding  No hernia  All laparoscopic incisions are healing well, with no signs of infection  There are no palpable incisional hernias  Deep palpation is not painful in all 4 quadrants, and there are no peritoneal findings  Musculoskeletal: Normal range of motion  Neurological: She is alert and oriented to person, place, and time  Skin: Skin is warm  Psychiatric: She has a normal mood and affect  Her behavior is normal  Judgment and thought content normal          Pathology, and Other Studies: I have personally reviewed pertinent films in PACS    Assessment/PLAN:    Ashanti Ortega is a 56 y o  female status post PEHR and Nissen takedown w/placement of Linx device by Dr Debra Blanchard on 7/20/20  Comes to the office today for her first postop visit  Doing well post op  No major issues  Pathology reviewed and discussed with patient  Negative for abnormal findings  The patient may now increase physical activity as tolerated with no heavy lifting for an additional 4 weeks  Diet will be advanced today as instructed by our dietitians and the patient binder  ---------------------------------------------------  We will order a 5d course of steroids  Follow up in 1 week                Radha Ariza MD  Bariatric Surgery Fellow  7/30/2020  11:21 AM

## 2020-08-06 ENCOUNTER — OFFICE VISIT (OUTPATIENT)
Dept: BARIATRICS | Facility: CLINIC | Age: 60
End: 2020-08-06

## 2020-08-06 VITALS
SYSTOLIC BLOOD PRESSURE: 124 MMHG | WEIGHT: 149 LBS | HEIGHT: 66 IN | TEMPERATURE: 96 F | HEART RATE: 74 BPM | BODY MASS INDEX: 23.95 KG/M2 | DIASTOLIC BLOOD PRESSURE: 70 MMHG

## 2020-08-06 DIAGNOSIS — Z98.84 BARIATRIC SURGERY STATUS: Primary | ICD-10-CM

## 2020-08-06 PROCEDURE — 99024 POSTOP FOLLOW-UP VISIT: CPT | Performed by: SURGERY

## 2020-08-06 NOTE — PROGRESS NOTES
POST OP UP VISIT - BARIATRIC SURGERY  Sage Rojo 56 y o  female MRN: [id number]  Unit/Bed#:  Encounter: [id number]      HPI:  Sage Rojo is a 56 y o  female status post PEHR and Nissen takedown w/placement of Linx device by Dr Sherren Kinds on 7/20/20  Comes to the office today for her second postop visit      Subjective      Patient reports doing well  She reports improvement of her swallowing after a course of prednisone  Tolerating a soft diet, and having regular bowel function  Ambulation is not impaired, no PPI is being taken  She reports fatigue, she lost about 8Ibs from time of surgery   Her preoperative symptoms have resolved in 50% from last time  Review of Systems   Constitutional: Positive for fatigue  Negative for activity change  HENT: Negative  Eyes: Negative  Respiratory: Negative for cough  Cardiovascular: Negative for chest pain and palpitations  Gastrointestinal: Negative for abdominal pain  Dysphagia improved  Endocrine: Negative for cold intolerance  Genitourinary: Negative for flank pain  Musculoskeletal: Negative for arthralgias  Skin: Negative for color change  Neurological: Negative  Hematological: Negative  Psychiatric/Behavioral: Negative  Historical Information   Past Medical History:   Diagnosis Date    Anxiety     Arthritis     Tiburcio ulcer     Depression     GERD (gastroesophageal reflux disease)     Hyperlipidemia     Migraine     Mitral valve prolapse     Murmur     Pituitary adenoma [facility])      Past Surgical History:   Procedure Laterality Date    BREAST BIOPSY Left 1996    benign    BREAST SURGERY      L breast cyst removal-benign    CATARACT EXTRACTION Bilateral     CATARACT EXTRACTION, BILATERAL  01/2020    COLONOSCOPY      DE QUERVAIN'S RELEASE Bilateral     EGD AND COLONOSCOPY N/A 5/11/2016    Procedure: EGD AND COLONOSCOPY;  Surgeon: Tony Han MD;  Location: [facility] GI LAB;   Service:    [place] Alexandre LAPAROSCOPIC MAGNETIC SPHINCTER AUGMENTATION N/A 7/20/2020    Procedure: MAGNETIC SPHINCTER AUGMENT, LINX PLACEMENT;  Surgeon: Shawn Acevedo MD;  [facility];  Service: Bariatrics    PARAESOPHAGEAL HERNIA REPAIR N/A 7/20/2020    Procedure: REPAIR HERNIA PARAESOPHAGEAL  LAPAROSCOPIC, PARTIAL GASTRECTOMY;  Surgeon: Shawn Acevedo MD;  [facility];  Service: Bariatrics    VT INCIS TENDON SHEATH,RADIAL STYLOID Left 11/21/2019    Procedure: [place] AND TENDON GRAFT;  Surgeon: Albino Marrero MD;  [facility];  Service: Orthopedics    VT LAP, REPAIR PARAESOPHAGEAL HERNIA, INCL FUNDOPLASTY W/ MESH N/A 8/31/2016    Procedure: REPAIR HERNIA PARAESOPHAGEAL  with bio A mesh LAPAROSCOPIC NISSEN FUNDOPLICATION Laparoscopic Gastroplexy Intraop EGD #[id number];  Surgeon: Shawn Acevedo MD;  [facility];  Service: Bariatrics    [place] REPAIR INTERCARP/CARP-METACARP JT Left 11/21/2019    Procedure: THUMB [address];  Surgeon: Albino Marrero MD;  [facility];  Service: Orthopedics    TONSILLECTOMY      WRIST SURGERY Bilateral     daquero vein release     Social History   Social History     Substance and Sexual Activity   Alcohol Use Yes    Frequency: Monthly or less    Drinks per session: 1 or 2    Comment: social     Social History     Substance and Sexual Activity   Drug Use No     Social History     Tobacco Use   Smoking Status Never Smoker   Smokeless Tobacco Never Used     Family History: non-contributory    Meds/Allergies   all medications and allergies reviewed  No Known Allergies    Objective       Current Vitals:   Blood Pressure: 124/70 (08/06/20 0923)  Pulse: 74 (08/06/20 0923)  Temperature: (!) 96 °F (35 6 °C) (08/06/20 0923)  Temp Source: Temporal (08/06/20 0923)  Height: 5' 6" (167 6 cm) (08/06/20 4953)  Weight - Scale: 67 6 kg (149 lb) (08/06/20 0923)      Invasive Devices     None                 Physical Exam   Constitutional: She is oriented to person, place, and time  HENT:   Head: Normocephalic  Nose: No congestion  Mouth/Throat: Mucous membranes are moist    Eyes: Pupils are equal, round, and reactive to light  Neck: Normal range of motion  Cardiovascular: Normal rate, regular rhythm, normal heart sounds and normal pulses  No murmur heard  Pulmonary/Chest: Effort normal    Abdominal: She exhibits no distension  There is no abdominal tenderness  Musculoskeletal: Normal range of motion  Neurological: She is alert and oriented to person, place, and time  Skin: Skin is warm  Psychiatric: Mood normal            Pathology, and Other Studies: I have personally reviewed pertinent reports  Assessment/PLAN:    56 y o  female status post  PEHR and Nissen takedown w/placement of Linx device by Dr Mara Luna on 7/20/20    Doing well post op  No major issues  Increase physical activity as tolerated and as instructed  Advance diet as instructed by our dietitians today and as indicated in the binder  Follow up in three month as scheduled  Off work till Monday August 17th                  Robert Fonseca MD  8/6/2020  10:28 AM

## 2020-08-25 ENCOUNTER — HOSPITAL ENCOUNTER (OUTPATIENT)
Dept: MAMMOGRAPHY | Facility: MEDICAL CENTER | Age: 60
Discharge: HOME | End: 2020-08-25
Payer: COMMERCIAL

## 2020-08-25 VITALS — WEIGHT: 149 LBS | BODY MASS INDEX: 23.95 KG/M2 | HEIGHT: 66 IN

## 2020-08-25 DIAGNOSIS — Z12.31 VISIT FOR SCREENING MAMMOGRAM: ICD-10-CM

## 2020-08-25 DIAGNOSIS — Z12.39 SCREENING FOR MALIGNANT NEOPLASM OF BREAST: ICD-10-CM

## 2020-08-25 PROCEDURE — 77063 BREAST TOMOSYNTHESIS BI: CPT

## 2020-08-25 PROCEDURE — 77067 SCR MAMMO BI INCL CAD: CPT

## 2020-08-26 ENCOUNTER — ANNUAL EXAM (OUTPATIENT)
Dept: OBGYN CLINIC | Facility: MEDICAL CENTER | Age: 60
End: 2020-08-26
Payer: COMMERCIAL

## 2020-08-26 VITALS
TEMPERATURE: 98.6 F | SYSTOLIC BLOOD PRESSURE: 110 MMHG | BODY MASS INDEX: 23.88 KG/M2 | DIASTOLIC BLOOD PRESSURE: 60 MMHG | HEIGHT: 66 IN | WEIGHT: 148.6 LBS

## 2020-08-26 DIAGNOSIS — Z13.820 ENCOUNTER FOR OSTEOPOROSIS SCREENING IN ASYMPTOMATIC POSTMENOPAUSAL PATIENT: ICD-10-CM

## 2020-08-26 DIAGNOSIS — Z12.39 SCREENING FOR MALIGNANT NEOPLASM OF BREAST: ICD-10-CM

## 2020-08-26 DIAGNOSIS — Z78.0 ENCOUNTER FOR OSTEOPOROSIS SCREENING IN ASYMPTOMATIC POSTMENOPAUSAL PATIENT: ICD-10-CM

## 2020-08-26 DIAGNOSIS — Z80.41 FAMILY HISTORY OF OVARIAN CANCER: ICD-10-CM

## 2020-08-26 DIAGNOSIS — Z01.419 ENCOUNTER FOR ROUTINE GYNECOLOGICAL EXAMINATION WITH PAPANICOLAOU SMEAR OF CERVIX: Primary | ICD-10-CM

## 2020-08-26 PROCEDURE — G0145 SCR C/V CYTO,THINLAYER,RESCR: HCPCS | Performed by: OBSTETRICS & GYNECOLOGY

## 2020-08-26 PROCEDURE — 99396 PREV VISIT EST AGE 40-64: CPT | Performed by: OBSTETRICS & GYNECOLOGY

## 2020-08-26 NOTE — PROGRESS NOTES
ASSESSMENT & PLAN: Zabrina Barrios was seen today for gynecologic exam     Diagnoses and all orders for this visit:    Encounter for routine gynecological examination with Papanicolaou smear of cervix  -     Liquid-based pap, screening    Family history of ovarian cancer  -     US pelvis complete w transvaginal; Future    Screening for malignant neoplasm of breast  -     Mammo screening bilateral w 3d & cad; Future    Encounter for osteoporosis screening in asymptomatic postmenopausal patient  -     DXA bone density spine hip and pelvis; Future    Discussion/Summary:  Patient here for yearly gyn preventive exam; had hiatal hernia repair 2020; requests Rx for pelvic u s  due to [place] ovarian cancer(given) and requested Rx for DEXA Scan (given)  Patient to retire on Tuesday, 2020     1   Routine well woman exam done today  2  Pap and HPV:  The patient's pap is up to date  Pap and cotesting was done today  Current ASCCP Guidelines reviewed  3   Mammogram was ordered  4  Colorectal cancer screening is up to date  4  The following were reviewed in today's visit: breast self exam, adequate intake of calcium and vitamin D, exercise and healthy diet  5  F/u 1 year  CC:  Annual Gynecologic Examination    HPI: Lus Holstein is a 56 y o  who presents for annual gynecologic examination  She has the following concerns:  Need for HRT; did not advise  Health Maintenance:    Patient describes her health as good  Patient does not have weight concerns  She exercises 7 days per week with work and walking and watching two grandchildren       She does wear her seatbelt routinely  She does perform regular monthly self breast exams  She does feel safe at home  Patients does not follow a  diet  Patient gets 5 servings of dairy or calcium rich foods a day      Last pap: [id number] mammogram: 2019  Last colorectal cancer screenin    Patient Active Problem List   Diagnosis    Migraine    [facility]uitary adenoma ([facility] [address] )    Iron deficiency anemia    Tiburcio ulcer    Dyslipidemia    Hyperlipidemia    Iron deficiency anemia due to chronic blood loss    Menopausal symptoms    Migraine, unspecified, not intractable, without status migrainosus    Osteoarthritis of carpometacarpal (CMC) joint of thumb       Past Medical History:   Diagnosis Date    Anxiety     Arthritis     Tiburcio ulcer     Depression     GERD (gastroesophageal reflux disease)     Hyperlipidemia     Migraine     Mitral valve prolapse     Murmur     Pituitary adenoma ([facility] [address] )        Past Surgical History:   Procedure Laterality Date    BREAST BIOPSY Left 1996    benign    BREAST SURGERY      L breast cyst removal-benign    CATARACT EXTRACTION Bilateral     CATARACT EXTRACTION, BILATERAL  01/2020    COLONOSCOPY      DE QUERVAIN'S RELEASE Bilateral     EGD AND COLONOSCOPY N/A 5/11/2016    Procedure: EGD AND COLONOSCOPY;  Surgeon: Jennifer Lewis MD;  Location: [facility] LAB;   Service:     LAPAROSCOPIC MAGNETIC SPHINCTER AUGMENTATION N/A 7/20/2020    Procedure: MAGNETIC SPHINCTER AUGMENT, LINX PLACEMENT;  Surgeon: Terence Infante MD;  [facility];  Service: Bariatrics    PARAESOPHAGEAL HERNIA REPAIR N/A 7/20/2020    Procedure: REPAIR HERNIA PARAESOPHAGEAL  LAPAROSCOPIC, PARTIAL GASTRECTOMY;  Surgeon: Terence Infante MD;  [facility];  Service: Bariatrics    IA INCIS TENDON [address] STYLOID Left 11/21/2019    Procedure: Frank Malu AND TENDON GRAFT;  Surgeon: Migel Albright MD;  [facility];  Service: Orthopedics    IA LAP, REPAIR PARAESOPHAGEAL HERNIA, INCL FUNDOPLASTY W/ MESH N/A 8/31/2016    Procedure: REPAIR HERNIA PARAESOPHAGEAL  with bio A mesh LAPAROSCOPIC NISSEN FUNDOPLICATION Laparoscopic Gastroplexy Intraop EGD #[id number];  Surgeon: Terence Infante MD;  [facility];  Service: [address], [address] INTERCARP/CARP-METACARP JT Left 11/21/2019    Procedure: THUMB [address] [facility]; Surgeon: Irina Patrick MD;  Location: [facility] OR;  Service: Orthopedics    TONSILLECTOMY      WRIST SURGERY Bilateral     daquero vein release       Past OB/Gyn History:    History of abnormal pap smears: No    Patient is currently sexually active  monogamous   Patient's family planning method is post menopausal status      Family History   Problem Relation Age of Onset    Anxiety disorder Mother     Arthritis Mother    [place] Hyperlipidemia Mother     Hypertension Mother     Prostate cancer Father 76    Dementia Father    [place] Parkinsonism Father     Other Father 80        bladder cancer    Ovarian cancer Maternal Grandmother 72    Breast cancer Paternal Aunt 66    Breast cancer Cousin 36    Colon cancer Paternal Aunt 48    Pancreatic cancer Paternal Aunt 67    No Known Problems Sister     No Known Problems Daughter        Social History:  Social History     Socioeconomic History    Marital status: /Civil Union     Spouse name: Not on file    Number of children: Not on file    Years of education: Not on file    Highest education level: Not on file   Occupational History    Not on file   Social Needs    Financial resource strain: Not on file    Food insecurity     Worry: Not on file     Inability: Not on file    Transportation needs     Medical: Not on file     Non-medical: Not on file   Tobacco Use    Smoking status: Never Smoker    Smokeless tobacco: Never Used   Substance and Sexual Activity    Alcohol use: Yes     Frequency: Monthly or less     Drinks per session: 1 or 2     Comment: social    Drug use: No    Sexual activity: Yes     Partners: Male     Comment:    Lifestyle    Physical activity     Days per week: Not on file     Minutes per session: Not on file    Stress: Not on file   Relationships    Social connections     Talks on phone: Not on file     Gets together: Not on file     Attends Mandaeism service: Not on file     Active member of club or organization: Not on file     Attends meetings of clubs or organizations: Not on file     Relationship status: Not on file    Intimate partner violence     Fear of current or ex partner: Not on file     Emotionally abused: Not on file     Physically abused: Not on file     Forced sexual activity: Not on file   Other Topics Concern    Not on file   Social History Narrative    Not on file     Presently lives with family  Patient is currently employed   No Known Allergies      Current Outpatient Medications:     ascorbic acid (VITAMIN C) 500 mg tablet, Take 500 mg by mouth 2 (two) times a day  , Disp: , Rfl:     aspirin 81 MG tablet, Take 1 tablet by mouth daily, Disp: , Rfl:     atorvastatin (LIPITOR) 10 mg tablet, TAKE ONE TABLET BY MOUTH EVERY DAY, Disp: 90 tablet, Rfl: 0    buPROPion (WELLBUTRIN XL) 300 mg 24 hr tablet, Take 1 tablet by mouth daily, Disp: , Rfl:     Cholecalciferol (VITAMIN D) 2000 UNITS CAPS, Take 1 tablet by mouth daily  , Disp: , Rfl:     DULoxetine (CYMBALTA) 20 mg capsule, Take 20 mg by mouth daily  , Disp: , Rfl:     metoclopramide (REGLAN) 5 mg tablet, Take 1 tablet (5 mg total) by mouth every 8 (eight) hours as needed (nausea and vomiting), Disp: 20 tablet, Rfl: 0    multivitamin (THERAGRAN) TABS, Take 1 tablet by mouth daily  , Disp: , Rfl:     omeprazole (PriLOSEC) 20 mg delayed release capsule, Take 1 capsule (20 mg total) by mouth daily, Disp: 30 capsule, Rfl: 0    ondansetron (ZOFRAN-ODT) 4 mg disintegrating tablet, Take 1 tablet (4 mg total) by mouth every 6 (six) hours as needed for nausea or vomiting, Disp: 20 tablet, Rfl: 0    ondansetron (ZOFRAN-ODT) 4 mg disintegrating tablet, Take 1 tablet (4 mg total) by mouth every 6 (six) hours as needed for nausea or vomiting, Disp: 20 tablet, Rfl: 0    oxyCODONE (ROXICODONE) 5 mg immediate release tablet, Take 1 tablet (5 mg total) by mouth every 4 (four) hours as needed for moderate painMax Daily Amount: 30 mg, Disp: 10 tablet, Rfl: 0   prednisoLONE (ORAPRED) 15 mg/5 mL oral solution, Take 23 mL (69 mg total) by mouth daily (Patient not taking: Reported on 8/6/2020), Disp: 120 mL, Rfl: 0    SUMAtriptan (IMITREX) 100 mg tablet, Take 1 tablet by mouth for migraine relief  May repeat 2 hours later  Maximum 200mg/day  (Patient taking differently: once as needed Take 1 tablet by mouth for migraine relief  May repeat 2 hours later  Maximum 200mg/day ), Disp: 27 tablet, Rfl: 0    vitamin E, tocopherol, 400 units capsule, Take 400 Units by mouth daily  , Disp: , Rfl:     Review of Systems  Constitutional :no fever, feels well, no tiredness, no recent weight gain or loss  ENT: no ear ache, no loss of hearing, no nosebleeds or nasal discharge, no sore throat or hoarseness  Cardiovascular: no complaints of slow or fast heart beat, no chest pain, no palpitations, no leg claudication or lower extremity edema  Respiratory: no complaints of shortness of shortness of breath, no AGUAYO  Breasts:no complaints of breast pain, breast lump, or nipple discharge  Gastrointestinal: no complaints of abdominal pain, constipation, nausea, vomiting, or diarrhea or bloody stools  Genitourinary : no complaints of dysuria, incontinence, pelvic pain, no dysmenorrhea, vaginal discharge or abnormal vaginal bleeding and as noted in HPI  Musculoskeletal: no complaints of arthralgia, no myalgia, no joint swelling or stiffness, no limb pain or swelling  Integumentary: no complaints of skin rash or lesion, itching or dry skin  Neurological: no complaints of headache, no confusion, no numbness or tingling, no dizziness or fainting    Physical Exam:     /60   Temp 98 6 °F (37 °C) (Temporal)   Ht 5' 6" (1 676 m)   Wt 67 4 kg (148 lb 9 6 oz)   LMP  (LMP Unknown)   Breastfeeding No   BMI 23 98 kg/m²     General: appears stated age, cooperative, alert normal mood and affect   Psychiatric oriented to person, place and time    Mood and affect normal   Neck: normal, supple,trachea midline, no masses  Thyroid: normal, no thyromegaly   Heart: regular rate and rhythm, S1, S2 normal, no murmur, click, rub or gallop   Lungs: clear to auscultation bilaterally, no increased work of breathing or signs of respiratory distress   Breasts: normal, no dimpling or skin changes noted   Abdomen: soft, non-tender, without masses or organomegaly   Vulva: normal , no lesions   Vagina: normal , no lesions or atrophy   Urethra: normal   Urethal meatus normal   Bladder Normal, soft, non-tender and no prolapse or masses appreciated   Cervix: normal, no palpable masses ; ec and c pap done (NO HPV culture this year)   Uterus: normal , non-tender, not enlarged, no palpable masses   Adnexa: normal, non-tender without fullness or masses; hemoccult neg  Lymphatic Palpation of lymph nodes in neck, axilla, groin and/or other locations: no lymphadenopathy or masses noted   Skin Normal skin turgor and no rashes    Palpation of skin and subcutaneous tissue normal

## 2020-09-02 ENCOUNTER — TELEPHONE (OUTPATIENT)
Dept: GASTROENTEROLOGY | Facility: MEDICAL CENTER | Age: 60
End: 2020-09-02

## 2020-09-02 LAB
LAB AP GYN PRIMARY INTERPRETATION: NORMAL
Lab: NORMAL

## 2020-09-02 NOTE — TELEPHONE ENCOUNTER
----- Message from Lisa Pak MD sent at 9/1/2020  3:18 PM EDT -----  Regarding: RE: repeat egd  She does not need a repeat EGD if she is feeling better  Thanks!  ----- Message -----  From: Aaliyah Archie  Sent: 8/31/2020   2:13 PM EDT  To: Lisa Pak MD  Subject: repeat egd                                       Does this pt still need a repeat egd? She stated she had surgery and was not sure she still needs the procedure? Please advise!

## 2020-09-14 ENCOUNTER — HOSPITAL ENCOUNTER (OUTPATIENT)
Dept: BONE DENSITY | Facility: MEDICAL CENTER | Age: 60
Discharge: HOME | End: 2020-09-14
Payer: COMMERCIAL

## 2020-09-14 ENCOUNTER — HOSPITAL ENCOUNTER (OUTPATIENT)
Dept: ULTRASOUND IMAGING | Facility: MEDICAL CENTER | Age: 60
Discharge: HOME | End: 2020-09-14
Payer: COMMERCIAL

## 2020-09-14 DIAGNOSIS — Z80.41 FAMILY HISTORY OF OVARIAN CANCER: ICD-10-CM

## 2020-09-14 DIAGNOSIS — Z78.0 ENCOUNTER FOR OSTEOPOROSIS SCREENING IN ASYMPTOMATIC POSTMENOPAUSAL PATIENT: ICD-10-CM

## 2020-09-14 DIAGNOSIS — Z13.820 ENCOUNTER FOR OSTEOPOROSIS SCREENING IN ASYMPTOMATIC POSTMENOPAUSAL PATIENT: ICD-10-CM

## 2020-09-14 PROCEDURE — 77080 DXA BONE DENSITY AXIAL: CPT

## 2020-09-14 PROCEDURE — 76830 TRANSVAGINAL US NON-OB: CPT

## 2020-09-14 PROCEDURE — 76856 US EXAM PELVIC COMPLETE: CPT

## 2020-09-22 ENCOUNTER — TELEPHONE (OUTPATIENT)
Dept: OBGYN CLINIC | Facility: MEDICAL CENTER | Age: 60
End: 2020-09-22

## 2020-09-24 ENCOUNTER — OFFICE VISIT (OUTPATIENT)
Dept: BARIATRICS | Facility: CLINIC | Age: 60
End: 2020-09-24

## 2020-09-24 VITALS
SYSTOLIC BLOOD PRESSURE: 120 MMHG | HEART RATE: 64 BPM | DIASTOLIC BLOOD PRESSURE: 70 MMHG | WEIGHT: 149 LBS | BODY MASS INDEX: 23.95 KG/M2 | HEIGHT: 66 IN | TEMPERATURE: 97.6 F

## 2020-09-24 DIAGNOSIS — K21.9 GASTROESOPHAGEAL REFLUX DISEASE WITHOUT ESOPHAGITIS: Primary | ICD-10-CM

## 2020-09-24 DIAGNOSIS — E78.5 HYPERLIPIDEMIA, UNSPECIFIED HYPERLIPIDEMIA TYPE: ICD-10-CM

## 2020-09-24 PROCEDURE — 99024 POSTOP FOLLOW-UP VISIT: CPT | Performed by: SURGERY

## 2020-09-24 NOTE — PROGRESS NOTES
POST OP UP VISIT - BARIATRIC SURGERY  Nii Moore 56 y o  female MRN: [id number]  Unit/Bed#:  Encounter: [id number]      HPI:  Nii Moore is a 56 y o  female status post PEHR and Nissen takedown w/placement of Linx device by Dr Jaimie Jaime 7/20/20  Comes to the office today for follow-up  The patient feels much better  She denies any symptoms  She reports 100% resolution of any symptoms  She is tolerating her diet  She is off medication  Review of Systems    Historical Information   Past Medical History:   Diagnosis Date    Anxiety     Arthritis     Tiburcio ulcer     Depression     GERD (gastroesophageal reflux disease)     Hyperlipidemia     Migraine     Mitral valve prolapse     Murmur     Pituitary adenoma (HCC)      Past Surgical History:   Procedure Laterality Date    BREAST BIOPSY Left 1996    benign    BREAST SURGERY      L breast cyst removal-benign    CATARACT EXTRACTION Bilateral     CATARACT EXTRACTION, BILATERAL  01/2020    COLONOSCOPY      DE QUERVAIN'S RELEASE Bilateral     EGD AND COLONOSCOPY N/A 5/11/2016    Procedure: EGD AND COLONOSCOPY;  Surgeon: Dee Blood MD;  Location: [facility] GI LAB;   Service:     LAPAROSCOPIC MAGNETIC SPHINCTER AUGMENTATION N/A 7/20/2020    Procedure: MAGNETIC SPHINCTER AUGMENT, LINX PLACEMENT;  Surgeon: Amaury Cox MD;  [facility];  Service: Bariatrics    PARAESOPHAGEAL HERNIA REPAIR N/A 7/20/2020    Procedure: REPAIR HERNIA PARAESOPHAGEAL  LAPAROSCOPIC, PARTIAL GASTRECTOMY;  Surgeon: Amaury Cox MD;  [facility];  Service: Bariatrics    AZ INCIS TENDON SHEATH,RADIAL STYLOID Left 11/21/2019    Procedure: Carlen Fat AND TENDON GRAFT;  Surgeon: Anum Nguyễn MD;  [facility];  Service: Orthopedics    AZ LAP, REPAIR PARAESOPHAGEAL HERNIA, INCL FUNDOPLASTY W/ MESH N/A 8/31/2016    Procedure: REPAIR HERNIA PARAESOPHAGEAL  with bio A mesh LAPAROSCOPIC NISSEN FUNDOPLICATION Laparoscopic Gastroplexy Intraop EGD #[id number];  Surgeon: Emily Multani MD;  [facility];  Service: [address], [address] INTERCARP/CARP-METACARP JT Left 11/21/2019    Procedure: THUMB [address];  Surgeon: Lona Lee MD;  [facility];  Service: Orthopedics    TONSILLECTOMY      WRIST SURGERY Bilateral     daquero vein release     Social History   Social History     Substance and Sexual Activity   Alcohol Use Yes    Frequency: Monthly or less    Drinks per session: 1 or 2    Comment: social     Social History     Substance and Sexual Activity   Drug Use No     Social History     Tobacco Use   Smoking Status Never Smoker   Smokeless Tobacco Never Used     Family History: non-contributory    Meds/Allergies   all medications and allergies reviewed  No Known Allergies    Objective       Current Vitals:   Blood Pressure: 120/70 (09/24/20 1029)  Pulse: 64 (09/24/20 1029)  Temperature: 97 6 °F (36 4 °C) (09/24/20 1029)  Temp Source: Temporal (09/24/20 1029)  Height: 5' 6" (167 6 cm) (09/24/20 1029)  Weight - Scale: 67 6 kg (149 lb) (09/24/20 1029)      Invasive Devices     None                 Physical Exam          Assessment/PLAN:    56 y o  female status post PEHR and Nissen takedown w/placement of Linx device by Dr Shakira Cardenas 7/20/20  Comes to the office today for follow-up  The patient feels much better  She denies any symptoms  She reports 100% resolution of any symptoms  She is tolerating her diet  She is off medication  Follow-up as needed          Donna Hatfield MD  9/24/2020  10:36 AM

## 2020-09-26 ENCOUNTER — TRANSCRIBE ORDERS (OUTPATIENT)
Dept: ADMINISTRATIVE | Facility: HOSPITAL | Age: 60
End: 2020-09-26

## 2020-09-26 ENCOUNTER — APPOINTMENT (OUTPATIENT)
Dept: LAB | Facility: MEDICAL CENTER | Age: 60
End: 2020-09-26
Payer: COMMERCIAL

## 2020-09-26 DIAGNOSIS — R07.89 OTHER CHEST PAIN: ICD-10-CM

## 2020-09-26 DIAGNOSIS — R07.89 PRESSURE IN CHEST: ICD-10-CM

## 2020-09-26 DIAGNOSIS — D50.9 IRON DEFICIENCY ANEMIA, UNSPECIFIED IRON DEFICIENCY ANEMIA TYPE: ICD-10-CM

## 2020-09-26 DIAGNOSIS — E23.7 DISEASE OF PITUITARY GLAND (HCC): Primary | ICD-10-CM

## 2020-09-26 DIAGNOSIS — E78.5 HYPERLIPIDEMIA, UNSPECIFIED HYPERLIPIDEMIA TYPE: Primary | ICD-10-CM

## 2020-09-26 DIAGNOSIS — E78.5 HYPERLIPIDEMIA, UNSPECIFIED HYPERLIPIDEMIA TYPE: ICD-10-CM

## 2020-09-26 DIAGNOSIS — E23.7 DISEASE OF PITUITARY GLAND (HCC): ICD-10-CM

## 2020-09-26 LAB
ALBUMIN SERPL BCP-MCNC: 3.7 G/DL (ref 3.5–5)
ALP SERPL-CCNC: 83 U/L (ref 46–116)
ALT SERPL W P-5'-P-CCNC: 31 U/L (ref 12–78)
ANION GAP SERPL CALCULATED.3IONS-SCNC: 4 MMOL/L (ref 4–13)
AST SERPL W P-5'-P-CCNC: 19 U/L (ref 5–45)
BASOPHILS # BLD AUTO: 0.06 THOUSANDS/ΜL (ref 0–0.1)
BASOPHILS NFR BLD AUTO: 1 % (ref 0–1)
BILIRUB SERPL-MCNC: 0.58 MG/DL (ref 0.2–1)
BUN SERPL-MCNC: 14 MG/DL (ref 5–25)
CALCIUM SERPL-MCNC: 9.3 MG/DL (ref 8.3–10.1)
CHLORIDE SERPL-SCNC: 111 MMOL/L (ref 100–108)
CHOLEST SERPL-MCNC: 172 MG/DL (ref 50–200)
CK SERPL-CCNC: 108 U/L (ref 26–192)
CO2 SERPL-SCNC: 29 MMOL/L (ref 21–32)
CORTIS AM PEAK SERPL-MCNC: 11.2 UG/DL (ref 4.2–22.4)
CREAT SERPL-MCNC: 0.62 MG/DL (ref 0.6–1.3)
CREAT UR-MCNC: 141 MG/DL
EOSINOPHIL # BLD AUTO: 0.16 THOUSAND/ΜL (ref 0–0.61)
EOSINOPHIL NFR BLD AUTO: 4 % (ref 0–6)
ERYTHROCYTE [DISTWIDTH] IN BLOOD BY AUTOMATED COUNT: 12.2 % (ref 11.6–15.1)
EST. AVERAGE GLUCOSE BLD GHB EST-MCNC: 103 MG/DL
FERRITIN SERPL-MCNC: 42 NG/ML (ref 8–388)
GFR SERPL CREATININE-BSD FRML MDRD: 97 ML/MIN/1.73SQ M
GLUCOSE P FAST SERPL-MCNC: 92 MG/DL (ref 65–99)
HBA1C MFR BLD: 5.2 %
HCT VFR BLD AUTO: 42.2 % (ref 34.8–46.1)
HDLC SERPL-MCNC: 78 MG/DL
HGB BLD-MCNC: 13.1 G/DL (ref 11.5–15.4)
IMM GRANULOCYTES # BLD AUTO: 0 THOUSAND/UL (ref 0–0.2)
IMM GRANULOCYTES NFR BLD AUTO: 0 % (ref 0–2)
IRON SATN MFR SERPL: 20 %
IRON SERPL-MCNC: 72 UG/DL (ref 50–170)
LDLC SERPL CALC-MCNC: 83 MG/DL (ref 0–100)
LYMPHOCYTES # BLD AUTO: 1.52 THOUSANDS/ΜL (ref 0.6–4.47)
LYMPHOCYTES NFR BLD AUTO: 37 % (ref 14–44)
MAGNESIUM SERPL-MCNC: 2.6 MG/DL (ref 1.6–2.6)
MCH RBC QN AUTO: 30.9 PG (ref 26.8–34.3)
MCHC RBC AUTO-ENTMCNC: 31 G/DL (ref 31.4–37.4)
MCV RBC AUTO: 100 FL (ref 82–98)
MICROALBUMIN UR-MCNC: 10.9 MG/L (ref 0–20)
MICROALBUMIN/CREAT 24H UR: 8 MG/G CREATININE (ref 0–30)
MONOCYTES # BLD AUTO: 0.42 THOUSAND/ΜL (ref 0.17–1.22)
MONOCYTES NFR BLD AUTO: 10 % (ref 4–12)
NEUTROPHILS # BLD AUTO: 1.99 THOUSANDS/ΜL (ref 1.85–7.62)
NEUTS SEG NFR BLD AUTO: 48 % (ref 43–75)
NONHDLC SERPL-MCNC: 94 MG/DL
NRBC BLD AUTO-RTO: 0 /100 WBCS
PHOSPHATE SERPL-MCNC: 4.3 MG/DL (ref 2.3–4.1)
PLATELET # BLD AUTO: 180 THOUSANDS/UL (ref 149–390)
PMV BLD AUTO: 12.2 FL (ref 8.9–12.7)
POTASSIUM SERPL-SCNC: 4.2 MMOL/L (ref 3.5–5.3)
PROLACTIN SERPL-MCNC: 10.7 NG/ML
PROT SERPL-MCNC: 6.8 G/DL (ref 6.4–8.2)
RBC # BLD AUTO: 4.24 MILLION/UL (ref 3.81–5.12)
SODIUM SERPL-SCNC: 144 MMOL/L (ref 136–145)
T4 FREE SERPL-MCNC: 0.95 NG/DL (ref 0.76–1.46)
TIBC SERPL-MCNC: 367 UG/DL (ref 250–450)
TRIGL SERPL-MCNC: 54 MG/DL
TSH SERPL DL<=0.05 MIU/L-ACNC: 1.1 UIU/ML (ref 0.36–3.74)
WBC # BLD AUTO: 4.15 THOUSAND/UL (ref 4.31–10.16)

## 2020-09-26 PROCEDURE — 82533 TOTAL CORTISOL: CPT

## 2020-09-26 PROCEDURE — 83036 HEMOGLOBIN GLYCOSYLATED A1C: CPT

## 2020-09-26 PROCEDURE — 36415 COLL VENOUS BLD VENIPUNCTURE: CPT

## 2020-09-26 PROCEDURE — 82550 ASSAY OF CK (CPK): CPT

## 2020-09-26 PROCEDURE — 84439 ASSAY OF FREE THYROXINE: CPT

## 2020-09-26 PROCEDURE — 80061 LIPID PANEL: CPT

## 2020-09-26 PROCEDURE — 82728 ASSAY OF FERRITIN: CPT

## 2020-09-26 PROCEDURE — 84100 ASSAY OF PHOSPHORUS: CPT

## 2020-09-26 PROCEDURE — 80053 COMPREHEN METABOLIC PANEL: CPT

## 2020-09-26 PROCEDURE — 82570 ASSAY OF URINE CREATININE: CPT | Performed by: SPECIALIST

## 2020-09-26 PROCEDURE — 85025 COMPLETE CBC W/AUTO DIFF WBC: CPT

## 2020-09-26 PROCEDURE — 84146 ASSAY OF PROLACTIN: CPT

## 2020-09-26 PROCEDURE — 83735 ASSAY OF MAGNESIUM: CPT

## 2020-09-26 PROCEDURE — 83540 ASSAY OF IRON: CPT

## 2020-09-26 PROCEDURE — 82024 ASSAY OF ACTH: CPT

## 2020-09-26 PROCEDURE — 83550 IRON BINDING TEST: CPT

## 2020-09-26 PROCEDURE — 82043 UR ALBUMIN QUANTITATIVE: CPT | Performed by: SPECIALIST

## 2020-09-26 PROCEDURE — 84443 ASSAY THYROID STIM HORMONE: CPT

## 2020-09-28 RX ORDER — ATORVASTATIN CALCIUM 10 MG/1
TABLET, FILM COATED ORAL
Qty: 90 TABLET | Refills: 0 | Status: SHIPPED | OUTPATIENT
Start: 2020-09-28 | End: 2021-06-14

## 2020-09-28 RX ORDER — DULOXETIN HYDROCHLORIDE 20 MG/1
CAPSULE, DELAYED RELEASE ORAL
Qty: 90 CAPSULE | Refills: 0 | Status: SHIPPED | OUTPATIENT
Start: 2020-09-28 | End: 2021-10-01 | Stop reason: SDUPTHER

## 2020-09-29 ENCOUNTER — OFFICE VISIT (OUTPATIENT)
Dept: CARDIOLOGY CLINIC | Facility: CLINIC | Age: 60
End: 2020-09-29
Payer: COMMERCIAL

## 2020-09-29 VITALS
BODY MASS INDEX: 24.35 KG/M2 | DIASTOLIC BLOOD PRESSURE: 64 MMHG | OXYGEN SATURATION: 98 % | HEART RATE: 73 BPM | HEIGHT: 66 IN | WEIGHT: 151.5 LBS | SYSTOLIC BLOOD PRESSURE: 100 MMHG

## 2020-09-29 DIAGNOSIS — E78.5 HYPERLIPIDEMIA, UNSPECIFIED HYPERLIPIDEMIA TYPE: Primary | ICD-10-CM

## 2020-09-29 LAB — ACTH PLAS-MCNC: 20.5 PG/ML (ref 7.2–63.3)

## 2020-09-29 PROCEDURE — 99213 OFFICE O/P EST LOW 20 MIN: CPT | Performed by: INTERNAL MEDICINE

## 2020-09-29 NOTE — PROGRESS NOTES
Follow-up - Cardiology   Melita oMsher 56 y o  female MRN: [id number]        Problems    Problem List Items Addressed This Visit        Other    Hyperlipidemia - Primary            Plan patient seen September 29, 2020  Her LDL is 83  She is followed for prominent pituitary gland  Endocrine evaluation is negative  Recent MRI of her head  She had a 2nd surgery for I hiatal hernia  She is retired  She had cataract surgery  She offers no complaints  Were basically only following her LDL  We will see her in 1 year          HPI: Melita Mosher is a 56y o  year old female       Plan patient seen November 5, 2019  She has been followed by a pituitary tumor  MRIs coming up  Endocrine is following  She had hiatal hernia surgery years ago for iron deficiency anemia  She is doing well in that regard  She has hyperlipidemia  That is being treated  Last LDL is 79  She has been having some chest pressure  It is not necessarily related to exertion  We will order a stress echo  She did have cataract surgery as well as hand surgery               HPI: Melita Mosher is a 62y o  year old female    History of Present Illness  Cardiology HPI Free Text Note Form [facility]: Patient sees us at long-term intervals  She recently saw for positive family history of vascular disease and showed an LDL of 192  She is no documented vascular disease  She is on Lipitor several times a week and her LDL is down to 108  I made no changes  our standpoint we've very little input to her care  issues recently have been severe iron deficiency anemia secondary to GERD and they're considering a procedure to reduce her hiatal hernia  addition she has a pituitary adenomatous being followed by endocrine  seen October 17, 2017  seen because of positive family history coronary disease and she has significantly elevated LDL  LDL is 128 on 10 mg of Lipitor every other day  Her increasing to take back to daily  did have significant anemia secondary to a hiatal hernia  She did have diet hernia repair  Fairly significant operation  She's done well since          Patient seen October 9, 2018  See her primarily for mild hyperlipidemia positive family history coronary disease  She also has a very small pituitary tumor is followed by Endocrine   It is not endocrine active  Her LDL is 96 on therapy  She is basically asymptomatic  Ramses Saavedra has some fleeting type of chest pain which is a very very low probability being anginal in nature  She seen basically on a yearly basis   Blood pressures been excellent            Review of Systems   Constitutional: Negative  Respiratory: Negative  Cardiovascular: Negative  Past Medical History:   Diagnosis Date    Anxiety     Arthritis     Tiburcio ulcer     Depression     GERD (gastroesophageal reflux disease)     Hyperlipidemia     Migraine     Mitral valve prolapse     Murmur     Pituitary adenoma (HCC)      Social History     Substance and Sexual Activity   Alcohol Use Yes    Frequency: Monthly or less    Drinks per session: 1 or 2    Comment: social     Social History     Substance and Sexual Activity   Drug Use No     Social History     Tobacco Use   Smoking Status Never Smoker   Smokeless Tobacco Never Used       Allergies:  No Known Allergies    Medications:     Current Outpatient Medications:     ascorbic acid (VITAMIN C) 500 mg tablet, Take 500 mg by mouth 2 (two) times a day  , Disp: , Rfl:     aspirin 81 MG tablet, Take 1 tablet by mouth daily, Disp: , Rfl:     atorvastatin (LIPITOR) 10 mg tablet, TAKE ONE TABLET BY MOUTH EVERY DAY, Disp: 90 tablet, Rfl: 0    buPROPion (WELLBUTRIN XL) 300 mg 24 hr tablet, Take 1 tablet by mouth daily, Disp: , Rfl:     Cholecalciferol (VITAMIN D) 2000 UNITS CAPS, Take 1 tablet by mouth daily  , Disp: , Rfl:     DULoxetine (CYMBALTA) 20 mg capsule, TAKE ONE CAPSULE BY MOUTH EVERY DAY, Disp: 90 capsule, Rfl: 0    multivitamin (THERAGRAN) TABS, Take 1 tablet by mouth daily  , Disp: , Rfl:     SUMAtriptan (IMITREX) 100 mg tablet, Take 1 tablet by mouth for migraine relief  May repeat 2 hours later  Maximum 200mg/day  (Patient taking differently: once as needed Take 1 tablet by mouth for migraine relief  May repeat 2 hours later  Maximum 200mg/day ), Disp: 27 tablet, Rfl: 0    vitamin E, tocopherol, 400 units capsule, Take 400 Units by mouth daily  , Disp: , Rfl:       Physical Exam  Constitutional:       Appearance: She is normal weight  Cardiovascular:      Rate and Rhythm: Normal rate and regular rhythm  Heart sounds: No murmur  No friction rub  No gallop  Pulmonary:      Effort: Pulmonary effort is normal  No respiratory distress  Breath sounds: Normal breath sounds  No stridor  No wheezing, rhonchi or rales  Chest:      Chest wall: No tenderness  Musculoskeletal:         General: No swelling, tenderness, deformity or signs of injury  Right lower leg: No edema  Left lower leg: No edema  Skin:     General: Skin is warm and dry  Neurological:      Mental Status: She is alert and oriented to person, place, and time  Laboratory Studies:  CMP:  Results from last 7 days   Lab Units 09/26/20  0953   POTASSIUM mmol/L 4 2   CHLORIDE mmol/L 111*   CO2 mmol/L 29   BUN mg/dL 14   CREATININE mg/dL 0 62   CALCIUM mg/dL 9 3   AST U/L 19   ALT U/L 31   ALK PHOS U/L 83   EGFR ml/min/1 73sq m 97     NT-proBNP: No results found for: NTBNP   Coags:    Lipid Profile:   Lab Results   Component Value Date    CHOL 175 06/27/2015     Lab Results   Component Value Date    HDL 78 09/26/2020     Lab Results   Component Value Date    LDLCALC 83 09/26/2020     Lab Results   Component Value Date    TRIG 54 09/26/2020       Cardiac testing:     EKG reviewed personally:  Reviewed recent EKGs which are unremarkable    No results found for this or any previous visit  No results found for this or any previous visit    No results found for this or any previous visit  No results found for this or any previous visit  Dimitry Li MD    Portions of the record may have been created with voice recognition software   Occasional wrong word or "sound a like" substitutions may have occurred due to the inherent limitations of voice recognition software   Read the chart carefully and recognize, using context, where substitutions have occurred

## 2020-09-30 ENCOUNTER — TRANSCRIBE ORDERS (OUTPATIENT)
Dept: ADMINISTRATIVE | Facility: HOSPITAL | Age: 60
End: 2020-09-30

## 2020-09-30 ENCOUNTER — APPOINTMENT (OUTPATIENT)
Dept: LAB | Facility: HOSPITAL | Age: 60
End: 2020-09-30
Attending: SPECIALIST
Payer: COMMERCIAL

## 2020-09-30 ENCOUNTER — HOSPITAL ENCOUNTER (OUTPATIENT)
Dept: ULTRASOUND IMAGING | Facility: HOSPITAL | Age: 60
Discharge: HOME | End: 2020-09-30
Attending: SPECIALIST
Payer: COMMERCIAL

## 2020-09-30 DIAGNOSIS — E23.7 DISEASE OF PITUITARY GLAND (HCC): ICD-10-CM

## 2020-09-30 DIAGNOSIS — D75.89 MACROCYTOSIS: ICD-10-CM

## 2020-09-30 DIAGNOSIS — K76.89 LIVER CYST: Primary | ICD-10-CM

## 2020-09-30 DIAGNOSIS — E66.8 OBESITY OF ENDOCRINE ORIGIN: ICD-10-CM

## 2020-09-30 DIAGNOSIS — D75.89 MACROCYTOSIS: Primary | ICD-10-CM

## 2020-09-30 DIAGNOSIS — K76.89 LIVER CYST: ICD-10-CM

## 2020-09-30 LAB
FOLATE SERPL-MCNC: 18.4 NG/ML (ref 3.1–17.5)
HCYS SERPL-SCNC: 10.5 UMOL/L (ref 3.7–11.2)
VIT B12 SERPL-MCNC: 227 PG/ML (ref 100–900)

## 2020-09-30 PROCEDURE — 36415 COLL VENOUS BLD VENIPUNCTURE: CPT

## 2020-09-30 PROCEDURE — 80375 DRUG/SUBSTANCE NOS 1-3: CPT

## 2020-09-30 PROCEDURE — 82607 VITAMIN B-12: CPT

## 2020-09-30 PROCEDURE — 84165 PROTEIN E-PHORESIS SERUM: CPT

## 2020-09-30 PROCEDURE — 84207 ASSAY OF VITAMIN B-6: CPT

## 2020-09-30 PROCEDURE — 86340 INTRINSIC FACTOR ANTIBODY: CPT

## 2020-09-30 PROCEDURE — 83090 ASSAY OF HOMOCYSTEINE: CPT

## 2020-09-30 PROCEDURE — 76705 ECHO EXAM OF ABDOMEN: CPT

## 2020-09-30 PROCEDURE — 82746 ASSAY OF FOLIC ACID SERUM: CPT

## 2020-09-30 PROCEDURE — 84165 PROTEIN E-PHORESIS SERUM: CPT | Performed by: PATHOLOGY

## 2020-10-01 LAB
ALBUMIN SERPL ELPH-MCNC: 4.37 G/DL (ref 3.5–5)
ALBUMIN SERPL ELPH-MCNC: 61.6 % (ref 52–65)
ALPHA1 GLOB SERPL ELPH-MCNC: 0.26 G/DL (ref 0.1–0.4)
ALPHA1 GLOB SERPL ELPH-MCNC: 3.6 % (ref 2.5–5)
ALPHA2 GLOB SERPL ELPH-MCNC: 0.76 G/DL (ref 0.4–1.2)
ALPHA2 GLOB SERPL ELPH-MCNC: 10.7 % (ref 7–13)
BETA GLOB ABNORMAL SERPL ELPH-MCNC: 0.44 G/DL (ref 0.4–0.8)
BETA1 GLOB SERPL ELPH-MCNC: 6.2 % (ref 5–13)
BETA2 GLOB SERPL ELPH-MCNC: 4.7 % (ref 2–8)
BETA2+GAMMA GLOB SERPL ELPH-MCNC: 0.33 G/DL (ref 0.2–0.5)
GAMMA GLOB ABNORMAL SERPL ELPH-MCNC: 0.94 G/DL (ref 0.5–1.6)
GAMMA GLOB SERPL ELPH-MCNC: 13.2 % (ref 12–22)
IGG/ALB SER: 1.6 {RATIO} (ref 1.1–1.8)
PROT PATTERN SERPL ELPH-IMP: NORMAL
PROT SERPL-MCNC: 7.1 G/DL (ref 6.4–8.2)

## 2020-10-02 ENCOUNTER — TELEPHONE (OUTPATIENT)
Dept: GASTROENTEROLOGY | Facility: AMBULARY SURGERY CENTER | Age: 60
End: 2020-10-02

## 2020-10-02 DIAGNOSIS — K76.89 LIVER CYST: Primary | ICD-10-CM

## 2020-10-02 DIAGNOSIS — D13.5 ADENOMYOMATOSIS OF GALLBLADDER: ICD-10-CM

## 2020-10-02 LAB
IF BLOCK AB SER QL RIA: 1 AU/ML (ref 0–1.1)
MISCELLANEOUS LAB TEST RESULT: NORMAL

## 2020-10-06 LAB — VIT B6 SERPL-MCNC: 7 UG/L (ref 2–32.8)

## 2020-10-08 DIAGNOSIS — D13.5 ADENOMYOMATOSIS OF GALLBLADDER: Primary | ICD-10-CM

## 2020-10-08 DIAGNOSIS — K76.89 LIVER CYST: ICD-10-CM

## 2021-01-06 ENCOUNTER — NURSE TRIAGE (OUTPATIENT)
Dept: OTHER | Facility: OTHER | Age: 61
End: 2021-01-06

## 2021-01-06 DIAGNOSIS — Z20.822 EXPOSURE TO COVID-19 VIRUS: ICD-10-CM

## 2021-01-06 DIAGNOSIS — Z20.822 EXPOSURE TO COVID-19 VIRUS: Primary | ICD-10-CM

## 2021-01-06 PROCEDURE — U0003 INFECTIOUS AGENT DETECTION BY NUCLEIC ACID (DNA OR RNA); SEVERE ACUTE RESPIRATORY SYNDROME CORONAVIRUS 2 (SARS-COV-2) (CORONAVIRUS DISEASE [COVID-19]), AMPLIFIED PROBE TECHNIQUE, MAKING USE OF HIGH THROUGHPUT TECHNOLOGIES AS DESCRIBED BY CMS-2020-01-R: HCPCS | Performed by: FAMILY MEDICINE

## 2021-01-06 PROCEDURE — U0005 INFEC AGEN DETEC AMPLI PROBE: HCPCS | Performed by: FAMILY MEDICINE

## 2021-01-06 NOTE — TELEPHONE ENCOUNTER
Regarding: covid/symptomatic- severe dizziness  ----- Message from [employer] sent at 1/6/2021 11:41 AM EST -----  "My son in law has covid and I was nauseated, fatigued, and had severe dizziness "

## 2021-01-06 NOTE — TELEPHONE ENCOUNTER
Pt c/o dizziness last night that is improving  Pt advised to go to ED/UC if dizziness recurs or is worse  Pt is requesting a covid test and does not have a [facility] PCP   Reports having an out of network PCP  Order placed   Pt informed of closest testing site and was advised of hours of operation, address, to wear a mask, and to stay in the car   Pt verbalized understanding       Pt already has a [employer] account to check for results   Advised to begin checking in 2-3 days after testing is completed  Reason for Disposition   [1] COVID-19 infection suspected by caller or triager AND [2] mild symptoms (cough, fever, or others) AND [8] no complications or SOB    Answer Assessment - Initial Assessment Questions  Were you within 6 feet or less, for up to 15 minutes or more with a person that has a confirmed COVID-19 test? Yes     What was the date of your exposure?  Within the last week, isn't sure     Are you experiencing any symptoms attributed to the virus?  (Assess for SOB, cough, fever, difficulty breathing) fatigue, nausea, " the dizziness was so unbelievable, it's better now just a little wobbly", runny nose, dull headache      HIGH RISK: Do you have any history heart or lung conditions, weakened immune system, diabetes, Asthma, CHF, HIV, COPD, Chemo, renal failure, sickle cell, etc? High cholesterol    Protocols used: CORONAVIRUS (COVID-19)  DIAGNOSED OR SUSPECTED-ADULT-OH

## 2021-01-07 LAB — SARS-COV-2 RNA SPEC QL NAA+PROBE: NOT DETECTED

## 2021-01-19 ENCOUNTER — TELEPHONE (OUTPATIENT)
Dept: HEMATOLOGY ONCOLOGY | Facility: CLINIC | Age: 61
End: 2021-01-19

## 2021-01-19 ENCOUNTER — CONSULT (OUTPATIENT)
Dept: SURGERY | Facility: CLINIC | Age: 61
End: 2021-01-19
Payer: COMMERCIAL

## 2021-01-19 VITALS
SYSTOLIC BLOOD PRESSURE: 120 MMHG | BODY MASS INDEX: 23.33 KG/M2 | HEART RATE: 68 BPM | TEMPERATURE: 97.2 F | DIASTOLIC BLOOD PRESSURE: 76 MMHG | HEIGHT: 66 IN | RESPIRATION RATE: 16 BRPM | WEIGHT: 145.2 LBS

## 2021-01-19 DIAGNOSIS — K76.89 LIVER CYST: ICD-10-CM

## 2021-01-19 DIAGNOSIS — D13.5 ADENOMYOMATOSIS OF GALLBLADDER: ICD-10-CM

## 2021-01-19 PROCEDURE — 99204 OFFICE O/P NEW MOD 45 MIN: CPT | Performed by: SURGERY

## 2021-01-19 NOTE — ASSESSMENT & PLAN NOTE
This is have increased in size since previous ultrasounds and are large  I will refer her to surgical Oncology for evaluation

## 2021-01-19 NOTE — TELEPHONE ENCOUNTER
New Patient GI Form   Patient Details:  Marli Kimble  1960  [id number]     Background Information:  [address] starts by opening a telephone encounter and gathering the following information   Who is calling to schedule and relationship? Provider   To which speciality is the referral?  Surgical Oncology   Reason for Visit? New Diagnosis   Tumor Type? Adenomyomatosis of gallbladder / Liver cyst   Is there a confimed diagnosis from biopsy/tissue reviewed by Pathology? No   Date of Tissue Diagnosis  (If done outside of [facility] please obtain report and slides)  (If no tissue diagnosis, please stop and discuss with Navigator prior to scheduling)  U/S - 9/30/20   Is patient aware of the diagnosis? Yes   Has Imaging been completed? Yes   If YES, where was the imaging done? (If outside [place] [address] obtain records)  9/30/20   Have any endoscopies been done (colonoscopy, EGD, EUS)  Yes   If YES where were they performed? (If outside of [facility] obtain the records)  [facility]   Has blood work been done? Yes   If YES, where was the blood work done? (If outside of [facility] obtain records)  2020   Is there a personal history of cancer? (If YES please list type)  No   Is there a family history of cancer? (If YES please list type)  Yes - father - prostate   Scheduling Information:   Preferred Nemia Massed   Are there any days the patient cannot be seen? Miscellaneous:   After completing the above information, please route to finance, nurse navigation and clinical trials for review

## 2021-01-19 NOTE — PROGRESS NOTES
Assessment/Plan:    Adenomyomatosis of gallbladder   Had long discussion about the diagnosis of adenomyomatosis  In general it is a benign finding however it often warrants cholecystectomy or repeat imaging for observation  In addition  I reviewed the images and her ultrasound shows contracted gallbladder and may not show cholelithiasis easily  With her age being over 59 and female it is often recommended to consider cholecystectomy in this situation  However I would like her to be evaluated by Surgical Oncology regarding her liver cysts said they as they have increased in size significantly  If the cysts are still recommend observation would consider cholecystectomy at that time  Or if they need surgical intervention can consider cholecystectomy as a combined procedure  Will wait for her evaluation    Liver cyst   This is have increased in size since previous ultrasounds and are large  I will refer her to surgical Oncology for evaluation  Diagnoses and all orders for this visit:    Adenomyomatosis of gallbladder  -     Ambulatory referral to General Surgery  -     Ambulatory referral to Surgical Oncology; Future    Liver cyst  -     Ambulatory referral to General Surgery  -     Ambulatory referral to Surgical Oncology; Future          Subjective:      Patient ID: Ashwini Paredes is a 56 y o  female  Ms William Mahmood  Is a 56-year-old female that was referred here for evaluation of an abnormal gallbladder  She has a significant history of a laparoscopic paraesophageal hernia repair followed by a recurrence with a takedown of the slit Nissen and repair with a LINX  Overall she is doing well from that but does occasionally have some nausea  She has a history of hepatic cysts and was sent for ultrasound that revealed significant enlargement of versus as well as adenomyomatosis of her gallbladder  Regards to the gallbladder she denies any abdominal pain specific in the right upper quadrant        The following portions of the patient's history were reviewed and updated as appropriate: allergies, current medications, past family history, past medical history, past social history, past surgical history and problem list     Review of Systems   Constitutional: Negative for appetite change, chills and fever  HENT: Negative for congestion and ear pain  Eyes: Negative for discharge and itching  Respiratory: Negative for chest tightness and shortness of breath  Cardiovascular: Negative for chest pain and palpitations  Gastrointestinal: Positive for nausea  Negative for abdominal distention and abdominal pain  Genitourinary: Negative for difficulty urinating and frequency  Musculoskeletal: Negative for arthralgias and gait problem  Skin: Negative for color change and rash  Neurological: Negative for dizziness and numbness  Psychiatric/Behavioral: Negative for agitation and confusion  Objective:      /76   Pulse 68   Temp (!) 97 2 °F (36 2 °C)   Resp 16   Ht 5' 6" (1 676 m)   Wt 65 9 kg (145 lb 3 2 oz)   LMP  (LMP Unknown)   BMI 23 44 kg/m²          Physical Exam  Vitals signs and nursing note reviewed  Constitutional:       General: She is not in acute distress  Appearance: She is well-developed  She is not diaphoretic  HENT:      Head: Normocephalic and atraumatic  Eyes:      Pupils: Pupils are equal, round, and reactive to light  Neck:      Musculoskeletal: Normal range of motion and neck supple  Cardiovascular:      Rate and Rhythm: Normal rate and regular rhythm  Heart sounds: Normal heart sounds  No murmur  Pulmonary:      Effort: Pulmonary effort is normal  No respiratory distress  Breath sounds: Normal breath sounds  No wheezing  Abdominal:      General: Bowel sounds are normal  There is no distension  Palpations: Abdomen is soft  Comments:   Old surgical scars    No evidence of incisional hernia   Musculoskeletal: Normal range of motion  Skin:     General: Skin is warm and dry  Neurological:      Mental Status: She is alert and oriented to person, place, and time     Psychiatric:         Behavior: Behavior normal

## 2021-01-19 NOTE — ASSESSMENT & PLAN NOTE
Had long discussion about the diagnosis of adenomyomatosis  In general it is a benign finding however it often warrants cholecystectomy or repeat imaging for observation  In addition  I reviewed the images and her ultrasound shows contracted gallbladder and may not show cholelithiasis easily  With her age being over 59 and female it is often recommended to consider cholecystectomy in this situation  However I would like her to be evaluated by Surgical Oncology regarding her liver cysts said they as they have increased in size significantly  If the cysts are still recommend observation would consider cholecystectomy at that time  Or if they need surgical intervention can consider cholecystectomy as a combined procedure    Will wait for her evaluation

## 2021-01-27 ENCOUNTER — TELEPHONE (OUTPATIENT)
Dept: SURGICAL ONCOLOGY | Facility: CLINIC | Age: 61
End: 2021-01-27

## 2021-01-28 ENCOUNTER — APPOINTMENT (OUTPATIENT)
Dept: LAB | Facility: MEDICAL CENTER | Age: 61
End: 2021-01-28
Payer: COMMERCIAL

## 2021-01-28 ENCOUNTER — CONSULT (OUTPATIENT)
Dept: SURGICAL ONCOLOGY | Facility: CLINIC | Age: 61
End: 2021-01-28
Payer: COMMERCIAL

## 2021-01-28 VITALS
DIASTOLIC BLOOD PRESSURE: 70 MMHG | TEMPERATURE: 97.4 F | SYSTOLIC BLOOD PRESSURE: 130 MMHG | BODY MASS INDEX: 23.95 KG/M2 | WEIGHT: 149 LBS | HEART RATE: 76 BPM | HEIGHT: 66 IN | RESPIRATION RATE: 16 BRPM

## 2021-01-28 DIAGNOSIS — K76.89 LIVER CYST: ICD-10-CM

## 2021-01-28 DIAGNOSIS — D13.5 ADENOMYOMATOSIS OF GALLBLADDER: ICD-10-CM

## 2021-01-28 DIAGNOSIS — K76.89 LIVER CYST: Primary | ICD-10-CM

## 2021-01-28 LAB
BUN SERPL-MCNC: 15 MG/DL (ref 5–25)
CREAT SERPL-MCNC: 0.64 MG/DL (ref 0.6–1.3)
GFR SERPL CREATININE-BSD FRML MDRD: 96 ML/MIN/1.73SQ M

## 2021-01-28 PROCEDURE — 99243 OFF/OP CNSLTJ NEW/EST LOW 30: CPT | Performed by: SURGERY

## 2021-01-28 PROCEDURE — 36415 COLL VENOUS BLD VENIPUNCTURE: CPT

## 2021-01-28 PROCEDURE — 82565 ASSAY OF CREATININE: CPT

## 2021-01-28 PROCEDURE — 84520 ASSAY OF UREA NITROGEN: CPT

## 2021-01-28 NOTE — PROGRESS NOTES
Surgical Oncology Consult       [id number] [facility]  [address] [facility] [address]  [facility] [address]  [address]   1960  [id number]  [address] [facility] [address] [address] [phone]    Diagnoses and all orders for this visit:    Liver cyst  -     Ambulatory referral to Surgical Oncology  -     CT abdomen pelvis w contrast; Future  -     BUN; Future  -     Creatinine, serum; Future    Adenomyomatosis of gallbladder  -     Ambulatory referral to Surgical Oncology        Chief Complaint   Patient presents with   [facility] Consult     Patient here for liver cysts incidentally found while being worked up for her gallbladder  Patient has no symptoms  No follow-ups on file  Oncology History    No history exists  History of Present Illness: 40-year-old female with a known history of liver cysts that were discovered in 2007  She was recently discovered to have adenomyosis of the gallbladder and is being evaluated for laparoscopic  Cholecystectomy  Ultrasound of her liver from September 30, 2020 revealed 3 cysts in the liver  Smaller cyst in the left lower lobe measured 1 7 x 1 1 x 2 5 cm  In the right lobe of the liver, there were 2 cysts  One was 5 7 x 5 6 x 5 8 cm  The 2nd was 8 x 6 2 x 6 4 cm  Compared to her previous imaging, the cysts have markedly enlarged  I personally reviewed the films  She comes in now to discuss further therapy  She denies any right upper quadrant pain, nausea, vomiting or early satiety  No pain with inspiration  Review of Systems  Complete ROS Surg Onc:   Constitutional: The patient denies new or recent history of general fatigue, no recent weight loss, no change in appetite  Eyes: No complaints of visual problems, no scleral icterus     ENT: no complaints of ear pain, no hoarseness, no difficulty swallowing,  no tinnitus and no new masses in head, oral cavity, or neck  Cardiovascular: No complaints of chest pain, no palpitations, no ankle edema  Respiratory: No complaints of shortness of breath, no cough  Gastrointestinal: No complaints of jaundice, no bloody stools, no pale stools  Genitourinary: No complaints of dysuria, no hematuria, no nocturia, no frequent urination, no urethral discharge  Musculoskeletal: No complaints of weakness, paralysis, joint stiffness or arthralgias  Integumentary: No complaints of rash, no new lesions  Neurological: No complaints of convulsions, no seizures, no dizziness  Hematologic/Lymphatic: No complaints of easy bruising  Endocrine:  No hot or cold intolerance  No polydipsia, polyphagia, or polyuria  Allergy/immunology:  No environmental allergies  No food allergies  Not immunocompromised  Skin:  No pallor or rash  No wound            Patient Active Problem List   Diagnosis    Migraine    [facility]uitary adenoma ([facility] )    Iron deficiency anemia    Tiburcio ulcer    Dyslipidemia    Hyperlipidemia    Iron deficiency anemia due to chronic blood loss    Menopausal symptoms    Migraine, unspecified, not intractable, without status migrainosus    Osteoarthritis of carpometacarpal (CMC) joint of thumb    Adenomyomatosis of gallbladder    Liver cyst     Past Medical History:   Diagnosis Date    Anxiety     Arthritis     Tiburcio ulcer     Depression     GERD (gastroesophageal reflux disease)     Hyperlipidemia     Migraine     Mitral valve prolapse     Murmur     [facility] )     [facility] ([facility] )      Past Surgical History:   Procedure Laterality Date    BREAST BIOPSY Left 1996    benign    BREAST SURGERY      L breast cyst removal-benign    CATARACT EXTRACTION Bilateral     CATARACT EXTRACTION, BILATERAL  01/2020    COLONOSCOPY      DE QUERVAIN'S RELEASE Bilateral     EGD AND COLONOSCOPY N/A 5/11/2016 Procedure: EGD AND COLONOSCOPY;  Surgeon: Kayla Ardon MD;  Location: [facility] GI LAB;   Service:     LAPAROSCOPIC MAGNETIC SPHINCTER AUGMENTATION N/A 7/20/2020    Procedure: MAGNETIC SPHINCTER AUGMENT, LINX PLACEMENT;  Surgeon: Tabatha Matos MD;  [facility];  Service: Bariatrics    PARAESOPHAGEAL HERNIA REPAIR N/A 7/20/2020    Procedure: REPAIR HERNIA PARAESOPHAGEAL  LAPAROSCOPIC, PARTIAL GASTRECTOMY;  Surgeon: Tabatha Matos MD;  [facility];  Service: [address] STYLOID Left 11/21/2019    Procedure: [employer] AND TENDON GRAFT;  Surgeon: Chris Hunter MD;  [facility];  Service: Orthopedics    [place] LAP, REPAIR PARAESOPHAGEAL HERNIA, INCL FUNDOPLASTY W/ MESH N/A 8/31/2016    Procedure: REPAIR HERNIA PARAESOPHAGEAL  with bio A mesh LAPAROSCOPIC NISSEN FUNDOPLICATION Laparoscopic Gastroplexy Intraop EGD #[id number];  Surgeon: Tabatha Matos MD;  [facility];  Service: Rachel Overlie INTERCARP/CARP-METACARP JT Left 11/21/2019    Procedure: THUMB [address] ARTHROPLASTY;  Surgeon: Chris Hunter MD;  [facility];  Service: Orthopedics    TONSILLECTOMY      WRIST SURGERY Bilateral     daquero vein release     Family History   Problem Relation Age of Onset    Anxiety disorder Mother     Arthritis Mother     Hyperlipidemia Mother     Hypertension Mother     Prostate cancer Father 76    Dementia Father     Parkinsonism Father     Other Father 80        bladder cancer    Ovarian cancer Maternal Grandmother 72    Breast cancer Paternal Aunt 66    Breast cancer Cousin 36    Colon cancer Paternal Aunt 48    Pancreatic cancer Paternal Aunt 67    No Known Problems Sister     No Known Problems Daughter      Social History     Socioeconomic History    Marital status: /Civil Union     Spouse name: Not on file    Number of children: Not on file    Years of education: Not on file    Highest education level: Not on file   Occupational History    Not on file   Social Needs    Financial resource strain: Not on file    Food insecurity     Worry: Not on file     Inability: Not on file    Transportation needs     Medical: Not on file     Non-medical: Not on file   Tobacco Use    Smoking status: Never Smoker    Smokeless tobacco: Never Used   Substance and Sexual Activity    Alcohol use: Yes     Frequency: Monthly or less     Drinks per session: 1 or 2     Comment: social    Drug use: No    Sexual activity: Yes     Partners: Male     Comment:    Lifestyle    Physical activity     Days per week: Not on file     Minutes per session: Not on file    Stress: Not on file   Relationships    Social connections     Talks on phone: Not on file     Gets together: Not on file     Attends Sabianist service: Not on file     Active member of club or organization: Not on file     Attends meetings of clubs or organizations: Not on file     Relationship status: Not on file    Intimate partner violence     Fear of current or ex partner: Not on file     Emotionally abused: Not on file     Physically abused: Not on file     Forced sexual activity: Not on file   Other Topics Concern    Not on file   Social History Narrative    Not on file       Current Outpatient Medications:     ascorbic acid (VITAMIN C) 500 mg tablet, Take 500 mg by mouth 2 (two) times a day  , Disp: , Rfl:     aspirin 81 MG tablet, Take 1 tablet by mouth daily, Disp: , Rfl:     atorvastatin (LIPITOR) 10 mg tablet, TAKE ONE TABLET BY MOUTH EVERY DAY, Disp: 90 tablet, Rfl: 0    buPROPion (WELLBUTRIN XL) 300 mg 24 hr tablet, Take 1 tablet by mouth daily, Disp: , Rfl:     Cholecalciferol (VITAMIN D) 2000 UNITS CAPS, Take 1 tablet by mouth daily  , Disp: , Rfl:     DULoxetine (CYMBALTA) 20 mg capsule, TAKE ONE CAPSULE BY MOUTH EVERY DAY, Disp: 90 capsule, Rfl: 0    multivitamin (THERAGRAN) TABS, Take 1 tablet by mouth daily  , Disp: , Rfl:     SUMAtriptan (IMITREX) 100 mg tablet, Take 1 tablet by mouth for migraine relief  May repeat 2 hours later  Maximum 200mg/day  (Patient taking differently: once as needed Take 1 tablet by mouth for migraine relief  May repeat 2 hours later  Maximum 200mg/day ), Disp: 27 tablet, Rfl: 0    vitamin E, tocopherol, 400 units capsule, Take 400 Units by mouth daily  , Disp: , Rfl:   No Known Allergies  Vitals:    01/28/21 0810   BP: 130/70   Pulse: 76   Resp: 16   Temp: (!) 97 4 °F (36 3 °C)       Physical Exam   Constitutional: General appearance: The Patient is well-developed and well-nourished who appears the stated age in no acute distress  Patient is pleasant and talkative  HEENT:  Normocephalic  Sclerae are anicteric  Mucous membranes are moist  Neck is supple without adenopathy  No JVD  Chest: The lungs are clear to auscultation  Cardiac: Heart is regular rate  Abdomen: Abdomen is soft, non-tender, non-distended and without masses  Extremities: There is no clubbing or cyanosis  There is no edema  Symmetric  Neuro: Grossly nonfocal  Gait is normal      Lymphatic: No evidence of cervical adenopathy bilaterally  No evidence of axillary adenopathy bilaterally  No evidence of inguinal adenopathy bilaterally  Skin: Warm, anicteric  Psych:  Patient is pleasant and talkative  Breasts:      Pathology:  [unfilled]    Labs:      Imaging    Ultrasound is as above  I personally reviewed the films  I reviewed the above laboratory and imaging data  Discussion/Summary:   68-year-old female with enlarging liver cyst   These appear to be asymptomatic  I have recommended obtaining cross-sectional imaging, since her last cross-sectional imaging was 14 years ago  This was just ensure that there is nothing worrisome in the cyst   If these are simple cysts by CT, I would recommend observation  I discussed that if they are symptomatic, laparoscopic marsupialization can be performed    If there would be any sign of malignancy or cystadenoma or cystadenocarcinoma, I would proceed with resection  We will obtain a CT  I will call her with the results  Assuming these are asymptomatic benign cysts we will then determine follow-up if needed  She is agreeable to this  All her questions were answered

## 2021-01-31 ENCOUNTER — HOSPITAL ENCOUNTER (OUTPATIENT)
Dept: CT IMAGING | Facility: HOSPITAL | Age: 61
Discharge: HOME | End: 2021-01-31
Attending: SURGERY
Payer: COMMERCIAL

## 2021-01-31 DIAGNOSIS — K76.89 LIVER CYST: ICD-10-CM

## 2021-01-31 PROCEDURE — 74177 CT ABD & PELVIS W/CONTRAST: CPT

## 2021-01-31 PROCEDURE — G1004 CDSM NDSC: HCPCS

## 2021-01-31 RX ADMIN — IOHEXOL 100 ML: 350 INJECTION, SOLUTION INTRAVENOUS at 13:20

## 2021-02-03 ENCOUNTER — TELEPHONE (OUTPATIENT)
Dept: SURGICAL ONCOLOGY | Facility: CLINIC | Age: 61
End: 2021-02-03

## 2021-02-03 NOTE — TELEPHONE ENCOUNTER
Spoke with patient and discussed CT results with her  Per Dr Moreno Mike shows a simple cyst on the liver, therefore she will not need any follow up unless she develops symptoms  I instructed patient she should go forward with general surgery for the cholecystectomy that Dr Vandana Watson recommended  Patient in agreement, all questions answered

## 2021-02-10 ENCOUNTER — CLINICAL SUPPORT (OUTPATIENT)
Dept: URGENT CARE | Facility: MEDICAL CENTER | Age: 61
End: 2021-02-10
Payer: COMMERCIAL

## 2021-02-10 DIAGNOSIS — K80.20 CALCULUS OF GALLBLADDER WITHOUT CHOLECYSTITIS WITHOUT OBSTRUCTION: ICD-10-CM

## 2021-02-10 LAB
ATRIAL RATE: 74 BPM
P AXIS: 55 DEGREES
PR INTERVAL: 162 MS
QRS AXIS: 51 DEGREES
QRSD INTERVAL: 86 MS
QT INTERVAL: 392 MS
QTC INTERVAL: 435 MS
T WAVE AXIS: 48 DEGREES
VENTRICULAR RATE: 74 BPM

## 2021-02-10 PROCEDURE — 93005 ELECTROCARDIOGRAM TRACING: CPT

## 2021-02-10 PROCEDURE — 93010 ELECTROCARDIOGRAM REPORT: CPT

## 2021-02-19 ENCOUNTER — IMMUNIZATIONS (OUTPATIENT)
Dept: FAMILY MEDICINE CLINIC | Facility: HOSPITAL | Age: 61
End: 2021-02-19

## 2021-02-19 DIAGNOSIS — Z23 ENCOUNTER FOR IMMUNIZATION: Primary | ICD-10-CM

## 2021-02-19 PROCEDURE — 91300 SARS-COV-2 / COVID-19 MRNA VACCINE (PFIZER-BIONTECH) 30 MCG: CPT

## 2021-02-19 PROCEDURE — 0001A SARS-COV-2 / COVID-19 MRNA VACCINE (PFIZER-BIONTECH) 30 MCG: CPT

## 2021-02-25 ENCOUNTER — ANESTHESIA EVENT (OUTPATIENT)
Dept: PERIOP | Facility: HOSPITAL | Age: 61
End: 2021-02-25
Payer: COMMERCIAL

## 2021-02-25 NOTE — PRE-PROCEDURE INSTRUCTIONS
Pre-Surgery Instructions:   Medication Instructions    ascorbic acid (VITAMIN C) 500 mg tablet Patient was instructed by Physician and understands   aspirin 81 MG tablet Patient was instructed by Physician and understands   atorvastatin (LIPITOR) 10 mg tablet Instructed patient per Anesthesia Guidelines   buPROPion (WELLBUTRIN XL) 300 mg 24 hr tablet Instructed patient per Anesthesia Guidelines   Cholecalciferol (VITAMIN D) 2000 UNITS CAPS Instructed patient per Anesthesia Guidelines   DULoxetine (CYMBALTA) 20 mg capsule Instructed patient per Anesthesia Guidelines   multivitamin [facility]) TABS Patient was instructed by Physician and understands   SUMAtriptan (IMITREX) 100 mg tablet Instructed patient per Anesthesia Guidelines   vitamin E, tocopherol, 400 units capsule Patient was instructed by Physician and understands  Instructed to take bupropion am of surgery with sip of water per anesthesia guidelines

## 2021-03-04 ENCOUNTER — HOSPITAL ENCOUNTER (OUTPATIENT)
Facility: HOSPITAL | Age: 61
Setting detail: OUTPATIENT SURGERY
Discharge: HOME | End: 2021-03-04
Attending: SURGERY | Admitting: SURGERY
Payer: COMMERCIAL

## 2021-03-04 ENCOUNTER — ANESTHESIA (OUTPATIENT)
Dept: PERIOP | Facility: HOSPITAL | Age: 61
End: 2021-03-04
Payer: COMMERCIAL

## 2021-03-04 VITALS
SYSTOLIC BLOOD PRESSURE: 91 MMHG | TEMPERATURE: 97.5 F | BODY MASS INDEX: 23.3 KG/M2 | WEIGHT: 145 LBS | OXYGEN SATURATION: 99 % | HEART RATE: 70 BPM | HEIGHT: 66 IN | DIASTOLIC BLOOD PRESSURE: 55 MMHG | RESPIRATION RATE: 16 BRPM

## 2021-03-04 DIAGNOSIS — K80.20 CALCULUS OF GALLBLADDER WITHOUT CHOLECYSTITIS WITHOUT OBSTRUCTION: ICD-10-CM

## 2021-03-04 PROCEDURE — 47562 LAPAROSCOPIC CHOLECYSTECTOMY: CPT | Performed by: SURGERY

## 2021-03-04 PROCEDURE — 88304 TISSUE EXAM BY PATHOLOGIST: CPT | Performed by: PATHOLOGY

## 2021-03-04 PROCEDURE — 47562 LAPAROSCOPIC CHOLECYSTECTOMY: CPT | Performed by: PHYSICIAN ASSISTANT

## 2021-03-04 PROCEDURE — 99024 POSTOP FOLLOW-UP VISIT: CPT | Performed by: SURGERY

## 2021-03-04 RX ORDER — FENTANYL CITRATE 50 UG/ML
INJECTION, SOLUTION INTRAMUSCULAR; INTRAVENOUS AS NEEDED
Status: DISCONTINUED | OUTPATIENT
Start: 2021-03-04 | End: 2021-03-04

## 2021-03-04 RX ORDER — INDOCYANINE GREEN AND WATER 25 MG
5 KIT INJECTION ONCE
Status: COMPLETED | OUTPATIENT
Start: 2021-03-04 | End: 2021-03-04

## 2021-03-04 RX ORDER — EPHEDRINE SULFATE 50 MG/ML
INJECTION INTRAVENOUS AS NEEDED
Status: DISCONTINUED | OUTPATIENT
Start: 2021-03-04 | End: 2021-03-04

## 2021-03-04 RX ORDER — HYDROCODONE BITARTRATE AND ACETAMINOPHEN 5; 325 MG/1; MG/1
2 TABLET ORAL EVERY 6 HOURS PRN
Status: DISCONTINUED | OUTPATIENT
Start: 2021-03-04 | End: 2021-03-04 | Stop reason: HOSPADM

## 2021-03-04 RX ORDER — MIDAZOLAM HYDROCHLORIDE 2 MG/2ML
INJECTION, SOLUTION INTRAMUSCULAR; INTRAVENOUS AS NEEDED
Status: DISCONTINUED | OUTPATIENT
Start: 2021-03-04 | End: 2021-03-04

## 2021-03-04 RX ORDER — ROCURONIUM BROMIDE 10 MG/ML
INJECTION, SOLUTION INTRAVENOUS AS NEEDED
Status: DISCONTINUED | OUTPATIENT
Start: 2021-03-04 | End: 2021-03-04

## 2021-03-04 RX ORDER — BUPIVACAINE HYDROCHLORIDE AND EPINEPHRINE 2.5; 5 MG/ML; UG/ML
INJECTION, SOLUTION INFILTRATION; PERINEURAL AS NEEDED
Status: DISCONTINUED | OUTPATIENT
Start: 2021-03-04 | End: 2021-03-04 | Stop reason: HOSPADM

## 2021-03-04 RX ORDER — HYDROCODONE BITARTRATE AND ACETAMINOPHEN 5; 325 MG/1; MG/1
1 TABLET ORAL EVERY 4 HOURS PRN
Status: DISCONTINUED | OUTPATIENT
Start: 2021-03-04 | End: 2021-03-04 | Stop reason: HOSPADM

## 2021-03-04 RX ORDER — ONDANSETRON 2 MG/ML
4 INJECTION INTRAMUSCULAR; INTRAVENOUS ONCE AS NEEDED
Status: DISCONTINUED | OUTPATIENT
Start: 2021-03-04 | End: 2021-03-04 | Stop reason: HOSPADM

## 2021-03-04 RX ORDER — MAGNESIUM HYDROXIDE 1200 MG/15ML
LIQUID ORAL AS NEEDED
Status: DISCONTINUED | OUTPATIENT
Start: 2021-03-04 | End: 2021-03-04 | Stop reason: HOSPADM

## 2021-03-04 RX ORDER — FENTANYL CITRATE/PF 50 MCG/ML
50 SYRINGE (ML) INJECTION
Status: DISCONTINUED | OUTPATIENT
Start: 2021-03-04 | End: 2021-03-04 | Stop reason: HOSPADM

## 2021-03-04 RX ORDER — PROPOFOL 10 MG/ML
INJECTION, EMULSION INTRAVENOUS AS NEEDED
Status: DISCONTINUED | OUTPATIENT
Start: 2021-03-04 | End: 2021-03-04

## 2021-03-04 RX ORDER — HYDROMORPHONE HCL/PF 1 MG/ML
0.5 SYRINGE (ML) INJECTION
Status: DISCONTINUED | OUTPATIENT
Start: 2021-03-04 | End: 2021-03-04 | Stop reason: HOSPADM

## 2021-03-04 RX ORDER — MORPHINE SULFATE 10 MG/ML
2 INJECTION, SOLUTION INTRAMUSCULAR; INTRAVENOUS
Status: DISCONTINUED | OUTPATIENT
Start: 2021-03-04 | End: 2021-03-04 | Stop reason: HOSPADM

## 2021-03-04 RX ORDER — SODIUM CHLORIDE 9 MG/ML
125 INJECTION, SOLUTION INTRAVENOUS CONTINUOUS
Status: DISCONTINUED | OUTPATIENT
Start: 2021-03-04 | End: 2021-03-04 | Stop reason: HOSPADM

## 2021-03-04 RX ORDER — CEFAZOLIN SODIUM 1 G/50ML
1000 SOLUTION INTRAVENOUS ONCE
Status: COMPLETED | OUTPATIENT
Start: 2021-03-04 | End: 2021-03-04

## 2021-03-04 RX ADMIN — SODIUM CHLORIDE 125 ML/HR: 0.9 INJECTION, SOLUTION INTRAVENOUS at 06:25

## 2021-03-04 RX ADMIN — SUGAMMADEX 120 MG: 100 INJECTION, SOLUTION INTRAVENOUS at 09:07

## 2021-03-04 RX ADMIN — FENTANYL CITRATE 50 MCG: 50 INJECTION INTRAMUSCULAR; INTRAVENOUS at 09:26

## 2021-03-04 RX ADMIN — SODIUM CHLORIDE: 0.9 INJECTION, SOLUTION INTRAVENOUS at 08:45

## 2021-03-04 RX ADMIN — INDOCYANINE GREEN AND WATER 10 MG: KIT at 08:24

## 2021-03-04 RX ADMIN — CEFAZOLIN SODIUM 1000 MG: 1 SOLUTION INTRAVENOUS at 08:10

## 2021-03-04 RX ADMIN — ROCURONIUM BROMIDE 50 MG: 10 INJECTION, SOLUTION INTRAVENOUS at 08:24

## 2021-03-04 RX ADMIN — PROPOFOL 200 MG: 10 INJECTION, EMULSION INTRAVENOUS at 08:24

## 2021-03-04 RX ADMIN — SODIUM CHLORIDE 125 ML/HR: 0.9 INJECTION, SOLUTION INTRAVENOUS at 09:49

## 2021-03-04 RX ADMIN — FENTANYL CITRATE 50 MCG: 50 INJECTION, SOLUTION INTRAMUSCULAR; INTRAVENOUS at 08:15

## 2021-03-04 RX ADMIN — MIDAZOLAM 2 MG: 1 INJECTION INTRAMUSCULAR; INTRAVENOUS at 08:15

## 2021-03-04 RX ADMIN — FENTANYL CITRATE 50 MCG: 50 INJECTION, SOLUTION INTRAMUSCULAR; INTRAVENOUS at 08:21

## 2021-03-04 RX ADMIN — EPHEDRINE SULFATE 5 MG: 50 INJECTION, SOLUTION INTRAVENOUS at 08:51

## 2021-03-04 NOTE — H&P
History & Physical    Cherry Johnson    56 y o   female  [id number]  Yamilet Celaya MD  Date: March 4, 2021    Assessment:  Patient Active Problem List   Diagnosis    Migraine    [facility]    Iron deficiency anemia    Tiburcio ulcer    Dyslipidemia    Hyperlipidemia    Iron deficiency anemia due to chronic blood loss    Menopausal symptoms    Migraine, unspecified, not intractable, without status migrainosus    Osteoarthritis of carpometacarpal (CMC) joint of thumb    Adenomyomatosis of gallbladder    Liver cyst     Plan:  Cherry Johnson is scheduled for robotic cholecystectomy      HPI    Historical Information   Past Medical History:   Diagnosis Date    Anxiety     Arthritis     Calculus of gallbladder     Tiburcio ulcer     Depression     Hyperlipidemia     Migraine     Mitral valve prolapse     Murmur     Neck pain     at times    Pit[facility]oma ([facility] )     Pituitary adenoma ([facility] )      Past Surgical History:   Procedure Laterality Date    BREAST BIOPSY Left 1996    benign    BREAST SURGERY      L breast cyst removal-benign    CATARACT EXTRACTION Bilateral     CATARACT EXTRACTION, BILATERAL  01/2020    COLONOSCOPY      DE QUERVAIN'S RELEASE Bilateral     EGD AND COLONOSCOPY N/A 5/11/2016    Procedure: EGD AND COLONOSCOPY;  Surgeon: Mac Coy MD;  Location: [facility] GI LAB;   Service:     LAPAROSCOPIC MAGNETIC SPHINCTER AUGMENTATION N/A 7/20/2020    Procedure: MAGNETIC SPHINCTER AUGMENT, LINX PLACEMENT;  Surgeon: Álvaro Carias MD;  [facility];  Service: Bariatrics    PARAESOPHAGEAL HERNIA REPAIR N/A 7/20/2020    Procedure: REPAIR HERNIA PARAESOPHAGEAL  LAPAROSCOPIC, PARTIAL GASTRECTOMY;  Surgeon: Álvaro Carias MD;  [facility];  Service: [address] STYLOID Left 11/21/2019    Procedure: [employer] AND TENDON GRAFT;  Surgeon: Sebastián Ruiz MD;  [facility];  Service: Orthopedics  [place] LAP, REPAIR PARAESOPHAGEAL HERNIA, INCL FUNDOPLASTY W/ MESH N/A 8/31/2016    Procedure: REPAIR HERNIA PARAESOPHAGEAL  with bio A mesh LAPAROSCOPIC NISSEN FUNDOPLICATION Laparoscopic Gastroplexy Intraop EGD #[id number];  Surgeon: Ambrosio Deleon MD;  [facility];  Service: [address] INTERCARP/CARP-METACARP JT Left 11/21/2019    Procedure: THUMB [address];  Surgeon: Gerda Mcclain MD;  [facility];  Service: Orthopedics    TONSILLECTOMY      WRIST SURGERY Bilateral     daquero vein release     Social History   Social History     Substance and Sexual Activity   Alcohol Use Yes    Frequency: 2-4 times a month    Drinks per session: 1 or 2    Comment: liquor     Social History     Substance and Sexual Activity   Drug Use No     Social History     Tobacco Use   Smoking Status Never Smoker   Smokeless Tobacco Never Used     Family History   Problem Relation Age of Onset    Anxiety disorder Mother     Arthritis Mother     Hyperlipidemia Mother     Hypertension Mother     Prostate cancer Father 76    Dementia Father    Ari[place] Parkinsonism Father     Other Father 80        bladder cancer    Ovarian cancer Maternal Grandmother 72    Breast cancer Paternal Aunt 66    Breast cancer Cousin 36    Colon cancer Paternal Aunt 48    Pancreatic cancer Paternal Aunt 67    No Known Problems Sister     No Known Problems Daughter         Meds/Allergies   No Known Allergies    Current Facility-Administered Medications:     sodium chloride 0 9 % infusion, 125 mL/hr, Intravenous, Continuous, Delphy Defalcis, DO, Last Rate: 125 mL/hr at 03/04/21 0625, 125 mL/hr at 03/04/21 0625    Review of Systems    Vitals:    03/04/21 0601   BP: 121/94   Pulse: 75   Resp: 16   Temp: 98 3 °F (36 8 °C)   SpO2: 98%     Physical Exam  GEN: NAD, A+OX3   HEENT: Normocephalic, atraumatic,   NECK: Supple, trachea midline,   CARDIAC: regular rate & rhythm, S1 & S2 normal    LUNGS: Clear to auscultation, No Wheeze, Rales, or Rhonchi  ABDOMEN:soft, nt  EXTREMITIES: No evidence of cyanosis, clubbing or edema  Pulses +2 B/L LE  NEURO: CN II-XII intact grossly, No sensory or motor deficits    Lab Results: I have personally reviewed pertinent lab results  Imaging: I have personally reviewed pertinent reports  EKG, Pathology, and Other Studies: I have personally reviewed pertinent reports      Lab Results   Component Value Date    GLUCOSE 97 06/27/2015    CALCIUM 9 3 09/26/2020     06/27/2015    K 4 2 09/26/2020    CO2 29 09/26/2020     (H) 09/26/2020    BUN 15 01/28/2021    CREATININE 0 64 01/28/2021     Lab Results   Component Value Date    WBC 4 15 (L) 09/26/2020    HGB 13 1 09/26/2020    HCT 42 2 09/26/2020     (H) 09/26/2020     09/26/2020     Lab Results   Component Value Date    ALT 31 09/26/2020    AST 19 09/26/2020    ALKPHOS 83 09/26/2020    BILITOT 0 16 (L) 06/27/2015

## 2021-03-04 NOTE — ANESTHESIA PREPROCEDURE EVALUATION
Procedure:  robotic alfonso (N/A Abdomen)    Relevant Problems   CARDIO   (+) Hyperlipidemia      GI/HEPATIC   (+) Tiburcio ulcer   (+) Liver cyst      HEMATOLOGY   (+) Iron deficiency anemia   (+) Iron deficiency anemia due to chronic blood loss      MUSCULOSKELETAL   (+) Osteoarthritis of carpometacarpal (CMC) joint of thumb        Physical Exam    Airway    Mallampati score: II  TM Distance: >3 FB  Neck ROM: full     Dental   No notable dental hx     Cardiovascular  Rhythm: regular, Rate: normal, Cardiovascular exam normal    Pulmonary  Pulmonary exam normal Breath sounds clear to auscultation,     Other Findings        Anesthesia Plan  ASA Score- 2     Anesthesia Type- general with ASA Monitors  Additional Monitors:   Airway Plan: ETT  Comment: Pt requests zofran   Plan Factors-    Induction- intravenous  Postoperative Plan- Plan for postoperative opioid use  Informed Consent- Anesthetic plan and risks discussed with patient and spouse

## 2021-03-04 NOTE — DISCHARGE INSTRUCTIONS
[facility]  Post-Operative Care Instructions  Dr Beatriz Matthew MD, [facility]  [phone]    1  General: You will feel pulling sensations around the wound or funny aches and pains around the incisions  This is normal  Even minor surgery is a change in your body and this is your bodys way of reaction to it  If you have had abdominal surgery, it may help to support the incision with a small pillow or blanket for comfort when moving or coughing  2  Wound care:  Okay to shower  The glue will fall off over the next week or 2     3  Water: You may shower over the wound, unless there are drain tubes left in place  Do not bathe or use a pool or hot tub until cleared by the physician  4  Activity: You may go up and down stairs, walk as much as you are comfortable, but walk at least 3 times each day  If you have had abdominal surgery, do not lift anything heavier than 20 pounds for at least 4 weeks, unless cleared by the doctor  5  Diet: You may resume a regular diet  If you had a same-day surgery or overnight stay surgery, he may wish to eat lightly for a few days: soups, crackers, and sandwiches  You may resume a regular diet when ready  6  Medications: Resume all of your previous medications, unless told otherwise by the doctor  A good option for pain control is to start with Tylenol and ibuprofen and alternate taking them every 2 hours for 1-2 days  If this is not sufficient then substituted the Tylenol for the narcotic pain medicine prescribed  Insure that you do not take more than 4000 mg of Tylenol per day  You do not need to take the narcotic pain medications unless you are having significant pain and discomfort  7  Driving: You will need someone to drive you home on the day of surgery  Do not drive or make any important decisions while on narcotic pain medication or 24 hours and after anesthesia or sedation for surgery   Generally, you may drive when your off all narcotic pain medications  8  Upset Stomach: You may take Maalox, Tums, or similar items for an upset stomach  If your narcotic pain medication causes an upset stomach, do not take it on an empty stomach  Try taking it with at least some crackers or toast      9  Constipation: Patients often experienced constipation after surgery  You may take over-the-counter medication for this, such as Metamucil, Senokot, Dulcolax, milk of magnesia, etc  You may take a suppository unless you have had anorectal surgery such as a procedure on your hemorrhoids  If you experience significant nausea or vomiting after abdominal surgery, call the office before trying any of these medications  10  Call the office: If you are experiencing any of the following, fevers above 101 5°, significant nausea or vomiting, if the wound develops drainage and/or is excessive redness around the wound, or if you have significant diarrhea or other worsening symptoms  11  Pain: You may be given a prescription for pain  This will be given to the hospital, the day of surgery  12  Sexual Activity: You may resume sexual activity when you feel ready and comfortable and your incision is sealed and healed without apparent infection risk

## 2021-03-04 NOTE — OP NOTE
OPERATIVE REPORT  PATIENT NAME: Perico Joaquin    :  1960  MRN: [id number]  Pt Location: [facility] OR ROOM 08    SURGERY DATE: 3/4/2021    Surgeon(s) and Role:     * Jordyn Hadley MD - Primary     * JOHN PAUL Peterson-C - [id number]  Sara TOSCANO MD - Assisting    Preop Diagnosis:  Calculus of gallbladder without cholecystitis without obstruction [K80 20]    Post-Op Diagnosis Codes:     * Calculus of gallbladder without cholecystitis without obstruction [K80 20]    Procedure(s) (LRB):  Robotic cholecystectomy  (N/A)    Specimen(s):  ID Type Source Tests Collected by Time Destination   1 : gallbladder Tissue Gallbladder TISSUE EXAM Jordyn Hadley MD 3/4/2021 2100        Estimated Blood Loss:   Minimal    Drains:  * No LDAs found *    Anesthesia Type:   General    Operative Indications:  Calculus of gallbladder without cholecystitis without obstruction [K80 20]      Operative Findings:  Adenomyomatosis    Complications:   None    Procedure and Technique:  The patient was seen again in the Holding Room  The risks, benefits, complications, treatment options, and expected outcomes were discussed with the patient  The possibilities of reaction to medication, pulmonary aspiration, perforation of viscus, bleeding, recurrent infection, finding a normal gallbladder, the need for additional procedures, failure to diagnose a condition, the possible need to convert to an open procedure, and creating a complication requiring transfusion or operation were discussed with the patient  The patient and/or family concurred with the proposed plan, giving informed consent  The site of surgery properly noted/marked  The patient was taken to Operating Room, identified as Perico Joaquin  and the procedure verified as Laparoscopic Cholecystectomy with possible Intraoperative Cholangiogram  A Time Out was held after prepping and draping in sterile fashion  The above information was confirmed      Prior to the induction of general anesthesia, antibiotic prophylaxis was administered  General endotracheal anesthesia was then administered and tolerated well  After the induction, the abdomen was prepped in the usual sterile fashion  The patient was positioned in the supine position, along with some reverse Trendelenburg  Local anesthetic agent was injected into the skin near the umbilicus and an incision made  The midline fascia was incised and the open technique was used to introduce a  port under direct vision  Pneumoperitoneum was then created with CO2 and was tolerated well without any adverse changes in the patient's vital signs  Additional trocars were introduced under direct vision  All skin incisions were infiltrated with a local anesthetic agent before making the incision and placing the trocars  The patient was placed in reverse Trendelenburg position  The two  8 mm robotic trocars were placed at least 10 cm from the camera port  A 5 mm system port was placed on the lateral right side  The gallbladder was identified, the fundus grasped and retracted cephalad  Adhesions were lysed bluntly and with the electrocautery where indicated, taking care not to injure any adjacent organs or viscus  The infundibulum was grasped and retracted laterally, exposing the peritoneum overlying the triangle of Calot  This was then dissected anteriorily and posteriorly and exposed in a blunt fashion or using cautery where appropriate  The cystic duct was clearly identified and  dissected circumferentially, as was the cystic artery, as the only two tubular structures leading into the gallbladder   The critical view of the [address] was identified  The posterior aspect of the gallbladder was lifted off the cystic plate, to insure that there were no posterior structres behind the gallbladder        Once this was all clearly identified, the cystic duct was then doubly ligated with clips on the patient's side and 1 on the gallbladder side   The cystic artery was then also clipped and transected  The gallbladder was dissected from the liver bed in retrograde fashion with the electrocautery  The robot was then undocked and a 5 mm camera was placed back in  The gallbladder was placed into an endocatch bag and secured  The liver bed was irrigated and inspected  Hemostasis was achieved with the electrocautery  Copious irrigation was utilized and was repeatedly aspirated until clear  The camera was then switched to the lateral port and directed back to the midline  The specimen was removed under direct visualization from the midline incision  The fascia was then closed using the renfac suture passer and a figure of eight 0 vicryl suture  Pneumoperitoneum was completely reduced after viewing removal of the trocars under direct vision  The wound was thoroughly irrigated  The skin was then closed with 4-0 monocryl and histacryl  Instrument, sponge, and needle counts were correct at closure and at the conclusion of the case  PA is medically necessary for the surgical safety of the case, including suturing, retracting, and hemostasis       I was present for the entire procedure    Patient Disposition:  PACU     SIGNATURE: Kirill Xiong MD  DATE: March 4, 2021  TIME: 9:12 AM

## 2021-03-10 ENCOUNTER — TELEPHONE (OUTPATIENT)
Dept: SURGERY | Facility: CLINIC | Age: 61
End: 2021-03-10

## 2021-03-10 NOTE — TELEPHONE ENCOUNTER
Two day post op call  Spoke to patient  Patient is doing well  Pain well controlled, patient ambulating well  No questions or concerns    Reminded patient of post op appointment and to call if there are any questions or concerns prior to appointment

## 2021-03-11 ENCOUNTER — IMMUNIZATIONS (OUTPATIENT)
Dept: FAMILY MEDICINE CLINIC | Facility: HOSPITAL | Age: 61
End: 2021-03-11

## 2021-03-11 DIAGNOSIS — Z23 ENCOUNTER FOR IMMUNIZATION: Primary | ICD-10-CM

## 2021-03-11 PROCEDURE — 91300 SARS-COV-2 / COVID-19 MRNA VACCINE (PFIZER-BIONTECH) 30 MCG: CPT

## 2021-03-11 PROCEDURE — 0002A SARS-COV-2 / COVID-19 MRNA VACCINE (PFIZER-BIONTECH) 30 MCG: CPT

## 2021-03-16 ENCOUNTER — OFFICE VISIT (OUTPATIENT)
Dept: SURGERY | Facility: CLINIC | Age: 61
End: 2021-03-16

## 2021-03-16 VITALS — TEMPERATURE: 97.5 F | BODY MASS INDEX: 24.11 KG/M2 | WEIGHT: 150 LBS | HEIGHT: 66 IN

## 2021-03-16 DIAGNOSIS — Z90.49 STATUS POST LAPAROSCOPIC CHOLECYSTECTOMY: Primary | ICD-10-CM

## 2021-03-16 PROCEDURE — 99024 POSTOP FOLLOW-UP VISIT: CPT | Performed by: SURGERY

## 2021-03-16 PROCEDURE — 3008F BODY MASS INDEX DOCD: CPT | Performed by: SURGERY

## 2021-03-17 PROBLEM — Z90.49 STATUS POST LAPAROSCOPIC CHOLECYSTECTOMY: Status: ACTIVE | Noted: 2021-03-17

## 2021-03-17 PROBLEM — D13.5 ADENOMYOMATOSIS OF GALLBLADDER: Status: RESOLVED | Noted: 2021-01-19 | Resolved: 2021-03-17

## 2021-03-17 NOTE — PROGRESS NOTES
Assessment/Plan:    Status post laparoscopic cholecystectomy   Overall she is doing very well  Her pain is well controlled  Had some itching from the glue but this will resolve as the glue is remioved removed  Her postoperative  Instructions were provided  She can follow up itz Tiwari There are no diagnoses linked to this encounter  Subjective:      Patient ID: Perico Joaquin is a 56 y o  female      HPI        Review of Systems      Objective:      Temp 97 5 °F (36 4 °C)   Ht 5' 6" (1 676 m)   Wt 68 kg (150 lb)   LMP  (LMP Unknown)   BMI 24 21 kg/m²          Physical Exam

## 2021-03-17 NOTE — ASSESSMENT & PLAN NOTE
Overall she is doing very well  Her pain is well controlled  Had some itching from the glue but this will resolve as the glue is remioved removed  Her postoperative  Instructions were provided  She can follow up itz Clements

## 2021-05-26 ENCOUNTER — OFFICE VISIT (OUTPATIENT)
Dept: INTERNAL MEDICINE CLINIC | Facility: CLINIC | Age: 61
End: 2021-05-26
Payer: COMMERCIAL

## 2021-05-26 VITALS
WEIGHT: 149 LBS | BODY MASS INDEX: 23.95 KG/M2 | SYSTOLIC BLOOD PRESSURE: 124 MMHG | HEIGHT: 66 IN | OXYGEN SATURATION: 97 % | DIASTOLIC BLOOD PRESSURE: 80 MMHG | TEMPERATURE: 98.2 F | HEART RATE: 72 BPM

## 2021-05-26 DIAGNOSIS — G43.909 MIGRAINE WITHOUT STATUS MIGRAINOSUS, NOT INTRACTABLE, UNSPECIFIED MIGRAINE TYPE: ICD-10-CM

## 2021-05-26 DIAGNOSIS — S49.80XA SHOULDER INJURY RELATED TO VACCINE ADMINISTRATION (SIRVA): ICD-10-CM

## 2021-05-26 DIAGNOSIS — D35.2 BENIGN NEOPLASM OF PITUITARY GLAND AND CRANIOPHARYNGEAL DUCT (POUCH) (HCC): ICD-10-CM

## 2021-05-26 DIAGNOSIS — D35.3 BENIGN NEOPLASM OF PITUITARY GLAND AND CRANIOPHARYNGEAL DUCT (POUCH) (HCC): ICD-10-CM

## 2021-05-26 DIAGNOSIS — T50.Z95A SHOULDER INJURY RELATED TO VACCINE ADMINISTRATION (SIRVA): ICD-10-CM

## 2021-05-26 DIAGNOSIS — E23.7 DISEASE OF PITUITARY GLAND (HCC): ICD-10-CM

## 2021-05-26 DIAGNOSIS — Z00.00 ENCOUNTER FOR MEDICAL EXAMINATION TO ESTABLISH CARE: Primary | ICD-10-CM

## 2021-05-26 PROCEDURE — 99244 OFF/OP CNSLTJ NEW/EST MOD 40: CPT | Performed by: HOSPITALIST

## 2021-05-26 PROCEDURE — 3725F SCREEN DEPRESSION PERFORMED: CPT | Performed by: HOSPITALIST

## 2021-05-26 PROCEDURE — 1036F TOBACCO NON-USER: CPT | Performed by: HOSPITALIST

## 2021-05-26 PROCEDURE — 3008F BODY MASS INDEX DOCD: CPT | Performed by: HOSPITALIST

## 2021-05-26 RX ORDER — SUMATRIPTAN 100 MG/1
TABLET, FILM COATED ORAL
Qty: 27 TABLET | Refills: 0 | Status: SHIPPED | OUTPATIENT
Start: 2021-05-26 | End: 2022-03-11 | Stop reason: SDUPTHER

## 2021-05-26 NOTE — PROGRESS NOTES
INTERNAL MEDICINE INITIAL OFFICE VISIT  [facility] INTERNAL MEDICINE BINU    NAME: Bere Jacob  AGE: 56 y o  SEX: female  : 1960     DATE: 2021     Assessment and Plan:     1  Encounter for medical examination to establish care      2  Migraine without status migrainosus, not intractable, unspecified migraine type  Patient follows up with Neurology for this  She gets migraines 2 to 4 times a month which is well controlled with Imitrex and ibuprofen  - SUMAtriptan (IMITREX) 100 mg tablet; Take 1 tablet by mouth for migraine relief  May repeat 2 hours later  Maximum 200mg/day  Dispense: 27 tablet; Refill: 0    3  Benign neoplasm of pituitary gland and craniopharyngeal duct (pouch) ([address] )  This was diagnosed in   This is not endocrine active  She gets a yearly MRI which has not shown any change in size of the pituitary gland measuring 9 mm  Last MRI was in May of 2020  Patient has no symptoms currently  She follows up with endocrinologist Dr Axel Ricardo    4-mild dyslipidemia  She follows up with Dr David Montgomery for this  She is on Lipitor 10 mg every other day  and last LDL was 83    5-acute cholecystitis status post laparoscopic cholecystectomy in April of this rohan this is stable at present with no acute issues  Bhavani [place] 6-SIRVA-( updated )     During her initial visit with me She had discussed her symptoms of SIRVA  with me however I forgot to add this to her  -history of the left shoulder pain since getting a flu shot in 2020-in December she saw Dr Cary Montes steroid injection was done which relieved her pain for about 6 months  Pain and some stiffness with internal rotation and extension  Also has pain over the subacromial and acromioclavicular joint  Has subsequently followed up with Dr Randy Valencia and Dr Tripp Bryant ( since Dr Connie Christy no longer under [employer])    On in  a repeat injection of the left subacromial space for left shoulder acromioclavicular joint arthritis and subacromial bursitis     If symptoms persist will need an MRI of the shoulder  Up-to-date on screening and immunization    Will see her in 1 year's time     Chief Complaint:     Chief Complaint   Patient presents with    Physical Exam    Establish Care      History of Present Illness:      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  Patient is status post laparoscopic cholecystectomy on 03/04/2021 and has recovered quite well  She was also noted to have liver cysts which currently appear benign and will be placed on surveillance  She has a history of  laparoscopic paraesophageal hernia repair followed by a recurrence with a takedown of the slit Nissen and repair with a LINX after she was diagnosed with iron-deficiency anemia     She is doing well with this with occasional periods of nausea  Patient has a very small pituitary tumor which is not endocrine active and gets a yearly MRI which has not shown any changes in size  She has also on 10 mg of Lipitor for mild dyslipidemia and a positive family history of coronary artery disease     Patient also has history of migraines which is well controlled on Imitrex and Motrin  She is up-to-date on her immunizations as well as screenings    The following portions of the patient's history were reviewed and updated as appropriate: allergies, current medications, past family history, past medical history, past social history, past surgical history and problem list      Review of Systems:     Review of Systems all other review of symptoms negative unless specified otherwise   Past Medical History:     Past Medical History:   Diagnosis Date    Adenomyomatosis of gallbladder 1/19/2021    Anxiety     Arthritis     Calculus of gallbladder     Tiburcio ulcer     Depression     Hyperlipidemia     Migraine     Mitral valve prolapse     Murmur     Neck pain at times    Pituitary adenoma [facility])     Pituitary adenoma [facility])         Past Surgical History:     Past Surgical History:   Procedure Laterality Date    BREAST BIOPSY Left 1996    benign    BREAST SURGERY      L breast cyst removal-benign    CATARACT EXTRACTION Bilateral     CATARACT EXTRACTION, BILATERAL  01/2020    COLONOSCOPY      DE QUERVAIN'S RELEASE Bilateral     EGD AND COLONOSCOPY N/A 5/11/2016    Procedure: EGD AND COLONOSCOPY;  Surgeon: Ricky Davison MD;  Location: [facility]; Service:     LAPAROSCOPIC MAGNETIC SPHINCTER AUGMENTATION N/A 7/20/2020    Procedure: MAGNETIC SPHINCTER AUGMENT, LINX PLACEMENT;  Surgeon: SPEEDY FRYE MD;  Location: [facility];  Service: Bariatrics    PARAESOPHAGEAL HERNIA REPAIR N/A 7/20/2020    Procedure: REPAIR HERNIA PARAESOPHAGEAL  LAPAROSCOPIC, PARTIAL GASTRECTOMY;  Surgeon: SPEEDY FRYE MD;  [facility];  Service: Bariatrics    AL INCIS TENDON [address] STYLOID Left 11/21/2019    Procedure: Shanika Sterling AND TENDON GRAFT;  Surgeon: Rishabh Garcia MD;  [facility];  Service: Orthopedics    AL LAP, REPAIR PARAESOPHAGEAL HERNIA, INCL FUNDOPLASTY W/ MESH N/A 8/31/2016    Procedure: REPAIR HERNIA PARAESOPHAGEAL  with bio A mesh LAPAROSCOPIC NISSEN FUNDOPLICATION Laparoscopic Gastroplexy Intraop EGD #[id number];  Surgeon: SPEEDY FRYE MD;  [facility];  Service: Bariatrics    AL LAP,CHOLECYSTECTOMY N/A 3/4/2021    Procedure: Robotic cholecystectomy ;  Surgeon: Tim Watson MD;  [facility]: [facility];  Service: General    AL REPAIR INTERCARP/CARP-METACARP JT Left 11/21/2019    Procedure: THUMB [facility];  Surgeon: Rishabh Garcia MD;  [facility]: [facility];  Service: Orthopedics    TONSILLECTOMY      WRIST SURGERY Bilateral     daquero vein release        Social History:   She reports that she has never smoked  She has never used smokeless tobacco  She reports current alcohol use   She reports that she does not use drugs  Family History:     Family History   Problem Relation Age of Onset    Anxiety disorder Mother     Arthritis Mother     Hyperlipidemia Mother     Hypertension Mother     Prostate cancer Father 76    Dementia Father     Parkinsonism Father     Other Father 80        bladder cancer    Ovarian cancer Maternal Grandmother 72    Breast cancer Paternal Aunt 66    Breast cancer Cousin 36    Colon cancer Paternal Aunt 48    Pancreatic cancer Paternal Aunt 67    No Known Problems Sister     No Known Problems Daughter         Current Medications:     Current Outpatient Medications:     ascorbic acid (VITAMIN C) 500 mg tablet, Take 500 mg by mouth 2 (two) times a day Pt stopped, Disp: , Rfl:     aspirin 81 MG tablet, Take 1 tablet by mouth daily Pt stopped, Disp: , Rfl:     atorvastatin (LIPITOR) 10 mg tablet, TAKE ONE TABLET BY MOUTH EVERY DAY (Patient taking differently: every evening ), Disp: 90 tablet, Rfl: 0    buPROPion (WELLBUTRIN XL) 300 mg 24 hr tablet, Take 1 tablet by mouth daily, Disp: , Rfl:     Cholecalciferol (VITAMIN D) 2000 UNITS CAPS, Take 1 tablet by mouth daily Pt stopped, Disp: , Rfl:     DULoxetine (CYMBALTA) 20 mg capsule, TAKE ONE CAPSULE BY MOUTH EVERY DAY (Patient taking differently: every evening ), Disp: 90 capsule, Rfl: 0    multivitamin (THERAGRAN) TABS, Take 1 tablet by mouth daily  , Disp: , Rfl:     SUMAtriptan (IMITREX) 100 mg tablet, Take 1 tablet by mouth for migraine relief  May repeat 2 hours later   Maximum 200mg/day , Disp: 27 tablet, Rfl: 0    vitamin E, tocopherol, 400 units capsule, Take 400 Units by mouth daily Pt stopped, Disp: , Rfl:      Allergies:   No Known Allergies     Physical Exam:     /80 (BP Location: Left arm, Patient Position: Sitting, Cuff Size: Standard)   Pulse 72   Temp 98 2 °F (36 8 °C) (Tympanic)   Ht 5' 6" (1 676 m)   Wt 67 6 kg (149 lb)   LMP  (LMP Unknown)   SpO2 97%   BMI 24 05 kg/m²     Physical Exam General Appearance:    Alert, cooperative, no distress   Head:    Normocephalic, without obvious abnormality, atraumatic   Eyes:    PERRL, conjunctiva/corneas clear, EOM's intact, fundi     benign, both eyes        Neck:   Supple, no adenopathy, no JVD   Back:     Symmetric, no curvature, ROM normal, no CVA tenderness   Lungs:     Clear to auscultation bilaterally, no wheezing or rhonchi   Heart:    Regular rate and rhythm, S1 and S2 normal, no murmur, rub   or gallop   Abdomen:     Soft, non-tender, bowel sounds active    Extremities:   Extremities normal, atraumatic, no cyanosis or edema   Psych:   Normal Affect   Neurologic:   CNII-XII intact  Normal strength, sensation and reflexes       Throughout        Data:     Laboratory Results: I have personally reviewed the pertinent laboratory results/reports   Radiology/Other Diagnostic Testing Results: I have personally reviewed pertinent reports        Karan Carney MD  [facility] INTERNAL MEDICINE [place]

## 2021-06-10 DIAGNOSIS — E78.5 HYPERLIPIDEMIA, UNSPECIFIED HYPERLIPIDEMIA TYPE: ICD-10-CM

## 2021-06-14 DIAGNOSIS — E78.5 HYPERLIPIDEMIA, UNSPECIFIED HYPERLIPIDEMIA TYPE: ICD-10-CM

## 2021-06-14 RX ORDER — ATORVASTATIN CALCIUM 10 MG/1
10 TABLET, FILM COATED ORAL EVERY EVENING
Qty: 60 TABLET | Refills: 5 | Status: SHIPPED | OUTPATIENT
Start: 2021-06-14 | End: 2021-06-14 | Stop reason: SDUPTHER

## 2021-06-14 RX ORDER — ATORVASTATIN CALCIUM 10 MG/1
10 TABLET, FILM COATED ORAL EVERY EVENING
Qty: 90 TABLET | Refills: 3 | Status: SHIPPED | OUTPATIENT
Start: 2021-06-14 | End: 2022-07-07

## 2021-07-13 ENCOUNTER — TELEPHONE (OUTPATIENT)
Dept: OBGYN CLINIC | Facility: HOSPITAL | Age: 61
End: 2021-07-13

## 2021-07-13 NOTE — TELEPHONE ENCOUNTER
Patient called asking to speak to Maria D Cruz, patient asking to r/s appt for tomorrow with Dr Nishant Willoughby, patient wants an early appointment and does not want the next opening, please contact patient as she is requesting sooner

## 2021-07-13 NOTE — TELEPHONE ENCOUNTER
Did you call this patient to change her 11:00 am appointment? There are no other slots to move this patient to  Please advise      Thank you

## 2021-07-14 NOTE — TELEPHONE ENCOUNTER
Patient called and said she is waiting for a pending appt from the manager, however she definitely couldn't make it in to today's appt with Dr Bhavin Saleh and noticed no one canceled it yet  I canceled the appt    Patient is available for a call after 2pm      Callback #[phone]

## 2021-07-19 NOTE — TELEPHONE ENCOUNTER
Patient has been scheduled in [facility] with Dr Yannick Shah on Monday August 23 at 10:00 am   Patient has approved date and time of appointment

## 2021-08-23 ENCOUNTER — OFFICE VISIT (OUTPATIENT)
Dept: OBGYN CLINIC | Facility: CLINIC | Age: 61
End: 2021-08-23
Payer: COMMERCIAL

## 2021-08-23 VITALS
HEIGHT: 66 IN | WEIGHT: 150.8 LBS | DIASTOLIC BLOOD PRESSURE: 88 MMHG | SYSTOLIC BLOOD PRESSURE: 132 MMHG | BODY MASS INDEX: 24.23 KG/M2

## 2021-08-23 DIAGNOSIS — M18.11 ARTHRITIS OF CARPOMETACARPAL (CMC) JOINT OF RIGHT THUMB: Primary | ICD-10-CM

## 2021-08-23 PROCEDURE — 3008F BODY MASS INDEX DOCD: CPT | Performed by: ORTHOPAEDIC SURGERY

## 2021-08-23 PROCEDURE — 1036F TOBACCO NON-USER: CPT | Performed by: ORTHOPAEDIC SURGERY

## 2021-08-23 PROCEDURE — 20600 DRAIN/INJ JOINT/BURSA W/O US: CPT | Performed by: ORTHOPAEDIC SURGERY

## 2021-08-23 PROCEDURE — 99203 OFFICE O/P NEW LOW 30 MIN: CPT | Performed by: ORTHOPAEDIC SURGERY

## 2021-08-23 RX ORDER — LIDOCAINE HYDROCHLORIDE 10 MG/ML
0.5 INJECTION, SOLUTION INFILTRATION; PERINEURAL
Status: COMPLETED | OUTPATIENT
Start: 2021-08-23 | End: 2021-08-23

## 2021-08-23 RX ORDER — BETAMETHASONE SODIUM PHOSPHATE AND BETAMETHASONE ACETATE 3; 3 MG/ML; MG/ML
3 INJECTION, SUSPENSION INTRA-ARTICULAR; INTRALESIONAL; INTRAMUSCULAR; SOFT TISSUE
Status: COMPLETED | OUTPATIENT
Start: 2021-08-23 | End: 2021-08-23

## 2021-08-23 RX ADMIN — LIDOCAINE HYDROCHLORIDE 0.5 ML: 10 INJECTION, SOLUTION INFILTRATION; PERINEURAL at 10:24

## 2021-08-23 RX ADMIN — BETAMETHASONE SODIUM PHOSPHATE AND BETAMETHASONE ACETATE 3 MG: 3; 3 INJECTION, SUSPENSION INTRA-ARTICULAR; INTRALESIONAL; INTRAMUSCULAR; SOFT TISSUE at 10:24

## 2021-08-23 NOTE — PATIENT INSTRUCTIONS
Advil/Tylenol/Aleve as needed for pain  Tylenol extended release as needed for pain  Volaten Gel  Aspercreme with lidocaine topical  [place] dream topical  4-6 oz of tart cherry juice prior to bedtime  Tumeric   Comfort cool brace as needed during the day  Thenar strengthening exercises      What is it [facility] Arthritis? In a normal joint, cartilage covers the end of the bones and serves as a shock absorber to allow smooth, pain-free movement  In osteoarthritis (OA, or degenerative arthritis) the cartilage layer wears out, resulting in direct contact between the bones and producing pain and deformity  One of the most common joints to develop OA in the hand is the base of the thumb  The thumb basal joint, also called the carpometacarpal Mathews) joint, is a specialized saddle shaped joint that is formed by a small bone of the wrist (trapezium) and the first bone of the thumb (metacarpal)  The saddle shaped joint allows the thumb to have a wide range of motions, including up, down, across the palm, and the ability to pinch (see Figure 1)  Who gets it? OA at the base of the thumb is more commonly seen in women over the age of 36  The exact cause is unknown, but genetics, previous injuries such as fractures or dislocations, and generalized joint laxity may predispose towards development of this type of arthritis  What are the symptoms and signs? The most common symptom is pain at the base of the thumb  The pain can be aggravated by activities that require pinching, such as opening jars, turning door knobs or keys, and writing  Severity can also progress to pain at rest and pain at night  In more severe cases, progressive destruction and mal-alignment of the joint occurs, and a bump develops at the base of the thumb as the metacarpal moves out of the saddle joint  This shift in the joint can cause limited motion and weakness, making pinch difficult (see Figure 2)   The next joint above the [facility] may compensate by loosening, causing it to bend further back (hyperextension)  How is the diagnosis made? The diagnosis is made by history and physical evaluation  Pressure and movement such as twisting will produce pain at the joint  A grinding sensation may also be present at the joint (see Figure 3)  X-rays are used to confirm the diagnosis, although symptom severity often does not correlate with x-ray findings  What are the treatment options? Less severe thumb arthritis will usually respond to non-surgical care  Arthritis medication, splinting and limited cortisone injections may help alleviate pain  A hand therapist might provide a variety of rigid and non-rigid splints which can be used while sleeping or during activities  Patients with advanced disease or who fail non-surgical treatment may be candidates for surgical reconstruction  A variety of surgical techniques are available that can successfully reduce or eliminate pain  Surgical procedures include removal of arthritic bone and joint reconstruction (arthroplasty), joint fusion, bone realignment, and even arthroscopy in select cases  A consultation with your hand surgeon can help decide the best option for you (see Figure 4)  © 2012 American [employer] for [employer] of the Hand  www handcare  org

## 2021-08-23 NOTE — PROGRESS NOTES
ASSESSMENT/PLAN:    Assessment:   Thumb CMC Arthritis  right    Plan:   Patient will proceed with a steroid injection into her right thumb cmc jt today  Advil/Tylenol/Aleve as needed for pain  Tylenol extended release as needed for pain  Volaten Gel  Aspercreme with lidocaine topical  [place] dream topical  4-6 oz of tart cherry juice prior to bedtime  Tumeric   Comfort cool brace as needed during the day  Thenar strengthening exercises    We will place a referral into the patients chart for a left shoulder evaluation with Dr Strickland So  Follow Up:  3  month(s)    To Do Next Visit:       General Discussions:     CMC Arthritis: The anatomy and physiology of carpometacarpal joint arthritis was discussed with the patient today in the office  Deterioration of the articular cartilage eventually leads to hypermobility at the thumb [facility] joint, resulting in joint subluxation, osteophyte formation, cystic changes within the trapezium and base of the first metacarpal, as well as subchondral sclerosis  Eventually, pain, limited mobility, and compensatory hyperextension at the metacarpophalangeal joint may develop  While normal activity and usage of the thumb joint may provide a painful experience to the patient, this typically does not result in damage to the thumb or hand  Treatment options include resting thumb spica splints to decreased joint edema, pain, and inflammation  Therapy exercises to strengthen the thenar musculature may relieve pain, but do not alter the overall continued development of osteoarthritis  Oral medications, topical medications, corticosteroid injections may decrease pain and increase overall function  Eventually, approximately 5% of patients may require surgical intervention         Operative Discussions:       _____________________________________________________  CHIEF COMPLAINT:  Chief Complaint   Patient presents with    Right Hand - Pain, Swelling         SUBJECTIVE:  Manuela Tavarez Jason Rodarte is a 59 y o  female who presents with Pain  Moderate  Intermittant  Sharp and Aching to the right thumb  This started  3+ year(s) ago as Chronic  Patient does have a history of a L thumb cmc jt arthroplasty with EPB tendon grafting and dequervains release on 11/21/2019 with relief of her left thumb pain  Radiation: Yes to the  thumb  Previous Treatments: steroid injections by Dr Van Johnson with only partial relief  Patients last injection into her right thumb was done in September of 2020  She also ices and used CBD oil  Associated symptoms: No Complaints  Handedness: right  Work status: retired RN    PAST MEDICAL HISTORY:  Past Medical History:   Diagnosis Date    Adenomyomatosis of gallbladder 1/19/2021    Anxiety     Arthritis     Calculus of gallbladder     Tiburcio ulcer     Depression     Hyperlipidemia     Migraine     Mitral valve prolapse     Murmur     Neck pain     at times    Pituitary adenoma (HCC)     Pituitary adenoma ([address] )        PAST SURGICAL HISTORY:  Past Surgical History:   Procedure Laterality Date    BREAST BIOPSY Left 1996    benign    BREAST SURGERY      L breast cyst removal-benign    CATARACT EXTRACTION Bilateral     CATARACT EXTRACTION, BILATERAL  01/2020    COLONOSCOPY      DE QUERVAIN'S RELEASE Bilateral     EGD AND COLONOSCOPY N/A 5/11/2016    Procedure: EGD AND COLONOSCOPY;  Surgeon: Grace Calvillo MD;  Location: [facility];   Service:     LAPAROSCOPIC MAGNETIC SPHINCTER AUGMENTATION N/A 7/20/2020    Procedure: MAGNETIC SPHINCTER AUGMENT, LINX PLACEMENT;  Surgeon: Israel Ferreira MD;  [facility];  Service: Bariatrics    PARAESOPHAGEAL HERNIA REPAIR N/A 7/20/2020    Procedure: REPAIR HERNIA PARAESOPHAGEAL  LAPAROSCOPIC, PARTIAL GASTRECTOMY;  Surgeon: Israel Ferreira MD;  [facility];  Service: [address] STYLOID Left 11/21/2019    Procedure: Fely Pacheco AND TENDON GRAFT;  Surgeon: Jacquie Martin Mi Lange MD;  [facility];  Service: Orthopedics    OR LAP, REPAIR PARAESOPHAGEAL HERNIA, INCL FUNDOPLASTY W/ MESH N/A 8/31/2016    Procedure: REPAIR HERNIA PARAESOPHAGEAL  with bio A mesh LAPAROSCOPIC NISSEN FUNDOPLICATION Laparoscopic Gastroplexy Intraop EGD #[id number];  Surgeon: Gustabo Flores MD;  [facility];  Service: Bariatrics    OR LAP,CHOLECYSTECTOMY N/A 3/4/2021    Procedure: Robotic cholecystectomy ;  Surgeon: José Aparicio MD;  [facility];  Service: General    OR REPAIR INTERCARP/CARP-METACARP JT Left 11/21/2019    Procedure: THUMB [address];  Surgeon: Laureano Esquivel MD;  [facility];  Service: Orthopedics    TONSILLECTOMY      WRIST SURGERY Bilateral     daquero vein release       FAMILY HISTORY:  Family History   Problem Relation Age of Onset    Anxiety disorder Mother     Arthritis Mother     Hyperlipidemia Mother     Hypertension Mother     Prostate cancer Father 76    Dementia Father     Parkinsonism Father     Other Father 80        bladder cancer    Ovarian cancer Maternal Grandmother 72    Breast cancer Paternal Aunt 66    Breast cancer Cousin 36    Colon cancer Paternal Aunt 48    Pancreatic cancer Paternal Aunt 67    No Known Problems Sister     No Known Problems Daughter        SOCIAL HISTORY:  Social History     Tobacco Use    Smoking status: Never Smoker    Smokeless tobacco: Never Used   Vaping Use    Vaping Use: Never used   Substance Use Topics    Alcohol use: Yes     Comment: liquor, social drinker    Drug use: No       MEDICATIONS:    Current Outpatient Medications:     ascorbic acid (VITAMIN C) 500 mg tablet, Take 500 mg by mouth 2 (two) times a day Pt stopped, Disp: , Rfl:     aspirin 81 MG tablet, Take 1 tablet by mouth daily Pt stopped, Disp: , Rfl:     atorvastatin (LIPITOR) 10 mg tablet, Take 1 tablet (10 mg total) by mouth every evening, Disp: 90 tablet, Rfl: 3    buPROPion (WELLBUTRIN XL) 300 mg 24 hr tablet, Take 1 tablet by mouth daily, Disp: , Rfl:     Cholecalciferol (VITAMIN D) 2000 UNITS CAPS, Take 1 tablet by mouth daily Pt stopped, Disp: , Rfl:     DULoxetine (CYMBALTA) 20 mg capsule, TAKE ONE CAPSULE BY MOUTH EVERY DAY (Patient taking differently: every evening ), Disp: 90 capsule, Rfl: 0    multivitamin (THERAGRAN) TABS, Take 1 tablet by mouth daily  , Disp: , Rfl:     SUMAtriptan (IMITREX) 100 mg tablet, Take 1 tablet by mouth for migraine relief  May repeat 2 hours later  Maximum 200mg/day , Disp: 27 tablet, Rfl: 0    vitamin E, tocopherol, 400 units capsule, Take 400 Units by mouth daily Pt stopped, Disp: , Rfl:     ALLERGIES:  No Known Allergies    REVIEW OF SYSTEMS:  Pertinent items are noted in HPI      LABS:  HgA1c:   Lab Results   Component Value Date    HGBA1C 5 2 09/26/2020     BMP:   Lab Results   Component Value Date    GLUCOSE 97 06/27/2015    CALCIUM 9 3 09/26/2020     06/27/2015    K 4 2 09/26/2020    CO2 29 09/26/2020     (H) 09/26/2020    BUN 15 01/28/2021    CREATININE 0 64 01/28/2021         _____________________________________________________  PHYSICAL EXAMINATION:  Vital signs: Ht 5' 6" (1 676 m)   Wt 68 4 kg (150 lb 12 8 oz)   LMP  (LMP Unknown)   BMI 24 34 kg/m²   General: well developed and well nourished, alert, oriented times 3 and appears comfortable  Psychiatric: Normal  HEENT: Trachea Midline, No torticollis  Cardiovascular: No discernable arrhythmia  Pulmonary: No wheezing or stridor  Abdomen: No rebound or guarding  Extremities: No peripheral edema  Skin: No Erythema  Neurovascular: Sensation Intact to the Median, Ulnar, Radial Nerve, Motor Intact to the Median, Ulnar, Radial Nerve and Pulses Intact    MUSCULOSKELETAL EXAMINATION:  RIGHT SIDE:  CMC: No Instability, Positive tendnerness CMC, Positive Shoulder Sign and Positive Hyperextension MP of 5 degrees, Jami 9    _____________________________________________________  STUDIES REVIEWED:  No Studies to review      PROCEDURES PERFORMED:  Small joint arthrocentesis: R thumb CMC  [place] Protocol:  Consent: Verbal consent obtained    Risks and benefits: risks, benefits and alternatives were discussed  Consent given by: patient  Site marked: the operative site was marked  Supporting Documentation  Indications: pain   Procedure Details  Location: thumb - R thumb CMC  Medications administered: 3 mg betamethasone acetate-betamethasone sodium phosphate 6 (3-3) mg/mL; 0 5 mL lidocaine 1 %    Patient tolerance: patient tolerated the procedure well with no immediate complications  Dressing:  Sterile dressing applied            Scribe Attestation    I,:  Marleen Terry am acting as a scribe while in the presence of the attending physician :       I,:  René Oshea MD personally performed the services described in this documentation    as scribed in my presence :

## 2021-09-15 ENCOUNTER — ANNUAL EXAM (OUTPATIENT)
Dept: OBGYN CLINIC | Facility: CLINIC | Age: 61
End: 2021-09-15
Payer: COMMERCIAL

## 2021-09-15 VITALS
WEIGHT: 147.6 LBS | DIASTOLIC BLOOD PRESSURE: 66 MMHG | BODY MASS INDEX: 23.72 KG/M2 | HEIGHT: 66 IN | SYSTOLIC BLOOD PRESSURE: 110 MMHG

## 2021-09-15 DIAGNOSIS — Z01.419 ENCOUNTER FOR WELL WOMAN EXAM WITH ROUTINE GYNECOLOGICAL EXAM: Primary | ICD-10-CM

## 2021-09-15 DIAGNOSIS — N89.8 VAGINAL DRYNESS: ICD-10-CM

## 2021-09-15 DIAGNOSIS — Z12.31 ENCOUNTER FOR SCREENING MAMMOGRAM FOR MALIGNANT NEOPLASM OF BREAST: ICD-10-CM

## 2021-09-15 PROCEDURE — S0612 ANNUAL GYNECOLOGICAL EXAMINA: HCPCS | Performed by: OBSTETRICS & GYNECOLOGY

## 2021-09-15 PROCEDURE — G0476 HPV COMBO ASSAY CA SCREEN: HCPCS | Performed by: OBSTETRICS & GYNECOLOGY

## 2021-09-15 PROCEDURE — G0145 SCR C/V CYTO,THINLAYER,RESCR: HCPCS | Performed by: OBSTETRICS & GYNECOLOGY

## 2021-09-15 PROCEDURE — 3008F BODY MASS INDEX DOCD: CPT | Performed by: ORTHOPAEDIC SURGERY

## 2021-09-15 NOTE — PROGRESS NOTES
ASSESSMENT & PLAN: Charlee Goodpasture was seen today for gynecologic exam     Diagnoses and all orders for this visit:    Encounter for well woman exam with routine gynecological exam    Encounter for screening mammogram for malignant neoplasm of breast  -     Mammo screening bilateral w 3d & cad; Future    Discussion/Summary:  Patient here for yearly gyn preventive exam  1  Routine well woman exam done today  2  Pap and HPV:  The patient's pap is up to date  Pap and cotesting was done today  Current ASCCP Guidelines reviewed  3   Mammogram was ordered  4  Colorectal cancer screening is up to date  4  The following were reviewed in today's visit: breast self exam, adequate intake of calcium and vitamin D, exercise and healthy diet  5  F/u 1 year  6  Vaginal dryness: discussed topical estrogen and Osphena, HRT not recommended secondary to lack of systemic side effects  CC:  Annual Gynecologic Examination    HPI: Ibeth Vazquez is a 59 y o  who presents for annual gynecologic examination  She has the following concerns:  Reports vaginal dryness with   Health Maintenance:    Patient describes her health as good  Patient does not have weight concerns  She exercises 7 days per week with work and walking and watching two grandchildren       She does wear her seatbelt routinely  She does perform regular monthly self breast exams  She does feel safe at home  Patients does not follow a  diet  Patient gets 5 servings of dairy or calcium rich foods a day      Last pap:   Last mammogram: 2020  Last colorectal cancer screenin    Patient Active Problem List   Diagnosis    Migraine    Pituitary adenoma ([address] )    Iron deficiency anemia    Tiburcio ulcer    Dyslipidemia    Hyperlipidemia    Iron deficiency anemia due to chronic blood loss    Menopausal symptoms    Migraine, unspecified, not intractable, without status migrainosus    Osteoarthritis of carpometacarpal (CMC) joint of thumb    Liver cyst    Status post laparoscopic cholecystectomy    Disease of pituitary gland [facility])       Past Medical History:   Diagnosis Date    Adenomyomatosis of gallbladder 1/19/2021    Anxiety     Arthritis     Calculus of gallbladder     Tiburcio ulcer     Depression     Hyperlipidemia     Migraine     Mitral valve prolapse     Murmur     Neck pain     at times    Pituitary adenoma (HCC)     Pituitary adenoma (HCC)        Past Surgical History:   Procedure Laterality Date    BREAST BIOPSY Left 1996    benign    BREAST SURGERY      L breast cyst removal-benign    CATARACT EXTRACTION Bilateral     CATARACT EXTRACTION, BILATERAL  01/2020    COLONOSCOPY      DE QUERVAIN'S RELEASE Bilateral     EGD AND COLONOSCOPY N/A 5/11/2016    Procedure: EGD AND COLONOSCOPY;  Surgeon: Sachi Villa MD;  Location: [facility];   Service:     LAPAROSCOPIC MAGNETIC SPHINCTER AUGMENTATION N/A 7/20/2020    Procedure: MAGNETIC SPHINCTER AUGMENT, LINX PLACEMENT;  Surgeon: Kwesi Hernandez MD;  [facility];  Service: Bariatrics    PARAESOPHAGEAL HERNIA REPAIR N/A 7/20/2020    Procedure: REPAIR HERNIA PARAESOPHAGEAL  LAPAROSCOPIC, PARTIAL GASTRECTOMY;  Surgeon: Kwesi Hernandez MD;  [facility];  Service: Bariatrics    MI INCIS TENDON [address] STYLOID Left 11/21/2019    Procedure: [employer] AND TENDON GRAFT;  Surgeon: Ernestine Amaya MD;  [facility];  Service: Orthopedics    MI LAP, REPAIR PARAESOPHAGEAL HERNIA, INCL FUNDOPLASTY W/ MESH N/A 8/31/2016    Procedure: REPAIR HERNIA PARAESOPHAGEAL  with bio A mesh LAPAROSCOPIC NISSEN FUNDOPLICATION Laparoscopic Gastroplexy Intraop EGD #[id number];  Surgeon: Kwesi Hernandez MD;  [facility];  Service: Bariatrics    MI LAP,CHOLECYSTECTOMY N/A 3/4/2021    Procedure: Robotic cholecystectomy ;  Surgeon: Robinson Knight MD;  [facility];  Service: General    MI REPAIR INTERCARP/CARP-METACARP JT Left 11/21/2019    Procedure: THUMB [facility] ARTHROPLASTY;  Surgeon: Geena Sosa MD;  [facility];  Service: Orthopedics    TONSILLECTOMY      WRIST SURGERY Bilateral     daquero vein release       Past OB/Gyn History:    History of abnormal pap smears: No    Patient is currently sexually active  monogamous   Patient's family planning method is post menopausal status  Family History   Problem Relation Age of Onset    Anxiety disorder Mother     Arthritis Mother     Hyperlipidemia Mother     Hypertension Mother    John Palm Prostate cancer Father 76    Dementia Father    John Palm Parkinsonism Father     Other Father 80        bladder cancer    Ovarian cancer Maternal Grandmother 72    Breast cancer Paternal Aunt 66    Breast cancer Cousin 36    Colon cancer Paternal Aunt 48    Pancreatic cancer Paternal Aunt 67    No Known Problems Sister     No Known Problems Daughter        Social History:  Social History     Socioeconomic History    Marital status: /Civil Union     Spouse name: Not on file    Number of children: Not on file    Years of education: Not on file    Highest education level: Not on file   Occupational History    Not on file   Tobacco Use    Smoking status: Never Smoker    Smokeless tobacco: Never Used   Vaping Use    Vaping Use: Never used   Substance and Sexual Activity    Alcohol use: Yes     Comment: liquor, social drinker    Drug use: No    Sexual activity: Yes     Partners: Male     Comment:    Other Topics Concern    Not on file   Social History Narrative    Not on file     Social Determinants of Health     Financial Resource Strain:     Difficulty of Paying Living Expenses:    Food Insecurity:     Worried About Running Out of Food in the Last Year:     [address] in the Last Year:    Transportation Needs:     Lack of Transportation (Medical):      Lack of Transportation (Non-Medical):    Physical Activity:     Days of Exercise per Week:     Minutes of Exercise per Session: Stress:     Feeling of Stress :    Social Connections:     Frequency of Communication with Friends and Family:     Frequency of Social Gatherings with Friends and Family:     Attends [employer] Services:     Active Member of Clubs or Organizations:     Attends Club or Organization Meetings:     Marital Status:    Intimate Partner Violence:     Fear of Current or Ex-Partner:     Emotionally Abused:     Physically Abused:     Sexually Abused:      Presently lives with family  Patient is currently employed   No Known Allergies      Current Outpatient Medications:     ascorbic acid (VITAMIN C) 500 mg tablet, Take 500 mg by mouth 2 (two) times a day , Disp: , Rfl:     aspirin 81 MG tablet, Take 1 tablet by mouth daily , Disp: , Rfl:     atorvastatin (LIPITOR) 10 mg tablet, Take 1 tablet (10 mg total) by mouth every evening, Disp: 90 tablet, Rfl: 3    buPROPion (WELLBUTRIN XL) 300 mg 24 hr tablet, Take 1 tablet by mouth daily, Disp: , Rfl:     Cholecalciferol (VITAMIN D) 2000 UNITS CAPS, Take 1 tablet by mouth daily , Disp: , Rfl:     DULoxetine (CYMBALTA) 20 mg capsule, TAKE ONE CAPSULE BY MOUTH EVERY DAY (Patient taking differently: every evening ), Disp: 90 capsule, Rfl: 0    multivitamin (THERAGRAN) TABS, Take 1 tablet by mouth daily  , Disp: , Rfl:     SUMAtriptan (IMITREX) 100 mg tablet, Take 1 tablet by mouth for migraine relief  May repeat 2 hours later  Maximum 200mg/day , Disp: 27 tablet, Rfl: 0    vitamin E, tocopherol, 400 units capsule, Take 400 Units by mouth daily Pt stopped, Disp: , Rfl:     Review of Systems  Constitutional :no fever, feels well, no tiredness, no recent weight gain or loss  ENT: no ear ache, no loss of hearing, no nosebleeds or nasal discharge, no sore throat or hoarseness  Cardiovascular: no complaints of slow or fast heart beat, no chest pain, no palpitations, no leg claudication or lower extremity edema    Respiratory: no complaints of shortness of shortness of breath, no AGUAYO  Breasts:no complaints of breast pain, breast lump, or nipple discharge  Gastrointestinal: no complaints of abdominal pain, constipation, nausea, vomiting, or diarrhea or bloody stools  Genitourinary : no complaints of dysuria, incontinence, pelvic pain, no dysmenorrhea, vaginal discharge or abnormal vaginal bleeding and as noted in HPI  Musculoskeletal: no complaints of arthralgia, no myalgia, no joint swelling or stiffness, no limb pain or swelling  Integumentary: no complaints of skin rash or lesion, itching or dry skin  Neurological: no complaints of headache, no confusion, no numbness or tingling, no dizziness or fainting    Physical Exam:     /66   Ht 5' 6" (1 676 m)   Wt 67 kg (147 lb 9 6 oz)   LMP  (LMP Unknown)   Breastfeeding No   BMI 23 82 kg/m²     General: appears stated age, cooperative, alert normal mood and affect   Psychiatric oriented to person, place and time  Mood and affect normal   Neck: normal, supple,trachea midline, no masses  Thyroid: normal, no thyromegaly   Heart: regular rate and rhythm, S1, S2 normal, no murmur, click, rub or gallop   Lungs: clear to auscultation bilaterally, no increased work of breathing or signs of respiratory distress   Breasts: normal, no dimpling or skin changes noted   Abdomen: soft, non-tender, without masses or organomegaly   Vulva: normal , no lesions   Vagina: normal , no lesions or atrophy   Urethra: normal   Urethal meatus normal   Bladder Normal, soft, non-tender and no prolapse or masses appreciated   Cervix: normal, no palpable masses ; pap done    Uterus: normal , non-tender, not enlarged, no palpable masses   Adnexa: normal, non-tender without fullness or masses   Lymphatic Palpation of lymph nodes in neck, axilla, groin and/or other locations: no lymphadenopathy or masses noted   Skin Normal skin turgor and no rashes    Palpation of skin and subcutaneous tissue normal

## 2021-09-16 LAB
HPV HR 12 DNA CVX QL NAA+PROBE: NEGATIVE
HPV16 DNA CVX QL NAA+PROBE: NEGATIVE
HPV18 DNA CVX QL NAA+PROBE: NEGATIVE

## 2021-09-20 ENCOUNTER — APPOINTMENT (OUTPATIENT)
Dept: LAB | Facility: MEDICAL CENTER | Age: 61
End: 2021-09-20
Payer: COMMERCIAL

## 2021-09-20 DIAGNOSIS — E23.7 DISEASE OF PITUITARY GLAND (HCC): ICD-10-CM

## 2021-09-20 DIAGNOSIS — E66.8 OBESITY OF ENDOCRINE ORIGIN: ICD-10-CM

## 2021-09-20 DIAGNOSIS — E78.5 HYPERLIPIDEMIA, UNSPECIFIED HYPERLIPIDEMIA TYPE: ICD-10-CM

## 2021-09-20 LAB
ALBUMIN SERPL BCP-MCNC: 3.7 G/DL (ref 3.5–5)
ALP SERPL-CCNC: 87 U/L (ref 46–116)
ALT SERPL W P-5'-P-CCNC: 26 U/L (ref 12–78)
ANION GAP SERPL CALCULATED.3IONS-SCNC: 6 MMOL/L (ref 4–13)
AST SERPL W P-5'-P-CCNC: 18 U/L (ref 5–45)
BASOPHILS # BLD AUTO: 0.06 THOUSANDS/ΜL (ref 0–0.1)
BASOPHILS NFR BLD AUTO: 1 % (ref 0–1)
BILIRUB SERPL-MCNC: 0.84 MG/DL (ref 0.2–1)
BUN SERPL-MCNC: 12 MG/DL (ref 5–25)
CALCIUM SERPL-MCNC: 9 MG/DL (ref 8.3–10.1)
CHLORIDE SERPL-SCNC: 107 MMOL/L (ref 100–108)
CHOLEST SERPL-MCNC: 186 MG/DL (ref 50–200)
CK SERPL-CCNC: 105 U/L (ref 26–192)
CO2 SERPL-SCNC: 27 MMOL/L (ref 21–32)
CORTIS AM PEAK SERPL-MCNC: 13.9 UG/DL (ref 4.2–22.4)
CREAT SERPL-MCNC: 0.61 MG/DL (ref 0.6–1.3)
EOSINOPHIL # BLD AUTO: 0.09 THOUSAND/ΜL (ref 0–0.61)
EOSINOPHIL NFR BLD AUTO: 2 % (ref 0–6)
ERYTHROCYTE [DISTWIDTH] IN BLOOD BY AUTOMATED COUNT: 12 % (ref 11.6–15.1)
EST. AVERAGE GLUCOSE BLD GHB EST-MCNC: 105 MG/DL
GFR SERPL CREATININE-BSD FRML MDRD: 97 ML/MIN/1.73SQ M
GLUCOSE P FAST SERPL-MCNC: 93 MG/DL (ref 65–99)
HBA1C MFR BLD: 5.3 %
HCT VFR BLD AUTO: 41.4 % (ref 34.8–46.1)
HDLC SERPL-MCNC: 78 MG/DL
HGB BLD-MCNC: 13.5 G/DL (ref 11.5–15.4)
IMM GRANULOCYTES # BLD AUTO: 0.01 THOUSAND/UL (ref 0–0.2)
IMM GRANULOCYTES NFR BLD AUTO: 0 % (ref 0–2)
LDLC SERPL CALC-MCNC: 93 MG/DL (ref 0–100)
LYMPHOCYTES # BLD AUTO: 1.09 THOUSANDS/ΜL (ref 0.6–4.47)
LYMPHOCYTES NFR BLD AUTO: 25 % (ref 14–44)
MAGNESIUM SERPL-MCNC: 2.3 MG/DL (ref 1.6–2.6)
MCH RBC QN AUTO: 31.5 PG (ref 26.8–34.3)
MCHC RBC AUTO-ENTMCNC: 32.6 G/DL (ref 31.4–37.4)
MCV RBC AUTO: 97 FL (ref 82–98)
MONOCYTES # BLD AUTO: 0.52 THOUSAND/ΜL (ref 0.17–1.22)
MONOCYTES NFR BLD AUTO: 12 % (ref 4–12)
NEUTROPHILS # BLD AUTO: 2.55 THOUSANDS/ΜL (ref 1.85–7.62)
NEUTS SEG NFR BLD AUTO: 60 % (ref 43–75)
NONHDLC SERPL-MCNC: 108 MG/DL
NRBC BLD AUTO-RTO: 0 /100 WBCS
PHOSPHATE SERPL-MCNC: 3.7 MG/DL (ref 2.3–4.1)
PLATELET # BLD AUTO: 187 THOUSANDS/UL (ref 149–390)
PMV BLD AUTO: 11.7 FL (ref 8.9–12.7)
POTASSIUM SERPL-SCNC: 4 MMOL/L (ref 3.5–5.3)
PROT SERPL-MCNC: 6.9 G/DL (ref 6.4–8.2)
RBC # BLD AUTO: 4.28 MILLION/UL (ref 3.81–5.12)
SODIUM SERPL-SCNC: 140 MMOL/L (ref 136–145)
T4 FREE SERPL-MCNC: 1.07 NG/DL (ref 0.76–1.46)
TRIGL SERPL-MCNC: 77 MG/DL
WBC # BLD AUTO: 4.32 THOUSAND/UL (ref 4.31–10.16)

## 2021-09-20 PROCEDURE — 84100 ASSAY OF PHOSPHORUS: CPT

## 2021-09-20 PROCEDURE — 82533 TOTAL CORTISOL: CPT

## 2021-09-20 PROCEDURE — 83835 ASSAY OF METANEPHRINES: CPT

## 2021-09-20 PROCEDURE — 83735 ASSAY OF MAGNESIUM: CPT

## 2021-09-20 PROCEDURE — 82024 ASSAY OF ACTH: CPT

## 2021-09-20 PROCEDURE — 36415 COLL VENOUS BLD VENIPUNCTURE: CPT

## 2021-09-20 PROCEDURE — 80053 COMPREHEN METABOLIC PANEL: CPT

## 2021-09-20 PROCEDURE — 84439 ASSAY OF FREE THYROXINE: CPT

## 2021-09-20 PROCEDURE — 80061 LIPID PANEL: CPT

## 2021-09-20 PROCEDURE — 85025 COMPLETE CBC W/AUTO DIFF WBC: CPT

## 2021-09-20 PROCEDURE — 82550 ASSAY OF CK (CPK): CPT

## 2021-09-20 PROCEDURE — 83036 HEMOGLOBIN GLYCOSYLATED A1C: CPT

## 2021-09-21 LAB — ACTH PLAS-MCNC: 15.5 PG/ML (ref 7.2–63.3)

## 2021-09-22 LAB
LAB AP GYN PRIMARY INTERPRETATION: NORMAL
Lab: NORMAL

## 2021-09-24 LAB
METANEPH FREE SERPL-MCNC: 30 PG/ML (ref 0–88)
NORMETANEPHRINE SERPL-MCNC: 127.5 PG/ML (ref 0–191.8)

## 2021-10-01 DIAGNOSIS — E78.5 HYPERLIPIDEMIA, UNSPECIFIED HYPERLIPIDEMIA TYPE: ICD-10-CM

## 2021-10-04 RX ORDER — DULOXETIN HYDROCHLORIDE 20 MG/1
20 CAPSULE, DELAYED RELEASE ORAL DAILY
Qty: 90 CAPSULE | Refills: 0 | Status: SHIPPED | OUTPATIENT
Start: 2021-10-04 | End: 2021-12-23 | Stop reason: SDUPTHER

## 2021-10-04 RX ORDER — BUPROPION HYDROCHLORIDE 300 MG/1
300 TABLET ORAL DAILY
Qty: 90 TABLET | Refills: 0 | Status: SHIPPED | OUTPATIENT
Start: 2021-10-04 | End: 2021-12-23 | Stop reason: SDUPTHER

## 2021-10-07 ENCOUNTER — VBI (OUTPATIENT)
Dept: ADMINISTRATIVE | Facility: OTHER | Age: 61
End: 2021-10-07

## 2021-10-12 ENCOUNTER — OFFICE VISIT (OUTPATIENT)
Dept: CARDIOLOGY CLINIC | Facility: CLINIC | Age: 61
End: 2021-10-12
Payer: COMMERCIAL

## 2021-10-12 VITALS
BODY MASS INDEX: 23.38 KG/M2 | WEIGHT: 145.5 LBS | SYSTOLIC BLOOD PRESSURE: 138 MMHG | DIASTOLIC BLOOD PRESSURE: 78 MMHG | HEIGHT: 66 IN | OXYGEN SATURATION: 99 % | HEART RATE: 79 BPM

## 2021-10-12 DIAGNOSIS — E78.5 HYPERLIPIDEMIA, UNSPECIFIED HYPERLIPIDEMIA TYPE: Primary | ICD-10-CM

## 2021-10-12 PROCEDURE — 1036F TOBACCO NON-USER: CPT | Performed by: INTERNAL MEDICINE

## 2021-10-12 PROCEDURE — 99213 OFFICE O/P EST LOW 20 MIN: CPT | Performed by: INTERNAL MEDICINE

## 2021-10-12 PROCEDURE — 3008F BODY MASS INDEX DOCD: CPT | Performed by: INTERNAL MEDICINE

## 2021-11-19 ENCOUNTER — HOSPITAL ENCOUNTER (OUTPATIENT)
Dept: MAMMOGRAPHY | Facility: MEDICAL CENTER | Age: 61
Discharge: HOME | End: 2021-11-19
Payer: COMMERCIAL

## 2021-11-19 VITALS — HEIGHT: 60 IN | BODY MASS INDEX: 28.57 KG/M2 | WEIGHT: 145.5 LBS

## 2021-11-19 DIAGNOSIS — Z12.31 ENCOUNTER FOR SCREENING MAMMOGRAM FOR MALIGNANT NEOPLASM OF BREAST: ICD-10-CM

## 2021-11-19 PROCEDURE — 77063 BREAST TOMOSYNTHESIS BI: CPT

## 2021-11-19 PROCEDURE — 77067 SCR MAMMO BI INCL CAD: CPT

## 2021-12-08 ENCOUNTER — OFFICE VISIT (OUTPATIENT)
Dept: OBGYN CLINIC | Facility: HOSPITAL | Age: 61
End: 2021-12-08
Payer: COMMERCIAL

## 2021-12-08 VITALS
HEIGHT: 60 IN | SYSTOLIC BLOOD PRESSURE: 100 MMHG | DIASTOLIC BLOOD PRESSURE: 80 MMHG | WEIGHT: 141 LBS | BODY MASS INDEX: 27.68 KG/M2

## 2021-12-08 DIAGNOSIS — M25.512 LEFT SHOULDER PAIN, UNSPECIFIED CHRONICITY: Primary | ICD-10-CM

## 2021-12-08 DIAGNOSIS — M18.11 ARTHRITIS OF CARPOMETACARPAL (CMC) JOINT OF RIGHT THUMB: ICD-10-CM

## 2021-12-08 PROCEDURE — 3008F BODY MASS INDEX DOCD: CPT | Performed by: ORTHOPAEDIC SURGERY

## 2021-12-08 PROCEDURE — 20600 DRAIN/INJ JOINT/BURSA W/O US: CPT | Performed by: ORTHOPAEDIC SURGERY

## 2021-12-08 PROCEDURE — 1036F TOBACCO NON-USER: CPT | Performed by: ORTHOPAEDIC SURGERY

## 2021-12-08 PROCEDURE — 99213 OFFICE O/P EST LOW 20 MIN: CPT | Performed by: ORTHOPAEDIC SURGERY

## 2021-12-08 RX ORDER — LIDOCAINE HYDROCHLORIDE 10 MG/ML
0.5 INJECTION, SOLUTION INFILTRATION; PERINEURAL
Status: COMPLETED | OUTPATIENT
Start: 2021-12-08 | End: 2021-12-08

## 2021-12-08 RX ORDER — BETAMETHASONE SODIUM PHOSPHATE AND BETAMETHASONE ACETATE 3; 3 MG/ML; MG/ML
3 INJECTION, SUSPENSION INTRA-ARTICULAR; INTRALESIONAL; INTRAMUSCULAR; SOFT TISSUE
Status: COMPLETED | OUTPATIENT
Start: 2021-12-08 | End: 2021-12-08

## 2021-12-08 RX ADMIN — BETAMETHASONE SODIUM PHOSPHATE AND BETAMETHASONE ACETATE 3 MG: 3; 3 INJECTION, SUSPENSION INTRA-ARTICULAR; INTRALESIONAL; INTRAMUSCULAR; SOFT TISSUE at 11:16

## 2021-12-08 RX ADMIN — LIDOCAINE HYDROCHLORIDE 0.5 ML: 10 INJECTION, SOLUTION INFILTRATION; PERINEURAL at 11:16

## 2021-12-22 ENCOUNTER — TELEPHONE (OUTPATIENT)
Dept: INTERNAL MEDICINE CLINIC | Facility: CLINIC | Age: 61
End: 2021-12-22

## 2021-12-22 DIAGNOSIS — E78.5 HYPERLIPIDEMIA, UNSPECIFIED HYPERLIPIDEMIA TYPE: ICD-10-CM

## 2021-12-23 RX ORDER — DULOXETIN HYDROCHLORIDE 20 MG/1
20 CAPSULE, DELAYED RELEASE ORAL DAILY
Qty: 90 CAPSULE | Refills: 0 | Status: SHIPPED | OUTPATIENT
Start: 2021-12-23 | End: 2022-04-14 | Stop reason: SDUPTHER

## 2021-12-23 RX ORDER — BUPROPION HYDROCHLORIDE 300 MG/1
300 TABLET ORAL DAILY
Qty: 90 TABLET | Refills: 0 | Status: SHIPPED | OUTPATIENT
Start: 2021-12-23 | End: 2022-07-28 | Stop reason: SDUPTHER

## 2022-01-21 ENCOUNTER — OFFICE VISIT (OUTPATIENT)
Dept: OBGYN CLINIC | Facility: MEDICAL CENTER | Age: 62
End: 2022-01-21
Payer: COMMERCIAL

## 2022-01-21 ENCOUNTER — APPOINTMENT (OUTPATIENT)
Dept: RADIOLOGY | Facility: MEDICAL CENTER | Age: 62
End: 2022-01-21
Payer: COMMERCIAL

## 2022-01-21 VITALS
WEIGHT: 141 LBS | SYSTOLIC BLOOD PRESSURE: 108 MMHG | DIASTOLIC BLOOD PRESSURE: 68 MMHG | HEART RATE: 78 BPM | BODY MASS INDEX: 22.66 KG/M2 | HEIGHT: 66 IN

## 2022-01-21 DIAGNOSIS — M25.512 LEFT SHOULDER PAIN, UNSPECIFIED CHRONICITY: ICD-10-CM

## 2022-01-21 DIAGNOSIS — M75.52 ACUTE BURSITIS OF LEFT SHOULDER: Primary | ICD-10-CM

## 2022-01-21 PROCEDURE — 99214 OFFICE O/P EST MOD 30 MIN: CPT | Performed by: PHYSICIAN ASSISTANT

## 2022-01-21 PROCEDURE — 73030 X-RAY EXAM OF SHOULDER: CPT

## 2022-01-21 PROCEDURE — 3008F BODY MASS INDEX DOCD: CPT | Performed by: PHYSICIAN ASSISTANT

## 2022-01-21 PROCEDURE — 20610 DRAIN/INJ JOINT/BURSA W/O US: CPT | Performed by: PHYSICIAN ASSISTANT

## 2022-01-21 PROCEDURE — 1036F TOBACCO NON-USER: CPT | Performed by: PHYSICIAN ASSISTANT

## 2022-01-21 RX ORDER — BUPIVACAINE HYDROCHLORIDE 5 MG/ML
6 INJECTION, SOLUTION EPIDURAL; INTRACAUDAL
Status: COMPLETED | OUTPATIENT
Start: 2022-01-21 | End: 2022-01-21

## 2022-01-21 RX ORDER — TRIAMCINOLONE ACETONIDE 40 MG/ML
40 INJECTION, SUSPENSION INTRA-ARTICULAR; INTRAMUSCULAR
Status: COMPLETED | OUTPATIENT
Start: 2022-01-21 | End: 2022-01-21

## 2022-01-21 RX ADMIN — BUPIVACAINE HYDROCHLORIDE 6 ML: 5 INJECTION, SOLUTION EPIDURAL; INTRACAUDAL at 10:40

## 2022-01-21 RX ADMIN — TRIAMCINOLONE ACETONIDE 40 MG: 40 INJECTION, SUSPENSION INTRA-ARTICULAR; INTRAMUSCULAR at 10:40

## 2022-01-21 NOTE — PROGRESS NOTES
Orthopaedic Surgery - Office Note  Leopoldo Paz (89 y o  female)   : 1960   MRN: [id number]  Encounter Date: 2022    Chief Complaint   Patient presents with    Left Shoulder - Pain       Assessment / Plan  L shoulder A/C joint arthritis with subacromial bursitis    · After discussion risks and benefits patient agreed to proceed with a steroid injection of the left subacromial space  This was prepared and injected sterilely and tolerated well  · Continue with ice and analgesics as needed for pain  · Patient was provided with home exercises which she will start at this time for rotator cuff strengthening and scapular stabilization  Patient will contact us if she would like to try outpatient physical therapy  · We did discuss that if her pain continued or worsened we can consider an MRI going forward  Return in about 3 months (around 2022)  History of Present Illness  Leopoldo Paz is a 59 y o  female who presents for ongoing shoulder pain since a flu shot in 2020  She did see Dr Carlie Richards at [facility] who did a steroid injection in 2020 that did relieve her symptoms for approximately 5 this 6 months  After that her pain did return with certain movements  These included going behind her back and across her body  Most of her pain at this time is over the [facility] joint with mild discomfort laterally in the subacromial space  She is denying any pain at night at this time  She denies any radiation of her pain down her arm  She does have some stiffness in her neck but no radiation of her pain  Review of Systems  Pertinent items are noted in HPI  All other systems were reviewed and are negative  Physical Exam  /68   Pulse 78   Ht 5' 6" (1 676 m)   Wt 64 kg (141 lb)   LMP  (LMP Unknown)   BMI 22 76 kg/m²   Cons: Appears well  No apparent distress  Psych: Alert  Oriented x3  Mood and affect normal   Eyes: PERRLA, EOMI  Resp: Normal effort    No audible wheezing or stridor  CV: Palpable pulse  No discernable arrhythmia  No LE edema  Lymph:  No palpable cervical, axillary, or inguinal lymphadenopathy  Skin: Warm  No palpable masses  No visible lesions  Neuro: Normal muscle tone  Normal and symmetric DTR's  Left Shoulder Exam  Alignment / Posture:  Normal shoulder posture  Normal scapular position  Inspection:  No swelling  No erythema  No deformity  Palpation:  Mild tenderness at [facility] joint of the left shoulder  Minimal soreness with palpation of the subacromial space  ROM:  Shoulder °  Shoulder ER 85°  Shoulder IR T10  Strength:  5/5 supraspinatus, infraspinatus, and subscapularis  Stability:  No objective shoulder instability  Tests: (+) Neer  (+) Painful arc  (+) Cross-body adduction  (-) Rosario Willi  (-) Belly press  (-) Speed  (-) Spurling  Neurovascular:  Sensation intact in Ax/R/M/U nerve distributions  Sensation intact in all digital nerve distributions  Fingers warm and perfused  Studies Reviewed  I have personally reviewed pertinent films in PACS  XR of left shoulder - From 01/21/2022 shows no fracture or dislocation with mild narrowing of the [facility] joint with associated spurring  Large joint arthrocentesis: L subacromial bursa  Universal Protocol:  Consent given by: patient    Supporting Documentation  Indications: pain   Procedure Details  Location: shoulder - L subacromial bursa  Needle size: 22 G  Approach: lateral  Medications administered: 6 mL bupivacaine (PF) 0 5 %; 40 mg triamcinolone acetonide 40 mg/mL    Patient tolerance: patient tolerated the procedure well with no immediate complications  Dressing:  Sterile dressing applied             Medical, Surgical, Family, and Social History  The patient's medical history, family history, and social history, were reviewed and updated as appropriate      Past Medical History:   Diagnosis Date    Adenomyomatosis of gallbladder 1/19/2021    Anxiety     Arthritis     Calculus of gallbladder     Tiburcio ulcer     Depression     Hyperlipidemia     Migraine     Mitral valve prolapse     Murmur     Neck pain     at times    Pituitary adenoma (HCC)     Pituitary adenoma (HCC)        Past Surgical History:   Procedure Laterality Date    BREAST EXCISIONAL BIOPSY Left 1996    benign    BREAST SURGERY      L breast cyst removal-benign    CATARACT EXTRACTION Bilateral     CATARACT EXTRACTION, BILATERAL  01/2020    COLONOSCOPY      DE QUERVAIN'S RELEASE Bilateral     EGD AND COLONOSCOPY N/A 5/11/2016    Procedure: EGD AND COLONOSCOPY;  Surgeon: Arminda Em MD;  Location: [facility];   Service:     LAPAROSCOPIC MAGNETIC SPHINCTER AUGMENTATION N/A 7/20/2020    Procedure: MAGNETIC SPHINCTER AUGMENT, LINX PLACEMENT;  Surgeon: Laverne Pulido MD;  [facility];  Service: Bariatrics    PARAESOPHAGEAL HERNIA REPAIR N/A 7/20/2020    Procedure: REPAIR HERNIA PARAESOPHAGEAL  LAPAROSCOPIC, PARTIAL GASTRECTOMY;  Surgeon: Laverne Pulido MD;  [facility];  Service: Bariatrics    ME INCIS TENDON [address] STYLOID Left 11/21/2019    Procedure: Mary Bk AND TENDON GRAFT;  Surgeon: Carlos Rodriguez MD;  [facility];  Service: Orthopedics    ME LAP, REPAIR PARAESOPHAGEAL HERNIA, INCL FUNDOPLASTY W/ MESH N/A 8/31/2016    Procedure: REPAIR HERNIA PARAESOPHAGEAL  with bio A mesh LAPAROSCOPIC NISSEN FUNDOPLICATION Laparoscopic Gastroplexy Intraop EGD #[id number];  Surgeon: Laverne Pulido MD;  [facility];  Service: Bariatrics    ME LAP,CHOLECYSTECTOMY N/A 3/4/2021    Procedure: Robotic cholecystectomy ;  Surgeon: Alirio Blank MD;  [facility];  Service: General    ME REPAIR INTERCARP/CARP-METACARP JT Left 11/21/2019    Procedure: THUMB [address] ARTHROPLASTY;  Surgeon: Carlos Rodriguez MD;  [facility];  Service: Orthopedics    TONSILLECTOMY      WRIST SURGERY Bilateral     daquero vein release       Family History   Problem Relation Age of Onset    Anxiety disorder Mother     Arthritis Mother     Hyperlipidemia Mother     Hypertension Mother    [facility] Prostate cancer Father 76    Dementia Father     Parkinsonism Father     Other Father 80        bladder cancer    Ovarian cancer Maternal Grandmother 72    Breast cancer Paternal Aunt 66    Breast cancer Cousin 36    Colon cancer Paternal Aunt 48    Pancreatic cancer Paternal Aunt 67    No Known Problems Sister     No Known Problems Daughter        Social History     Occupational History    Not on file   Tobacco Use    Smoking status: Never Smoker    Smokeless tobacco: Never Used   Vaping Use    Vaping Use: Never used   Substance and Sexual Activity    Alcohol use: Yes     Comment: liquor, social drinker    Drug use: No    Sexual activity: Yes     Partners: Male     Comment:        No Known Allergies      Current Outpatient Medications:     ascorbic acid (VITAMIN C) 500 mg tablet, Take 500 mg by mouth 2 (two) times a day , Disp: , Rfl:     aspirin 81 MG tablet, Take 1 tablet by mouth daily , Disp: , Rfl:     atorvastatin (LIPITOR) 10 mg tablet, Take 1 tablet (10 mg total) by mouth every evening, Disp: 90 tablet, Rfl: 3    buPROPion (Wellbutrin XL) 300 mg 24 hr tablet, Take 1 tablet (300 mg total) by mouth daily, Disp: 90 tablet, Rfl: 0    Cholecalciferol (VITAMIN D) 2000 UNITS CAPS, Take 1 tablet by mouth daily , Disp: , Rfl:     DULoxetine (CYMBALTA) 20 mg capsule, Take 1 capsule (20 mg total) by mouth daily, Disp: 90 capsule, Rfl: 0    multivitamin (THERAGRAN) TABS, Take 1 tablet by mouth daily  , Disp: , Rfl:     Ospemifene 60 MG TABS, Take 1 tablet (60 mg total) by mouth daily, Disp: 90 tablet, Rfl: 3    SUMAtriptan (IMITREX) 100 mg tablet, Take 1 tablet by mouth for migraine relief  May repeat 2 hours later   Maximum 200mg/day , Disp: 27 tablet, Rfl: 0    vitamin E, tocopherol, 400 units capsule, Take 400 Units by mouth daily Pt stopped, Disp: , Rfl: Ysabel Damon PA-C    Scribe Attestation    I,:   am acting as a scribe while in the presence of the attending physician :       I,:   personally performed the services described in this documentation    as scribed in my presence :

## 2022-03-11 DIAGNOSIS — G43.909 MIGRAINE WITHOUT STATUS MIGRAINOSUS, NOT INTRACTABLE, UNSPECIFIED MIGRAINE TYPE: ICD-10-CM

## 2022-03-14 RX ORDER — SUMATRIPTAN 100 MG/1
TABLET, FILM COATED ORAL
Qty: 27 TABLET | Refills: 0 | Status: SHIPPED | OUTPATIENT
Start: 2022-03-14 | End: 2022-06-03 | Stop reason: SDUPTHER

## 2022-04-01 ENCOUNTER — OFFICE VISIT (OUTPATIENT)
Dept: OBGYN CLINIC | Facility: HOSPITAL | Age: 62
End: 2022-04-01
Payer: COMMERCIAL

## 2022-04-01 VITALS
BODY MASS INDEX: 22.73 KG/M2 | HEART RATE: 79 BPM | HEIGHT: 66 IN | DIASTOLIC BLOOD PRESSURE: 80 MMHG | SYSTOLIC BLOOD PRESSURE: 124 MMHG | WEIGHT: 141.4 LBS

## 2022-04-01 DIAGNOSIS — M18.11 PRIMARY OSTEOARTHRITIS OF FIRST CARPOMETACARPAL JOINT OF RIGHT HAND: Primary | ICD-10-CM

## 2022-04-01 PROCEDURE — 3008F BODY MASS INDEX DOCD: CPT | Performed by: ORTHOPAEDIC SURGERY

## 2022-04-01 PROCEDURE — 99213 OFFICE O/P EST LOW 20 MIN: CPT | Performed by: ORTHOPAEDIC SURGERY

## 2022-04-01 PROCEDURE — 1036F TOBACCO NON-USER: CPT | Performed by: ORTHOPAEDIC SURGERY

## 2022-04-01 NOTE — PROGRESS NOTES
ASSESSMENT/PLAN:    Assessment:   Thumb CMC Arthritis  right    Plan: Will continue with injection therapy as needed    Follow Up:  2  month(s)    In regards to her left shoulder pain, patient was referred to see Dr Doretha Campos for evaluation and management      _____________________________________________________  CHIEF COMPLAINT:  Chief Complaint   Patient presents with    Right Thumb - Follow-up         SUBJECTIVE:  Zeferino Braga is a 59 y o  female who presents for follow up regarding right thumb CMC arthritis  Patient is doing well at this time  She currently has no pain  She does not feel that she needs another injection today  PAST MEDICAL HISTORY:  Past Medical History:   Diagnosis Date    Adenomyomatosis of gallbladder 1/19/2021    Anxiety     Arthritis     Calculus of gallbladder     Tiburcio ulcer     Depression     Hyperlipidemia     Migraine     Mitral valve prolapse     Murmur     Neck pain     at times    Pituitary adenoma (HCC)     Pituitary adenoma ([address] )     Shoulder injury related to vaccine administration ([facility]) 09/2020       PAST SURGICAL HISTORY:  Past Surgical History:   Procedure Laterality Date    BREAST EXCISIONAL BIOPSY Left 1996    benign    BREAST SURGERY      L breast cyst removal-benign    CATARACT EXTRACTION Bilateral     CATARACT EXTRACTION, BILATERAL  01/2020    COLONOSCOPY      DE QUERVAIN'S RELEASE Bilateral     EGD AND COLONOSCOPY N/A 5/11/2016    Procedure: EGD AND COLONOSCOPY;  Surgeon: Joce Colmenares MD;  Location: [facility];   Service:     LAPAROSCOPIC MAGNETIC SPHINCTER AUGMENTATION N/A 7/20/2020    Procedure: MAGNETIC SPHINCTER AUGMENT, LINX PLACEMENT;  Surgeon: Shyla Agrawal MD;  [facility];  Service: Bariatrics    PARAESOPHAGEAL HERNIA REPAIR N/A 7/20/2020    Procedure: REPAIR HERNIA PARAESOPHAGEAL  LAPAROSCOPIC, PARTIAL GASTRECTOMY;  Surgeon: Shyla Agrawal MD;  [facility];  Service: Bariatrics    CO INCIS TENDON SHEATH,RADIAL STYLOID Left 11/21/2019    Procedure: RELEASE DEQUERVAINS AND TENDON GRAFT;  Surgeon: Keena Maddox MD;  [facility];  Service: Orthopedics    AL LAP, REPAIR PARAESOPHAGEAL HERNIA, INCL FUNDOPLASTY W/ MESH N/A 8/31/2016    Procedure: REPAIR HERNIA PARAESOPHAGEAL  with bio A mesh LAPAROSCOPIC NISSEN FUNDOPLICATION Laparoscopic Gastroplexy Intraop EGD #[id number];  Surgeon: Laly Salguero MD;  [facility];  Service: Bariatrics    AL LAP,CHOLECYSTECTOMY N/A 3/4/2021    Procedure: Robotic cholecystectomy ;  Surgeon: Mariposa Mejia MD;  [facility];  Service: General    AL REPAIR INTERCARP/CARP-METACARP JT Left 11/21/2019    Procedure: THUMB [address];  Surgeon: Keena Maddox MD;  [facility];  Service: Orthopedics    TONSILLECTOMY      WRIST SURGERY Bilateral     daquero vein release       FAMILY HISTORY:  Family History   Problem Relation Age of Onset    Anxiety disorder Mother     Arthritis Mother     Hyperlipidemia Mother     Hypertension Mother     Prostate cancer Father 76    Dementia Father     Parkinsonism Father     Other Father 80        bladder cancer    Ovarian cancer Maternal Grandmother 72    Breast cancer Paternal Aunt 66    Breast cancer Cousin 36    Colon cancer Paternal Aunt 48    Pancreatic cancer Paternal Aunt 67    No Known Problems Sister     No Known Problems Daughter        SOCIAL HISTORY:  Social History     Tobacco Use    Smoking status: Never Smoker    Smokeless tobacco: Never Used   Vaping Use    Vaping Use: Never used   Substance Use Topics    Alcohol use: Yes     Comment: liquor, social drinker    Drug use: No       MEDICATIONS:    Current Outpatient Medications:     ascorbic acid (VITAMIN C) 500 mg tablet, Take 500 mg by mouth 2 (two) times a day , Disp: , Rfl:     aspirin 81 MG tablet, Take 1 tablet by mouth daily , Disp: , Rfl:     atorvastatin (LIPITOR) 10 mg tablet, Take 1 tablet (10 mg total) by mouth every evening, Disp: 90 tablet, Rfl: 3    buPROPion (Wellbutrin XL) 300 mg 24 hr tablet, Take 1 tablet (300 mg total) by mouth daily, Disp: 90 tablet, Rfl: 0    Cholecalciferol (VITAMIN D) 2000 UNITS CAPS, Take 1 tablet by mouth daily , Disp: , Rfl:     DULoxetine (CYMBALTA) 20 mg capsule, Take 1 capsule (20 mg total) by mouth daily, Disp: 90 capsule, Rfl: 0    multivitamin (THERAGRAN) TABS, Take 1 tablet by mouth daily  , Disp: , Rfl:     Ospemifene 60 MG TABS, Take 1 tablet (60 mg total) by mouth daily, Disp: 90 tablet, Rfl: 3    SUMAtriptan (IMITREX) 100 mg tablet, Take 1 tablet by mouth for migraine relief  May repeat 2 hours later  Maximum 200mg/day , Disp: 27 tablet, Rfl: 0    vitamin E, tocopherol, 400 units capsule, Take 400 Units by mouth daily Pt stopped, Disp: , Rfl:     ALLERGIES:  No Known Allergies    REVIEW OF SYSTEMS:  Pertinent items are noted in HPI  A comprehensive review of systems was negative      LABS:  HgA1c:   Lab Results   Component Value Date    HGBA1C 5 3 09/20/2021     BMP:   Lab Results   Component Value Date    GLUCOSE 97 06/27/2015    CALCIUM 9 0 09/20/2021     06/27/2015    K 4 0 09/20/2021    CO2 27 09/20/2021     09/20/2021    BUN 12 09/20/2021    CREATININE 0 61 09/20/2021           _____________________________________________________  PHYSICAL EXAMINATION:  Vital signs: /80   Pulse 79   Ht 5' 6" (1 676 m)   Wt 64 1 kg (141 lb 6 4 oz)   LMP  (LMP Unknown)   BMI 22 82 kg/m²   General: well developed and well nourished, alert, oriented times 3 and appears comfortable  Psychiatric: Normal  HEENT: Trachea Midline, No torticollis  Cardiovascular: No discernable arrhythmia  Pulmonary: No wheezing or stridor  Abdomen: No rebound or guarding  Extremities: No peripheral edema  Skin: No masses, erythema, lacerations, fluctation, ulcerations  Neurovascular: Sensation Intact to the Median, Ulnar, Radial Nerve, Motor Intact to the Median, Ulnar, Radial Nerve and Pulses Intact    MUSCULOSKELETAL EXAMINATION:  RIGHT SIDE:  CMC: Negative Grind and No tenderness to [facility]    _____________________________________________________  STUDIES REVIEWED:  No Studies to review      PROCEDURES PERFORMED:  Procedures  No Procedures performed today    Scribe Attestation    I,:  Anjel Gama PA-C am acting as a scribe while in the presence of the attending physician :       I,:  Harley Vogt MD personally performed the services described in this documentation    as scribed in my presence :

## 2022-04-08 ENCOUNTER — TELEPHONE (OUTPATIENT)
Dept: INTERNAL MEDICINE CLINIC | Facility: CLINIC | Age: 62
End: 2022-04-08

## 2022-04-08 NOTE — TELEPHONE ENCOUNTER
Pt called asking if we received medical records from dr Cornell Wallace, I looked and we did not receive them, she states she sent him a letter asking for these records to be sent to us  I called dr Raymundo Guerrero office had to leave message to send them to us, l left phone and fax [phone]   I will keep looking to see, patient also said she will call to see if we received them

## 2022-04-14 DIAGNOSIS — E78.5 HYPERLIPIDEMIA, UNSPECIFIED HYPERLIPIDEMIA TYPE: ICD-10-CM

## 2022-04-14 RX ORDER — DULOXETIN HYDROCHLORIDE 20 MG/1
20 CAPSULE, DELAYED RELEASE ORAL DAILY
Qty: 90 CAPSULE | Refills: 0 | Status: SHIPPED | OUTPATIENT
Start: 2022-04-14 | End: 2022-07-14 | Stop reason: SDUPTHER

## 2022-04-27 ENCOUNTER — TELEPHONE (OUTPATIENT)
Dept: INTERNAL MEDICINE CLINIC | Facility: CLINIC | Age: 62
End: 2022-04-27

## 2022-04-27 NOTE — TELEPHONE ENCOUNTER
Jose Fay has called the [facility] office in regards to records from Dr Leonard Davis has stated she has spoken to the office of Dr Leonard Lewis to have records transferred to the IM office, yet has not received information if it had been sent to the  Office  Flip wished to know if the IM office had received records from Dr Leonard Lewis  Looked into patient's chart to see no records from Dr Leonard Lewis  Asked Kassidy Davis if she had Dr Juan Chacon phone number to call to request records, which Kassidy Davis did  Kassidy Davis stated the number is [phone]

## 2022-04-27 NOTE — TELEPHONE ENCOUNTER
Attempted to call Dr Clarke Dang to request records to be sent to the [address] [place] [address]

## 2022-04-29 NOTE — TELEPHONE ENCOUNTER
I spoke to mitchell as annabel said she spoke to mitchell, his response was ,Yes, I put in a telephone encounter on 04/27/2022  Pager is [phone] and phone number is [phone]  I did try call on Wednesday and had to leave them a message

## 2022-05-03 NOTE — TELEPHONE ENCOUNTER
We have contacted dr Hernan Chacon numerous times , he doesn't call and hasnt sent records , we have informed the patient of this , im closing this out

## 2022-05-24 ENCOUNTER — OFFICE VISIT (OUTPATIENT)
Dept: OBGYN CLINIC | Facility: MEDICAL CENTER | Age: 62
End: 2022-05-24
Payer: COMMERCIAL

## 2022-05-24 VITALS
HEIGHT: 66 IN | HEART RATE: 76 BPM | WEIGHT: 141 LBS | BODY MASS INDEX: 22.66 KG/M2 | DIASTOLIC BLOOD PRESSURE: 84 MMHG | SYSTOLIC BLOOD PRESSURE: 116 MMHG

## 2022-05-24 DIAGNOSIS — M75.52 ACUTE BURSITIS OF LEFT SHOULDER: Primary | ICD-10-CM

## 2022-05-24 PROCEDURE — 20610 DRAIN/INJ JOINT/BURSA W/O US: CPT | Performed by: ORTHOPAEDIC SURGERY

## 2022-05-24 PROCEDURE — 99214 OFFICE O/P EST MOD 30 MIN: CPT | Performed by: ORTHOPAEDIC SURGERY

## 2022-05-24 PROCEDURE — 1036F TOBACCO NON-USER: CPT | Performed by: ORTHOPAEDIC SURGERY

## 2022-05-24 PROCEDURE — 3008F BODY MASS INDEX DOCD: CPT | Performed by: ORTHOPAEDIC SURGERY

## 2022-05-24 RX ORDER — METHYLPREDNISOLONE ACETATE 40 MG/ML
1 INJECTION, SUSPENSION INTRA-ARTICULAR; INTRALESIONAL; INTRAMUSCULAR; SOFT TISSUE
Status: COMPLETED | OUTPATIENT
Start: 2022-05-24 | End: 2022-05-24

## 2022-05-24 RX ORDER — BUPIVACAINE HYDROCHLORIDE 2.5 MG/ML
4 INJECTION, SOLUTION INFILTRATION; PERINEURAL
Status: COMPLETED | OUTPATIENT
Start: 2022-05-24 | End: 2022-05-24

## 2022-05-24 RX ADMIN — METHYLPREDNISOLONE ACETATE 1 ML: 40 INJECTION, SUSPENSION INTRA-ARTICULAR; INTRALESIONAL; INTRAMUSCULAR; SOFT TISSUE at 09:35

## 2022-05-24 RX ADMIN — BUPIVACAINE HYDROCHLORIDE 4 ML: 2.5 INJECTION, SOLUTION INFILTRATION; PERINEURAL at 09:35

## 2022-05-24 NOTE — PROGRESS NOTES
Orthopaedic Surgery - Office Note  La Nena Victor (50 y o  female)   : 1960   MRN: [id number]  Encounter Date: 2022    Chief Complaint   Patient presents with    Left Shoulder - Follow-up       Assessment / Plan  Left shoulder AC joint mild arthritis with subacromial bursitis, responding well with nonsurgical treatment  · Discussed MRI for left shoulder if pain worsens or continues to further evaluate cuff  · CSI of left subacromial bursa  was performed  · Discussed caution with continued injections for a bursa/tendon  · Activity as tolerated  · Continue home exercise program  · Anti-inflammatories or Tylenol prn pain  · Ice and heat as needed  Return if symptoms worsen or fail to improve  History of Present Illness  La Nena Victor is a 59 y o  female who presents today for follow-up evaluation left shoulder pain  She is right-hand dominant  She states she continues to experience mild pain with certain sharp movements, but has not noticed any decrease in her range of motion  She mentions that her cortisone injection which she received her previous visit on 2022 to did provide her relief for around a month or so  She states that she was compliant with her home exercise program focusing on band work which provided her mild relief  She denies any new injury or trauma  She denies any distal paresthesias  At this time she would like to receive another cortisone injection and continue with nonsurgical intervention  Review of Systems  Pertinent items are noted in HPI  All other systems were reviewed and are negative  Physical Exam  /84   Pulse 76   Ht 5' 6" (1 676 m)   Wt 64 kg (141 lb)   LMP  (LMP Unknown)   BMI 22 76 kg/m²   Cons: Appears well  No apparent distress  Psych: Alert  Oriented x3  Mood and affect normal   Eyes: PERRLA, EOMI  Resp: Normal effort  No audible wheezing or stridor  CV: Palpable pulse  No discernable arrhythmia  No LE edema    Lymph: No palpable cervical, axillary, or inguinal lymphadenopathy  Skin: Warm  No palpable masses  No visible lesions  Neuro: Normal muscle tone  Normal and symmetric DTR's  Left Shoulder Exam  Alignment / Posture:  Normal shoulder posture  Inspection:  No swelling  No edema  Palpation:  Mild subacromial tenderness  No effusion  ROM:  Shoulder   Shoulder ER 70  Shoulder IR T6   Strength:  Rotator cuff 5/5  Stability:  No objective shoulder instability  Tests: (+) Lehman  (+) Neer  Neurovascular:  Sensation intact in Ax/R/M/U nerve distributions  2+ radial pulse  Studies Reviewed  XR of left shoulder - Performed on 01/21/2022 reveal mild joint space narrowing with spurring  No acute osseous abnormalities  Large joint arthrocentesis: L subacromial bursa  Universal Protocol:  Consent: Verbal consent obtained  Risks and benefits: risks, benefits and alternatives were discussed  Consent given by: patient  Timeout called at: 5/24/2022 9:34 AM   Patient identity confirmed: verbally with patient    Supporting Documentation  Indications: pain and diagnostic evaluation   Procedure Details  Location: shoulder - L subacromial bursa  Needle size: 22 G  Ultrasound guidance: no  Approach: anterior  Medications administered: 4 mL bupivacaine 0 25 %; 1 mL methylPREDNISolone acetate 40 mg/mL    Patient tolerance: patient tolerated the procedure well with no immediate complications  Dressing:  Sterile dressing applied          Medical, Surgical, Family, and Social History  The patient's medical history, family history, and social history, were reviewed and updated as appropriate      Past Medical History:   Diagnosis Date    Adenomyomatosis of gallbladder 1/19/2021    Anxiety     Arthritis     Calculus of gallbladder     Tiburcio ulcer     Depression     Hyperlipidemia     Migraine     Mitral valve prolapse     Murmur     Neck pain     at times    Pituitary adenoma ([address] )     Pituitary adenoma ([address] )  Shoulder injury related to vaccine administration ([facility]IR[facility]) 09/2020       Past Surgical History:   Procedure Laterality Date    BREAST EXCISIONAL BIOPSY Left 1996    benign    BREAST SURGERY      L breast cyst removal-benign    CATARACT EXTRACTION Bilateral     CATARACT EXTRACTION, BILATERAL  01/2020    COLONOSCOPY      DE QUERVAIN'S RELEASE Bilateral     EGD AND COLONOSCOPY N/A 5/11/2016    Procedure: EGD AND COLONOSCOPY;  Surgeon: Charlotte Blanchard MD;  Location: [facility];   Service:     LAPAROSCOPIC MAGNETIC SPHINCTER AUGMENTATION N/A 7/20/2020    Procedure: MAGNETIC SPHINCTER AUGMENT, LINX PLACEMENT;  Surgeon: Tiffanie Leon MD;  [facility];  Service: Bariatrics    PARAESOPHAGEAL HERNIA REPAIR N/A 7/20/2020    Procedure: REPAIR HERNIA PARAESOPHAGEAL  LAPAROSCOPIC, PARTIAL GASTRECTOMY;  Surgeon: Tiffanie Leon MD;  [facility];  Service: Bariatrics    MN INCIS TENDON SHEATH,RADIAL STYLOID Left 11/21/2019    Procedure: Samantha Villanueva AND TENDON GRAFT;  Surgeon: Luann Xiao MD;  [facility];  Service: Orthopedics    MN LAP, REPAIR PARAESOPHAGEAL HERNIA, INCL FUNDOPLASTY W/ MESH N/A 8/31/2016    Procedure: REPAIR HERNIA PARAESOPHAGEAL  with bio A mesh LAPAROSCOPIC NISSEN FUNDOPLICATION Laparoscopic Gastroplexy Intraop EGD #[id number];  Surgeon: Tiffanie Leon MD;  [facility];  Service: Bariatrics    MN LAP,CHOLECYSTECTOMY N/A 3/4/2021    Procedure: Robotic cholecystectomy ;  Surgeon: Aisha Rao MD;  [facility];  Service: General    MN REPAIR INTERCARP/CARP-METACARP JT Left 11/21/2019    Procedure: THUMB [facility] ARTHROPLASTY;  Surgeon: Luann Xiao MD;  [facility];  Service: Orthopedics    TONSILLECTOMY      WRIST SURGERY Bilateral     daquero vein release       Family History   Problem Relation Age of Onset    Anxiety disorder Mother     Arthritis Mother     Hyperlipidemia Mother     Hypertension Mother     Prostate cancer Father 76    Dementia Father     Parkinsonism Father     Other Father 80        bladder cancer    Ovarian cancer Maternal Grandmother 72    Breast cancer Paternal Aunt 66    Breast cancer Cousin 36    Colon cancer Paternal Aunt 48    Pancreatic cancer Paternal Aunt 67    No Known Problems Sister     No Known Problems Daughter        Social History     Occupational History    Not on file   Tobacco Use    Smoking status: Never Smoker    Smokeless tobacco: Never Used   Vaping Use    Vaping Use: Never used   Substance and Sexual Activity    Alcohol use: Yes     Comment: liquor, social drinker    Drug use: No    Sexual activity: Yes     Partners: Male     Comment:        No Known Allergies      Current Outpatient Medications:     ascorbic acid (VITAMIN C) 500 mg tablet, Take 500 mg by mouth 2 (two) times a day , Disp: , Rfl:     aspirin 81 MG tablet, Take 1 tablet by mouth daily , Disp: , Rfl:     atorvastatin (LIPITOR) 10 mg tablet, Take 1 tablet (10 mg total) by mouth every evening, Disp: 90 tablet, Rfl: 3    buPROPion (Wellbutrin XL) 300 mg 24 hr tablet, Take 1 tablet (300 mg total) by mouth daily, Disp: 90 tablet, Rfl: 0    Cholecalciferol (VITAMIN D) 2000 UNITS CAPS, Take 1 tablet by mouth daily , Disp: , Rfl:     DULoxetine (CYMBALTA) 20 mg capsule, Take 1 capsule (20 mg total) by mouth daily, Disp: 90 capsule, Rfl: 0    multivitamin (THERAGRAN) TABS, Take 1 tablet by mouth daily  , Disp: , Rfl:     Ospemifene 60 MG TABS, Take 1 tablet (60 mg total) by mouth daily, Disp: 90 tablet, Rfl: 3    SUMAtriptan (IMITREX) 100 mg tablet, Take 1 tablet by mouth for migraine relief  May repeat 2 hours later   Maximum 200mg/day , Disp: 27 tablet, Rfl: 0    vitamin E, tocopherol, 400 units capsule, Take 400 Units by mouth daily Pt stopped, Disp: , Rfl:       Estee Laird    Scribe Attestation    I,:  Estee Laird am acting as a scribe while in the presence of the attending physician : I,:  Darwin De La Cruz MD personally performed the services described in this documentation    as scribed in my presence :

## 2022-06-03 ENCOUNTER — OFFICE VISIT (OUTPATIENT)
Dept: OBGYN CLINIC | Facility: HOSPITAL | Age: 62
End: 2022-06-03
Payer: COMMERCIAL

## 2022-06-03 VITALS
SYSTOLIC BLOOD PRESSURE: 110 MMHG | WEIGHT: 141 LBS | HEIGHT: 66 IN | DIASTOLIC BLOOD PRESSURE: 80 MMHG | BODY MASS INDEX: 22.66 KG/M2

## 2022-06-03 DIAGNOSIS — G43.909 MIGRAINE WITHOUT STATUS MIGRAINOSUS, NOT INTRACTABLE, UNSPECIFIED MIGRAINE TYPE: ICD-10-CM

## 2022-06-03 DIAGNOSIS — M18.11 PRIMARY OSTEOARTHRITIS OF FIRST CARPOMETACARPAL JOINT OF RIGHT HAND: Primary | ICD-10-CM

## 2022-06-03 PROCEDURE — 1036F TOBACCO NON-USER: CPT | Performed by: ORTHOPAEDIC SURGERY

## 2022-06-03 PROCEDURE — 3008F BODY MASS INDEX DOCD: CPT | Performed by: ORTHOPAEDIC SURGERY

## 2022-06-03 PROCEDURE — 99213 OFFICE O/P EST LOW 20 MIN: CPT | Performed by: ORTHOPAEDIC SURGERY

## 2022-06-03 PROCEDURE — 20600 DRAIN/INJ JOINT/BURSA W/O US: CPT | Performed by: ORTHOPAEDIC SURGERY

## 2022-06-03 RX ORDER — TRIAMCINOLONE ACETONIDE 40 MG/ML
20 INJECTION, SUSPENSION INTRA-ARTICULAR; INTRAMUSCULAR
Status: COMPLETED | OUTPATIENT
Start: 2022-06-03 | End: 2022-06-03

## 2022-06-03 RX ORDER — SUMATRIPTAN 100 MG/1
TABLET, FILM COATED ORAL
Qty: 27 TABLET | Refills: 0 | Status: SHIPPED | OUTPATIENT
Start: 2022-06-03

## 2022-06-03 RX ORDER — LIDOCAINE HYDROCHLORIDE 10 MG/ML
0.5 INJECTION, SOLUTION INFILTRATION; PERINEURAL
Status: COMPLETED | OUTPATIENT
Start: 2022-06-03 | End: 2022-06-03

## 2022-06-03 RX ADMIN — LIDOCAINE HYDROCHLORIDE 0.5 ML: 10 INJECTION, SOLUTION INFILTRATION; PERINEURAL at 10:28

## 2022-06-03 RX ADMIN — TRIAMCINOLONE ACETONIDE 20 MG: 40 INJECTION, SUSPENSION INTRA-ARTICULAR; INTRAMUSCULAR at 10:28

## 2022-06-03 NOTE — PROGRESS NOTES
ASSESSMENT/PLAN:    Assessment:   Thumb CMC Arthritis  right    Plan:   Patient will proceed with a steroid injection into her right thumb with kenalog today  She has no formal restrictions  Follow Up:  4  month(s)    To Do Next Visit:       General Discussions:     CMC Arthritis: The anatomy and physiology of carpometacarpal joint arthritis was discussed with the patient today in the office  Deterioration of the articular cartilage eventually leads to hypermobility at the thumb [facility] joint, resulting in joint subluxation, osteophyte formation, cystic changes within the trapezium and base of the first metacarpal, as well as subchondral sclerosis  Eventually, pain, limited mobility, and compensatory hyperextension at the metacarpophalangeal joint may develop  While normal activity and usage of the thumb joint may provide a painful experience to the patient, this typically does not result in damage to the thumb or hand  Treatment options include resting thumb spica splints to decreased joint edema, pain, and inflammation  Therapy exercises to strengthen the thenar musculature may relieve pain, but do not alter the overall continued development of osteoarthritis  Oral medications, topical medications, corticosteroid injections may decrease pain and increase overall function  Eventually, approximately 5% of patients may require surgical intervention  Operative Discussions:       _____________________________________________________  CHIEF COMPLAINT:  Chief Complaint   Patient presents with    Right Thumb - Follow-up     CMC-Kenalog 12/8/21         SUBJECTIVE:  Kirstie Kinney is a 59 y o  female who presents for follow up regarding Thumb CMC Arthritis  right  Since last visit, Kirstie Kinney has tried steroid injections with only partial relief  Today there is Pain  Moderate  Intermittant  Sharp and Aching to the right thumb      Radiation: Yes to the  thumb  Associated symptoms: No Complaints    PAST MEDICAL HISTORY:  Past Medical History:   Diagnosis Date    Adenomyomatosis of gallbladder 1/19/2021    Anxiety     Arthritis     Calculus of gallbladder     Tiburcio ulcer     Depression     Hyperlipidemia     Migraine     Mitral valve prolapse     Murmur     Neck pain     at times    Pituitary adenoma (HCC)     Pituitary adenoma ([address]     Shoulder injury related to vaccine administration ([facility]) 09/2020       PAST SURGICAL HISTORY:  Past Surgical History:   Procedure Laterality Date    BREAST EXCISIONAL BIOPSY Left 1996    benign    BREAST SURGERY      L breast cyst removal-benign    CATARACT EXTRACTION Bilateral     CATARACT EXTRACTION, BILATERAL  01/2020    COLONOSCOPY      DE QUERVAIN'S RELEASE Bilateral     EGD AND COLONOSCOPY N/A 5/11/2016    Procedure: EGD AND COLONOSCOPY;  Surgeon: Nadeem Sauer MD;  Location: [facility];   Service:     LAPAROSCOPIC MAGNETIC SPHINCTER AUGMENTATION N/A 7/20/2020    Procedure: MAGNETIC SPHINCTER AUGMENT, LINX PLACEMENT;  Surgeon: Dung Neri MD;  [facility];  Service: Bariatrics    PARAESOPHAGEAL HERNIA REPAIR N/A 7/20/2020    Procedure: REPAIR HERNIA PARAESOPHAGEAL  LAPAROSCOPIC, PARTIAL GASTRECTOMY;  Surgeon: Dung Neri MD;  [facility];  Service: Bariatrics    IN INCIS TENDON SHEATH,RADIAL STYLOID Left 11/21/2019    Procedure: [place] AND TENDON GRAFT;  Surgeon: Jolly Wilson MD;  [facility];  Service: Orthopedics    IN LAP, REPAIR PARAESOPHAGEAL HERNIA, INCL FUNDOPLASTY W/ MESH N/A 8/31/2016    Procedure: REPAIR HERNIA PARAESOPHAGEAL  with bio A mesh LAPAROSCOPIC NISSEN FUNDOPLICATION Laparoscopic Gastroplexy Intraop EGD #[id number];  Surgeon: Dung Neri MD;  [facility];  Service: Bariatrics    IN LAP,CHOLECYSTECTOMY N/A 3/4/2021    Procedure: Robotic cholecystectomy ;  Surgeon: Melissa Alcantara MD;  [facility];  Service: General    IN REPAIR INTERCARP/CARP-METACARP JT Left 11/21/2019    Procedure: THUMB CMC ARTHROPLASTY;  Surgeon: Maria E Bell MD;  [facility];  Service: Orthopedics    TONSILLECTOMY      WRIST SURGERY Bilateral     daquero vein release       FAMILY HISTORY:  Family History   Problem Relation Age of Onset    Anxiety disorder Mother     Arthritis Mother     Hyperlipidemia Mother     Hypertension Mother     Prostate cancer Father 76    Dementia Father     Parkinsonism Father     Other Father 80        bladder cancer    Ovarian cancer Maternal Grandmother 72    Breast cancer Paternal Aunt 66    Breast cancer Cousin 36    Colon cancer Paternal Aunt 48    Pancreatic cancer Paternal Aunt 67    No Known Problems Sister     No Known Problems Daughter        SOCIAL HISTORY:  Social History     Tobacco Use    Smoking status: Never Smoker    Smokeless tobacco: Never Used   Vaping Use    Vaping Use: Never used   Substance Use Topics    Alcohol use: Yes     Comment: liquor, social drinker    Drug use: No       MEDICATIONS:    Current Outpatient Medications:     ascorbic acid (VITAMIN C) 500 mg tablet, Take 500 mg by mouth 2 (two) times a day , Disp: , Rfl:     aspirin 81 MG tablet, Take 1 tablet by mouth daily , Disp: , Rfl:     atorvastatin (LIPITOR) 10 mg tablet, Take 1 tablet (10 mg total) by mouth every evening, Disp: 90 tablet, Rfl: 3    buPROPion (Wellbutrin XL) 300 mg 24 hr tablet, Take 1 tablet (300 mg total) by mouth daily, Disp: 90 tablet, Rfl: 0    Cholecalciferol (VITAMIN D) 2000 UNITS CAPS, Take 1 tablet by mouth daily , Disp: , Rfl:     DULoxetine (CYMBALTA) 20 mg capsule, Take 1 capsule (20 mg total) by mouth daily, Disp: 90 capsule, Rfl: 0    multivitamin (THERAGRAN) TABS, Take 1 tablet by mouth daily  , Disp: , Rfl:     Ospemifene 60 MG TABS, Take 1 tablet (60 mg total) by mouth daily, Disp: 90 tablet, Rfl: 3    SUMAtriptan (IMITREX) 100 mg tablet, Take 1 tablet by mouth for migraine relief  May repeat 2 hours later  Maximum 200mg/day , Disp: 27 tablet, Rfl: 0    vitamin E, tocopherol, 400 units capsule, Take 400 Units by mouth daily Pt stopped, Disp: , Rfl:     ALLERGIES:  No Known Allergies    REVIEW OF SYSTEMS:  Pertinent items are noted in HPI  LABS:  HgA1c:   Lab Results   Component Value Date    HGBA1C 5 3 09/20/2021     BMP:   Lab Results   Component Value Date    GLUCOSE 97 06/27/2015    CALCIUM 9 0 09/20/2021     06/27/2015    K 4 0 09/20/2021    CO2 27 09/20/2021     09/20/2021    BUN 12 09/20/2021    CREATININE 0 61 09/20/2021           _____________________________________________________  PHYSICAL EXAMINATION:  Vital signs: /80   Ht 5' 6" (1 676 m)   Wt 64 kg (141 lb)   LMP  (LMP Unknown)   BMI 22 76 kg/m²   General: well developed and well nourished, alert, oriented times 3 and appears comfortable  Psychiatric: Normal  HEENT: Trachea Midline, No torticollis  Cardiovascular: No discernable arrhythmia  Pulmonary: No wheezing or stridor  Abdomen: No rebound or guarding  Extremities: No peripheral edema  Skin: No masses, erythema, lacerations, fluctation, ulcerations  Neurovascular: Sensation Intact to the Median, Ulnar, Radial Nerve, Motor Intact to the Median, Ulnar, Radial Nerve and Pulses Intact    MUSCULOSKELETAL EXAMINATION:  RIGHT SIDE:  CMC: No Instability, Positive grind, Positive tendnerness CMC and Positive Shoulder Sign    _____________________________________________________  STUDIES REVIEWED:  No Studies to review      PROCEDURES PERFORMED:  Small joint arthrocentesis: R thumb CMC  [place] Protocol:  Consent: Verbal consent obtained    Risks and benefits: risks, benefits and alternatives were discussed  Consent given by: patient  Site marked: the operative site was marked  Supporting Documentation  Indications: pain   Procedure Details  Location: thumb - R thumb CMC  Medications administered: 0 5 mL lidocaine 1 %; 20 mg triamcinolone acetonide 40 mg/mL    Patient tolerance: patient tolerated the procedure well with no immediate complications  Dressing:  Sterile dressing applied        Scribe Attestation    I,:  Vanesa Ta am acting as a scribe while in the presence of the attending physician :       I,:  Godwin Gardner MD personally performed the services described in this documentation    as scribed in my presence :

## 2022-07-07 DIAGNOSIS — E78.5 HYPERLIPIDEMIA, UNSPECIFIED HYPERLIPIDEMIA TYPE: ICD-10-CM

## 2022-07-07 RX ORDER — ATORVASTATIN CALCIUM 10 MG/1
TABLET, FILM COATED ORAL
Qty: 90 TABLET | Refills: 0 | Status: SHIPPED | OUTPATIENT
Start: 2022-07-07 | End: 2022-10-07

## 2022-07-14 DIAGNOSIS — E78.5 HYPERLIPIDEMIA, UNSPECIFIED HYPERLIPIDEMIA TYPE: ICD-10-CM

## 2022-07-14 RX ORDER — DULOXETIN HYDROCHLORIDE 20 MG/1
20 CAPSULE, DELAYED RELEASE ORAL DAILY
Qty: 30 CAPSULE | Refills: 0 | Status: SHIPPED | OUTPATIENT
Start: 2022-07-14 | End: 2022-07-15 | Stop reason: SDUPTHER

## 2022-07-15 ENCOUNTER — TELEPHONE (OUTPATIENT)
Dept: SURGERY | Facility: CLINIC | Age: 62
End: 2022-07-15

## 2022-07-15 DIAGNOSIS — E78.5 HYPERLIPIDEMIA, UNSPECIFIED HYPERLIPIDEMIA TYPE: ICD-10-CM

## 2022-07-15 RX ORDER — DULOXETIN HYDROCHLORIDE 20 MG/1
20 CAPSULE, DELAYED RELEASE ORAL DAILY
Qty: 90 CAPSULE | Refills: 0 | Status: SHIPPED | OUTPATIENT
Start: 2022-07-15 | End: 2022-10-14

## 2022-07-15 NOTE — TELEPHONE ENCOUNTER
Lindabriansadie Keller has not been seen by the  OS office since 05/26/21  Was given a 30 day supply until [facility] had scheduled an appointment  Called and spoke to patient  Scheduled appointment for 08/03/22

## 2022-07-15 NOTE — TELEPHONE ENCOUNTER
Patient called the specialty office regarding her Duloxetine  She stated that [employer] told her that only a 30 day supply was sent in but for her insurance, it has to be a 90 day supply  Can the be corrected and the patient notified that this was done please?

## 2022-07-15 NOTE — TELEPHONE ENCOUNTER
Kalpana Smith has called the IM Office requesting a 90 day supply of DULoxetine (CYMBALTA) 20 mg capsule  Rowan Castelan the IM office has not seen her since 05/26/21, wished to know if she would be able to schedule an appointment  Jose G Anderson agreed and made an appointment for 08/03/22 with Dr Urvashi Vera had then requested a 90 day supply of DULoxetine (CYMBALTA) 20 mg capsule

## 2022-07-28 ENCOUNTER — RA CDI HCC (OUTPATIENT)
Dept: OTHER | Facility: HOSPITAL | Age: 62
End: 2022-07-28

## 2022-07-28 DIAGNOSIS — E78.5 HYPERLIPIDEMIA, UNSPECIFIED HYPERLIPIDEMIA TYPE: ICD-10-CM

## 2022-07-28 NOTE — PROGRESS NOTES
[facility]  coding [facility]       Chart reviewed, no opportunity found: CHART REVIEWED, NO OPPORTUNITY FOUND        Patients Insurance        Commercial Insurance: [address] [place] [address]

## 2022-07-29 RX ORDER — BUPROPION HYDROCHLORIDE 300 MG/1
300 TABLET ORAL DAILY
Qty: 90 TABLET | Refills: 0 | Status: SHIPPED | OUTPATIENT
Start: 2022-07-29

## 2022-07-29 NOTE — TELEPHONE ENCOUNTER
Sydnie Farfan has requested a 90 day supply of buPROPion (Wellbutrin XL) 300 mg 24 hr tablet  Would a 90 day supply be appropriate?

## 2022-09-01 DIAGNOSIS — G43.909 MIGRAINE WITHOUT STATUS MIGRAINOSUS, NOT INTRACTABLE, UNSPECIFIED MIGRAINE TYPE: ICD-10-CM

## 2022-09-01 RX ORDER — SUMATRIPTAN 100 MG/1
TABLET, FILM COATED ORAL
Qty: 27 TABLET | Refills: 0 | Status: SHIPPED | OUTPATIENT
Start: 2022-09-01

## 2022-09-01 NOTE — TELEPHONE ENCOUNTER
Melita Mosher has requested a refill of SUMAtriptan (IMITREX) 100 mg tablet  Yang Downing has an appointment scheduled for 09/21/22  Would a refill be appropriate?

## 2022-09-22 ENCOUNTER — APPOINTMENT (OUTPATIENT)
Dept: LAB | Facility: MEDICAL CENTER | Age: 62
End: 2022-09-22
Payer: COMMERCIAL

## 2022-09-22 DIAGNOSIS — E78.5 HYPERLIPIDEMIA, UNSPECIFIED HYPERLIPIDEMIA TYPE: ICD-10-CM

## 2022-09-22 DIAGNOSIS — E34.9 MULTIPLE ENDOCRINE DEFICIENCY SYNDROME: ICD-10-CM

## 2022-09-22 DIAGNOSIS — E23.7 DISEASE OF PITUITARY GLAND (HCC): ICD-10-CM

## 2022-09-22 LAB
ALBUMIN SERPL BCP-MCNC: 3.5 G/DL (ref 3.5–5)
ALP SERPL-CCNC: 83 U/L (ref 46–116)
ALT SERPL W P-5'-P-CCNC: 28 U/L (ref 12–78)
ANION GAP SERPL CALCULATED.3IONS-SCNC: 2 MMOL/L (ref 4–13)
AST SERPL W P-5'-P-CCNC: 22 U/L (ref 5–45)
BACTERIA UR QL AUTO: ABNORMAL /HPF
BASOPHILS # BLD AUTO: 0.06 THOUSANDS/ΜL (ref 0–0.1)
BASOPHILS NFR BLD AUTO: 1 % (ref 0–1)
BILIRUB SERPL-MCNC: 0.68 MG/DL (ref 0.2–1)
BILIRUB UR QL STRIP: NEGATIVE
BUN SERPL-MCNC: 12 MG/DL (ref 5–25)
CALCIUM SERPL-MCNC: 9 MG/DL (ref 8.3–10.1)
CAOX CRY URNS QL MICRO: ABNORMAL /HPF
CHLORIDE SERPL-SCNC: 109 MMOL/L (ref 96–108)
CHOLEST SERPL-MCNC: 174 MG/DL
CK SERPL-CCNC: 108 U/L (ref 26–192)
CLARITY UR: ABNORMAL
CO2 SERPL-SCNC: 28 MMOL/L (ref 21–32)
COLOR UR: YELLOW
CORTIS AM PEAK SERPL-MCNC: 7.4 UG/DL (ref 4.2–22.4)
CREAT SERPL-MCNC: 0.75 MG/DL (ref 0.6–1.3)
EOSINOPHIL # BLD AUTO: 0.21 THOUSAND/ΜL (ref 0–0.61)
EOSINOPHIL NFR BLD AUTO: 5 % (ref 0–6)
ERYTHROCYTE [DISTWIDTH] IN BLOOD BY AUTOMATED COUNT: 12.2 % (ref 11.6–15.1)
EST. AVERAGE GLUCOSE BLD GHB EST-MCNC: 103 MG/DL
FSH SERPL-ACNC: 63 MIU/ML
GFR SERPL CREATININE-BSD FRML MDRD: 84 ML/MIN/1.73SQ M
GLUCOSE P FAST SERPL-MCNC: 85 MG/DL (ref 65–99)
GLUCOSE UR STRIP-MCNC: NEGATIVE MG/DL
HBA1C MFR BLD: 5.2 %
HCT VFR BLD AUTO: 43.8 % (ref 34.8–46.1)
HDLC SERPL-MCNC: 85 MG/DL
HGB BLD-MCNC: 13.8 G/DL (ref 11.5–15.4)
HGB UR QL STRIP.AUTO: NEGATIVE
HYALINE CASTS #/AREA URNS LPF: ABNORMAL /LPF
IMM GRANULOCYTES # BLD AUTO: 0.01 THOUSAND/UL (ref 0–0.2)
IMM GRANULOCYTES NFR BLD AUTO: 0 % (ref 0–2)
KETONES UR STRIP-MCNC: NEGATIVE MG/DL
LDLC SERPL CALC-MCNC: 80 MG/DL (ref 0–100)
LEUKOCYTE ESTERASE UR QL STRIP: ABNORMAL
LH SERPL-ACNC: 23.8 MIU/ML
LYMPHOCYTES # BLD AUTO: 1.44 THOUSANDS/ΜL (ref 0.6–4.47)
LYMPHOCYTES NFR BLD AUTO: 32 % (ref 14–44)
MAGNESIUM SERPL-MCNC: 2.2 MG/DL (ref 1.6–2.6)
MCH RBC QN AUTO: 31.4 PG (ref 26.8–34.3)
MCHC RBC AUTO-ENTMCNC: 31.5 G/DL (ref 31.4–37.4)
MCV RBC AUTO: 100 FL (ref 82–98)
MONOCYTES # BLD AUTO: 0.48 THOUSAND/ΜL (ref 0.17–1.22)
MONOCYTES NFR BLD AUTO: 11 % (ref 4–12)
MUCOUS THREADS UR QL AUTO: ABNORMAL
NEUTROPHILS # BLD AUTO: 2.32 THOUSANDS/ΜL (ref 1.85–7.62)
NEUTS SEG NFR BLD AUTO: 51 % (ref 43–75)
NITRITE UR QL STRIP: NEGATIVE
NON-SQ EPI CELLS URNS QL MICRO: ABNORMAL /HPF
NONHDLC SERPL-MCNC: 89 MG/DL
NRBC BLD AUTO-RTO: 0 /100 WBCS
PH UR STRIP.AUTO: 6 [PH]
PHOSPHATE SERPL-MCNC: 3.5 MG/DL (ref 2.3–4.1)
PLATELET # BLD AUTO: 196 THOUSANDS/UL (ref 149–390)
PMV BLD AUTO: 11.3 FL (ref 8.9–12.7)
POTASSIUM SERPL-SCNC: 4.2 MMOL/L (ref 3.5–5.3)
PROLACTIN SERPL-MCNC: 7.6 NG/ML
PROT SERPL-MCNC: 7.2 G/DL (ref 6.4–8.4)
PROT UR STRIP-MCNC: ABNORMAL MG/DL
RBC # BLD AUTO: 4.4 MILLION/UL (ref 3.81–5.12)
RBC #/AREA URNS AUTO: ABNORMAL /HPF
RENAL EPI CELLS #/AREA URNS HPF: PRESENT /[HPF]
SODIUM SERPL-SCNC: 139 MMOL/L (ref 135–147)
SP GR UR STRIP.AUTO: 1.02 (ref 1–1.03)
T4 FREE SERPL-MCNC: 0.89 NG/DL (ref 0.76–1.46)
TRANS CELLS #/AREA URNS HPF: PRESENT /[HPF]
TRIGL SERPL-MCNC: 46 MG/DL
TSH SERPL DL<=0.05 MIU/L-ACNC: 0.91 UIU/ML (ref 0.45–4.5)
UROBILINOGEN UR STRIP-ACNC: <2 MG/DL
WBC # BLD AUTO: 4.52 THOUSAND/UL (ref 4.31–10.16)
WBC #/AREA URNS AUTO: ABNORMAL /HPF

## 2022-09-22 PROCEDURE — 85025 COMPLETE CBC W/AUTO DIFF WBC: CPT

## 2022-09-22 PROCEDURE — 83835 ASSAY OF METANEPHRINES: CPT

## 2022-09-22 PROCEDURE — 80061 LIPID PANEL: CPT

## 2022-09-22 PROCEDURE — 83001 ASSAY OF GONADOTROPIN (FSH): CPT

## 2022-09-22 PROCEDURE — 82533 TOTAL CORTISOL: CPT

## 2022-09-22 PROCEDURE — 80053 COMPREHEN METABOLIC PANEL: CPT

## 2022-09-22 PROCEDURE — 83002 ASSAY OF GONADOTROPIN (LH): CPT

## 2022-09-22 PROCEDURE — 82024 ASSAY OF ACTH: CPT

## 2022-09-22 PROCEDURE — 84443 ASSAY THYROID STIM HORMONE: CPT

## 2022-09-22 PROCEDURE — 84439 ASSAY OF FREE THYROXINE: CPT

## 2022-09-22 PROCEDURE — 83735 ASSAY OF MAGNESIUM: CPT

## 2022-09-22 PROCEDURE — 83036 HEMOGLOBIN GLYCOSYLATED A1C: CPT

## 2022-09-22 PROCEDURE — 81001 URINALYSIS AUTO W/SCOPE: CPT

## 2022-09-22 PROCEDURE — 84146 ASSAY OF PROLACTIN: CPT

## 2022-09-22 PROCEDURE — 84100 ASSAY OF PHOSPHORUS: CPT

## 2022-09-22 PROCEDURE — 36415 COLL VENOUS BLD VENIPUNCTURE: CPT

## 2022-09-22 PROCEDURE — 82550 ASSAY OF CK (CPK): CPT

## 2022-09-23 LAB — ACTH PLAS-MCNC: 12.8 PG/ML (ref 7.2–63.3)

## 2022-09-29 LAB
METANEPH FREE SERPL-MCNC: 30.5 PG/ML (ref 0–88)
NORMETANEPHRINE SERPL-MCNC: 84.5 PG/ML (ref 0–285.2)

## 2022-09-30 ENCOUNTER — OFFICE VISIT (OUTPATIENT)
Dept: INTERNAL MEDICINE CLINIC | Facility: CLINIC | Age: 62
End: 2022-09-30
Payer: COMMERCIAL

## 2022-09-30 VITALS
WEIGHT: 144.4 LBS | HEIGHT: 66 IN | SYSTOLIC BLOOD PRESSURE: 138 MMHG | OXYGEN SATURATION: 98 % | HEART RATE: 72 BPM | DIASTOLIC BLOOD PRESSURE: 82 MMHG | BODY MASS INDEX: 23.21 KG/M2 | TEMPERATURE: 98 F

## 2022-09-30 DIAGNOSIS — Z00.00 ENCOUNTER FOR ANNUAL PHYSICAL EXAM: Primary | ICD-10-CM

## 2022-09-30 PROCEDURE — 99396 PREV VISIT EST AGE 40-64: CPT | Performed by: HOSPITALIST

## 2022-09-30 NOTE — PROGRESS NOTES
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) ([address] )  This was diagnosed in   This is not endocrine active  She gets a yearly MRI which has not shown any change in size of the pituitary gland measuring 9 mm  Has increased hair fall   She follows up with endocrinologist Dr Juan Vick who plans to repeat her pituitary workup in -mild dyslipidemia  She follows up with Dr Julio Rivera for this  She is on Lipitor 10 mg every other day  and last LDL was 80  HDL is 85  Triglycerides 46 and total cholesterol 170     5-acute cholecystitis status post laparoscopic cholecystectomy in 2021 this is stable at present with no acute issues        6-SIRVA-  -history of the left shoulder pain since getting a flu shot in 2020-in December she saw Dr Itz Farooq steroid injection was done which relieved her pain for about 6 monthsHas subsequently followed up with Dr Savanna Jaramillo and Dr John Jameson  in 2022 a repeat injection of the left subacromial space for left shoulder acromioclavicular joint arthritis and subacromial bursitis  Gerhardt Sep symptoms are now coming up branch she plans to see orthopedics for a repeat steroid injection  Immunizations and preventive care screenings were discussed with patient today     Follow-up in 1yr     Chief Complaint:     Chief Complaint   Patient presents with    Annual Exam     physical      History of Present Illness:     Adult Annual Physical   Patient here for a comprehensive physical exam  The patient reports increased stress while caring for her 80year old mother  Otherwise doing well  Has left shoulder bursitis and right thumb CMC arthritis-has had steroid injections for this but no developing symptoms in the shoulder as well as the [facility] joint  History of migraine gets about 2-3 episodes a which is well controlled with Imitrex and ibuprofen  Diet and Physical Activity  · Diet/Nutrition: well balanced diet, consuming 3-5 servings of fruits/vegetables daily, adequate fiber intake and adequate whole grain intake  · Exercise: moderate cardiovascular exercise  Depression Screening  PHQ-2/9 Depression Screening    Little interest or pleasure in doing things: 0 - not at all  Feeling down, depressed, or hopeless: 0 - not at all  PHQ-2 Score: 0  PHQ-2 Interpretation: Negative depression screen       General Health  · Sleep: sleeps poorly  · Hearing: normal - bilateral   · Vision: goes for regular eye exams  · Dental: regular dental visits         /GYN Health  · Patient is: perimenopausal  ·      Review of Systems:     Review of Systems all other review of symptoms negative unless specified otherwise   Past Medical History:     Past Medical History:   Diagnosis Date    Adenomyomatosis of gallbladder 1/19/2021    Anxiety     Arthritis     Calculus of gallbladder     Tiburcio ulcer     Depression     Hyperlipidemia     Migraine     Mitral valve prolapse     Murmur     Neck pain     at times    Pituitary adenoma ([address] )     Pituitary adenoma ([address] )     Shoulder injury related to vaccine administration (SIRVA) 09/2020      Past Surgical History:     Past Surgical History:   Procedure Laterality Date    BREAST EXCISIONAL BIOPSY Left 1996    benign    BREAST SURGERY      L breast cyst removal-benign    CATARACT EXTRACTION Bilateral     CATARACT EXTRACTION, BILATERAL  01/2020    COLONOSCOPY      DE QUERVAIN'S RELEASE Bilateral     EGD AND COLONOSCOPY N/A 5/11/2016    Procedure: EGD AND COLONOSCOPY;  Surgeon: Anatoliy Miller MD;  Location: [facility] GI LAB;   Service:     LAPAROSCOPIC MAGNETIC SPHINCTER AUGMENTATION N/A 7/20/2020    Procedure: MAGNETIC SPHINCTER AUGMENT, LINX PLACEMENT;  Surgeon: Jeovany Whaley MD;  [facility];  Service: Bariatrics    PARAESOPHAGEAL HERNIA REPAIR N/A 7/20/2020    Procedure: REPAIR HERNIA PARAESOPHAGEAL  LAPAROSCOPIC, PARTIAL GASTRECTOMY;  Surgeon: Jeovany Whaley MD;  [facility];  Service: Bariatrics    ND INCIS TENDON SHEATH,RADIAL STYLOID Left 11/21/2019    Procedure: Pearlean Gutting AND TENDON GRAFT;  Surgeon: Anahi Page MD;  [facility];  Service: Orthopedics    ND LAP, REPAIR PARAESOPHAGEAL HERNIA, INCL FUNDOPLASTY W/ MESH N/A 8/31/2016    Procedure: REPAIR HERNIA PARAESOPHAGEAL  with bio A mesh LAPAROSCOPIC NISSEN FUNDOPLICATION Laparoscopic Gastroplexy Intraop EGD #[id number];  Surgeon: Jeovany Whaley MD;  [facility];  Service: Bariatrics    ND LAP,CHOLECYSTECTOMY N/A 3/4/2021    Procedure: Robotic cholecystectomy ;  Surgeon: Shelton Pitts MD;  [facility];  Service: General    ND REPAIR INTERCARP/CARP-METACARP JT Left 11/21/2019    Procedure: THUMB [address];  Surgeon: Anahi Page MD;  [facility];  Service: Orthopedics    TONSILLECTOMY      WRIST SURGERY Bilateral     daquero vein release      Social History:     Social History     Socioeconomic History    Marital status: /Civil Union     Spouse name: None    Number of children: None    Years of education: None    Highest education level: None   Occupational History    None   Tobacco Use    Smoking status: Never Smoker    Smokeless tobacco: Never Used   Vaping Use    Vaping Use: Never used   Substance and Sexual Activity    Alcohol use: Yes     Comment: liquor, social drinker    Drug use: No    Sexual activity: Yes     Partners: Male     Comment:    Other Topics Concern    None   Social History Narrative    None     Social Determinants of Health     Financial Resource Strain: Not on file   Food Insecurity: Not on file   Transportation Needs: Not on file   Physical Activity: Not on file   Stress: Not on file   Social Connections: Not on file   Intimate Partner Violence: Not on file   Housing Stability: Not on file      Family History:     Family History   Problem Relation Age of Onset    Anxiety disorder Mother     Arthritis Mother     Hyperlipidemia Mother     Hypertension Mother     Prostate cancer Father 76    Dementia Father     Parkinsonism Father     Other Father 80        bladder cancer    Ovarian cancer Maternal Grandmother 72    Breast cancer Paternal Aunt 66    Breast cancer Cousin 36    Colon cancer Paternal Aunt 48    Pancreatic cancer Paternal Aunt 67    No Known Problems Sister     No Known Problems Daughter       Current Medications:     Current Outpatient Medications   Medication Sig Dispense Refill    ascorbic acid (VITAMIN C) 500 mg tablet Take 500 mg by mouth 2 (two) times a day       aspirin 81 MG tablet Take 1 tablet by mouth daily       atorvastatin (LIPITOR) 10 mg tablet TAKE ONE TABLET BY MOUTH EVERY EVENING 90 tablet 0    buPROPion (Wellbutrin XL) 300 mg 24 hr tablet Take 1 tablet (300 mg total) by mouth daily 90 tablet 0    Cholecalciferol (VITAMIN D) 2000 UNITS CAPS Take 1 tablet by mouth daily       DULoxetine (CYMBALTA) 20 mg capsule Take 1 capsule (20 mg total) by mouth daily 90 capsule 0    multivitamin (THERAGRAN) TABS Take 1 tablet by mouth daily   Ospemifene 60 MG TABS Take 1 tablet (60 mg total) by mouth daily 90 tablet 3    SUMAtriptan (IMITREX) 100 mg tablet take 1 tablet by mouth for migraine relief  may repeat 2 hours later   max 2 tablets per day 27 tablet 0    vitamin E, tocopherol, 400 units capsule Take 400 Units by mouth daily Pt stopped       No current facility-administered medications for this visit  Allergies:     No Known Allergies   Physical Exam:     /82 (BP Location: Left arm, Patient Position: Sitting, Cuff Size: Standard)   Pulse 72   Temp 98 °F (36 7 °C) (Tympanic)   Ht 5' 6" (1 676 m)   Wt 65 5 kg (144 lb 6 4 oz)   LMP  (LMP Unknown)   SpO2 98%   BMI 23 31 kg/m²     Physical Exam   General Appearance:    Alert, cooperative, no distress   Head:    Normocephalic, without obvious abnormality, atraumatic   Eyes:    PERRL, conjunctiva/corneas clear, EOM's intact, fundi     benign, both eyes        Neck:   Supple, no adenopathy, no JVD   Back:     Symmetric, no curvature, ROM normal, no CVA tenderness   Lungs:     Clear to auscultation bilaterally, no wheezing or rhonchi   Heart:    Regular rate and rhythm, S1 and S2 normal, no murmur, rub   or gallop   Abdomen:     Soft, non-tender, bowel sounds active    Extremities:   Extremities normal, atraumatic, no cyanosis or edema   Psych:   Normal Affect   Neurologic:   CNII-XII intact   Normal strength, sensation and reflexes       Throughout       Willi Foster MD  [facility] INTERNAL MEDICINE [place]

## 2022-10-07 DIAGNOSIS — E78.5 HYPERLIPIDEMIA, UNSPECIFIED HYPERLIPIDEMIA TYPE: ICD-10-CM

## 2022-10-07 RX ORDER — ATORVASTATIN CALCIUM 10 MG/1
TABLET, FILM COATED ORAL
Qty: 90 TABLET | Refills: 0 | Status: SHIPPED | OUTPATIENT
Start: 2022-10-07

## 2022-10-11 ENCOUNTER — ANNUAL EXAM (OUTPATIENT)
Dept: OBGYN CLINIC | Facility: CLINIC | Age: 62
End: 2022-10-11
Payer: COMMERCIAL

## 2022-10-11 VITALS
HEIGHT: 66 IN | WEIGHT: 145 LBS | SYSTOLIC BLOOD PRESSURE: 118 MMHG | DIASTOLIC BLOOD PRESSURE: 70 MMHG | BODY MASS INDEX: 23.3 KG/M2

## 2022-10-11 DIAGNOSIS — Z01.419 ENCOUNTER FOR WELL WOMAN EXAM WITH ROUTINE GYNECOLOGICAL EXAM: Primary | ICD-10-CM

## 2022-10-11 DIAGNOSIS — Z78.0 ENCOUNTER FOR OSTEOPOROSIS SCREENING IN ASYMPTOMATIC POSTMENOPAUSAL PATIENT: ICD-10-CM

## 2022-10-11 DIAGNOSIS — N95.1 VAGINAL DRYNESS, MENOPAUSAL: ICD-10-CM

## 2022-10-11 DIAGNOSIS — Z13.820 ENCOUNTER FOR OSTEOPOROSIS SCREENING IN ASYMPTOMATIC POSTMENOPAUSAL PATIENT: ICD-10-CM

## 2022-10-11 DIAGNOSIS — L65.9 HAIR LOSS: ICD-10-CM

## 2022-10-11 DIAGNOSIS — Z12.31 ENCOUNTER FOR SCREENING MAMMOGRAM FOR MALIGNANT NEOPLASM OF BREAST: ICD-10-CM

## 2022-10-11 PROCEDURE — S0612 ANNUAL GYNECOLOGICAL EXAMINA: HCPCS | Performed by: OBSTETRICS & GYNECOLOGY

## 2022-10-11 PROCEDURE — G0145 SCR C/V CYTO,THINLAYER,RESCR: HCPCS | Performed by: OBSTETRICS & GYNECOLOGY

## 2022-10-11 RX ORDER — ESTRADIOL 0.1 MG/G
2 CREAM VAGINAL
Qty: 42.5 G | Refills: 11 | Status: SHIPPED | OUTPATIENT
Start: 2022-10-11

## 2022-10-11 NOTE — PROGRESS NOTES
ASSESSMENT & PLAN: Jered Aguilar was seen today for gynecologic exam     Diagnoses and all orders for this visit:    Encounter for well woman exam with routine gynecological exam  -     Liquid-based pap, screening    Encounter for screening mammogram for malignant neoplasm of breast  -     Mammo screening bilateral w 3d & cad; Future    Encounter for osteoporosis screening in asymptomatic postmenopausal patient  -     DXA bone density spine hip and pelvis; Future    Hair loss  -     Testosterone, free, total; Future  -     DHEA-sulfate; Future    Vaginal dryness, menopausal  -     estradiol (ESTRACE VAGINAL) 0 1 mg/g vaginal cream; Insert 2 g into the vagina daily at bedtime For 2 weeks then twice weekly  Discussion/Summary:  Patient here for yearly gyn preventive exam  1  Routine well woman exam done today  2  Pap and HPV:  The patient's pap is up to date  Pap and cotesting was done today  Current ASCCP Guidelines reviewed  3   Mammogram was ordered  4  Colorectal cancer screening is up to date  4  The following were reviewed in today's visit: breast self exam, adequate intake of calcium and vitamin D, exercise and healthy diet  5  F/u 1 year  6  Vaginal dryness: script for estrace sent, osphena did not help with symptoms   CC:  Annual Gynecologic Examination    HPI: Kell Padron is a 57 y o  who presents for annual gynecologic examination    She has the following concerns:  Reports vaginal dryness; tried [place] with no relief    Last pap:   Last mammogram:   Last colorectal cancer screenin    Patient Active Problem List   Diagnosis   • Migraine   • Pituitary adenoma ([address] )   • Iron deficiency anemia   • Cat Shutters ulcer   • Dyslipidemia   • Hyperlipidemia   • Iron deficiency anemia due to chronic blood loss   • Menopausal symptoms   • Migraine, unspecified, not intractable, without status migrainosus   • Osteoarthritis of carpometacarpal (CMC) joint of thumb   • Liver cyst   • Status post laparoscopic cholecystectomy   • Disease of pituitary gland [facility])   • Acute bursitis of left shoulder       Past Medical History:   Diagnosis Date   • Adenomyomatosis of gallbladder 1/19/2021   • Anxiety    • Arthritis    • Calculus of gallbladder    • Mardella Maria Alejandra ulcer    • Depression    • Hyperlipidemia    • Migraine    • Mitral valve prolapse    • Murmur    • Neck pain     at times   • Pituitary adenoma [facility])    • Pituitary adenoma ([address] )    • Shoulder injury related to vaccine administration (SIRVA) 09/2020       Past Surgical History:   Procedure Laterality Date   • BREAST EXCISIONAL BIOPSY Left 1996    benign   • BREAST SURGERY      L breast cyst removal-benign   • CATARACT EXTRACTION Bilateral    • CATARACT EXTRACTION, BILATERAL  01/2020   • COLONOSCOPY     • DE QUERVAIN'S RELEASE Bilateral    • EGD AND COLONOSCOPY N/A 5/11/2016    Procedure: EGD AND COLONOSCOPY;  Surgeon: Jennifer Lewis MD;  Location: [facility] GI LAB;   Service:    • LAPAROSCOPIC MAGNETIC SPHINCTER AUGMENTATION N/A 7/20/2020    Procedure: MAGNETIC SPHINCTER AUGMENT, LINX PLACEMENT;  Surgeon: Terence Infante MD;  [facility];  Service: Bariatrics   • PARAESOPHAGEAL HERNIA REPAIR N/A 7/20/2020    Procedure: REPAIR HERNIA PARAESOPHAGEAL  LAPAROSCOPIC, PARTIAL GASTRECTOMY;  Surgeon: Terence Infante MD;  [facility];  Service: Bariatrics   • ME INCIS TENDON SHEATH,RADIAL STYLOID Left 11/21/2019    Procedure: Frank Malu AND TENDON GRAFT;  Surgeon: Migel Albright MD;  [facility];  Service: Orthopedics   • ME LAP, REPAIR PARAESOPHAGEAL HERNIA, INCL FUNDOPLASTY W/ MESH N/A 8/31/2016    Procedure: REPAIR HERNIA PARAESOPHAGEAL  with bio A mesh LAPAROSCOPIC NISSEN FUNDOPLICATION Laparoscopic Gastroplexy Intraop EGD #[id number];  Surgeon: Terence Infante MD;  [facility];  Service: Bariatrics   • ME LAP,CHOLECYSTECTOMY N/A 3/4/2021    Procedure: Robotic cholecystectomy ;  Surgeon: Pedro Plummer MD;  [facility];  Service: General   • IL REPAIR INTERCARP/CARP-METACARP JT Left 11/21/2019    Procedure: THUMB [facility] ARTHROPLASTY;  Surgeon: Guicho Pettit MD;  [facility];  Service: Orthopedics   • TONSILLECTOMY     • WRIST SURGERY Bilateral     daquero vein release       Past OB/Gyn History:    History of abnormal pap smears: No    Patient is currently sexually active  monogamous   Patient's family planning method is post menopausal status      Family History   Problem Relation Age of Onset   • Anxiety disorder Mother    • Arthritis Mother    • Hyperlipidemia Mother    • Hypertension Mother    • Prostate cancer Father 76   • Dementia Father    • Parkinsonism Father    • Other Father 80        bladder cancer   • Ovarian cancer Maternal Grandmother 72   • Breast cancer Paternal Aunt 66   • Breast cancer Cousin 36   • Colon cancer Paternal Aunt 46   • Pancreatic cancer Paternal Aunt 68   • No Known Problems Sister    • No Known Problems Daughter        Social History:  Social History     Socioeconomic History   • Marital status: /Civil Union     Spouse name: Not on file   • Number of children: Not on file   • Years of education: Not on file   • Highest education level: Not on file   Occupational History   • Not on file   Tobacco Use   • Smoking status: Never Smoker   • Smokeless tobacco: Never Used   Vaping Use   • Vaping Use: Never used   Substance and Sexual Activity   • Alcohol use: Yes     Comment: liquor, social drinker   • Drug use: No   • Sexual activity: Yes     Partners: Male     Comment:    Other Topics Concern   • Not on file   Social History Narrative   • Not on file     Social Determinants of Health     Financial Resource Strain: Not on file   Food Insecurity: Not on file   Transportation Needs: Not on file   Physical Activity: Not on file   Stress: Not on file   Social Connections: Not on file   Intimate Partner Violence: Not on file   Housing Stability: Not on file     Presently lives with family  Patient is currently employed   No Known Allergies      Current Outpatient Medications:   •  ascorbic acid (VITAMIN C) 500 mg tablet, Take 500 mg by mouth 2 (two) times a day , Disp: , Rfl:   •  aspirin 81 MG tablet, Take 1 tablet by mouth daily , Disp: , Rfl:   •  atorvastatin (LIPITOR) 10 mg tablet, TAKE ONE TABLET BY MOUTH every EVENING, Disp: 90 tablet, Rfl: 0  •  buPROPion (Wellbutrin XL) 300 mg 24 hr tablet, Take 1 tablet (300 mg total) by mouth daily, Disp: 90 tablet, Rfl: 0  •  Cholecalciferol (VITAMIN D) 2000 UNITS CAPS, Take 1 tablet by mouth daily , Disp: , Rfl:   •  DULoxetine (CYMBALTA) 20 mg capsule, Take 1 capsule (20 mg total) by mouth daily, Disp: 90 capsule, Rfl: 0  •  estradiol (ESTRACE VAGINAL) 0 1 mg/g vaginal cream, Insert 2 g into the vagina daily at bedtime For 2 weeks then twice weekly, Disp: 42 5 g, Rfl: 11  •  multivitamin (THERAGRAN) TABS, Take 1 tablet by mouth daily  , Disp: , Rfl:   •  Ospemifene 60 MG TABS, Take 1 tablet (60 mg total) by mouth daily, Disp: 90 tablet, Rfl: 3  •  SUMAtriptan (IMITREX) 100 mg tablet, take 1 tablet by mouth for migraine relief  may repeat 2 hours later  max 2 tablets per day, Disp: 27 tablet, Rfl: 0  •  vitamin E, tocopherol, 400 units capsule, Take 400 Units by mouth daily Pt stopped, Disp: , Rfl:     Review of Systems  Constitutional :no fever, feels well, no tiredness, no recent weight gain or loss  ENT: no ear ache, no loss of hearing, no nosebleeds or nasal discharge, no sore throat or hoarseness  Cardiovascular: no complaints of slow or fast heart beat, no chest pain, no palpitations, no leg claudication or lower extremity edema    Respiratory: no complaints of shortness of shortness of breath, no AGUAYO  Breasts:no complaints of breast pain, breast lump, or nipple discharge  Gastrointestinal: no complaints of abdominal pain, constipation, nausea, vomiting, or diarrhea or bloody stools  Genitourinary : no complaints of dysuria, incontinence, pelvic pain, no dysmenorrhea, vaginal discharge or abnormal vaginal bleeding and as noted in HPI  Musculoskeletal: no complaints of arthralgia, no myalgia, no joint swelling or stiffness, no limb pain or swelling  Integumentary: no complaints of skin rash or lesion, itching or dry skin  Neurological: no complaints of headache, no confusion, no numbness or tingling, no dizziness or fainting    Physical Exam:     /70 (BP Location: Left arm, Patient Position: Sitting, Cuff Size: Adult)   Ht 5' 6" (1 676 m)   Wt 65 8 kg (145 lb)   LMP  (LMP Unknown)   BMI 23 40 kg/m²     General: appears stated age, cooperative, alert normal mood and affect   Psychiatric oriented to person, place and time  Mood and affect normal   Neck: normal, supple,trachea midline, no masses  Thyroid: normal, no thyromegaly   Heart: regular rate and rhythm, S1, S2 normal, no murmur, click, rub or gallop   Lungs: clear to auscultation bilaterally, no increased work of breathing or signs of respiratory distress   Breasts: normal, no dimpling or skin changes noted   Abdomen: soft, non-tender, without masses or organomegaly   Vulva: normal , no lesions   Vagina: normal , no lesions or atrophy   Urethra: normal   Urethal meatus normal   Bladder Normal, soft, non-tender and no prolapse or masses appreciated   Cervix: normal, no palpable masses ; pap done    Uterus: normal , non-tender, not enlarged, no palpable masses   Adnexa: normal, non-tender without fullness or masses   Lymphatic Palpation of lymph nodes in neck, axilla, groin and/or other locations: no lymphadenopathy or masses noted   Skin Normal skin turgor and no rashes    Palpation of skin and subcutaneous tissue normal

## 2022-10-12 ENCOUNTER — OFFICE VISIT (OUTPATIENT)
Dept: CARDIOLOGY CLINIC | Facility: CLINIC | Age: 62
End: 2022-10-12
Payer: COMMERCIAL

## 2022-10-12 VITALS
OXYGEN SATURATION: 96 % | HEART RATE: 82 BPM | BODY MASS INDEX: 23.22 KG/M2 | WEIGHT: 144.5 LBS | DIASTOLIC BLOOD PRESSURE: 74 MMHG | HEIGHT: 66 IN | SYSTOLIC BLOOD PRESSURE: 112 MMHG

## 2022-10-12 DIAGNOSIS — E78.5 HYPERLIPIDEMIA, UNSPECIFIED HYPERLIPIDEMIA TYPE: Primary | ICD-10-CM

## 2022-10-12 PROCEDURE — 99213 OFFICE O/P EST LOW 20 MIN: CPT | Performed by: INTERNAL MEDICINE

## 2022-10-12 NOTE — PROGRESS NOTES
Follow-up - Cardiology   Jewell Acosta 57 y o  female MRN: [id number]        Problems    Problem List Items Addressed This Visit    None           Plan discussion  Patient seen October 12, 2022  She has been totally asymptomatic  Gallbladder is out March 2021  She did lose some weight since that time  She is retired  Rest was following her pituitary issue which is been in amazingly stable for many years  She is going to have another MRI as well as coming year  Last was 2020  Her LDL is 80 to 89  HPI: Jewell Acosta is a 57y o  year old female         PlanAnd discussion  Patient seen October 12 12/2021  We follow her primarily for her hyperlipidemia  Her LDL is 83  She has no known vascular disease  She is being followed for hepatic cyst and she is also being followed for enlarged pituitary area  She did have her gallbladder out  She is retired  She has had 2 surgeries for hiatal hernia she has had her cataract removed  We will see her in 1 year  She is on a low dose of statin              HPI: Jewell Acosta is a 59y o  year old female                Plan patient seen September 29, 2020  Her LDL is 83  She is followed for prominent pituitary gland  Endocrine evaluation is negative  Recent MRI of her head  She had a 2nd surgery for I hiatal hernia  She is retired  She had cataract surgery  She offers no complaints  Were basically only following her LDL  We will see her in 1 year        HPI: Corrina Castelan is a 57 y  o  year old female       Plan patient seen November 5, 2019  She has been followed by a pituitary tumor   MRIs coming up  Julianna Alonzo is following  She had hiatal hernia surgery years ago for iron deficiency anemia  Venessazev Keri is doing well in that regard  She has hyperlipidemia  Ismael Garcia is being treated  Last LDL is 79  She has been having some chest pressure   It is not necessarily related to exertion    We will order a stress echo  She did have cataract surgery as well as hand surgery               HPI: Corrina Castelan is A 79 y o  year old female    History of Present Illness  Cardiology HPI Free Text Note Form [facility]: Patient sees us at long-term intervals  She recently saw for positive family history of vascular disease and showed an LDL of 192  She is no documented vascular disease  She is on Lipitor several times a week and her LDL is down to 108  I made no changes  our standpoint we've very little input to her care  issues recently have been severe iron deficiency anemia secondary to GERD and they're considering a procedure to reduce her hiatal hernia  addition she has a pituitary adenomatous being followed by endocrine  seen October 17, 2017  seen because of positive family history coronary disease and she has significantly elevated LDL  LDL is 128 on 10 mg of Lipitor every other day  Her increasing to take back to daily  did have significant anemia secondary to a hiatal hernia  She did have diet hernia repair  Fairly significant operation  She's done well since          Patient seen October 9, 2018  See her primarily for mild hyperlipidemia positive family history coronary disease  She also has a very small pituitary tumor is followed by Endocrine   It is not endocrine active  Her LDL is 96 on therapy  She is basically asymptomatic  Denia Valdez has some fleeting type of chest pain which is a very very low probability being anginal in nature  She seen basically on a yearly basis   Blood pressures been excellent                PlanAnd discussion  Patient seen October 12 12/2021  We follow her primarily for her hyperlipidemia  Her LDL is 83  She has no known vascular disease  She is being followed for hepatic cyst and she is also being followed for enlarged pituitary area  She did have her gallbladder out  She is retired  She has had 2 surgeries for hiatal hernia she has had her cataract removed      We will see her in 1 year  She is on a low dose of statin              HPI: Jeromy Alamo is a 59y o  year old female                Plan patient seen September 29, 2020  Her LDL is 83  She is followed for prominent pituitary gland  Endocrine evaluation is negative  Recent MRI of her head  She had a 2nd surgery for I hiatal hernia  She is retired  She had cataract surgery  She offers no complaints  Were basically only following her LDL  We will see her in 1 year        HPI: Corrina Castelan is a 57 y  o  year old female       Plan patient seen November 5, 2019  She has been followed by a pituitary tumor   MRIs coming up  Karely Jackman is following  She had hiatal hernia surgery years ago for iron deficiency anemia  Luiza Fields is doing well in that regard  She has hyperlipidemia  Ruthell Fabrizio is being treated  Last LDL is 79  She has been having some chest pressure   It is not necessarily related to exertion  We will order a stress echo  She did have cataract surgery as well as hand surgery               HPI: Corrina Castelan is Z 89 y o  year old female    History of Present Illness  Cardiology HPI Free Text Note Form [facility]: Patient sees us at long-term intervals  She recently saw for positive family history of vascular disease and showed an LDL of 192  She is no documented vascular disease  She is on Lipitor several times a week and her LDL is down to 108  I made no changes  our standpoint we've very little input to her care  issues recently have been severe iron deficiency anemia secondary to GERD and they're considering a procedure to reduce her hiatal hernia  addition she has a pituitary adenomatous being followed by endocrine  seen October 17, 2017  seen because of positive family history coronary disease and she has significantly elevated LDL  LDL is 128 on 10 mg of Lipitor every other day  Her increasing to take back to daily  did have significant anemia secondary to a hiatal hernia   She did have diet hernia repair  Fairly significant operation  She's done well since          Patient seen 2018  See her primarily for mild hyperlipidemia positive family history coronary disease  She also has a very small pituitary tumor is followed by Endocrine   It is not endocrine active  Her LDL is 96 on therapy  She is basically asymptomatic  Ramses Saavedra has some fleeting type of chest pain which is a very very low probability being anginal in nature  She seen basically on a yearly basis   Blood pressures been excellent                Review of Systems   Constitutional: Negative  Respiratory: Negative  Cardiovascular: Negative  Psychiatric/Behavioral: Negative            Past Medical History:   Diagnosis Date   • Adenomyomatosis of gallbladder 2021   • Anxiety    • Arthritis    • Calculus of gallbladder    • Jan Mercury ulcer    • Depression    • Hyperlipidemia    • Migraine    • Mitral valve prolapse    • Murmur    • Neck pain     at times   • Pituitary adenoma [facility])    • Pituitary adenoma (HCC)    • Shoulder injury related to vaccine administration Rudell Apgar) 2020     Social History     Substance and Sexual Activity   Alcohol Use Yes    Comment: liquor, social drinker     Social History     Substance and Sexual Activity   Drug Use No     Social History     Tobacco Use   Smoking Status Never Smoker   Smokeless Tobacco Never Used       Allergies:  No Known Allergies    Medications:     Current Outpatient Medications:   •  ascorbic acid (VITAMIN C) 500 mg tablet, Take 500 mg by mouth 2 (two) times a day , Disp: , Rfl:   •  aspirin 81 MG tablet, Take 1 tablet by mouth daily , Disp: , Rfl:   •  atorvastatin (LIPITOR) 10 mg tablet, TAKE ONE TABLET BY MOUTH every EVENING, Disp: 90 tablet, Rfl: 0  •  buPROPion (Wellbutrin XL) 300 mg 24 hr tablet, Take 1 tablet (300 mg total) by mouth daily, Disp: 90 tablet, Rfl: 0  •  Cholecalciferol (VITAMIN D) 2000 UNITS CAPS, Take 1 tablet by mouth daily , Disp: , Rfl:   •  DULoxetine (CYMBALTA) 20 mg capsule, Take 1 capsule (20 mg total) by mouth daily, Disp: 90 capsule, Rfl: 0  •  estradiol (ESTRACE VAGINAL) 0 1 mg/g vaginal cream, Insert 2 g into the vagina daily at bedtime For 2 weeks then twice weekly, Disp: 42 5 g, Rfl: 11  •  multivitamin (THERAGRAN) TABS, Take 1 tablet by mouth daily  , Disp: , Rfl:   •  Ospemifene 60 MG TABS, Take 1 tablet (60 mg total) by mouth daily, Disp: 90 tablet, Rfl: 3  •  SUMAtriptan (IMITREX) 100 mg tablet, take 1 tablet by mouth for migraine relief  may repeat 2 hours later  max 2 tablets per day, Disp: 27 tablet, Rfl: 0  •  vitamin E, tocopherol, 400 units capsule, Take 400 Units by mouth daily Pt stopped, Disp: , Rfl:       Physical Exam  Constitutional:       Appearance: She is normal weight  Cardiovascular:      Rate and Rhythm: Normal rate and regular rhythm  Pulses: Normal pulses  Heart sounds: No murmur heard  Pulmonary:      Effort: Pulmonary effort is normal    Musculoskeletal:      Right lower leg: No edema  Left lower leg: No edema  Skin:     General: Skin is warm and dry  Neurological:      Mental Status: She is alert and oriented to person, place, and time  Laboratory Studies:  CMP:      Invalid input(s): ALBUMIN  NT-proBNP: No results found for: NTBNP   Coags:    Lipid Profile:   Lab Results   Component Value Date    CHOL 175 06/27/2015     Lab Results   Component Value Date    HDL 85 09/22/2022     Lab Results   Component Value Date    LDLCALC 80 09/22/2022     Lab Results   Component Value Date    TRIG 46 09/22/2022       Cardiac testing:     EKG reviewed personally:     No results found for this or any previous visit  No results found for this or any previous visit  No results found for this or any previous visit  No results found for this or any previous visit        Marika Moon MD    Portions of the record may have been created with voice recognition software   Occasional wrong word or "sound a like" substitutions may have occurred due to the inherent limitations of voice recognition software   Read the chart carefully and recognize, using context, where substitutions have occurred

## 2022-10-12 NOTE — PROGRESS NOTES
Follow-up - Cardiology   Colette Kang 57 y o  female MRN: [id number]        Problems    Problem List Items Addressed This Visit        Other    Hyperlipidemia - Primary    Relevant Orders    CBC and differential    CK    Lipid panel            Plan          HPI: Colette Kang is a 57y o  year old female        Review of Systems   Musculoskeletal:        She had a influenza vaccination in her left shoulder and she developed SI RVA    Since then she has had several steroid injections as still has problems with left shoulder pain  Her left shoulder after the injection was extremely painful to move         Past Medical History:   Diagnosis Date   • Adenomyomatosis of gallbladder 1/19/2021   • Anxiety    • Arthritis    • Calculus of gallbladder    • Luster [place] ulcer    • Depression    • Hyperlipidemia    • Migraine    • Mitral valve prolapse    • Murmur    • Neck pain     at times   • Pituitary adenoma [facility])    • Pituitary adenoma (HCC)    • Shoulder injury related to vaccine administration Nick Burks) 09/2020     Social History     Substance and Sexual Activity   Alcohol Use Yes    Comment: liquor, social drinker     Social History     Substance and Sexual Activity   Drug Use No     Social History     Tobacco Use   Smoking Status Never Smoker   Smokeless Tobacco Never Used       Allergies:  No Known Allergies    Medications:     Current Outpatient Medications:   •  ascorbic acid (VITAMIN C) 500 mg tablet, Take 500 mg by mouth 2 (two) times a day , Disp: , Rfl:   •  aspirin 81 MG tablet, Take 1 tablet by mouth daily , Disp: , Rfl:   •  atorvastatin (LIPITOR) 10 mg tablet, TAKE ONE TABLET BY MOUTH every EVENING, Disp: 90 tablet, Rfl: 0  •  buPROPion (Wellbutrin XL) 300 mg 24 hr tablet, Take 1 tablet (300 mg total) by mouth daily, Disp: 90 tablet, Rfl: 0  •  Cholecalciferol (VITAMIN D) 2000 UNITS CAPS, Take 1 tablet by mouth daily , Disp: , Rfl:   •  DULoxetine (CYMBALTA) 20 mg capsule, Take 1 capsule (20 mg total) by mouth daily, Disp: 90 capsule, Rfl: 0  •  estradiol (ESTRACE VAGINAL) 0 1 mg/g vaginal cream, Insert 2 g into the vagina daily at bedtime For 2 weeks then twice weekly, Disp: 42 5 g, Rfl: 11  •  multivitamin (THERAGRAN) TABS, Take 1 tablet by mouth daily  , Disp: , Rfl:   •  Ospemifene 60 MG TABS, Take 1 tablet (60 mg total) by mouth daily, Disp: 90 tablet, Rfl: 3  •  SUMAtriptan (IMITREX) 100 mg tablet, take 1 tablet by mouth for migraine relief  may repeat 2 hours later  max 2 tablets per day, Disp: 27 tablet, Rfl: 0  •  vitamin E, tocopherol, 400 units capsule, Take 400 Units by mouth daily Pt stopped, Disp: , Rfl:       Physical Exam      Laboratory Studies:  CMP:      Invalid input(s): ALBUMIN  NT-proBNP: No results found for: NTBNP   Coags:    Lipid Profile:   Lab Results   Component Value Date    CHOL 175 06/27/2015     Lab Results   Component Value Date    HDL 85 09/22/2022     Lab Results   Component Value Date    LDLCALC 80 09/22/2022     Lab Results   Component Value Date    TRIG 46 09/22/2022       Cardiac testing:     EKG reviewed personally:     No results found for this or any previous visit  No results found for this or any previous visit  No results found for this or any previous visit  No results found for this or any previous visit  Marika Moon MD    Portions of the record may have been created with voice recognition software   Occasional wrong word or "sound a like" substitutions may have occurred due to the inherent limitations of voice recognition software   Read the chart carefully and recognize, using context, where substitutions have occurred

## 2022-10-13 DIAGNOSIS — E78.5 HYPERLIPIDEMIA, UNSPECIFIED HYPERLIPIDEMIA TYPE: ICD-10-CM

## 2022-10-14 RX ORDER — DULOXETIN HYDROCHLORIDE 20 MG/1
CAPSULE, DELAYED RELEASE ORAL
Qty: 90 CAPSULE | Refills: 0 | Status: SHIPPED | OUTPATIENT
Start: 2022-10-14

## 2022-10-15 LAB
LAB AP GYN PRIMARY INTERPRETATION: NORMAL
Lab: NORMAL

## 2022-10-19 ENCOUNTER — OFFICE VISIT (OUTPATIENT)
Dept: OBGYN CLINIC | Facility: OTHER | Age: 62
End: 2022-10-19
Payer: COMMERCIAL

## 2022-10-19 VITALS
DIASTOLIC BLOOD PRESSURE: 73 MMHG | HEIGHT: 66 IN | SYSTOLIC BLOOD PRESSURE: 110 MMHG | WEIGHT: 144 LBS | BODY MASS INDEX: 23.14 KG/M2 | HEART RATE: 75 BPM

## 2022-10-19 DIAGNOSIS — M75.52 ACUTE BURSITIS OF LEFT SHOULDER: Primary | ICD-10-CM

## 2022-10-19 PROCEDURE — 20610 DRAIN/INJ JOINT/BURSA W/O US: CPT | Performed by: ORTHOPAEDIC SURGERY

## 2022-10-19 PROCEDURE — 99213 OFFICE O/P EST LOW 20 MIN: CPT | Performed by: ORTHOPAEDIC SURGERY

## 2022-10-19 RX ORDER — METHYLPREDNISOLONE ACETATE 40 MG/ML
1 INJECTION, SUSPENSION INTRA-ARTICULAR; INTRALESIONAL; INTRAMUSCULAR; SOFT TISSUE
Status: COMPLETED | OUTPATIENT
Start: 2022-10-19 | End: 2022-10-19

## 2022-10-19 RX ORDER — BUPIVACAINE HYDROCHLORIDE 5 MG/ML
3.5 INJECTION, SOLUTION PERINEURAL
Status: COMPLETED | OUTPATIENT
Start: 2022-10-19 | End: 2022-10-19

## 2022-10-19 RX ADMIN — BUPIVACAINE HYDROCHLORIDE 3.5 ML: 5 INJECTION, SOLUTION PERINEURAL at 11:07

## 2022-10-19 RX ADMIN — METHYLPREDNISOLONE ACETATE 1 ML: 40 INJECTION, SUSPENSION INTRA-ARTICULAR; INTRALESIONAL; INTRAMUSCULAR; SOFT TISSUE at 11:07

## 2022-10-19 NOTE — PROGRESS NOTES
Orthopaedic Surgery - Office Note  Francisco Roman (3 y o  female)   : 1960   MRN: [id number]  Encounter Date: 10/19/2022    Chief Complaint   Patient presents with   • Left Shoulder - Follow-up       Assessment / Plan  Left shoulder AC joint mild arthritis with subacromial bursitis, responding well with nonsurgical treatment    · CSI of left subacromial bursa  was performed   · Patient would like to hold off on an MRI due to high insurance cost, she will explore this option in July when her insurance changes  If patient does find the out of pocket is reasonable, she can call the office and we will put in an order for an MRI  · We discussed the risks of repeated CSI injections, patient is getting 4-5 months of relief in symptoms and due to insurance cost she did agree to follow this treatment plan  · Continue with HEP  · Ice, heat and anti-inflammatories prn   Return in about 5 months (around 3/19/2023) for follow up with Dr Altaf Powers  History of Present Illness  Francisco Roman is a RHD 57 y o  female who presents for follow up left shoulder AC joint mild OA and subacromial bursitis  During her previous visit on 2022 she received a subacromial CSI which provided her about 5 months of relief  Her discomfort has returned and is describes as a dull ache  This discomfort increase with movement  She has remained complaint with her HEP which does help  She denies any new injuries or distal paresthesias  She is hoping to receive another CSI today  Review of Systems  Pertinent items are noted in HPI  All other systems were reviewed and are negative  Physical Exam  /73   Pulse 75   Ht 5' 6" (1 676 m)   Wt 65 3 kg (144 lb)   LMP  (LMP Unknown)   BMI 23 24 kg/m²   Cons: Appears well  No apparent distress  Psych: Alert  Oriented x3  Mood and affect normal   Eyes: PERRLA, EOMI  Resp: Normal effort  No audible wheezing or stridor  CV: Palpable pulse  No discernable arrhythmia    No LE edema   Lymph:  No palpable cervical, axillary, or inguinal lymphadenopathy  Skin: Warm  No palpable masses  No visible lesions  Neuro: Normal muscle tone  Normal and symmetric DTR's  Left Shoulder Exam  Alignment / Posture:  Normal shoulder posture  Inspection:  No swelling  No erythema  Palpation:  mild bursal tenderness  No effusion  ROM:  Shoulder   Shoulder ER 70  Shoulder IR T8  Strength:  FE 4/5, ER 4/5  Stability:  No objective shoulder instability  Tests: (+) Lehman  Neurovascular:  Sensation intact in Ax/R/M/U nerve distributions  2+ radial pulse  Studies Reviewed  No studies to review    Large joint arthrocentesis: L subacromial bursa  Universal Protocol:  Consent given by: patient  Time out: Immediately prior to procedure a "time out" was called to verify the correct patient, procedure, equipment, support staff and site/side marked as required  Site marked: the operative site was marked  Supporting Documentation  Indications: pain and diagnostic evaluation   Procedure Details  Location: shoulder - L subacromial bursa  Preparation: Patient was prepped and draped in the usual sterile fashion  Needle size: 22 G  Ultrasound guidance: no  Approach: lateral  Medications administered: 1 mL methylPREDNISolone acetate 40 mg/mL; 3 5 mL bupivacaine 0 5 %    Patient tolerance: patient tolerated the procedure well with no immediate complications  Dressing:  Sterile dressing applied        Medical, Surgical, Family, and Social History  The patient's medical history, family history, and social history, were reviewed and updated as appropriate      Past Medical History:   Diagnosis Date   • Adenomyomatosis of gallbladder 1/19/2021   • Anxiety    • Arthritis    • Calculus of gallbladder    • Aamir Beth[place] ulcer    • Depression    • Hyperlipidemia    • Migraine    • Mitral valve prolapse    • Murmur    • Neck pain     at times   • Pituitary adenoma [facility])    • Pituitary [facility] ([facility] [place] [facility] )    • Shoulder injury related to vaccine administration ([facility]) 09/2020       Past Surgical History:   Procedure Laterality Date   • BREAST EXCISIONAL BIOPSY Left 1996    benign   • BREAST SURGERY      L breast cyst removal-benign   • CATARACT EXTRACTION Bilateral    • CATARACT EXTRACTION, BILATERAL  01/2020   • COLONOSCOPY     • DE QUERVAIN'S RELEASE Bilateral    • EGD AND COLONOSCOPY N/A 5/11/2016    Procedure: EGD AND COLONOSCOPY;  Surgeon: Catherine Howrad MD;  Location: [facility];   Service:    • LAPAROSCOPIC MAGNETIC SPHINCTER AUGMENTATION N/A 7/20/2020    Procedure: MAGNETIC SPHINCTER AUGMENT, LINX PLACEMENT;  Surgeon: Tereso Ralph MD;  [facility];  Service: Bariatrics   • PARAESOPHAGEAL HERNIA REPAIR N/A 7/20/2020    Procedure: REPAIR HERNIA PARAESOPHAGEAL  LAPAROSCOPIC, PARTIAL GASTRECTOMY;  Surgeon: Tereso Ralph MD;  [facility];  Service: Bariatrics   • MD INCIS TENDON SHEATH,RADIAL STYLOID Left 11/21/2019    Procedure: Nawaf Maria Alejandra AND TENDON GRAFT;  Surgeon: Kerrie Hoffman MD;  [facility];  Service: Orthopedics   • MD LAP, REPAIR PARAESOPHAGEAL HERNIA, INCL FUNDOPLASTY W/ MESH N/A 8/31/2016    Procedure: REPAIR HERNIA PARAESOPHAGEAL  with bio A mesh LAPAROSCOPIC NISSEN FUNDOPLICATION Laparoscopic Gastroplexy Intraop EGD #[id number];  Surgeon: Tereso Ralph MD;  [facility];  Service: Bariatrics   • MD LAP,CHOLECYSTECTOMY N/A 3/4/2021    Procedure: Robotic cholecystectomy ;  Surgeon: Ovidio Bird MD;  [facility];  Service: General   • MD REPAIR INTERCARP/CARP-METACARP JT Left 11/21/2019    Procedure: THUMB Aia 16 ARTHROPLASTY;  Surgeon: Kerrie Hoffman MD;  [facility];  Service: Orthopedics   • TONSILLECTOMY     • WRIST SURGERY Bilateral     daquero vein release       Family History   Problem Relation Age of Onset   • Anxiety disorder Mother    • Arthritis Mother    • Hyperlipidemia Mother    • Hypertension Mother    • Prostate cancer Father 76   • Dementia Father    • Parkinsonism Father    • Other Father 80        bladder cancer   • Ovarian cancer Maternal Grandmother 72   • Breast cancer Paternal Aunt 66   • Breast cancer Cousin 36   • Colon cancer Paternal Aunt 46   • Pancreatic cancer Paternal Aunt 68   • No Known Problems Sister    • No Known Problems Daughter        Social History     Occupational History   • Not on file   Tobacco Use   • Smoking status: Never Smoker   • Smokeless tobacco: Never Used   Vaping Use   • Vaping Use: Never used   Substance and Sexual Activity   • Alcohol use: Yes     Comment: liquor, social drinker   • Drug use: No   • Sexual activity: Yes     Partners: Male     Comment:        No Known Allergies      Current Outpatient Medications:   •  ascorbic acid (VITAMIN C) 500 mg tablet, Take 500 mg by mouth 2 (two) times a day , Disp: , Rfl:   •  aspirin 81 MG tablet, Take 1 tablet by mouth daily , Disp: , Rfl:   •  atorvastatin (LIPITOR) 10 mg tablet, TAKE ONE TABLET BY MOUTH every EVENING, Disp: 90 tablet, Rfl: 0  •  buPROPion (Wellbutrin XL) 300 mg 24 hr tablet, Take 1 tablet (300 mg total) by mouth daily, Disp: 90 tablet, Rfl: 0  •  Cholecalciferol (VITAMIN D) 2000 UNITS CAPS, Take 1 tablet by mouth daily , Disp: , Rfl:   •  DULoxetine (CYMBALTA) 20 mg capsule, TAKE 1 CAPSULE BY MOUTH DAILY, Disp: 90 capsule, Rfl: 0  •  estradiol (ESTRACE VAGINAL) 0 1 mg/g vaginal cream, Insert 2 g into the vagina daily at bedtime For 2 weeks then twice weekly, Disp: 42 5 g, Rfl: 11  •  multivitamin (THERAGRAN) TABS, Take 1 tablet by mouth daily  , Disp: , Rfl:   •  SUMAtriptan (IMITREX) 100 mg tablet, take 1 tablet by mouth for migraine relief  may repeat 2 hours later   max 2 tablets per day, Disp: 27 tablet, Rfl: 0  •  vitamin E, tocopherol, 400 units capsule, Take 400 Units by mouth daily Pt stopped, Disp: , Rfl:       [employer]    I,:  Rose Roman am acting as a scribe while in the presence of the attending physician :       I,:  Mustapha Holly MD personally performed the services described in this documentation    as scribed in my presence :

## 2022-10-24 ENCOUNTER — TELEPHONE (OUTPATIENT)
Dept: NEUROLOGY | Facility: CLINIC | Age: 62
End: 2022-10-24

## 2022-10-24 NOTE — TELEPHONE ENCOUNTER
Fax received from [facility] requesting medical records  Scanned into media and sent to [facility]

## 2022-10-26 ENCOUNTER — TELEPHONE (OUTPATIENT)
Dept: INTERNAL MEDICINE CLINIC | Facility: CLINIC | Age: 62
End: 2022-10-26

## 2022-10-26 ENCOUNTER — OFFICE VISIT (OUTPATIENT)
Dept: OBGYN CLINIC | Facility: HOSPITAL | Age: 62
End: 2022-10-26
Payer: COMMERCIAL

## 2022-10-26 VITALS
WEIGHT: 145 LBS | BODY MASS INDEX: 23.3 KG/M2 | SYSTOLIC BLOOD PRESSURE: 110 MMHG | HEIGHT: 66 IN | DIASTOLIC BLOOD PRESSURE: 70 MMHG | OXYGEN SATURATION: 99 % | HEART RATE: 87 BPM

## 2022-10-26 DIAGNOSIS — M18.11 ARTHRITIS OF CARPOMETACARPAL (CMC) JOINT OF RIGHT THUMB: Primary | ICD-10-CM

## 2022-10-26 PROCEDURE — 99214 OFFICE O/P EST MOD 30 MIN: CPT | Performed by: ORTHOPAEDIC SURGERY

## 2022-10-26 PROCEDURE — 20600 DRAIN/INJ JOINT/BURSA W/O US: CPT | Performed by: ORTHOPAEDIC SURGERY

## 2022-10-26 RX ORDER — TRIAMCINOLONE ACETONIDE 40 MG/ML
20 INJECTION, SUSPENSION INTRA-ARTICULAR; INTRAMUSCULAR
Status: COMPLETED | OUTPATIENT
Start: 2022-10-26 | End: 2022-10-26

## 2022-10-26 RX ORDER — BUPIVACAINE HYDROCHLORIDE 2.5 MG/ML
0.5 INJECTION, SOLUTION INFILTRATION; PERINEURAL
Status: COMPLETED | OUTPATIENT
Start: 2022-10-26 | End: 2022-10-26

## 2022-10-26 RX ADMIN — TRIAMCINOLONE ACETONIDE 20 MG: 40 INJECTION, SUSPENSION INTRA-ARTICULAR; INTRAMUSCULAR at 10:12

## 2022-10-26 RX ADMIN — BUPIVACAINE HYDROCHLORIDE 0.5 ML: 2.5 INJECTION, SOLUTION INFILTRATION; PERINEURAL at 10:12

## 2022-10-26 NOTE — PROGRESS NOTES
ASSESSMENT/PLAN:    Assessment:   Right thumb CMC arthritis    Plan:   Patient was given a right thumb CMC cortisone injection (kenolog)  She tolerated well   She was advised we can repeat the cortisone injection every 3-4 months as needed for pain  She will follow-up in 3 months for re-evaluation possible repeat cortisone injection    Follow Up:  3 months    To Do Next Visit:  Re-evaluation    General Discussions:  Aia 16 Arthritis: The anatomy and physiology of carpometacarpal joint arthritis was discussed with the patient today in the office  Deterioration of the articular cartilage eventually leads to hypermobility at the thumb Aia 16 joint, resulting in joint subluxation, osteophyte formation, cystic changes within the trapezium and base of the first metacarpal, as well as subchondral sclerosis  Eventually, pain, limited mobility, and compensatory hyperextension at the metacarpophalangeal joint may develop  While normal activity and usage of the thumb joint may provide a painful experience to the patient, this typically does not result in damage to the thumb or hand  Treatment options include resting thumb spica splints to decreased joint edema, pain, and inflammation  Therapy exercises to strengthen the thenar musculature may relieve pain, but do not alter the overall continued development of osteoarthritis  Oral medications, topical medications, corticosteroid injections may decrease pain and increase overall function  Eventually, approximately 5% of patients may require surgical intervention  _____________________________________________________  CHIEF COMPLAINT:  Chief Complaint   Patient presents with   • Right Thumb - Follow-up     CMC- Kenalog          SUBJECTIVE:  Brandi Schwab is a 57 y o  female who presents for follow up regarding right thumb CMC arthritis  Since last visit, Brandi Schwab has tried steroid injections with relief    Today there is there is pain at the base of her thumb   She has difficulty opening up jars  She would like to try repeat cortisone injection for this issue as it worked for her in the past       PAST MEDICAL HISTORY:  Past Medical History:   Diagnosis Date   • Adenomyomatosis of gallbladder 1/19/2021   • Anxiety    • Arthritis    • Calculus of gallbladder    • Martha Bennetts ulcer    • Depression    • Hyperlipidemia    • Migraine    • Mitral valve prolapse    • Murmur    • Neck pain     at times   • Pituitary adenoma [facility])    • Pituitary adenoma ([facility] [address] )    • Shoulder injury related to vaccine administration (SIRVA) 09/2020       PAST SURGICAL HISTORY:  Past Surgical History:   Procedure Laterality Date   • BREAST EXCISIONAL BIOPSY Left 1996    benign   • BREAST SURGERY      L breast cyst removal-benign   • CATARACT EXTRACTION Bilateral    • CATARACT EXTRACTION, BILATERAL  01/2020   • COLONOSCOPY     • DE QUERVAIN'S RELEASE Bilateral    • EGD AND COLONOSCOPY N/A 5/11/2016    Procedure: EGD AND COLONOSCOPY;  Surgeon: Tevin Moore MD;  Location: [facility] GI LAB;   Service:    • LAPAROSCOPIC MAGNETIC SPHINCTER AUGMENTATION N/A 7/20/2020    Procedure: MAGNETIC SPHINCTER AUGMENT, LINX PLACEMENT;  Surgeon: Keisha Ramirez MD;  [facility];  Service: Bariatrics   • PARAESOPHAGEAL HERNIA REPAIR N/A 7/20/2020    Procedure: REPAIR HERNIA PARAESOPHAGEAL  LAPAROSCOPIC, PARTIAL GASTRECTOMY;  Surgeon: Keisha Ramirez MD;  [facility];  Service: Bariatrics   • MS INCIS TENDON SHEATH,RADIAL STYLOID Left 11/21/2019    Procedure: [place] AND TENDON GRAFT;  Surgeon: Sofia Albert MD;  [facility];  Service: Orthopedics   • MS LAP, REPAIR PARAESOPHAGEAL HERNIA, INCL FUNDOPLASTY W/ MESH N/A 8/31/2016    Procedure: REPAIR HERNIA PARAESOPHAGEAL  with bio A mesh LAPAROSCOPIC NISSEN FUNDOPLICATION Laparoscopic Gastroplexy Intraop EGD #[id number];  Surgeon: Keisha Ramirez MD;  [facility];  Service: Bariatrics   • MS LAP,CHOLECYSTECTOMY N/A 3/4/2021 Procedure: Robotic cholecystectomy ;  Surgeon: Angi Rene MD;  [facility];  Service: General   • PA REPAIR INTERCARP/CARP-METACARP JT Left 11/21/2019    Procedure: [place] ARTHROPLASTY;  Surgeon: Marni Mina MD;  [facility];  Service: Orthopedics   • TONSILLECTOMY     • WRIST SURGERY Bilateral     daquero vein release       FAMILY HISTORY:  Family History   Problem Relation Age of Onset   • Anxiety disorder Mother    • Arthritis Mother    • Hyperlipidemia Mother    • Hypertension Mother    • Prostate cancer Father 76   • Dementia Father    • Parkinsonism Father    • Other Father 80        bladder cancer   • Ovarian cancer Maternal Grandmother 72   • Breast cancer Paternal Aunt 66   • Breast cancer Cousin 36   • Colon cancer Paternal Aunt 46   • Pancreatic cancer Paternal Aunt 68   • No Known Problems Sister    • No Known Problems Daughter        SOCIAL HISTORY:  Social History     Tobacco Use   • Smoking status: Never Smoker   • Smokeless tobacco: Never Used   Vaping Use   • Vaping Use: Never used   Substance Use Topics   • Alcohol use: Yes     Comment: liquor, social drinker   • Drug use: No       MEDICATIONS:    Current Outpatient Medications:   •  ascorbic acid (VITAMIN C) 500 mg tablet, Take 500 mg by mouth 2 (two) times a day , Disp: , Rfl:   •  aspirin 81 MG tablet, Take 1 tablet by mouth daily , Disp: , Rfl:   •  atorvastatin (LIPITOR) 10 mg tablet, TAKE ONE TABLET BY MOUTH every EVENING, Disp: 90 tablet, Rfl: 0  •  buPROPion (Wellbutrin XL) 300 mg 24 hr tablet, Take 1 tablet (300 mg total) by mouth daily, Disp: 90 tablet, Rfl: 0  •  Cholecalciferol (VITAMIN D) 2000 UNITS CAPS, Take 1 tablet by mouth daily , Disp: , Rfl:   •  DULoxetine (CYMBALTA) 20 mg capsule, TAKE 1 CAPSULE BY MOUTH DAILY, Disp: 90 capsule, Rfl: 0  •  estradiol (ESTRACE VAGINAL) 0 1 mg/g vaginal cream, Insert 2 g into the vagina daily at bedtime For 2 weeks then twice weekly, Disp: 42 5 g, Rfl: 11  • multivitamin (THERAGRAN) TABS, Take 1 tablet by mouth daily  , Disp: , Rfl:   •  SUMAtriptan (IMITREX) 100 mg tablet, take 1 tablet by mouth for migraine relief  may repeat 2 hours later  max 2 tablets per day, Disp: 27 tablet, Rfl: 0  •  vitamin E, tocopherol, 400 units capsule, Take 400 Units by mouth daily Pt stopped, Disp: , Rfl:     ALLERGIES:  No Known Allergies    REVIEW OF SYSTEMS:  Pertinent items are noted in HPI  A comprehensive review of systems was negative      LABS:  HgA1c:   Lab Results   Component Value Date    HGBA1C 5 2 09/22/2022     BMP:   Lab Results   Component Value Date    GLUCOSE 97 06/27/2015    CALCIUM 9 0 09/22/2022     06/27/2015    K 4 2 09/22/2022    CO2 28 09/22/2022     (H) 09/22/2022    BUN 12 09/22/2022    CREATININE 0 75 09/22/2022           _____________________________________________________  PHYSICAL EXAMINATION:  Vital signs: /70   Pulse 87   Ht 5' 6" (1 676 m)   Wt 65 8 kg (145 lb)   LMP  (LMP Unknown)   SpO2 99%   BMI 23 40 kg/m²   General: well developed and well nourished, alert, oriented times 3 and appears comfortable  Psychiatric: Normal  HEENT: Trachea Midline, No torticollis  Cardiovascular: No discernable arrhythmia  Pulmonary: No wheezing or stridor  Abdomen: No rebound or guarding  Extremities: No peripheral edema  Skin: No masses, erythema, lacerations, fluctation, ulcerations  Neurovascular: Sensation Intact to the Median, Ulnar, Radial Nerve, Motor Intact to the Median, Ulnar, Radial Nerve and Pulses Intact    MUSCULOSKELETAL EXAMINATION:  right CMC Exam:  No adduction contracture  No hyperextension deformity of MCP joint  Positive localized tenderness over radial and dorsal aspect of thumb (CMC joint)  Grind test is Positive for pain and Positive for crepitus  No triggering or tenderness over the A1 pulley  No pain with Finkelstein’s maneuver             _____________________________________________________  STUDIES REVIEWED:  No Studies to review      PROCEDURES PERFORMED:  Small joint arthrocentesis: R thumb CMC  [place] Protocol:  Consent: Verbal consent obtained  Risks and benefits: risks, benefits and alternatives were discussed  Consent given by: patient  Time out: Immediately prior to procedure a "time out" was called to verify the correct patient, procedure, equipment, support staff and site/side marked as required    Patient consent: the patient's understanding of the procedure matches consent given  Site marked: the operative site was marked  Patient identity confirmed: verbally with patient    Supporting Documentation  Indications: pain   Procedure Details  Location: thumb - R thumb CMC  Preparation: Patient was prepped and draped in the usual sterile fashion  Needle size: 25 G  Approach: dorsal  Medications administered: 20 mg triamcinolone acetonide 40 mg/mL; 0 5 mL bupivacaine 0 25 %    Patient tolerance: patient tolerated the procedure well with no immediate complications  Dressing:  Sterile dressing applied            Scribe Attestation    I,:  Katey Hay PA-C am acting as a scribe while in the presence of the attending physician :       I,:  Marzena Beck MD personally performed the services described in this documentation    as scribed in my presence :             Scribe Attestation    I,:  Katey Hay PA-C am acting as a scribe while in the presence of the attending physician :       I,:  Marzena Beck MD personally performed the services described in this documentation    as scribed in my presence :

## 2022-10-26 NOTE — TELEPHONE ENCOUNTER
Serjio Valerio has called the [facility] office in regards to receiving the flu shot for both herself and her spouse  Asked Jackelyn Ward when they would like to come in  [facility] wished to know if the nurse visits could be placed for 12:30PM today  Informed Flip the  providers have a meeting in the afternoon, and would be unable to give flu shots without a provider in the  office  Jackelyn Ward stated they would try to come earlier today, and if not would go a pharmacy for the flu shots  Jackelyn Ward ended the call soon after

## 2022-12-01 DIAGNOSIS — G43.909 MIGRAINE WITHOUT STATUS MIGRAINOSUS, NOT INTRACTABLE, UNSPECIFIED MIGRAINE TYPE: ICD-10-CM

## 2022-12-01 RX ORDER — SUMATRIPTAN 100 MG/1
TABLET, FILM COATED ORAL
Qty: 27 TABLET | Refills: 0 | Status: SHIPPED | OUTPATIENT
Start: 2022-12-01

## 2022-12-01 NOTE — TELEPHONE ENCOUNTER
Efren Bernal has requested a refill of SUMAtriptan (IMITREX) 100 mg tablet  Would a refill be appropriate?

## 2022-12-06 ENCOUNTER — HOSPITAL ENCOUNTER (OUTPATIENT)
Dept: MAMMOGRAPHY | Facility: MEDICAL CENTER | Age: 62
Discharge: HOME | End: 2022-12-06

## 2022-12-06 ENCOUNTER — HOSPITAL ENCOUNTER (OUTPATIENT)
Dept: BONE DENSITY | Facility: MEDICAL CENTER | Age: 62
Discharge: HOME | End: 2022-12-06

## 2022-12-06 VITALS — WEIGHT: 142 LBS | BODY MASS INDEX: 22.82 KG/M2 | HEIGHT: 66 IN

## 2022-12-06 DIAGNOSIS — Z78.0 ENCOUNTER FOR OSTEOPOROSIS SCREENING IN ASYMPTOMATIC POSTMENOPAUSAL PATIENT: ICD-10-CM

## 2022-12-06 DIAGNOSIS — Z13.820 ENCOUNTER FOR OSTEOPOROSIS SCREENING IN ASYMPTOMATIC POSTMENOPAUSAL PATIENT: ICD-10-CM

## 2022-12-06 DIAGNOSIS — Z12.31 ENCOUNTER FOR SCREENING MAMMOGRAM FOR MALIGNANT NEOPLASM OF BREAST: ICD-10-CM

## 2023-01-02 DIAGNOSIS — E78.5 HYPERLIPIDEMIA, UNSPECIFIED HYPERLIPIDEMIA TYPE: ICD-10-CM

## 2023-01-03 RX ORDER — BUPROPION HYDROCHLORIDE 300 MG/1
TABLET ORAL
Qty: 90 TABLET | Refills: 0 | Status: SHIPPED | OUTPATIENT
Start: 2023-01-03

## 2023-01-03 NOTE — TELEPHONE ENCOUNTER
Logan Sotelo has requested a refill of buPROPion (WELLBUTRIN XL) 300 mg 24 hr tablet  Pt meets the requirements placed by [facility]  Will refill medication

## 2023-01-14 DIAGNOSIS — E78.5 HYPERLIPIDEMIA, UNSPECIFIED HYPERLIPIDEMIA TYPE: ICD-10-CM

## 2023-01-16 RX ORDER — DULOXETIN HYDROCHLORIDE 20 MG/1
CAPSULE, DELAYED RELEASE ORAL
Qty: 90 CAPSULE | Refills: 0 | Status: SHIPPED | OUTPATIENT
Start: 2023-01-16

## 2023-01-16 NOTE — TELEPHONE ENCOUNTER
Nelida Pemberton has requested a refill of DULoxetine (CYMBALTA) 20 mg capsule  [facility]  Will refill medication

## 2023-01-31 DIAGNOSIS — E78.5 HYPERLIPIDEMIA, UNSPECIFIED HYPERLIPIDEMIA TYPE: ICD-10-CM

## 2023-01-31 RX ORDER — ATORVASTATIN CALCIUM 10 MG/1
TABLET, FILM COATED ORAL
Qty: 90 TABLET | Refills: 0 | Status: SHIPPED | OUTPATIENT
Start: 2023-01-31

## 2023-02-21 ENCOUNTER — TELEPHONE (OUTPATIENT)
Dept: OBGYN CLINIC | Facility: HOSPITAL | Age: 63
End: 2023-02-21

## 2023-02-21 NOTE — TELEPHONE ENCOUNTER
Patient wanted to schedule a follow up appointment with Dr Ant Chacon and was wondering how much a visit would cost if she paid out of pocket instead of using health insurance  Self pay for new patients would be an estimate of $159 00 but this patient is a follow up so the estimate for a follow up visit would be $105 00  This is just an estimate for the office visit anything extra that is done in the room like injections would be an additional charge  We ask that the patient at least pay upfront a $30 00 deposit toward the visit and the rest can be billed unless she'd like to pay the full $105 00  Again, this amount is just an estimate, it could be more or it could be less  The patient stated she would call back to reschedule her appointment  When she does please inform her of this self pay information       Thank you

## 2023-03-23 ENCOUNTER — TELEPHONE (OUTPATIENT)
Dept: OBGYN CLINIC | Facility: CLINIC | Age: 63
End: 2023-03-23

## 2023-03-23 NOTE — TELEPHONE ENCOUNTER
Caller: Patient     Doctor: Migdalia Dee     Reason for call: Calling to find out the CPT code for an injection , spoke to office and the ygave       Call back#: n/a

## 2023-03-31 ENCOUNTER — OFFICE VISIT (OUTPATIENT)
Dept: OBGYN CLINIC | Facility: MEDICAL CENTER | Age: 63
End: 2023-03-31

## 2023-03-31 VITALS
DIASTOLIC BLOOD PRESSURE: 79 MMHG | SYSTOLIC BLOOD PRESSURE: 140 MMHG | HEIGHT: 66 IN | HEART RATE: 80 BPM | WEIGHT: 135.4 LBS | BODY MASS INDEX: 21.76 KG/M2

## 2023-03-31 DIAGNOSIS — M19.012 OSTEOARTHRITIS OF LEFT AC (ACROMIOCLAVICULAR) JOINT: Primary | ICD-10-CM

## 2023-03-31 DIAGNOSIS — M75.52 ACUTE BURSITIS OF LEFT SHOULDER: ICD-10-CM

## 2023-03-31 RX ORDER — METHYLPREDNISOLONE ACETATE 40 MG/ML
1 INJECTION, SUSPENSION INTRA-ARTICULAR; INTRALESIONAL; INTRAMUSCULAR; SOFT TISSUE
Status: COMPLETED | OUTPATIENT
Start: 2023-03-31 | End: 2023-03-31

## 2023-03-31 RX ORDER — BUPIVACAINE HYDROCHLORIDE 2.5 MG/ML
4 INJECTION, SOLUTION INFILTRATION; PERINEURAL
Status: COMPLETED | OUTPATIENT
Start: 2023-03-31 | End: 2023-03-31

## 2023-03-31 RX ADMIN — BUPIVACAINE HYDROCHLORIDE 4 ML: 2.5 INJECTION, SOLUTION INFILTRATION; PERINEURAL at 10:29

## 2023-03-31 RX ADMIN — METHYLPREDNISOLONE ACETATE 1 ML: 40 INJECTION, SUSPENSION INTRA-ARTICULAR; INTRALESIONAL; INTRAMUSCULAR; SOFT TISSUE at 10:29

## 2023-03-31 NOTE — PROGRESS NOTES
"Orthopaedic Surgery - Office Note  Sanjana Hayden (57 y o  female)   : 1960   MRN: [id number]  Encounter Date: 3/31/2023    Chief Complaint   Patient presents with   • Left Shoulder - Follow-up       Assessment / Plan  Left shoulder AC joint mild arthritis with subacromial bursitis    · Cortisone steroid injection was administered today to left subacromial bursa  Patient should avoid strenuous activities for the next 1-2 days  Patient should avoid vaccines for the next 2 weeks if possible  · Continue home exercise program   · Continue motrin to control pain  · If no relief, consider MRI of left shoulder  Return in about 3 months (around 2023)  History of Present Illness  Sanjana Hayden is a 57 y o  female who presents to the office for follow-up of left shoulder AC joint arthritis with subacromial bursitis  Last visit she received cortisone injection which provided about 3 and half months of relief  This is not as beneficial as the prior cortisone injection which gave her 5 months of relief  She is experiencing posterior lateral left shoulder pain made worse with activities such as putting on her coat  She has continued her home exercise program   She denies any numbness or tingling  She has been taking Motrin to control her pain  Review of Systems  Pertinent items are noted in HPI  All other systems were reviewed and are negative  Physical Exam  /79   Pulse 80   Ht 5' 6\" (1 676 m)   Wt 61 4 kg (135 lb 6 4 oz)   LMP  (LMP Unknown)   BMI 21 85 kg/m²   Cons: Appears well  No apparent distress  Psych: Alert  Oriented x3  Mood and affect normal   Eyes: PERRLA, EOMI  Resp: Normal effort  No audible wheezing or stridor  CV: Palpable pulse  No discernable arrhythmia  No LE edema  Lymph:  No palpable cervical, axillary, or inguinal lymphadenopathy  Skin: Warm  No palpable masses  No visible lesions  Neuro: Normal muscle tone  Normal and symmetric DTR's       Left " "Shoulder Exam  Alignment / Posture:  Normal shoulder posture  Inspection:  No swelling  Palpation:  Subacromial bursa tenderness  ROM:  Shoulder   Shoulder ER 60  Strength:  FE 4+/5  ER 4/5  Stability:  No objective shoulder instability  Tests: (+) Neer  (+) Painful arc  (-) Belly press  Neurovascular:  Sensation intact in Ax/R/M/U nerve distributions  2+ radial pulse  Studies Reviewed  No studies to review    Large joint arthrocentesis: L subacromial bursa  Universal Protocol:  Consent: Verbal consent obtained  Risks and benefits: risks, benefits and alternatives were discussed  Consent given by: patient  Time out: Immediately prior to procedure a \"time out\" was called to verify the correct patient, procedure, equipment, support staff and site/side marked as required  Timeout called at: 3/31/2023 10:24 AM   Patient understanding: patient states understanding of the procedure being performed  Site marked: the operative site was marked  Patient identity confirmed: verbally with patient    Supporting Documentation  Indications: pain   Procedure Details  Location: shoulder - L subacromial bursa  Preparation: Patient was prepped and draped in the usual sterile fashion  Needle size: 22 G  Ultrasound guidance: no  Medications administered: 4 mL bupivacaine 0 25 %; 1 mL methylPREDNISolone acetate 40 mg/mL    Patient tolerance: patient tolerated the procedure well with no immediate complications  Dressing:  Sterile dressing applied             Medical, Surgical, Family, and Social History  The patient's medical history, family history, and social history, were reviewed and updated as appropriate      Past Medical History:   Diagnosis Date   • Adenomyomatosis of gallbladder 1/19/2021   • Anxiety    • Arthritis    • Calculus of gallbladder    • Lore [place] ulcer    • Depression    • Hyperlipidemia    • Migraine    • Mitral valve prolapse    • Murmur    • Neck pain     at times   • Pituitary adenoma [facility])    • " Pituitary adenoma ([facility] [address] )    • Shoulder injury related to vaccine administration (SIRVA) 09/2020       Past Surgical History:   Procedure Laterality Date   • BREAST EXCISIONAL BIOPSY Left 1996    benign   • BREAST SURGERY      L breast cyst removal-benign   • CATARACT EXTRACTION Bilateral    • CATARACT EXTRACTION, BILATERAL  01/2020   • COLONOSCOPY     • DE QUERVAIN'S RELEASE Bilateral    • EGD AND COLONOSCOPY N/A 5/11/2016    Procedure: EGD AND COLONOSCOPY;  Surgeon: Stan Boudreaux MD;  Location: [facility] GI LAB;   Service:    • LAPAROSCOPIC MAGNETIC SPHINCTER AUGMENTATION N/A 7/20/2020    Procedure: MAGNETIC SPHINCTER AUGMENT, LINX PLACEMENT;  Surgeon: Jim Hensley MD;  [facility];  Service: Bariatrics   • PARAESOPHAGEAL HERNIA REPAIR N/A 7/20/2020    Procedure: REPAIR HERNIA PARAESOPHAGEAL  LAPAROSCOPIC, PARTIAL GASTRECTOMY;  Surgeon: Jim Hensley MD;  [facility];  Service: Bariatrics   • MD ARTHRP INTERPOS INTERCARPAL/METACARPAL JOINTS Left 11/21/2019    Procedure: THUMB [address];  Surgeon: Trevin Chowdhury MD;  [facility];  Service: Orthopedics   • MD INCISION EXTENSOR TENDON SHEATH WRIST Left 11/21/2019    Procedure: Toth Tisha AND TENDON GRAFT;  Surgeon: Trevin Chowdhury MD;  [facility];  Service: Orthopedics   • MD LAPAROSCOPY SURG CHOLECYSTECTOMY N/A 3/4/2021    Procedure: Robotic cholecystectomy ;  Surgeon: Jose Craig MD;  [facility];  Service: General   • MD LAPS RPR PARAESPHGL HRNA INCL FUNDPLSTY [address] N/A 8/31/2016    Procedure: REPAIR HERNIA PARAESOPHAGEAL  with bio A mesh LAPAROSCOPIC NISSEN FUNDOPLICATION Laparoscopic Gastroplexy Intraop EGD #[id number];  Surgeon: Jim Hensley MD;  [facility];  Service: Bariatrics   • TONSILLECTOMY     • WRIST SURGERY Bilateral     daquero vein release       Family History   Problem Relation Age of Onset   • Anxiety disorder Mother    • Arthritis Mother    • Hyperlipidemia Mother    • Hypertension Mother    • Prostate cancer Father 76   • Dementia Father    • Parkinsonism Father    • Other Father 80        bladder cancer   • No Known Problems Sister    • No Known Problems Daughter    • Ovarian cancer Maternal Grandmother 72   • No Known Problems Maternal Grandfather    • No Known Problems Paternal Grandmother    • No Known Problems Paternal Grandfather    • No Known Problems Maternal Aunt    • Breast cancer Paternal Aunt 66   • Colon cancer Paternal Aunt 46   • Pancreatic cancer Paternal Aunt 68   • Breast cancer Cousin 36       Social History     Occupational History   • Not on file   Tobacco Use   • Smoking status: Never   • Smokeless tobacco: Never   Vaping Use   • Vaping Use: Never used   Substance and Sexual Activity   • Alcohol use: Yes     Comment: liquor, social drinker   • Drug use: No   • Sexual activity: Yes     Partners: Male     Comment:        No Known Allergies      Current Outpatient Medications:   •  ascorbic acid (VITAMIN C) 500 mg tablet, Take 500 mg by mouth 2 (two) times a day , Disp: , Rfl:   •  aspirin 81 MG tablet, Take 1 tablet by mouth daily , Disp: , Rfl:   •  atorvastatin (LIPITOR) 10 mg tablet, TAKE ONE TABLET BY MOUTH every EVENING, Disp: 90 tablet, Rfl: 0  •  buPROPion (WELLBUTRIN XL) 300 mg 24 hr tablet, TAKE 1 TABLET BY MOUTH DAILY, Disp: 90 tablet, Rfl: 0  •  Cholecalciferol (VITAMIN D) 2000 UNITS CAPS, Take 1 tablet by mouth daily , Disp: , Rfl:   •  DULoxetine (CYMBALTA) 20 mg capsule, TAKE 1 CAPSULE BY MOUTH DAILY, Disp: 90 capsule, Rfl: 0  •  estradiol (ESTRACE VAGINAL) 0 1 mg/g vaginal cream, Insert 2 g into the vagina daily at bedtime For 2 weeks then twice weekly, Disp: 42 5 g, Rfl: 11  •  multivitamin (THERAGRAN) TABS, Take 1 tablet by mouth daily  , Disp: , Rfl:   •  SUMAtriptan (IMITREX) 100 mg tablet, TAKE 1 TABLET BY MOUTH FOR MIGRAINE RELIEF  MAY REPEAT 2 HOURS LATER   MAX 2 TABLETS/DAY , Disp: 27 tablet, Rfl: 0  •  vitamin E, tocopherol, 400 units capsule, Take 400 Units by mouth daily Pt stopped, Disp: , Rfl:       [employer]    I,:  Foreign Conn am acting as a scribe while in the presence of the attending physician :       I,:  Tyson Hoover MD personally performed the services described in this documentation    as scribed in my presence :

## 2023-05-01 DIAGNOSIS — E78.5 HYPERLIPIDEMIA, UNSPECIFIED HYPERLIPIDEMIA TYPE: ICD-10-CM

## 2023-05-01 RX ORDER — ATORVASTATIN CALCIUM 10 MG/1
TABLET, FILM COATED ORAL
Qty: 90 TABLET | Refills: 0 | Status: SHIPPED | OUTPATIENT
Start: 2023-05-01 | End: 2023-05-03 | Stop reason: SDUPTHER

## 2023-05-03 DIAGNOSIS — E78.5 HYPERLIPIDEMIA, UNSPECIFIED HYPERLIPIDEMIA TYPE: ICD-10-CM

## 2023-05-03 RX ORDER — ATORVASTATIN CALCIUM 10 MG/1
10 TABLET, FILM COATED ORAL EVERY EVENING
Qty: 90 TABLET | Refills: 0 | Status: SHIPPED | OUTPATIENT
Start: 2023-05-03

## 2023-06-08 DIAGNOSIS — E78.5 HYPERLIPIDEMIA, UNSPECIFIED HYPERLIPIDEMIA TYPE: ICD-10-CM

## 2023-06-08 RX ORDER — ATORVASTATIN CALCIUM 10 MG/1
10 TABLET, FILM COATED ORAL EVERY EVENING
Qty: 90 TABLET | Refills: 3 | Status: SHIPPED | OUTPATIENT
Start: 2023-06-08

## 2023-06-09 RX ORDER — BUPROPION HYDROCHLORIDE 300 MG/1
300 TABLET ORAL DAILY
Qty: 90 TABLET | Refills: 0 | Status: SHIPPED | OUTPATIENT
Start: 2023-06-09

## 2023-06-09 RX ORDER — DULOXETIN HYDROCHLORIDE 20 MG/1
20 CAPSULE, DELAYED RELEASE ORAL DAILY
Qty: 90 CAPSULE | Refills: 0 | Status: SHIPPED | OUTPATIENT
Start: 2023-06-09

## 2023-07-19 ENCOUNTER — TELEPHONE (OUTPATIENT)
Dept: OBGYN CLINIC | Facility: MEDICAL CENTER | Age: 63
End: 2023-07-19

## 2023-07-19 ENCOUNTER — TELEPHONE (OUTPATIENT)
Dept: OBGYN CLINIC | Facility: HOSPITAL | Age: 63
End: 2023-07-19

## 2023-07-19 DIAGNOSIS — M75.52 ACUTE BURSITIS OF LEFT SHOULDER: ICD-10-CM

## 2023-07-19 DIAGNOSIS — Z12.31 ENCOUNTER FOR SCREENING MAMMOGRAM FOR MALIGNANT NEOPLASM OF BREAST: Primary | ICD-10-CM

## 2023-07-19 DIAGNOSIS — M25.512 LEFT SHOULDER PAIN, UNSPECIFIED CHRONICITY: Primary | ICD-10-CM

## 2023-07-19 NOTE — TELEPHONE ENCOUNTER
Caller: Patient     Doctor: Jil Sterling     Reason for call: Patient asked for an MRI order, once ordered please call patient     Call back#: [phone]

## 2023-07-19 NOTE — TELEPHONE ENCOUNTER
I did place an order for an MRI of the left shoulder please let the patient know and let them know how to schedule. Patient will also need a follow-up following this to review the MRI study.

## 2023-07-19 NOTE — TELEPHONE ENCOUNTER
Called the patient and let her know an MRI was ordered and that she can call central scheduling. Let her know to call the office back to let us know when she is ready to make her follow-up appointment. She did state there was a change to her insurance; I instructed her to provide this information to central scheduling.

## 2023-07-19 NOTE — TELEPHONE ENCOUNTER
Patient called about a mammogram script and to see if it can be placed. Please review when you get a chance.  Thank you

## 2023-07-27 ENCOUNTER — TELEPHONE (OUTPATIENT)
Dept: BARIATRICS | Facility: CLINIC | Age: 63
End: 2023-07-27

## 2023-08-02 ENCOUNTER — APPOINTMENT (OUTPATIENT)
Dept: LAB | Facility: MEDICAL CENTER | Age: 63
End: 2023-08-02
Payer: MEDICARE

## 2023-08-02 DIAGNOSIS — E23.7 DISEASE OF PITUITARY GLAND (HCC): ICD-10-CM

## 2023-08-02 DIAGNOSIS — E78.5 HYPERLIPIDEMIA, UNSPECIFIED HYPERLIPIDEMIA TYPE: ICD-10-CM

## 2023-08-02 DIAGNOSIS — E34.9 ENDOCRINE DISORDER RELATED TO PUBERTY: ICD-10-CM

## 2023-08-02 LAB
ALBUMIN SERPL BCP-MCNC: 3.4 G/DL (ref 3.5–5)
ALP SERPL-CCNC: 83 U/L (ref 46–116)
ALT SERPL W P-5'-P-CCNC: 26 U/L (ref 12–78)
ANION GAP SERPL CALCULATED.3IONS-SCNC: 2 MMOL/L
AST SERPL W P-5'-P-CCNC: 23 U/L (ref 5–45)
BASOPHILS # BLD AUTO: 0.06 THOUSANDS/ÂΜL (ref 0–0.1)
BASOPHILS NFR BLD AUTO: 1 % (ref 0–1)
BILIRUB SERPL-MCNC: 0.39 MG/DL (ref 0.2–1)
BUN SERPL-MCNC: 12 MG/DL (ref 5–25)
CALCIUM ALBUM COR SERPL-MCNC: 9.5 MG/DL (ref 8.3–10.1)
CALCIUM SERPL-MCNC: 9 MG/DL (ref 8.3–10.1)
CHLORIDE SERPL-SCNC: 110 MMOL/L (ref 96–108)
CO2 SERPL-SCNC: 29 MMOL/L (ref 21–32)
CORTIS AM PEAK SERPL-MCNC: 15.6 UG/DL (ref 6.7–22.6)
CREAT SERPL-MCNC: 0.66 MG/DL (ref 0.6–1.3)
EOSINOPHIL # BLD AUTO: 0.19 THOUSAND/ÂΜL (ref 0–0.61)
EOSINOPHIL NFR BLD AUTO: 5 % (ref 0–6)
ERYTHROCYTE [DISTWIDTH] IN BLOOD BY AUTOMATED COUNT: 12 % (ref 11.6–15.1)
EST. AVERAGE GLUCOSE BLD GHB EST-MCNC: 120 MG/DL
ESTRADIOL SERPL-MCNC: 91.4 PG/ML
FSH SERPL-ACNC: 99.7 MIU/ML
GFR SERPL CREATININE-BSD FRML MDRD: 92 ML/MIN/1.73SQ M
GLUCOSE P FAST SERPL-MCNC: 85 MG/DL (ref 65–99)
HBA1C MFR BLD: 5.8 %
HCT VFR BLD AUTO: 40.1 % (ref 34.8–46.1)
HGB BLD-MCNC: 13.2 G/DL (ref 11.5–15.4)
IMM GRANULOCYTES # BLD AUTO: 0.03 THOUSAND/UL (ref 0–0.2)
IMM GRANULOCYTES NFR BLD AUTO: 1 % (ref 0–2)
LH SERPL-ACNC: 36.4 MIU/ML
LYMPHOCYTES # BLD AUTO: 1.43 THOUSANDS/ÂΜL (ref 0.6–4.47)
LYMPHOCYTES NFR BLD AUTO: 34 % (ref 14–44)
MAGNESIUM SERPL-MCNC: 2.4 MG/DL (ref 1.6–2.6)
MCH RBC QN AUTO: 32 PG (ref 26.8–34.3)
MCHC RBC AUTO-ENTMCNC: 32.9 G/DL (ref 31.4–37.4)
MCV RBC AUTO: 97 FL (ref 82–98)
MONOCYTES # BLD AUTO: 0.55 THOUSAND/ÂΜL (ref 0.17–1.22)
MONOCYTES NFR BLD AUTO: 13 % (ref 4–12)
NEUTROPHILS # BLD AUTO: 1.93 THOUSANDS/ÂΜL (ref 1.85–7.62)
NEUTS SEG NFR BLD AUTO: 46 % (ref 43–75)
NRBC BLD AUTO-RTO: 0 /100 WBCS
PHOSPHATE SERPL-MCNC: 3.9 MG/DL (ref 2.3–4.1)
PLATELET # BLD AUTO: 177 THOUSANDS/UL (ref 149–390)
PMV BLD AUTO: 11.6 FL (ref 8.9–12.7)
POTASSIUM SERPL-SCNC: 4.3 MMOL/L (ref 3.5–5.3)
PROLACTIN SERPL-MCNC: 25.62 NG/ML (ref 2.74–19.64)
PROT SERPL-MCNC: 6.7 G/DL (ref 6.4–8.4)
RBC # BLD AUTO: 4.13 MILLION/UL (ref 3.81–5.12)
SODIUM SERPL-SCNC: 141 MMOL/L (ref 135–147)
T4 FREE SERPL-MCNC: 0.79 NG/DL (ref 0.61–1.12)
TSH SERPL DL<=0.05 MIU/L-ACNC: 1.51 UIU/ML (ref 0.45–4.5)
WBC # BLD AUTO: 4.19 THOUSAND/UL (ref 4.31–10.16)

## 2023-08-02 PROCEDURE — 83735 ASSAY OF MAGNESIUM: CPT

## 2023-08-02 PROCEDURE — 83002 ASSAY OF GONADOTROPIN (LH): CPT

## 2023-08-02 PROCEDURE — 83001 ASSAY OF GONADOTROPIN (FSH): CPT

## 2023-08-02 PROCEDURE — 84443 ASSAY THYROID STIM HORMONE: CPT

## 2023-08-02 PROCEDURE — 80053 COMPREHEN METABOLIC PANEL: CPT

## 2023-08-02 PROCEDURE — 84305 ASSAY OF SOMATOMEDIN: CPT

## 2023-08-02 PROCEDURE — 84146 ASSAY OF PROLACTIN: CPT

## 2023-08-02 PROCEDURE — 84100 ASSAY OF PHOSPHORUS: CPT

## 2023-08-02 PROCEDURE — 36415 COLL VENOUS BLD VENIPUNCTURE: CPT

## 2023-08-02 PROCEDURE — 85025 COMPLETE CBC W/AUTO DIFF WBC: CPT

## 2023-08-02 PROCEDURE — 82024 ASSAY OF ACTH: CPT

## 2023-08-02 PROCEDURE — 82533 TOTAL CORTISOL: CPT

## 2023-08-02 PROCEDURE — 83036 HEMOGLOBIN GLYCOSYLATED A1C: CPT

## 2023-08-02 PROCEDURE — 83835 ASSAY OF METANEPHRINES: CPT

## 2023-08-02 PROCEDURE — 82670 ASSAY OF TOTAL ESTRADIOL: CPT

## 2023-08-02 PROCEDURE — 83003 ASSAY GROWTH HORMONE (HGH): CPT

## 2023-08-02 PROCEDURE — 84439 ASSAY OF FREE THYROXINE: CPT

## 2023-08-02 PROCEDURE — 88112 CYTOPATH CELL ENHANCE TECH: CPT | Performed by: PATHOLOGY

## 2023-08-03 LAB — ACTH PLAS-MCNC: 22.6 PG/ML (ref 7.2–63.3)

## 2023-08-04 LAB
GH SERPL-MCNC: 5.9 NG/ML (ref 0–10)
IGF-I SERPL-MCNC: 77 NG/ML (ref 57–202)

## 2023-08-06 LAB
METANEPH FREE SERPL-MCNC: 23.3 PG/ML (ref 0–88)
NORMETANEPHRINE SERPL-MCNC: 58.4 PG/ML (ref 0–285.2)

## 2023-08-07 PROCEDURE — 88112 CYTOPATH CELL ENHANCE TECH: CPT | Performed by: PATHOLOGY

## 2023-08-08 ENCOUNTER — HOSPITAL ENCOUNTER (OUTPATIENT)
Dept: MRI IMAGING | Facility: HOSPITAL | Age: 63
Discharge: HOME | End: 2023-08-08
Payer: MEDICARE

## 2023-08-08 DIAGNOSIS — E23.7 DISEASE OF PITUITARY GLAND (HCC): ICD-10-CM

## 2023-08-08 DIAGNOSIS — E34.9 ENDOCRINE DISORDER RELATED TO PUBERTY: ICD-10-CM

## 2023-08-08 DIAGNOSIS — M25.512 LEFT SHOULDER PAIN, UNSPECIFIED CHRONICITY: ICD-10-CM

## 2023-08-08 DIAGNOSIS — E78.5 HYPERLIPIDEMIA, UNSPECIFIED HYPERLIPIDEMIA TYPE: ICD-10-CM

## 2023-08-08 PROCEDURE — 73221 MRI JOINT UPR EXTREM W/O DYE: CPT

## 2023-08-08 PROCEDURE — 70553 MRI BRAIN STEM W/O & W/DYE: CPT

## 2023-08-08 PROCEDURE — G1004 CDSM NDSC: HCPCS

## 2023-08-08 PROCEDURE — A9585 GADOBUTROL INJECTION: HCPCS | Performed by: SPECIALIST

## 2023-08-08 RX ORDER — GADOBUTROL 604.72 MG/ML
6 INJECTION INTRAVENOUS
Status: COMPLETED | OUTPATIENT
Start: 2023-08-08 | End: 2023-08-08

## 2023-08-08 RX ADMIN — GADOBUTROL 6 ML: 604.72 INJECTION INTRAVENOUS at 15:16

## 2023-08-25 DIAGNOSIS — E78.5 HYPERLIPIDEMIA, UNSPECIFIED HYPERLIPIDEMIA TYPE: ICD-10-CM

## 2023-08-25 RX ORDER — BUPROPION HYDROCHLORIDE 300 MG/1
300 TABLET ORAL DAILY
Qty: 90 TABLET | Refills: 0 | Status: SHIPPED | OUTPATIENT
Start: 2023-08-25

## 2023-08-25 RX ORDER — DULOXETIN HYDROCHLORIDE 20 MG/1
20 CAPSULE, DELAYED RELEASE ORAL DAILY
Qty: 90 CAPSULE | Refills: 0 | Status: SHIPPED | OUTPATIENT
Start: 2023-08-25

## 2023-08-25 NOTE — TELEPHONE ENCOUNTER
Cristiana Stephens, it's Kristy Ear calling [facility]. MAN, date of birth [birth year]. I have a new pharmacy that I'm going to be dealing with the new mail away and they're not able to transfer any refills that I have. They need a new script so they're going to be faxing you a request. It's going to be from center and it's going to be for the duloxetine 20 milligrams. So I just want to let you know about that if you could get make sure that you get that and send that out. They only had the [facility] location, so I gave them the [place] address and phone number and fax number. So if there's a problem, if you don't receive it by Monday, just give me a call because it takes They said it would take me about two to 10 business days to get this So I just want to make sure that's going to be taken care of. You can reach me on my cell and that's [phone]. Thanks, Yosvany Laird. Have a great day.  christi Davenport.

## 2023-09-11 ENCOUNTER — OFFICE VISIT (OUTPATIENT)
Dept: OBGYN CLINIC | Facility: MEDICAL CENTER | Age: 63
End: 2023-09-11
Payer: MEDICARE

## 2023-09-11 VITALS
WEIGHT: 142 LBS | BODY MASS INDEX: 22.82 KG/M2 | HEIGHT: 66 IN | SYSTOLIC BLOOD PRESSURE: 109 MMHG | HEART RATE: 80 BPM | DIASTOLIC BLOOD PRESSURE: 70 MMHG

## 2023-09-11 DIAGNOSIS — G56.02 CARPAL TUNNEL SYNDROME ON LEFT: Primary | ICD-10-CM

## 2023-09-11 DIAGNOSIS — M75.52 ACUTE BURSITIS OF LEFT SHOULDER: ICD-10-CM

## 2023-09-11 DIAGNOSIS — M19.012 OSTEOARTHRITIS OF LEFT AC (ACROMIOCLAVICULAR) JOINT: ICD-10-CM

## 2023-09-11 PROCEDURE — 99214 OFFICE O/P EST MOD 30 MIN: CPT | Performed by: ORTHOPAEDIC SURGERY

## 2023-09-11 PROCEDURE — 20610 DRAIN/INJ JOINT/BURSA W/O US: CPT | Performed by: ORTHOPAEDIC SURGERY

## 2023-09-11 RX ORDER — BUPIVACAINE HYDROCHLORIDE 2.5 MG/ML
4 INJECTION, SOLUTION INFILTRATION; PERINEURAL
Status: COMPLETED | OUTPATIENT
Start: 2023-09-11 | End: 2023-09-11

## 2023-09-11 RX ORDER — METHYLPREDNISOLONE ACETATE 40 MG/ML
1 INJECTION, SUSPENSION INTRA-ARTICULAR; INTRALESIONAL; INTRAMUSCULAR; SOFT TISSUE
Status: COMPLETED | OUTPATIENT
Start: 2023-09-11 | End: 2023-09-11

## 2023-09-11 RX ADMIN — BUPIVACAINE HYDROCHLORIDE 4 ML: 2.5 INJECTION, SOLUTION INFILTRATION; PERINEURAL at 09:30

## 2023-09-11 RX ADMIN — METHYLPREDNISOLONE ACETATE 1 ML: 40 INJECTION, SUSPENSION INTRA-ARTICULAR; INTRALESIONAL; INTRAMUSCULAR; SOFT TISSUE at 09:30

## 2023-09-28 DIAGNOSIS — G43.909 MIGRAINE WITHOUT STATUS MIGRAINOSUS, NOT INTRACTABLE, UNSPECIFIED MIGRAINE TYPE: ICD-10-CM

## 2023-09-28 DIAGNOSIS — E78.5 HYPERLIPIDEMIA, UNSPECIFIED HYPERLIPIDEMIA TYPE: ICD-10-CM

## 2023-09-28 RX ORDER — ATORVASTATIN CALCIUM 10 MG/1
10 TABLET, FILM COATED ORAL EVERY EVENING
Qty: 90 TABLET | Refills: 3 | Status: SHIPPED | OUTPATIENT
Start: 2023-09-28

## 2023-09-28 RX ORDER — SUMATRIPTAN 100 MG/1
TABLET, FILM COATED ORAL
Qty: 27 TABLET | Refills: 0 | Status: SHIPPED | OUTPATIENT
Start: 2023-09-28

## 2023-09-29 ENCOUNTER — APPOINTMENT (OUTPATIENT)
Dept: LAB | Facility: MEDICAL CENTER | Age: 63
End: 2023-09-29
Payer: MEDICARE

## 2023-10-18 ENCOUNTER — ANNUAL EXAM (OUTPATIENT)
Dept: OBGYN CLINIC | Facility: CLINIC | Age: 63
End: 2023-10-18
Payer: MEDICARE

## 2023-10-18 VITALS
WEIGHT: 142 LBS | SYSTOLIC BLOOD PRESSURE: 115 MMHG | BODY MASS INDEX: 22.82 KG/M2 | HEIGHT: 66 IN | DIASTOLIC BLOOD PRESSURE: 70 MMHG

## 2023-10-18 DIAGNOSIS — N95.1 VAGINAL DRYNESS, MENOPAUSAL: ICD-10-CM

## 2023-10-18 DIAGNOSIS — Z01.419 ENCOUNTER FOR WELL WOMAN EXAM WITH ROUTINE GYNECOLOGICAL EXAM: Primary | ICD-10-CM

## 2023-10-18 DIAGNOSIS — Z12.31 BREAST CANCER SCREENING BY MAMMOGRAM: ICD-10-CM

## 2023-10-18 PROCEDURE — G0101 CA SCREEN;PELVIC/BREAST EXAM: HCPCS | Performed by: OBSTETRICS & GYNECOLOGY

## 2023-10-18 PROCEDURE — G0145 SCR C/V CYTO,THINLAYER,RESCR: HCPCS | Performed by: OBSTETRICS & GYNECOLOGY

## 2023-10-18 RX ORDER — LIDOCAINE HYDROCHLORIDE 10 MG/ML
INJECTION, SOLUTION INFILTRATION; PERINEURAL
COMMUNITY
Start: 2023-08-28

## 2023-10-18 RX ORDER — ESTRADIOL 10 UG/1
1 INSERT VAGINAL 2 TIMES WEEKLY
Qty: 24 TABLET | Refills: 3 | Status: SHIPPED | OUTPATIENT
Start: 2023-10-19

## 2023-10-18 NOTE — PROGRESS NOTES
ASSESSMENT & PLAN: Mindy Rhodes was seen today for gynecologic exam.    Diagnoses and all orders for this visit:    Encounter for well woman exam with routine gynecological exam    Discussion/Summary:  Patient here for yearly gyn preventive exam  1. Routine well woman exam done today  2. Pap and HPV:  The patient's pap is up to date. Pap was done today. Current ASCCP Guidelines reviewed. 3.  Mammogram was ordered  4. Colorectal cancer screening is up to date. 4. The following were reviewed in today's visit: breast self exam, adequate intake of calcium and vitamin D, exercise and healthy diet. 5. F/u 1 year. 6. Vaginal dryness: script for estrace sent, osphena did not help with symptoms   CC:  Annual Gynecologic Examination    HPI: Neel Be is a 70 y.o. who presents for annual gynecologic examination. She has the following concerns:  states the estrace does not alleviate; will trial vagifem  Reports GSUI, mostly with coughing, sneezing or sudden movements. Discussed PFPT, poise tampons, and sling. Will consider her options.      Last pap:   Last mammogram:   Last colorectal cancer screenin    Patient Active Problem List   Diagnosis    Migraine    Pituitary adenoma ([address])    Iron deficiency anemia    Betty Balboa ulcer    Dyslipidemia    Hyperlipidemia    Iron deficiency anemia due to chronic blood loss    Menopausal symptoms    Migraine, unspecified, not intractable, without status migrainosus    Osteoarthritis of carpometacarpal (CMC) joint of thumb    Liver cyst    Status post laparoscopic cholecystectomy    Disease of pituitary gland (HCC)    Acute bursitis of left shoulder       Past Medical History:   Diagnosis Date    Adenomyomatosis of gallbladder 2021    Anxiety     Arthritis     Calculus of gallbladder     Betty Balboa ulcer     Depression     Hyperlipidemia     Migraine     Mitral valve prolapse     Murmur     Neck pain     at times    Pituitary adenoma [facility])     Pituitary adenoma [facility])     Shoulder injury related to vaccine administration (SIRVA) 09/2020       Past Surgical History:   Procedure Laterality Date    BREAST EXCISIONAL BIOPSY Left 1996    benign    BREAST SURGERY      L breast cyst removal-benign    CATARACT EXTRACTION Bilateral     CATARACT EXTRACTION, BILATERAL  01/2020    COLONOSCOPY      DE QUERVAIN'S RELEASE Bilateral     EGD AND COLONOSCOPY N/A 5/11/2016    Procedure: EGD AND COLONOSCOPY;  Surgeon: Soto Mccall MD;  Location: [facility] LAB; Service:     LAPAROSCOPIC MAGNETIC SPHINCTER AUGMENTATION N/A 7/20/2020    Procedure: MAGNETIC SPHINCTER AUGMENT, LINX PLACEMENT;  Surgeon: Bart Lynn MD;  [facility];  Service: Bariatrics    PARAESOPHAGEAL HERNIA REPAIR N/A 7/20/2020    Procedure: REPAIR HERNIA PARAESOPHAGEAL  LAPAROSCOPIC, PARTIAL GASTRECTOMY;  Surgeon: Bart Lynn MD;  [facility];  [facility] 11/21/2019    Procedure: [address] ARTHROPLASTY;  Surgeon: Marybeth Mckenna MD;  [facility];  Service: Orthopedics    GA INCISION EXTENSOR TENDON SHEATH WRIST Left 11/21/2019    Procedure: [employer] AND TENDON GRAFT;  Surgeon: Marybeth Mckenna MD;  [facility];  Service: Orthopedics    GA LAPAROSCOPY SURG CHOLECYSTECTOMY N/A 3/4/2021    Procedure: Robotic cholecystectomy ;  Surgeon: Dale Oakley MD;  [facility];  Service: General    GA LAPS RPR PARAESPHGL HRNA INCL FUNDPLSTY [address] N/A 8/31/2016    Procedure: REPAIR HERNIA PARAESOPHAGEAL  with bio A mesh LAPAROSCOPIC NISSEN FUNDOPLICATION Laparoscopic Gastroplexy Intraop EGD #[id number];  Surgeon: Bart Lynn MD;  [facility];  Service: Bariatrics    TONSILLECTOMY      WRIST SURGERY Bilateral     daquero vein release       Past OB/Gyn History:    History of abnormal pap smears: No.   Patient is currently sexually active.    monogamous   Patient's family planning method is post menopausal status. Family History   Problem Relation Age of Onset    Anxiety disorder Mother     Arthritis Mother     Hyperlipidemia Mother     Hypertension Mother     Prostate cancer Father 76    Dementia Father     Parkinsonism Father     Other Father 80        bladder cancer    No Known Problems Sister     No Known Problems Daughter     Ovarian cancer Maternal Grandmother 72    No Known Problems Maternal Grandfather     No Known Problems Paternal Grandmother     No Known Problems Paternal Grandfather     No Known Problems Maternal Aunt     Breast cancer Paternal Aunt 66    Colon cancer Paternal Aunt 46    Pancreatic cancer Paternal Aunt 68    Breast cancer Cousin 36       Social History:  Social History     Socioeconomic History    Marital status: /Civil Union     Spouse name: Not on file    Number of children: Not on file    Years of education: Not on file    Highest education level: Not on file   Occupational History    Not on file   Tobacco Use    Smoking status: Never    Smokeless tobacco: Never   Vaping Use    Vaping Use: Never used   Substance and Sexual Activity    Alcohol use: Yes     Comment: liquor, social drinker    Drug use: No    Sexual activity: Yes     Partners: Male     Comment:    Other Topics Concern    Not on file   Social History Narrative    Not on file     Social Determinants of Health     Financial Resource Strain: Not on file   Food Insecurity: Not on file   Transportation Needs: Not on file   Physical Activity: Not on file   Stress: Not on file   Social Connections: Not on file   Intimate Partner Violence: Not on file   Housing Stability: Not on file     Presently lives with family. Patient is currently employed .     No Known Allergies      Current Outpatient Medications:     ascorbic acid (VITAMIN C) 500 mg tablet, Take 500 mg by mouth 2 (two) times a day , Disp: , Rfl:     aspirin 81 MG tablet, Take 1 tablet by mouth daily , Disp: , Rfl:     atorvastatin (LIPITOR) 10 mg tablet, Take 1 tablet (10 mg total) by mouth every evening, Disp: 90 tablet, Rfl: 3    buPROPion (WELLBUTRIN XL) 300 mg 24 hr tablet, Take 1 tablet (300 mg total) by mouth daily, Disp: 90 tablet, Rfl: 0    Cholecalciferol (VITAMIN D) 2000 UNITS CAPS, Take 1 tablet by mouth daily , Disp: , Rfl:     DULoxetine (CYMBALTA) 20 mg capsule, Take 1 capsule (20 mg total) by mouth daily, Disp: 90 capsule, Rfl: 0    estradiol (ESTRACE VAGINAL) 0.1 mg/g vaginal cream, Insert 2 g into the vagina daily at bedtime For 2 weeks then twice weekly, Disp: 42.5 g, Rfl: 11    lidocaine (XYLOCAINE) 1 %, Take 1 mL by injection route., Disp: , Rfl:     multivitamin (THERAGRAN) TABS, Take 1 tablet by mouth daily. , Disp: , Rfl:     SUMAtriptan (IMITREX) 100 mg tablet, TAKE 1 TABLET BY MOUTH FOR MIGRAINE RELIEF. MAY REPEAT 2 HOURS LATER. MAX 2 TABLETS/DAY., Disp: 27 tablet, Rfl: 0    vitamin E, tocopherol, 400 units capsule, Take 400 Units by mouth daily Pt stopped, Disp: , Rfl:     Review of Systems  Constitutional :no fever, feels well, no tiredness, no recent weight gain or loss  ENT: no ear ache, no loss of hearing, no nosebleeds or nasal discharge, no sore throat or hoarseness. Cardiovascular: no complaints of slow or fast heart beat, no chest pain, no palpitations, no leg claudication or lower extremity edema. Respiratory: no complaints of shortness of shortness of breath, no AGUAYO  Breasts:no complaints of breast pain, breast lump, or nipple discharge  Gastrointestinal: no complaints of abdominal pain, constipation, nausea, vomiting, or diarrhea or bloody stools  Genitourinary : no complaints of dysuria, incontinence, pelvic pain, no dysmenorrhea, vaginal discharge or abnormal vaginal bleeding and as noted in HPI. Musculoskeletal: no complaints of arthralgia, no myalgia, no joint swelling or stiffness, no limb pain or swelling.   Integumentary: no complaints of skin rash or lesion, itching or dry skin  Neurological: no complaints of headache, no confusion, no numbness or tingling, no dizziness or fainting    Physical Exam:     /70   Ht 5' 6" (1.676 m)   Wt 64.4 kg (142 lb)   LMP  (LMP Unknown)   BMI 22.92 kg/m²     General: appears stated age, cooperative, alert normal mood and affect   Psychiatric oriented to person, place and time. Mood and affect normal   Neck: normal, supple,trachea midline, no masses. Thyroid: normal, no thyromegaly   Heart: regular rate and rhythm, S1, S2 normal, no murmur, click, rub or gallop   Lungs: clear to auscultation bilaterally, no increased work of breathing or signs of respiratory distress   Breasts: normal, no dimpling or skin changes noted   Abdomen: soft, non-tender, without masses or organomegaly   Vulva: normal , no lesions   Vagina: normal , no lesions or atrophy   Urethra: normal   Urethal meatus normal   Bladder Normal, soft, non-tender and no prolapse or masses appreciated   Cervix: normal, no palpable masses ; pap done    Uterus: normal , non-tender, not enlarged, no palpable masses   Adnexa: normal, non-tender without fullness or masses   Lymphatic Palpation of lymph nodes in neck, axilla, groin and/or other locations: no lymphadenopathy or masses noted   Skin Normal skin turgor and no rashes.   Palpation of skin and subcutaneous tissue normal.

## 2023-10-24 ENCOUNTER — HOSPITAL ENCOUNTER (OUTPATIENT)
Dept: MAMMOGRAPHY | Facility: MEDICAL CENTER | Age: 63
Discharge: HOME | End: 2023-10-24
Payer: MEDICARE

## 2023-10-24 VITALS — BODY MASS INDEX: 22.82 KG/M2 | HEIGHT: 66 IN | WEIGHT: 142 LBS

## 2023-10-24 DIAGNOSIS — Z12.31 BREAST CANCER SCREENING BY MAMMOGRAM: ICD-10-CM

## 2023-10-24 LAB
LAB AP GYN PRIMARY INTERPRETATION: NORMAL
Lab: NORMAL

## 2023-10-24 PROCEDURE — 77067 SCR MAMMO BI INCL CAD: CPT

## 2023-10-24 PROCEDURE — 77063 BREAST TOMOSYNTHESIS BI: CPT

## 2023-10-25 DIAGNOSIS — N95.1 VAGINAL DRYNESS, MENOPAUSAL: Primary | ICD-10-CM

## 2023-10-25 NOTE — TELEPHONE ENCOUNTER
Pt called saying that tablet was too expensive and wanted the cream sent instead,pt also said that she got abnormal mammogram results,it was explained to patient that provider had not reviewed,since it just came in,pt is asking for provider to review and contact her

## 2023-10-26 DIAGNOSIS — E78.5 HYPERLIPIDEMIA, UNSPECIFIED HYPERLIPIDEMIA TYPE: ICD-10-CM

## 2023-10-26 RX ORDER — BUPROPION HYDROCHLORIDE 300 MG/1
300 TABLET ORAL DAILY
Qty: 90 TABLET | Refills: 1 | Status: SHIPPED | OUTPATIENT
Start: 2023-10-26

## 2023-10-26 RX ORDER — ESTRADIOL 0.1 MG/G
CREAM VAGINAL
Qty: 42.5 G | Refills: 9 | Status: SHIPPED | OUTPATIENT
Start: 2023-10-26

## 2023-10-26 RX ORDER — DULOXETIN HYDROCHLORIDE 20 MG/1
20 CAPSULE, DELAYED RELEASE ORAL DAILY
Qty: 90 CAPSULE | Refills: 1 | Status: SHIPPED | OUTPATIENT
Start: 2023-10-26

## 2023-10-26 NOTE — PROGRESS NOTES
Call placed to patient regarding recommendation for contrast enhanced mammogram / ultrasound. Biopsy procedure explained if needed. _____ RIGHT ___X___LEFT      __X___Ultrasound guided  ______Stereotactic breast biopsy. Pt states that procedure was explained to her, additional questions answered at this time    __X___Verbalized understanding.       Blood thinners: No: _____ Yes: _____ What:     Biopsy teaching sheet given:  _____yes __X__no (All teaching points discussed during call, pt with no questions at this time)    Pt given name/# for any further questions/needs    Pt agreeable to a post procedure call and states we can give her biopsy results to her over the phone

## 2023-11-01 ENCOUNTER — HOSPITAL ENCOUNTER (OUTPATIENT)
Dept: ULTRASOUND IMAGING | Facility: CLINIC | Age: 63
Discharge: HOME | End: 2023-11-01
Payer: MEDICARE

## 2023-11-01 ENCOUNTER — HOSPITAL ENCOUNTER (OUTPATIENT)
Dept: MAMMOGRAPHY | Facility: CLINIC | Age: 63
Discharge: HOME | End: 2023-11-01
Payer: MEDICARE

## 2023-11-01 ENCOUNTER — OFFICE VISIT (OUTPATIENT)
Dept: INTERNAL MEDICINE CLINIC | Facility: CLINIC | Age: 63
End: 2023-11-01
Payer: MEDICARE

## 2023-11-01 VITALS — HEART RATE: 71 BPM | SYSTOLIC BLOOD PRESSURE: 121 MMHG | DIASTOLIC BLOOD PRESSURE: 69 MMHG

## 2023-11-01 VITALS — SYSTOLIC BLOOD PRESSURE: 101 MMHG | HEART RATE: 82 BPM | DIASTOLIC BLOOD PRESSURE: 65 MMHG

## 2023-11-01 VITALS
DIASTOLIC BLOOD PRESSURE: 82 MMHG | SYSTOLIC BLOOD PRESSURE: 120 MMHG | TEMPERATURE: 97.8 F | HEART RATE: 72 BPM | HEIGHT: 66 IN | BODY MASS INDEX: 23.3 KG/M2 | WEIGHT: 145 LBS | OXYGEN SATURATION: 99 % | RESPIRATION RATE: 14 BRPM

## 2023-11-01 DIAGNOSIS — R92.8 ABNORMAL MAMMOGRAM: ICD-10-CM

## 2023-11-01 DIAGNOSIS — N63.21 MASS OF UPPER OUTER QUADRANT OF LEFT BREAST: Primary | ICD-10-CM

## 2023-11-01 DIAGNOSIS — Z23 INFLUENZA VACCINE ADMINISTERED: ICD-10-CM

## 2023-11-01 DIAGNOSIS — Z00.00 MEDICARE ANNUAL WELLNESS VISIT, SUBSEQUENT: ICD-10-CM

## 2023-11-01 DIAGNOSIS — Z23 ENCOUNTER FOR ADMINISTRATION OF VACCINE: ICD-10-CM

## 2023-11-01 PROCEDURE — 88342 IMHCHEM/IMCYTCHM 1ST ANTB: CPT | Performed by: PATHOLOGY

## 2023-11-01 PROCEDURE — 76642 ULTRASOUND BREAST LIMITED: CPT

## 2023-11-01 PROCEDURE — A4648 IMPLANTABLE TISSUE MARKER: HCPCS

## 2023-11-01 PROCEDURE — G0439 PPPS, SUBSEQ VISIT: HCPCS | Performed by: HOSPITALIST

## 2023-11-01 PROCEDURE — 90662 IIV NO PRSV INCREASED AG IM: CPT | Performed by: HOSPITALIST

## 2023-11-01 PROCEDURE — 88341 IMHCHEM/IMCYTCHM EA ADD ANTB: CPT | Performed by: PATHOLOGY

## 2023-11-01 PROCEDURE — 88360 TUMOR IMMUNOHISTOCHEM/MANUAL: CPT | Performed by: PATHOLOGY

## 2023-11-01 PROCEDURE — 77066 DX MAMMO INCL CAD BI: CPT

## 2023-11-01 PROCEDURE — G0008 ADMIN INFLUENZA VIRUS VAC: HCPCS | Performed by: HOSPITALIST

## 2023-11-01 PROCEDURE — 88305 TISSUE EXAM BY PATHOLOGIST: CPT | Performed by: PATHOLOGY

## 2023-11-01 PROCEDURE — 19083 BX BREAST 1ST LESION US IMAG: CPT

## 2023-11-01 RX ORDER — LIDOCAINE HYDROCHLORIDE 10 MG/ML
5 INJECTION, SOLUTION EPIDURAL; INFILTRATION; INTRACAUDAL; PERINEURAL ONCE
Status: COMPLETED | OUTPATIENT
Start: 2023-11-01 | End: 2023-11-01

## 2023-11-01 RX ADMIN — LIDOCAINE HYDROCHLORIDE 5 ML: 10 INJECTION, SOLUTION EPIDURAL; INFILTRATION; INTRACAUDAL; PERINEURAL at 08:43

## 2023-11-01 RX ADMIN — IOHEXOL 97 ML: 350 INJECTION, SOLUTION INTRAVENOUS at 07:48

## 2023-11-01 NOTE — PROGRESS NOTES
Procedure type:    ___x__ultrasound guided _____stereotactic    Breast:    __x___Left _____Right    Location: 10 o'clock 8cmfn    Needle:12G Pedro    # of passes:4    Clip:Sarah  Reflector 7.5cm    Performed by:Dr. Paulino Severe    Pressure held for 5 minutes by:Carly Velez Strips:    ___X__yes _____no    Yojana Castro aid:    __X___yes_____no    Tolerated procedure:    __X___yes _____no

## 2023-11-01 NOTE — PROGRESS NOTES
Assessment and Plan:     1-left breast lump. Suspicious for CA. Status postbiopsy. We will follow-up with breast surgeon post biopsy results. 2-influenza vaccine administered today        Depression Screening and Follow-up Plan: Patient was screened for depression during today's encounter. They screened negative with a PHQ-2 score of 0. Preventive health issues were discussed with patient, and age appropriate screening tests were ordered as noted in patient's After Visit Summary. Personalized health advice and appropriate referrals for health education or preventive services given if needed, as noted in patient's After Visit Summary.      History of Present Illness:     Patient presents for a Medicare Wellness Visit    HPI   Patient Care Team:  Pedro Hilliard MD as PCP - General (Internal Medicine)  Daniela Guillory DO as PCP - OBGYN (Obstetrics and Gynecology)  MD Tessa Bui MD as Endoscopist     Review of Systems:     Review of Systems all other review of symptoms negative unless specified otherwise     Problem List:     Patient Active Problem List   Diagnosis   • Migraine   • Pituitary adenoma [facility])   • Iron deficiency anemia   • Jearline Loose ulcer   • Dyslipidemia   • Hyperlipidemia   • Iron deficiency anemia due to chronic blood loss   • Menopausal symptoms   • Migraine, unspecified, not intractable, without status migrainosus   • Osteoarthritis of carpometacarpal (CMC) joint of thumb   • Liver cyst   • Status post laparoscopic cholecystectomy   • Disease of pituitary gland ([address])   • Acute bursitis of left shoulder      Past Medical and Surgical History:     Past Medical History:   Diagnosis Date   • Adenomyomatosis of gallbladder 1/19/2021   • Anxiety    • Arthritis    • Calculus of gallbladder    • Tiburcio ulcer    • Depression    • Hyperlipidemia    • Migraine    • Mitral valve prolapse    • Murmur    • Neck pain     at times   • Pituitary adenoma [facility])    • Pituitary adenoma ([address]) • Shoulder injury related to vaccine administration (SIRVA) 09/2020     Past Surgical History:   Procedure Laterality Date   • BREAST EXCISIONAL BIOPSY Left 1996    benign   • BREAST SURGERY      L breast cyst removal-benign   • CATARACT EXTRACTION Bilateral    • CATARACT EXTRACTION, BILATERAL  01/2020   • COLONOSCOPY     • DE QUERVAIN'S RELEASE Bilateral    • EGD AND COLONOSCOPY N/A 5/11/2016    Procedure: EGD AND COLONOSCOPY;  Surgeon: General Johnathan MD;  Location[facility];   Service:    • LAPAROSCOPIC MAGNETIC SPHINCTER AUGMENTATION N/A 7/20/2020    Procedure: MAGNETIC SPHINCTER AUGMENT, LINX PLACEMENT;  Surgeon: Shanae Grove MD;  [facility];  Service: Bariatrics   • PARAESOPHAGEAL HERNIA REPAIR N/A 7/20/2020    Procedure: REPAIR HERNIA PARAESOPHAGEAL  LAPAROSCOPIC, PARTIAL GASTRECTOMY;  Surgeon: Shanae Grove MD;  [facility];  Service: Bariatrics   • NV ARTHRP INTERPOS INTERCARPAL/METACARPAL JOINTS Left 11/21/2019    Procedure: [address] ARTHROPLASTY;  Surgeon: Aris Hassan MD;  [facility];  Service: Orthopedics   • NV INCISION EXTENSOR TENDON SHEATH WRIST Left 11/21/2019    Procedure: Izabela Magyar AND TENDON GRAFT;  Surgeon: Aris Hassan MD;  [facility];  Service: Orthopedics   • NV LAPAROSCOPY SURG CHOLECYSTECTOMY N/A 3/4/2021    Procedure: Robotic cholecystectomy ;  Surgeon: Anupama Helms MD;  [facility];  Service: General   • NV LAPS RPR PARAESPHGL HRNA INCL FUNDPLSTY [address] N/A 8/31/2016    Procedure: REPAIR HERNIA PARAESOPHAGEAL  with bio A mesh LAPAROSCOPIC NISSEN FUNDOPLICATION Laparoscopic Gastroplexy Intraop EGD #[id number];  Surgeon: Shanae Grove MD;  [facility];  Service: Bariatrics   • TONSILLECTOMY     • US GUIDED BREAST BIOPSY LEFT COMPLETE Left 11/1/2023   • WRIST SURGERY Bilateral     daquero vein release      Family History:     Family History   Problem Relation Age of Onset   • Anxiety disorder Mother    • Arthritis Mother    • Hyperlipidemia Mother    • Hypertension Mother    • Prostate cancer Father 76   • Dementia Father    • Parkinsonism Father    • Other Father 80        bladder cancer   • No Known Problems Sister    • No Known Problems Daughter    • Ovarian cancer Maternal Grandmother 72   • No Known Problems Maternal Grandfather    • No Known Problems Paternal Grandmother    • No Known Problems Paternal Grandfather    • No Known Problems Maternal Aunt    • Breast cancer Paternal Aunt 66   • Colon cancer Paternal Aunt 46   • Pancreatic cancer Paternal Aunt 68   • Breast cancer Cousin 36      Social History:     Social History     Socioeconomic History   • Marital status: /Civil Union     Spouse name: None   • Number of children: None   • Years of education: None   • Highest education level: None   Occupational History   • None   Tobacco Use   • Smoking status: Never     Passive exposure: Never   • Smokeless tobacco: Never   Vaping Use   • Vaping Use: Never used   Substance and Sexual Activity   • Alcohol use: Yes     Comment: liquor, social drinker   • Drug use: No   • Sexual activity: Yes     Partners: Male     Comment:    Other Topics Concern   • None   Social History Narrative   • None     Social Determinants of Health     Financial Resource Strain: Low Risk  (10/31/2023)    Overall Financial Resource Strain (CARDIA)    • Difficulty of Paying Living Expenses: Not hard at all   Food Insecurity: Not on file   Transportation Needs: No Transportation Needs (10/31/2023)    PRAPARE - Transportation    • Lack of Transportation (Medical): No    • Lack of Transportation (Non-Medical):  No   Physical Activity: Not on file   Stress: Not on file   Social Connections: Not on file   Intimate Partner Violence: Not on file   Housing Stability: Not on file      Medications and Allergies:     Current Outpatient Medications   Medication Sig Dispense Refill   • ascorbic acid (VITAMIN C) 500 mg tablet Take 500 mg by mouth 2 (two) times a day      • aspirin 81 MG tablet Take 1 tablet by mouth daily      • atorvastatin (LIPITOR) 10 mg tablet Take 1 tablet (10 mg total) by mouth every evening 90 tablet 3   • buPROPion (WELLBUTRIN XL) 300 mg 24 hr tablet Take 1 tablet (300 mg total) by mouth daily 90 tablet 1   • Cholecalciferol (VITAMIN D) 2000 UNITS CAPS Take 1 tablet by mouth daily      • DULoxetine (CYMBALTA) 20 mg capsule Take 1 capsule (20 mg total) by mouth daily 90 capsule 1   • estradiol (ESTRACE) 0.1 mg/g vaginal cream INSERT 2 GRAMS INTO THE VAGINA DAILY AT BEDTIME FOR 2 WEEKS, THEN TWICE WEEKLY 42.5 g 9   • lidocaine (XYLOCAINE) 1 % Take 1 mL by injection route. • multivitamin (THERAGRAN) TABS Take 1 tablet by mouth daily. • SUMAtriptan (IMITREX) 100 mg tablet TAKE 1 TABLET BY MOUTH FOR MIGRAINE RELIEF. MAY REPEAT 2 HOURS LATER. MAX 2 TABLETS/DAY. 27 tablet 0   • vitamin E, tocopherol, 400 units capsule Take 400 Units by mouth daily Pt stopped     • estradiol (VAGIFEM, YUVAFEM) 10 MCG TABS vaginal tablet Insert 1 tablet (10 mcg total) into the vagina 2 (two) times a week 24 tablet 3     No current facility-administered medications for this visit. No Known Allergies   Immunizations:     Immunization History   Administered Date(s) Administered   • COVID-19 PFIZER VACCINE 0.3 ML IM 02/19/2021, 03/11/2021   • Influenza, high dose seasonal 0.7 mL 11/01/2023   • Zoster Vaccine Recombinant 03/02/2020, 03/02/2020, 07/08/2020      Health Maintenance:         Topic Date Due   • Hepatitis C Screening  Never done   • HIV Screening  Never done   • Breast Cancer Screening: Mammogram  10/24/2024   • Colorectal Cancer Screening  05/15/2025         Topic Date Due   • Pneumococcal Vaccine: 65+ Years (1 - PCV) Never done   • COVID-19 Vaccine (3 - Pfizer risk series) 04/08/2021      Medicare Screening Tests and Risk Assessments:         Health Risk Assessment:   Patient rates overall health as very good.  Patient feels that their physical health rating is slightly better. Patient is very satisfied with their life. Eyesight was rated as same. Hearing was rated as same. Patient feels that their emotional and mental health rating is same. Patients states they are sometimes angry. Patient states they are sometimes unusually tired/fatigued. Pain experienced in the last 7 days has been some. Patient's pain rating has been 4/10. Patient states that she has experienced no weight loss or gain in last 6 months. Depression Screening:   PHQ-2 Score: 0      Fall Risk Screening: In the past year, patient has experienced: no history of falling in past year      Urinary Incontinence Screening:   Patient has leaked urine accidently in the last six months. Home Safety:  Patient does not have trouble with stairs inside or outside of their home. Patient has working smoke alarms and has working carbon monoxide detector. Home safety hazards include: none. Nutrition:   Current diet is Limited junk food. Medications:   Patient is currently taking over-the-counter supplements. OTC medications include: see medication list. Patient is able to manage medications. Activities of Daily Living (ADLs)/Instrumental Activities of Daily Living (IADLs):   Walk and transfer into and out of bed and chair?: Yes  Dress and groom yourself?: Yes    Bathe or shower yourself?: Yes    Feed yourself? Yes  Do your laundry/housekeeping?: Yes  Manage your money, pay your bills and track your expenses?: Yes  Make your own meals?: Yes    Do your own shopping?: Yes    Durable Medical Equipment Suppliers  N/A    Previous Hospitalizations:   Any hospitalizations or ED visits within the last 12 months?: No      Advance Care Planning:   Living will: Yes    Durable POA for healthcare:  Yes    Advanced directive: Yes      PREVENTIVE SCREENINGS      Cardiovascular Screening:    General: Screening Not Indicated and History Lipid Disorder      Diabetes Screening:     General: Screening Current      Colorectal Cancer Screening:     General: Screening Current      Breast Cancer Screening:     General: Screening Current      Cervical Cancer Screening:    General: Screening Not Indicated      Lung Cancer Screening:     General: Screening Not Indicated    Screening, Brief Intervention, and Referral to Treatment (SBIRT)    Screening  Typical number of drinks in a day: 1  Typical number of drinks in a week: 5  Interpretation: Low risk drinking behavior. AUDIT-C Screenin) How often did you have a drink containing alcohol in the past year? 2 to 3 times a week  2) How many drinks did you have on a typical day when you were drinking in the past year? 1 to 2  3) How often did you have 6 or more drinks on one occasion in the past year? never    AUDIT-C Score: 3  Interpretation: Score 3-12 (female): POSITIVE screen for alcohol misuse    AUDIT Screenin) How often during the last year have you found that you were not able to stop drinking once you had started? 0 - never  5) How often during the last year have you failed to do what was normally expected from you because of drinking? 0 - never  6) How often during the last year have you needed a first drink in the morning to get yourself going after a heavy drinking session?  0 - never  7) How often during the last year have you had a feeling of guilt or remorse after drinking? 0 - never  8) How often during the last year have you been unable to remember what happened the night before because you had been drinking? 0 - never  9) Have you or someone else been injured as a result of your drinking? 0 - no  10) Has a relative or friend or a doctor or another health worker been concerned about your drinking or suggested you cut down? 0 - no    AUDIT Score: 3  Interpretation: Low risk alcohol consumption    Single Item Drug Screening:  How often have you used an illegal drug (including marijuana) or a prescription medication for non-medical reasons in the past year? never    Single Item Drug Screen Score: 0  Interpretation: Negative screen for possible drug use disorder    No results found. Physical Exam:     /82 (BP Location: Left arm, Patient Position: Sitting, Cuff Size: Adult)   Pulse 72   Temp 97.8 °F (36.6 °C) (Tympanic)   Resp 14   Ht 5' 6" (1.676 m)   Wt 65.8 kg (145 lb)   LMP  (LMP Unknown)   SpO2 99%   BMI 23.40 kg/m²     Physical Exam   General Appearance:    Alert, cooperative, no distress   Head:    Normocephalic, without obvious abnormality, atraumatic   Eyes:    PERRL, conjunctiva/corneas clear, EOM's intact, fundi     benign, both eyes        Neck:   Supple, no adenopathy, no JVD   Back:     Symmetric, no curvature, ROM normal, no CVA tenderness   Lungs:     Clear to auscultation bilaterally, no wheezing or rhonchi   Heart:    Regular rate and rhythm, S1 and S2 normal, no murmur, rub   or gallop   Abdomen:     Soft, non-tender, bowel sounds active    Extremities:   Extremities normal, atraumatic, no cyanosis or edema   Psych:   Normal Affect   Neurologic:   CNII-XII intact.  Normal strength, sensation and reflexes       Throughout       Sb Razo MD

## 2023-11-01 NOTE — PATIENT INSTRUCTIONS
Medicare Preventive Visit Patient Instructions  Thank you for completing your Welcome to Medicare Visit or Medicare Annual Wellness Visit today. Your next wellness visit will be due in one year (11/1/2024). The screening/preventive services that you may require over the next 5-10 years are detailed below. Some tests may not apply to you based off risk factors and/or age. Screening tests ordered at today's visit but not completed yet may show as past due. Also, please note that scanned in results may not display below. Preventive Screenings:  Service Recommendations Previous Testing/Comments   Colorectal Cancer Screening  * Colonoscopy    * Fecal Occult Blood Test (FOBT)/Fecal Immunochemical Test (FIT)  * Fecal DNA/Cologuard Test  * Flexible Sigmoidoscopy Age: 43-73 years old   Colonoscopy: every 10 years (may be performed more frequently if at higher risk)  OR  FOBT/FIT: every 1 year  OR  Cologuard: every 3 years  OR  Sigmoidoscopy: every 5 years  Screening may be recommended earlier than age 39 if at higher risk for colorectal cancer. Also, an individualized decision between you and your healthcare provider will decide whether screening between the ages of 77-80 would be appropriate. Colonoscopy: 05/15/2020  FOBT/FIT: Not on file  Cologuard: Not on file  Sigmoidoscopy: Not on file    Screening Current     Breast Cancer Screening Age: 36 years old  Frequency: every 1-2 years  Not required if history of left and right mastectomy Mammogram: 11/01/2023    Screening Current   Cervical Cancer Screening Between the ages of 21-29, pap smear recommended once every 3 years. Between the ages of 32-69, can perform pap smear with HPV co-testing every 5 years.    Recommendations may differ for women with a history of total hysterectomy, cervical cancer, or abnormal pap smears in past. Pap Smear: 10/18/2023    Screening Not Indicated   Hepatitis C Screening Once for adults born between 1945 and 1965  More frequently in patients at high risk for Hepatitis C Hep C Antibody: Not on file        Diabetes Screening 1-2 times per year if you're at risk for diabetes or have pre-diabetes Fasting glucose: 85 mg/dL (8/2/2023)  A1C: 5.8 % (8/2/2023)  Screening Current   Cholesterol Screening Once every 5 years if you don't have a lipid disorder. May order more often based on risk factors. Lipid panel: 09/22/2022    Screening Not Indicated  History Lipid Disorder     Other Preventive Screenings Covered by Medicare:  Abdominal Aortic Aneurysm (AAA) Screening: covered once if your at risk. You're considered to be at risk if you have a family history of AAA. Lung Cancer Screening: covers low dose CT scan once per year if you meet all of the following conditions: (1) Age 48-67; (2) No signs or symptoms of lung cancer; (3) Current smoker or have quit smoking within the last 15 years; (4) You have a tobacco smoking history of at least 20 pack years (packs per day multiplied by number of years you smoked); (5) You get a written order from a healthcare provider. Glaucoma Screening: covered annually if you're considered high risk: (1) You have diabetes OR (2) Family history of glaucoma OR (3)  aged 48 and older OR (3)  American aged 72 and older  Osteoporosis Screening: covered every 2 years if you meet one of the following conditions: (1) You're estrogen deficient and at risk for osteoporosis based off medical history and other findings; (2) Have a vertebral abnormality; (3) On glucocorticoid therapy for more than 3 months; (4) Have primary hyperparathyroidism; (5) On osteoporosis medications and need to assess response to drug therapy. Last bone density test (DXA Scan): 12/09/2022. HIV Screening: covered annually if you're between the age of 14-79. Also covered annually if you are younger than 13 and older than 72 with risk factors for HIV infection.  For pregnant patients, it is covered up to 3 times per pregnancy. Immunizations:  Immunization Recommendations   Influenza Vaccine Annual influenza vaccination during flu season is recommended for all persons aged >= 6 months who do not have contraindications   Pneumococcal Vaccine   * Pneumococcal conjugate vaccine = PCV13 (Prevnar 13), PCV15 (Vaxneuvance), PCV20 (Prevnar 20)  * Pneumococcal polysaccharide vaccine = PPSV23 (Pneumovax) Adults 51-38 yo with certain risk factors or if 69+ yo  If never received any pneumonia vaccine: recommend Prevnar 20 (PCV20)  Give PCV20 if previously received 1 dose of PCV13 or PPSV23   Hepatitis B Vaccine 3 dose series if at intermediate or high risk (ex: diabetes, end stage renal disease, liver disease)   Respiratory syncytial virus (RSV) Vaccine - COVERED BY MEDICARE PART D  * RSVPreF3 (Arexvy) CDC recommends that adults 61years of age and older may receive a single dose of RSV vaccine using shared clinical decision-making (SCDM)   Tetanus (Td) Vaccine - COST NOT COVERED BY MEDICARE PART B Following completion of primary series, a booster dose should be given every 10 years to maintain immunity against tetanus. Td may also be given as tetanus wound prophylaxis. Tdap Vaccine - COST NOT COVERED BY MEDICARE PART B Recommended at least once for all adults. For pregnant patients, recommended with each pregnancy.    Shingles Vaccine (Shingrix) - COST NOT COVERED BY MEDICARE PART B  2 shot series recommended in those 19 years and older who have or will have weakened immune systems or those 50 years and older     Health Maintenance Due:      Topic Date Due   • Hepatitis C Screening  Never done   • HIV Screening  Never done   • Breast Cancer Screening: Mammogram  10/24/2024   • Colorectal Cancer Screening  05/15/2025     Immunizations Due:      Topic Date Due   • Pneumococcal Vaccine: 65+ Years (1 - PCV) Never done   • COVID-19 Vaccine (3 - Pfizer risk series) 04/08/2021   • Influenza Vaccine (1) Never done     Advance Directives   What are advance directives? Advance directives are legal documents that state your wishes and plans for medical care. These plans are made ahead of time in case you lose your ability to make decisions for yourself. Advance directives can apply to any medical decision, such as the treatments you want, and if you want to donate organs. What are the types of advance directives? There are many types of advance directives, and each state has rules about how to use them. You may choose a combination of any of the following:  Living will: This is a written record of the treatment you want. You can also choose which treatments you do not want, which to limit, and which to stop at a certain time. This includes surgery, medicine, IV fluid, and tube feedings. Durable power of  for healthcare [facility]): This is a written record that states who you want to make healthcare choices for you when you are unable to make them for yourself. This person, called a proxy, is usually a family member or a friend. You may choose more than 1 proxy. Do not resuscitate (DNR) order:  A DNR order is used in case your heart stops beating or you stop breathing. It is a request not to have certain forms of treatment, such as CPR. A DNR order may be included in other types of advance directives. Medical directive: This covers the care that you want if you are in a coma, near death, or unable to make decisions for yourself. You can list the treatments you want for each condition. Treatment may include pain medicine, surgery, blood transfusions, dialysis, IV or tube feedings, and a ventilator (breathing machine). Values history: This document has questions about your views, beliefs, and how you feel and think about life. This information can help others choose the care that you would choose. Why are advance directives important? An advance directive helps you control your care.  Although spoken wishes may be used, it is better to have your wishes written down. Spoken wishes can be misunderstood, or not followed. Treatments may be given even if you do not want them. An advance directive may make it easier for your family to make difficult choices about your care. Urinary Incontinence   Urinary incontinence (UI)  is when you lose control of your bladder. UI develops because your bladder cannot store or empty urine properly. The 3 most common types of UI are stress incontinence, urge incontinence, or both. Medicines:   May be given to help strengthen your bladder control. Report any side effects of medication to your healthcare provider. Do pelvic muscle exercises often:  Your pelvic muscles help you stop urinating. Squeeze these muscles tight for 5 seconds, then relax for 5 seconds. Gradually work up to squeezing for 10 seconds. Do 3 sets of 15 repetitions a day, or as directed. This will help strengthen your pelvic muscles and improve bladder control. Train your bladder:  Go to the bathroom at set times, such as every 2 hours, even if you do not feel the urge to go. You can also try to hold your urine when you feel the urge to go. For example, hold your urine for 5 minutes when you feel the urge to go. As that becomes easier, hold your urine for 10 minutes. Self-care:   Keep a UI record. Write down how often you leak urine and how much you leak. Make a note of what you were doing when you leaked urine. Drink liquids as directed. You may need to limit the amount of liquid you drink to help control your urine leakage. Do not drink any liquid right before you go to bed. Limit or do not have drinks that contain caffeine or alcohol. Prevent constipation. Eat a variety of high-fiber foods. Good examples are high-fiber cereals, beans, vegetables, and whole-grain breads. Walking is the best way to trigger your intestines to have a bowel movement. Exercise regularly and maintain a healthy weight.   Weight loss and exercise will decrease pressure on your bladder and help you control your leakage. Use a catheter as directed  to help empty your bladder. A catheter is a tiny, plastic tube that is put into your bladder to drain your urine. Go to behavior therapy as directed. Behavior therapy may be used to help you learn to control your urge to urinate. Alcohol Use and Your Health    Drinking too much can harm your health. Excessive alcohol use leads to about 88,000 death in the [facility] each year, and shortens the life of those who diet by almost 30 years. Further, excessive drinking cost the economy $249 billion in 2010. Most excessive drinkers are not alcohol dependent. Excessive alcohol use has immediate effects that increase the risk of many harmful health conditions. These are most often the result of binge drinking. Over time, excessive alcohol use can lead to the development of chronic diseases and other series health problems. What is considered a "drink"? Excessive alcohol use includes:  Binge Drinking: For women, 4 or more drinks consumed on one occasion. For men, 5 or more drinks consumed on one occasion. Heavy Drinking: For women, 8 or more drinks per week. For men, 15 or more drinks per week  Any alcohol used by pregnant women  Any alcohol used by those under the age of 21 years    If you choose to drink, do so in moderation:  Do not drink at all if you are under the age of 24, or if you are or may be pregnant, or have health problems that could be made worse by drinking.   For women, up to 1 drink per day  For men, up to 2 drinks a day    No one should begin drinking or drink more frequently based on potential health benefits    Short-Term Health Risks:  Injuries: motor vehicle crashes, falls, drownings, burns  Violence: homicide, suicide, sexual assault, intimate partner violence  Alcohol poisoning  Reproductive health: risky sexual behaviors, unintended prengnacy, sexually transmitted diseases, miscarriage, stillbirth, fetal alcohol syndrome    Long-Term Health Risks:  Chronic diseases: high blood pressure, heart disease, stroke, liver disease, digestive problems  Cancers: breast, mouth and throat, liver, colon  Learning and memory problems: dementia, poor school performance  Mental health: depression, anxiety, insomnia  Social problems: lost productivity, family problems, unemployment  Alcohol dependence    For support and more information:  Substance Abuse and [address]  Web Address: https://[employer]/    Alcoholics Anonymous        Web Address: http://[employer]/    https://www.cdc.gov/alcohol/fact-sheets/alcohol-use.htm     © [employer] 2018 Information is for End User's use only and may not be sold, redistributed or otherwise used for commercial purposes.  All illustrations and images included in CareNotes® are the copyrighted property of [employer] or [address] [place] [address]

## 2023-11-01 NOTE — PROGRESS NOTES
Answers submitted by the patient for this visit:  AUDIT-C (Alcohol Use Disorders Identification Test) Screening (Submitted on 10/31/2023)  How often during the last year have you found that you were not able to stop drinking once you had started?: 0 - never  How often during the last year have you failed to do what was normally expected from you because of drinking?: 0 - never  How often during the last year have you needed a first drink in the morning to get yourself going after a heavy drinking session?: 0 - never  How often during the last year have you had a feeling of guilt or remorse after drinking?: 0 - never  How often during the last year have you been unable to remember what happened the night before because you had been drinking?: 0 - never  Have you or someone else been injured as a result of your drinking?: 0 - no  Has a relative or friend or a doctor or another health worker been concerned about your drinking or suggested you cut down?: 0 - no  Medicare Annual Wellness Visit (Submitted on 10/31/2023)  How would you rate your overall health?: very good  Compared to last year, how is your physical health?: slightly better  In general, how satisfied are you with your life?: very satisfied  Compared to last year, how is your eyesight?: same  Compared to last year, how is your hearing?: same  Compared to last year, how is your emotional/mental health?: same  How often is anger a problem for you?: sometimes  How often do you feel unusually tired/fatigued?: sometimes  In the past 7 days, how much pain have you experienced?: some  If you answered "some" or "a lot", please rate the severity of your pain on a scale of 1 to 10 (1 being the least severe pain and 10 being the most intense pain). : 4/10  In the past 6 months, have you lost or gained 10 pounds without trying?: No  One or more falls in the last year: No  In the past 6 months, have you accidentally leaked urine?: Yes  Do you have trouble with the stairs inside or outside your home?: No  Does your home have working smoke alarms?: Yes  Welcome to [facility] Initial Preventative Visit  [facility] - [facility] INTERNAL MEDICINE BINU  NAME: Duglas Alvares SEX: female : 1960  HPI:  Duglas Alvares is a 70 y.o. female who presents to the office today for Subsequent Wellness Visit. Last Medicare wellness visit information was reviewed, patient was interviewed , no changes since last AWV: no    Last Medicare wellness visit information was reviewed, patient interviewed and updates made to the record today: yes    PATIENT CARE TEAM:  Patient Care Team:  Chani John MD as PCP - General (Internal Medicine)  Pauline Pemberton DO as PCP - OBGYN (Obstetrics and Gynecology)  MD Sherice Chaudhry MD as Endoscopist    REVIEW OF SYSTEMS:  Review of Systems    PAST MEDICAL HISTORY:  Past Medical History:   Diagnosis Date   • Adenomyomatosis of gallbladder 2021   • Anxiety    • Arthritis    • Calculus of gallbladder    • Martino Brunner ulcer    • Depression    • Hyperlipidemia    • Migraine    • Mitral valve prolapse    • Murmur    • Neck pain     at times   • Pituitary adenoma [facility])    • Pituitary adenoma ([address] )    • Shoulder injury related to vaccine administration (SIRVA) 2020       PAST SURGICAL HISTORY:  Past Surgical History:   Procedure Laterality Date   • BREAST EXCISIONAL BIOPSY Left     benign   • BREAST SURGERY      L breast cyst removal-benign   • CATARACT EXTRACTION Bilateral    • CATARACT EXTRACTION, BILATERAL  2020   • COLONOSCOPY     • DE QUERVAIN'S RELEASE Bilateral    • EGD AND COLONOSCOPY N/A 2016    Procedure: EGD AND COLONOSCOPY;  Surgeon: Sherice Vega MD;  Location: [facility] GI LAB;   Service:    • LAPAROSCOPIC MAGNETIC SPHINCTER AUGMENTATION N/A 2020    Procedure: MAGNETIC SPHINCTER AUGMENT, LINX PLACEMENT;  Surgeon: Tram Lobo MD;  Location: [facility] OR;  Service: Bariatrics   • PARAESOPHAGEAL HERNIA REPAIR N/A 7/20/2020    Procedure: REPAIR HERNIA PARAESOPHAGEAL  LAPAROSCOPIC, PARTIAL GASTRECTOMY;  Surgeon: Chiquis Kim MD;  [facility];  Service: Bariatrics   • DC ARTHRP INTERPOS INTERCARPAL/METACARPAL JOINTS Left 11/21/2019    Procedure: [place] [address] ARTHROPLASTY;  Surgeon: Sanjeev Guo MD;  [facility];  Service: Orthopedics   • DC INCISION EXTENSOR TENDON SHEATH WRIST Left 11/21/2019    Procedure: Nacho Tolentino AND TENDON GRAFT;  Surgeon: Sanjeev Guo MD;  [facility];  Service: Orthopedics   • DC LAPAROSCOPY SURG CHOLECYSTECTOMY N/A 3/4/2021    Procedure: Robotic cholecystectomy ;  Surgeon: Timbo Simmons MD;  [facility];  Service: General   • DC LAPS RPR PARAESPHGL HRNA INCL FUNDPLSTY [address] N/A 8/31/2016    Procedure: REPAIR HERNIA PARAESOPHAGEAL  with bio A mesh LAPAROSCOPIC NISSEN FUNDOPLICATION Laparoscopic Gastroplexy Intraop EGD #[id number];  Surgeon: Chiquis Kim MD;  [facility];  Service: Bariatrics   • TONSILLECTOMY     • US GUIDED BREAST BIOPSY LEFT COMPLETE Left 11/1/2023   • WRIST SURGERY Bilateral     daquero vein release       PROBLEM LIST:  Patient Active Problem List   Diagnosis   • Migraine   • Pituitary adenoma ([address])   • Iron deficiency anemia   • Tiburcio ulcer   • Dyslipidemia   • Hyperlipidemia   • Iron deficiency anemia due to chronic blood loss   • Menopausal symptoms   • Migraine, unspecified, not intractable, without status migrainosus   • Osteoarthritis of carpometacarpal (CMC) joint of thumb   • Liver cyst   • Status post laparoscopic cholecystectomy   • Disease of pituitary gland (HCC)   • Acute bursitis of left shoulder       FAMILY HISTORY:  Family History   Problem Relation Age of Onset   • Anxiety disorder Mother    • Arthritis Mother    • Hyperlipidemia Mother    • Hypertension Mother    • Prostate cancer Father 76   • Dementia Father    • Parkinsonism Father    • Other Father 80        bladder cancer   • No Known Problems Sister    • No Known Problems Daughter    • Ovarian cancer Maternal Grandmother 72   • No Known Problems Maternal Grandfather    • No Known Problems Paternal Grandmother    • No Known Problems Paternal Grandfather    • No Known Problems Maternal Aunt    • Breast cancer Paternal Aunt 66   • Colon cancer Paternal Aunt 46   • Pancreatic cancer Paternal Aunt 68   • Breast cancer Cousin 36       SOCIAL HISTORY:  She reports that she has never smoked. She has never been exposed to tobacco smoke. She has never used smokeless tobacco. She reports current alcohol use. She reports that she does not use drugs. ALLERGIES:  No Known Allergies    CURRENT MEDICATIONS:  Current Outpatient Medications   Medication Sig Dispense Refill   • ascorbic acid (VITAMIN C) 500 mg tablet Take 500 mg by mouth 2 (two) times a day      • aspirin 81 MG tablet Take 1 tablet by mouth daily      • atorvastatin (LIPITOR) 10 mg tablet Take 1 tablet (10 mg total) by mouth every evening 90 tablet 3   • buPROPion (WELLBUTRIN XL) 300 mg 24 hr tablet Take 1 tablet (300 mg total) by mouth daily 90 tablet 1   • Cholecalciferol (VITAMIN D) 2000 UNITS CAPS Take 1 tablet by mouth daily      • DULoxetine (CYMBALTA) 20 mg capsule Take 1 capsule (20 mg total) by mouth daily 90 capsule 1   • estradiol (ESTRACE) 0.1 mg/g vaginal cream INSERT 2 GRAMS INTO THE VAGINA DAILY AT BEDTIME FOR 2 WEEKS, THEN TWICE WEEKLY 42.5 g 9   • lidocaine (XYLOCAINE) 1 % Take 1 mL by injection route. • multivitamin (THERAGRAN) TABS Take 1 tablet by mouth daily. • SUMAtriptan (IMITREX) 100 mg tablet TAKE 1 TABLET BY MOUTH FOR MIGRAINE RELIEF. MAY REPEAT 2 HOURS LATER. MAX 2 TABLETS/DAY.  27 tablet 0   • vitamin E, tocopherol, 400 units capsule Take 400 Units by mouth daily Pt stopped     • estradiol (VAGIFEM, YUVAFEM) 10 MCG TABS vaginal tablet Insert 1 tablet (10 mcg total) into the vagina 2 (two) times a week 24 tablet 3     No current facility-administered medications for this visit. IMMUNIZATIONS:  Immunization History   Administered Date(s) Administered   • COVID-19 PFIZER VACCINE 0.3 ML IM 02/19/2021, 03/11/2021   • Influenza, high dose seasonal 0.7 mL 11/01/2023   • Zoster Vaccine Recombinant 03/02/2020, 03/02/2020, 07/08/2020       HEALTH MAINTENANCE:  Health Maintenance   Topic Date Due   • Hepatitis C Screening  Never done   • OT PLAN OF CARE  Never done   • Pneumococcal Vaccine: 65+ Years (1 - PCV) Never done   • HIV Screening  Never done   • PT PLAN OF CARE  03/22/2020   • COVID-19 Vaccine (3 - Pfizer risk series) 04/08/2021   • Breast Cancer Screening: Mammogram  10/24/2024   • Fall Risk  11/01/2024   • Depression Screening  11/01/2024   • Urinary Incontinence Screening  11/01/2024   • Medicare Annual Wellness Visit (AWV)  11/01/2024   • BMI: Adult  11/01/2024   • Colorectal Cancer Screening  05/15/2025   • Osteoporosis Screening  Completed   • Influenza Vaccine  Completed   • HIB Vaccine  Aged Out   • IPV Vaccine  Aged Out   • Hepatitis A Vaccine  Aged Out   • Meningococcal ACWY Vaccine  Aged Out   • HPV Vaccine  Aged Out       PATIENT HEALTH RISK ASSESSMENT (HRA):  [unfilled]    PHYSICAL EXAM:  /82 (BP Location: Left arm, Patient Position: Sitting, Cuff Size: Adult)   Pulse 72   Temp 97.8 °F (36.6 °C) (Tympanic)   Resp 14   Ht 5' 6" (1.676 m)   Wt 65.8 kg (145 lb)   LMP  (LMP Unknown)   SpO2 99%   BMI 23.40 kg/m²   Physical Exam    MEDICARE SCREENING LABS:  none    ASSESSMENT/PLAN:  1. Medicare annual wellness visit, subsequent  7 mm left breast lesion. Status postbiopsy today. We will follow-up with breast surgery once results available. 2. Mass of upper outer quadrant of left breast  See my notes above    3. Influenza vaccine administered      4.  Encounter for administration of vaccine    - influenza vaccine, high-dose, PF 0.7 mL (FLUZONE HIGH-DOSE)    MEDICARE WELLNESS COUNSELING/DISCUSSION:        Odalis Francisco MD  11/1/2023  Does your home have a carbon monoxide monitor?: Yes  Which safety hazards (if any) have you experienced in your home? Please select all that apply.: none  How would you describe your current diet? Please select all that apply.: Limited junk food  In addition to prescription medications, are you taking any over-the-counter supplements?: Yes  If yes, what supplements are you taking?: Vitamins C, D, E.  MVI’s. Baby ASA.   Prn Motrin  Can you manage your medications?: Yes  Are you currently taking any opioid medications?: No  Can you walk and transfer into and out of your bed and chair?: Yes  Can you dress and groom yourself?: Yes  Can you bathe or shower yourself?: Yes  Can you feed yourself?: Yes  Can you do your laundry/ housekeeping?: Yes  Can you manage your money, pay your bills, and track your expenses?: Yes  Can you make your own meals?: Yes  Can you do your own shopping?: Yes  Please list your DME (Durable Medical Equipment) supplier, if you use one.: N/A  Within the last 12 months, have you had any hospitalizations or Emergency Department visits?: No  Do you have a living will?: Yes  Do you have a Durable POA (Power of ) for healthcare decisions?: Yes  Do you have an Advanced Directive for end of life decisions?: No  How often have you used an illegal drug (including marijuana) or a prescription medication for non-medical reasons in the past year?: never  What is the typical number of drinks you consume in a day?: 1  What is the typical number of drinks you consume in a week?: 5  How often did you have a drink containing alcohol in the past year?: 2 to 3 times a week  How many drinks did you have on a typical day  when you were drinking in the past year?: 1 to 2  How often did you have 6 or more drinks on one occasion in the past year?: never

## 2023-11-02 NOTE — PROGRESS NOTES
Post procedure call completed    Bleeding: _____yes __X___no    Pain: _____yes ___X___no    Redness/Swelling: ______yes ___X___no    Band aid removed: _____yes ___X__no (discussed removing when she showers)    Steri-Strips intact: ___X___yes _____no (discussed with patient to remove steri strips on 11/5/2023 if they have not come off on their own)    Pt with no questions at this time, adv will call when results available, adv to call with any questions or concerns, has name/# for contact

## 2023-11-03 ENCOUNTER — TELEPHONE (OUTPATIENT)
Dept: MAMMOGRAPHY | Facility: CLINIC | Age: 63
End: 2023-11-03

## 2023-11-03 ENCOUNTER — TELEPHONE (OUTPATIENT)
Dept: INTERNAL MEDICINE CLINIC | Facility: CLINIC | Age: 63
End: 2023-11-03

## 2023-11-03 DIAGNOSIS — J20.9 ACUTE BRONCHITIS, UNSPECIFIED ORGANISM: ICD-10-CM

## 2023-11-03 DIAGNOSIS — J20.9 ACUTE BRONCHITIS, UNSPECIFIED ORGANISM: Primary | ICD-10-CM

## 2023-11-03 PROCEDURE — 88342 IMHCHEM/IMCYTCHM 1ST ANTB: CPT | Performed by: PATHOLOGY

## 2023-11-03 PROCEDURE — 88305 TISSUE EXAM BY PATHOLOGIST: CPT | Performed by: PATHOLOGY

## 2023-11-03 PROCEDURE — 88341 IMHCHEM/IMCYTCHM EA ADD ANTB: CPT | Performed by: PATHOLOGY

## 2023-11-03 RX ORDER — DEXTROMETHORPHAN HBR. AND GUAIFENESIN 10; 100 MG/5ML; MG/5ML
5 SOLUTION ORAL EVERY 12 HOURS
Qty: 180 ML | Refills: 0 | Status: SHIPPED | OUTPATIENT
Start: 2023-11-03

## 2023-11-03 RX ORDER — DEXTROMETHORPHAN HBR. AND GUAIFENESIN 10; 100 MG/5ML; MG/5ML
5 SOLUTION ORAL EVERY 12 HOURS
Qty: 180 ML | Refills: 0 | Status: SHIPPED | OUTPATIENT
Start: 2023-11-03 | End: 2023-11-03 | Stop reason: SDUPTHER

## 2023-11-03 NOTE — TELEPHONE ENCOUNTER
Patient was given her positive breast biopsy results today by Dr. Charli Dodd. Patient reports that she will continue care at [facility] and [facility].

## 2023-11-03 NOTE — TELEPHONE ENCOUNTER
Chest congestion, runny nose, cough, nite time is worse, can she have something for that so she can sleep. Maybe cough with codeine.

## 2023-11-15 ENCOUNTER — HOSPITAL ENCOUNTER (OUTPATIENT)
Dept: ULTRASOUND IMAGING | Facility: CLINIC | Age: 63
Discharge: HOME | End: 2023-11-15

## 2023-11-18 ENCOUNTER — APPOINTMENT (OUTPATIENT)
Dept: LAB | Facility: MEDICAL CENTER | Age: 63
End: 2023-11-18
Payer: MEDICARE

## 2023-11-18 ENCOUNTER — APPOINTMENT (OUTPATIENT)
Dept: RADIOLOGY | Facility: MEDICAL CENTER | Age: 63
End: 2023-11-18
Payer: MEDICARE

## 2023-11-18 DIAGNOSIS — C50.912 MALIGNANT NEOPLASM OF LEFT FEMALE BREAST, UNSPECIFIED ESTROGEN RECEPTOR STATUS, UNSPECIFIED SITE OF BREAST (HCC): ICD-10-CM

## 2023-11-18 LAB
ALBUMIN SERPL BCP-MCNC: 4.1 G/DL (ref 3.5–5)
ALP SERPL-CCNC: 85 U/L (ref 34–104)
ALT SERPL W P-5'-P-CCNC: 12 U/L (ref 7–52)
ANION GAP SERPL CALCULATED.3IONS-SCNC: 8 MMOL/L
AST SERPL W P-5'-P-CCNC: 20 U/L (ref 13–39)
BASOPHILS # BLD AUTO: 0.09 THOUSANDS/ÂΜL (ref 0–0.1)
BASOPHILS NFR BLD AUTO: 2 % (ref 0–1)
BILIRUB SERPL-MCNC: 0.64 MG/DL (ref 0.2–1)
BUN SERPL-MCNC: 15 MG/DL (ref 5–25)
CALCIUM SERPL-MCNC: 8.9 MG/DL (ref 8.4–10.2)
CHLORIDE SERPL-SCNC: 105 MMOL/L (ref 96–108)
CO2 SERPL-SCNC: 30 MMOL/L (ref 21–32)
CREAT SERPL-MCNC: 0.59 MG/DL (ref 0.6–1.3)
EOSINOPHIL # BLD AUTO: 0.31 THOUSAND/ÂΜL (ref 0–0.61)
EOSINOPHIL NFR BLD AUTO: 6 % (ref 0–6)
ERYTHROCYTE [DISTWIDTH] IN BLOOD BY AUTOMATED COUNT: 12.1 % (ref 11.6–15.1)
GFR SERPL CREATININE-BSD FRML MDRD: 96 ML/MIN/1.73SQ M
GLUCOSE P FAST SERPL-MCNC: 88 MG/DL (ref 65–99)
HCT VFR BLD AUTO: 40.8 % (ref 34.8–46.1)
HGB BLD-MCNC: 13.6 G/DL (ref 11.5–15.4)
IMM GRANULOCYTES # BLD AUTO: 0.01 THOUSAND/UL (ref 0–0.2)
IMM GRANULOCYTES NFR BLD AUTO: 0 % (ref 0–2)
LYMPHOCYTES # BLD AUTO: 2.02 THOUSANDS/ÂΜL (ref 0.6–4.47)
LYMPHOCYTES NFR BLD AUTO: 41 % (ref 14–44)
MCH RBC QN AUTO: 32.8 PG (ref 26.8–34.3)
MCHC RBC AUTO-ENTMCNC: 33.3 G/DL (ref 31.4–37.4)
MCV RBC AUTO: 98 FL (ref 82–98)
MONOCYTES # BLD AUTO: 0.64 THOUSAND/ÂΜL (ref 0.17–1.22)
MONOCYTES NFR BLD AUTO: 13 % (ref 4–12)
NEUTROPHILS # BLD AUTO: 1.91 THOUSANDS/ÂΜL (ref 1.85–7.62)
NEUTS SEG NFR BLD AUTO: 38 % (ref 43–75)
NRBC BLD AUTO-RTO: 0 /100 WBCS
PLATELET # BLD AUTO: 235 THOUSANDS/UL (ref 149–390)
PMV BLD AUTO: 11.3 FL (ref 8.9–12.7)
POTASSIUM SERPL-SCNC: 3.4 MMOL/L (ref 3.5–5.3)
PROT SERPL-MCNC: 6.8 G/DL (ref 6.4–8.4)
RBC # BLD AUTO: 4.15 MILLION/UL (ref 3.81–5.12)
SODIUM SERPL-SCNC: 143 MMOL/L (ref 135–147)
WBC # BLD AUTO: 4.98 THOUSAND/UL (ref 4.31–10.16)

## 2023-11-18 PROCEDURE — 80053 COMPREHEN METABOLIC PANEL: CPT

## 2023-11-18 PROCEDURE — 36415 COLL VENOUS BLD VENIPUNCTURE: CPT

## 2023-11-18 PROCEDURE — 71046 X-RAY EXAM CHEST 2 VIEWS: CPT

## 2023-11-18 PROCEDURE — 85025 COMPLETE CBC W/AUTO DIFF WBC: CPT

## 2023-11-28 ENCOUNTER — DOCUMENTATION (OUTPATIENT)
Dept: HEMATOLOGY ONCOLOGY | Facility: CLINIC | Age: 63
End: 2023-11-28

## 2023-11-28 NOTE — PROGRESS NOTES
Received patients information from Tumor Registry report. Patient is scheduled with outside network for her care.  No further action taken by Care Coordination Team.

## 2024-01-04 ENCOUNTER — APPOINTMENT (OUTPATIENT)
Dept: LAB | Facility: MEDICAL CENTER | Age: 64
End: 2024-01-04
Payer: MEDICARE

## 2024-01-04 DIAGNOSIS — C50.212 MALIGNANT NEOPLASM OF UPPER-INNER QUADRANT OF LEFT FEMALE BREAST, UNSPECIFIED ESTROGEN RECEPTOR STATUS (HCC): ICD-10-CM

## 2024-01-04 DIAGNOSIS — Z17.0 ESTROGEN RECEPTOR POSITIVE: ICD-10-CM

## 2024-01-04 LAB
ALBUMIN SERPL BCP-MCNC: 4.2 G/DL (ref 3.5–5)
ALP SERPL-CCNC: 79 U/L (ref 34–104)
ALT SERPL W P-5'-P-CCNC: 22 U/L (ref 7–52)
ANION GAP SERPL CALCULATED.3IONS-SCNC: 9 MMOL/L
AST SERPL W P-5'-P-CCNC: 23 U/L (ref 13–39)
BASOPHILS # BLD AUTO: 0.08 THOUSANDS/ÂΜL (ref 0–0.1)
BASOPHILS NFR BLD AUTO: 2 % (ref 0–1)
BILIRUB SERPL-MCNC: 0.55 MG/DL (ref 0.2–1)
BUN SERPL-MCNC: 13 MG/DL (ref 5–25)
CALCIUM SERPL-MCNC: 9.3 MG/DL (ref 8.4–10.2)
CHLORIDE SERPL-SCNC: 105 MMOL/L (ref 96–108)
CO2 SERPL-SCNC: 29 MMOL/L (ref 21–32)
CREAT SERPL-MCNC: 0.63 MG/DL (ref 0.6–1.3)
EOSINOPHIL # BLD AUTO: 0.29 THOUSAND/ÂΜL (ref 0–0.61)
EOSINOPHIL NFR BLD AUTO: 6 % (ref 0–6)
ERYTHROCYTE [DISTWIDTH] IN BLOOD BY AUTOMATED COUNT: 11.9 % (ref 11.6–15.1)
GFR SERPL CREATININE-BSD FRML MDRD: 94 ML/MIN/1.73SQ M
GLUCOSE P FAST SERPL-MCNC: 81 MG/DL (ref 65–99)
HCT VFR BLD AUTO: 43.7 % (ref 34.8–46.1)
HGB BLD-MCNC: 13.5 G/DL (ref 11.5–15.4)
IMM GRANULOCYTES # BLD AUTO: 0.01 THOUSAND/UL (ref 0–0.2)
IMM GRANULOCYTES NFR BLD AUTO: 0 % (ref 0–2)
LYMPHOCYTES # BLD AUTO: 1.64 THOUSANDS/ÂΜL (ref 0.6–4.47)
LYMPHOCYTES NFR BLD AUTO: 33 % (ref 14–44)
MCH RBC QN AUTO: 30.9 PG (ref 26.8–34.3)
MCHC RBC AUTO-ENTMCNC: 30.9 G/DL (ref 31.4–37.4)
MCV RBC AUTO: 100 FL (ref 82–98)
MONOCYTES # BLD AUTO: 0.53 THOUSAND/ÂΜL (ref 0.17–1.22)
MONOCYTES NFR BLD AUTO: 11 % (ref 4–12)
NEUTROPHILS # BLD AUTO: 2.48 THOUSANDS/ÂΜL (ref 1.85–7.62)
NEUTS SEG NFR BLD AUTO: 48 % (ref 43–75)
NRBC BLD AUTO-RTO: 0 /100 WBCS
PLATELET # BLD AUTO: 206 THOUSANDS/UL (ref 149–390)
PMV BLD AUTO: 11.3 FL (ref 8.9–12.7)
POTASSIUM SERPL-SCNC: 3.7 MMOL/L (ref 3.5–5.3)
PROT SERPL-MCNC: 6.8 G/DL (ref 6.4–8.4)
RBC # BLD AUTO: 4.37 MILLION/UL (ref 3.81–5.12)
SODIUM SERPL-SCNC: 143 MMOL/L (ref 135–147)
WBC # BLD AUTO: 5.03 THOUSAND/UL (ref 4.31–10.16)

## 2024-01-04 PROCEDURE — 85025 COMPLETE CBC W/AUTO DIFF WBC: CPT

## 2024-01-04 PROCEDURE — 80053 COMPREHEN METABOLIC PANEL: CPT

## 2024-01-04 PROCEDURE — 36415 COLL VENOUS BLD VENIPUNCTURE: CPT

## 2024-01-22 DIAGNOSIS — E78.5 HYPERLIPIDEMIA, UNSPECIFIED HYPERLIPIDEMIA TYPE: ICD-10-CM

## 2024-01-22 RX ORDER — ATORVASTATIN CALCIUM 10 MG/1
10 TABLET, FILM COATED ORAL EVERY EVENING
Qty: 90 TABLET | Refills: 0 | Status: SHIPPED | OUTPATIENT
Start: 2024-01-22

## 2024-01-22 RX ORDER — DULOXETIN HYDROCHLORIDE 20 MG/1
20 CAPSULE, DELAYED RELEASE ORAL DAILY
Qty: 90 CAPSULE | Refills: 0 | Status: SHIPPED | OUTPATIENT
Start: 2024-01-22

## 2024-01-22 RX ORDER — BUPROPION HYDROCHLORIDE 300 MG/1
300 TABLET ORAL DAILY
Qty: 90 TABLET | Refills: 0 | Status: SHIPPED | OUTPATIENT
Start: 2024-01-22

## 2024-01-22 NOTE — TELEPHONE ENCOUNTER
Elsa Flip date of birth [birth year]. I tried to reorder my Sumatriptan online on my chart but it said I can't do it. I, my  and I both have new mail aways and I made some changes for our pharmacies in my chart. So if you could order the Sumatriptan 100 milligrams, it's going to be a mail away with [facility] and I put them in. I have [facility] and I also have [facility] in there, so I already put a request in for two of them. I think one of them [facility] so far and two with [facility], so this would be the second one with [facility]. It's sumatriptan 100 milligrams and they say that it's a I think they said 12 per month is what they give you. So it would be a an order of 36. Any questions? You can reach me at [phone].a lot, Shannan. Ethel.

## 2024-01-26 ENCOUNTER — APPOINTMENT (OUTPATIENT)
Dept: LAB | Facility: MEDICAL CENTER | Age: 64
End: 2024-01-26
Payer: MEDICARE

## 2024-01-26 DIAGNOSIS — Z51.11 ENCOUNTER FOR ANTINEOPLASTIC CHEMOTHERAPY: ICD-10-CM

## 2024-01-26 DIAGNOSIS — Z17.0 ESTROGEN RECEPTOR POSITIVE: ICD-10-CM

## 2024-01-26 LAB
ALBUMIN SERPL BCP-MCNC: 4 G/DL (ref 3.5–5)
ALP SERPL-CCNC: 93 U/L (ref 34–104)
ALT SERPL W P-5'-P-CCNC: 20 U/L (ref 7–52)
ANION GAP SERPL CALCULATED.3IONS-SCNC: 8 MMOL/L
AST SERPL W P-5'-P-CCNC: 19 U/L (ref 13–39)
BASOPHILS # BLD AUTO: 0.18 THOUSANDS/ÂΜL (ref 0–0.1)
BASOPHILS NFR BLD AUTO: 3 % (ref 0–1)
BILIRUB SERPL-MCNC: 0.45 MG/DL (ref 0.2–1)
BUN SERPL-MCNC: 9 MG/DL (ref 5–25)
CALCIUM SERPL-MCNC: 9.2 MG/DL (ref 8.4–10.2)
CHLORIDE SERPL-SCNC: 105 MMOL/L (ref 96–108)
CO2 SERPL-SCNC: 29 MMOL/L (ref 21–32)
CREAT SERPL-MCNC: 0.62 MG/DL (ref 0.6–1.3)
EOSINOPHIL # BLD AUTO: 0.08 THOUSAND/ÂΜL (ref 0–0.61)
EOSINOPHIL NFR BLD AUTO: 1 % (ref 0–6)
ERYTHROCYTE [DISTWIDTH] IN BLOOD BY AUTOMATED COUNT: 12.6 % (ref 11.6–15.1)
GFR SERPL CREATININE-BSD FRML MDRD: 94 ML/MIN/1.73SQ M
GLUCOSE P FAST SERPL-MCNC: 84 MG/DL (ref 65–99)
HCT VFR BLD AUTO: 40 % (ref 34.8–46.1)
HGB BLD-MCNC: 12.7 G/DL (ref 11.5–15.4)
IMM GRANULOCYTES # BLD AUTO: 0.03 THOUSAND/UL (ref 0–0.2)
IMM GRANULOCYTES NFR BLD AUTO: 1 % (ref 0–2)
LYMPHOCYTES # BLD AUTO: 1.62 THOUSANDS/ÂΜL (ref 0.6–4.47)
LYMPHOCYTES NFR BLD AUTO: 29 % (ref 14–44)
MCH RBC QN AUTO: 30.8 PG (ref 26.8–34.3)
MCHC RBC AUTO-ENTMCNC: 31.8 G/DL (ref 31.4–37.4)
MCV RBC AUTO: 97 FL (ref 82–98)
MONOCYTES # BLD AUTO: 0.67 THOUSAND/ÂΜL (ref 0.17–1.22)
MONOCYTES NFR BLD AUTO: 12 % (ref 4–12)
NEUTROPHILS # BLD AUTO: 3 THOUSANDS/ÂΜL (ref 1.85–7.62)
NEUTS SEG NFR BLD AUTO: 54 % (ref 43–75)
NRBC BLD AUTO-RTO: 0 /100 WBCS
PLATELET # BLD AUTO: 291 THOUSANDS/UL (ref 149–390)
PMV BLD AUTO: 11.4 FL (ref 8.9–12.7)
POTASSIUM SERPL-SCNC: 4.1 MMOL/L (ref 3.5–5.3)
PROT SERPL-MCNC: 6.5 G/DL (ref 6.4–8.4)
RBC # BLD AUTO: 4.12 MILLION/UL (ref 3.81–5.12)
SODIUM SERPL-SCNC: 142 MMOL/L (ref 135–147)
WBC # BLD AUTO: 5.58 THOUSAND/UL (ref 4.31–10.16)

## 2024-01-26 PROCEDURE — 80053 COMPREHEN METABOLIC PANEL: CPT

## 2024-01-26 PROCEDURE — 85025 COMPLETE CBC W/AUTO DIFF WBC: CPT

## 2024-01-26 PROCEDURE — 36415 COLL VENOUS BLD VENIPUNCTURE: CPT

## 2024-02-07 DIAGNOSIS — E78.5 HYPERLIPIDEMIA, UNSPECIFIED HYPERLIPIDEMIA TYPE: ICD-10-CM

## 2024-02-07 DIAGNOSIS — G43.909 MIGRAINE WITHOUT STATUS MIGRAINOSUS, NOT INTRACTABLE, UNSPECIFIED MIGRAINE TYPE: ICD-10-CM

## 2024-02-07 RX ORDER — ATORVASTATIN CALCIUM 10 MG/1
10 TABLET, FILM COATED ORAL EVERY EVENING
Qty: 90 TABLET | Refills: 3 | Status: SHIPPED | OUTPATIENT
Start: 2024-02-07

## 2024-02-07 RX ORDER — SUMATRIPTAN 100 MG/1
TABLET, FILM COATED ORAL
Qty: 36 TABLET | Refills: 0 | Status: SHIPPED | OUTPATIENT
Start: 2024-02-07

## 2024-02-16 ENCOUNTER — APPOINTMENT (OUTPATIENT)
Dept: LAB | Facility: MEDICAL CENTER | Age: 64
End: 2024-02-16
Payer: MEDICARE

## 2024-02-16 DIAGNOSIS — K12.31 MUCOUS MEMBRANE INFLAMMATION DUE TO CHEMOTHERAPY: ICD-10-CM

## 2024-02-16 DIAGNOSIS — Z51.11 ENCOUNTER FOR ANTINEOPLASTIC CHEMOTHERAPY: ICD-10-CM

## 2024-02-16 DIAGNOSIS — Z17.0 ESTROGEN RECEPTOR POSITIVE: ICD-10-CM

## 2024-02-16 DIAGNOSIS — E86.0 DEHYDRATION: ICD-10-CM

## 2024-02-16 DIAGNOSIS — C50.212 MALIGNANT NEOPLASM OF UPPER-INNER QUADRANT OF LEFT FEMALE BREAST, UNSPECIFIED ESTROGEN RECEPTOR STATUS (HCC): ICD-10-CM

## 2024-02-16 LAB
ALBUMIN SERPL BCP-MCNC: 3.8 G/DL (ref 3.5–5)
ALP SERPL-CCNC: 90 U/L (ref 34–104)
ALT SERPL W P-5'-P-CCNC: 17 U/L (ref 7–52)
ANION GAP SERPL CALCULATED.3IONS-SCNC: 6 MMOL/L
AST SERPL W P-5'-P-CCNC: 19 U/L (ref 13–39)
BASOPHILS # BLD AUTO: 0.12 THOUSANDS/ÂΜL (ref 0–0.1)
BASOPHILS NFR BLD AUTO: 2 % (ref 0–1)
BILIRUB SERPL-MCNC: 0.43 MG/DL (ref 0.2–1)
BUN SERPL-MCNC: 14 MG/DL (ref 5–25)
CALCIUM SERPL-MCNC: 8.7 MG/DL (ref 8.4–10.2)
CHLORIDE SERPL-SCNC: 107 MMOL/L (ref 96–108)
CO2 SERPL-SCNC: 27 MMOL/L (ref 21–32)
CREAT SERPL-MCNC: 0.58 MG/DL (ref 0.6–1.3)
EOSINOPHIL # BLD AUTO: 0.03 THOUSAND/ÂΜL (ref 0–0.61)
EOSINOPHIL NFR BLD AUTO: 1 % (ref 0–6)
ERYTHROCYTE [DISTWIDTH] IN BLOOD BY AUTOMATED COUNT: 13.3 % (ref 11.6–15.1)
GFR SERPL CREATININE-BSD FRML MDRD: 97 ML/MIN/1.73SQ M
GLUCOSE P FAST SERPL-MCNC: 88 MG/DL (ref 65–99)
HCT VFR BLD AUTO: 39.9 % (ref 34.8–46.1)
HGB BLD-MCNC: 12.4 G/DL (ref 11.5–15.4)
IMM GRANULOCYTES # BLD AUTO: 0.03 THOUSAND/UL (ref 0–0.2)
IMM GRANULOCYTES NFR BLD AUTO: 1 % (ref 0–2)
LYMPHOCYTES # BLD AUTO: 1.47 THOUSANDS/ÂΜL (ref 0.6–4.47)
LYMPHOCYTES NFR BLD AUTO: 25 % (ref 14–44)
MCH RBC QN AUTO: 30.8 PG (ref 26.8–34.3)
MCHC RBC AUTO-ENTMCNC: 31.1 G/DL (ref 31.4–37.4)
MCV RBC AUTO: 99 FL (ref 82–98)
MONOCYTES # BLD AUTO: 0.71 THOUSAND/ÂΜL (ref 0.17–1.22)
MONOCYTES NFR BLD AUTO: 12 % (ref 4–12)
NEUTROPHILS # BLD AUTO: 3.45 THOUSANDS/ÂΜL (ref 1.85–7.62)
NEUTS SEG NFR BLD AUTO: 59 % (ref 43–75)
NRBC BLD AUTO-RTO: 0 /100 WBCS
PLATELET # BLD AUTO: 221 THOUSANDS/UL (ref 149–390)
PMV BLD AUTO: 11.4 FL (ref 8.9–12.7)
POTASSIUM SERPL-SCNC: 4.3 MMOL/L (ref 3.5–5.3)
PROT SERPL-MCNC: 6 G/DL (ref 6.4–8.4)
RBC # BLD AUTO: 4.02 MILLION/UL (ref 3.81–5.12)
SODIUM SERPL-SCNC: 140 MMOL/L (ref 135–147)
WBC # BLD AUTO: 5.81 THOUSAND/UL (ref 4.31–10.16)

## 2024-02-16 PROCEDURE — 85025 COMPLETE CBC W/AUTO DIFF WBC: CPT

## 2024-02-16 PROCEDURE — 36415 COLL VENOUS BLD VENIPUNCTURE: CPT

## 2024-02-16 PROCEDURE — 80053 COMPREHEN METABOLIC PANEL: CPT

## 2024-03-06 ENCOUNTER — APPOINTMENT (OUTPATIENT)
Dept: LAB | Facility: MEDICAL CENTER | Age: 64
End: 2024-03-06
Payer: MEDICARE

## 2024-03-06 DIAGNOSIS — E86.0 DEHYDRATION: ICD-10-CM

## 2024-03-06 DIAGNOSIS — K12.31 MUCOUS MEMBRANE INFLAMMATION DUE TO CHEMOTHERAPY: ICD-10-CM

## 2024-03-06 DIAGNOSIS — C50.212 MALIGNANT NEOPLASM OF UPPER-INNER QUADRANT OF LEFT FEMALE BREAST, UNSPECIFIED ESTROGEN RECEPTOR STATUS (HCC): ICD-10-CM

## 2024-03-06 DIAGNOSIS — Z51.11 ENCOUNTER FOR ANTINEOPLASTIC CHEMOTHERAPY: ICD-10-CM

## 2024-03-06 DIAGNOSIS — Z17.0 ESTROGEN RECEPTOR POSITIVE: ICD-10-CM

## 2024-03-06 LAB
ALBUMIN SERPL BCP-MCNC: 3.5 G/DL (ref 3.5–5)
ALP SERPL-CCNC: 82 U/L (ref 34–104)
ALT SERPL W P-5'-P-CCNC: 22 U/L (ref 7–52)
ANION GAP SERPL CALCULATED.3IONS-SCNC: 8 MMOL/L
AST SERPL W P-5'-P-CCNC: 26 U/L (ref 13–39)
BASOPHILS # BLD AUTO: 0.11 THOUSANDS/ÂΜL (ref 0–0.1)
BASOPHILS NFR BLD AUTO: 2 % (ref 0–1)
BILIRUB SERPL-MCNC: 0.45 MG/DL (ref 0.2–1)
BUN SERPL-MCNC: 11 MG/DL (ref 5–25)
CALCIUM SERPL-MCNC: 8.4 MG/DL (ref 8.4–10.2)
CHLORIDE SERPL-SCNC: 108 MMOL/L (ref 96–108)
CO2 SERPL-SCNC: 26 MMOL/L (ref 21–32)
CREAT SERPL-MCNC: 0.48 MG/DL (ref 0.6–1.3)
EOSINOPHIL # BLD AUTO: 0.07 THOUSAND/ÂΜL (ref 0–0.61)
EOSINOPHIL NFR BLD AUTO: 1 % (ref 0–6)
ERYTHROCYTE [DISTWIDTH] IN BLOOD BY AUTOMATED COUNT: 14.6 % (ref 11.6–15.1)
GFR SERPL CREATININE-BSD FRML MDRD: 103 ML/MIN/1.73SQ M
GLUCOSE P FAST SERPL-MCNC: 78 MG/DL (ref 65–99)
HCT VFR BLD AUTO: 35.1 % (ref 34.8–46.1)
HGB BLD-MCNC: 11.2 G/DL (ref 11.5–15.4)
IMM GRANULOCYTES # BLD AUTO: 0.04 THOUSAND/UL (ref 0–0.2)
IMM GRANULOCYTES NFR BLD AUTO: 1 % (ref 0–2)
LYMPHOCYTES # BLD AUTO: 1.48 THOUSANDS/ÂΜL (ref 0.6–4.47)
LYMPHOCYTES NFR BLD AUTO: 24 % (ref 14–44)
MCH RBC QN AUTO: 31.3 PG (ref 26.8–34.3)
MCHC RBC AUTO-ENTMCNC: 31.9 G/DL (ref 31.4–37.4)
MCV RBC AUTO: 98 FL (ref 82–98)
MONOCYTES # BLD AUTO: 0.71 THOUSAND/ÂΜL (ref 0.17–1.22)
MONOCYTES NFR BLD AUTO: 12 % (ref 4–12)
NEUTROPHILS # BLD AUTO: 3.72 THOUSANDS/ÂΜL (ref 1.85–7.62)
NEUTS SEG NFR BLD AUTO: 60 % (ref 43–75)
NRBC BLD AUTO-RTO: 0 /100 WBCS
PLATELET # BLD AUTO: 248 THOUSANDS/UL (ref 149–390)
PMV BLD AUTO: 10.6 FL (ref 8.9–12.7)
POTASSIUM SERPL-SCNC: 4.2 MMOL/L (ref 3.5–5.3)
PROT SERPL-MCNC: 5.7 G/DL (ref 6.4–8.4)
RBC # BLD AUTO: 3.58 MILLION/UL (ref 3.81–5.12)
SODIUM SERPL-SCNC: 142 MMOL/L (ref 135–147)
WBC # BLD AUTO: 6.13 THOUSAND/UL (ref 4.31–10.16)

## 2024-03-06 PROCEDURE — 36415 COLL VENOUS BLD VENIPUNCTURE: CPT

## 2024-03-06 PROCEDURE — 80053 COMPREHEN METABOLIC PANEL: CPT

## 2024-03-06 PROCEDURE — 85025 COMPLETE CBC W/AUTO DIFF WBC: CPT

## 2024-03-08 ENCOUNTER — APPOINTMENT (RX ONLY)
Dept: URBAN - METROPOLITAN AREA CLINIC 341 | Facility: CLINIC | Age: 64
Setting detail: DERMATOLOGY
End: 2024-03-08

## 2024-03-08 DIAGNOSIS — D22 MELANOCYTIC NEVI: ICD-10-CM

## 2024-03-08 DIAGNOSIS — L82.1 OTHER SEBORRHEIC KERATOSIS: ICD-10-CM

## 2024-03-08 DIAGNOSIS — D18.0 HEMANGIOMA: ICD-10-CM

## 2024-03-08 DIAGNOSIS — D485 NEOPLASM OF UNCERTAIN BEHAVIOR OF SKIN: ICD-10-CM | Status: INADEQUATELY CONTROLLED

## 2024-03-08 DIAGNOSIS — L81.4 OTHER MELANIN HYPERPIGMENTATION: ICD-10-CM

## 2024-03-08 PROBLEM — D22.5 MELANOCYTIC NEVI OF TRUNK: Status: ACTIVE | Noted: 2024-03-08

## 2024-03-08 PROBLEM — D48.5 NEOPLASM OF UNCERTAIN BEHAVIOR OF SKIN: Status: ACTIVE | Noted: 2024-03-08

## 2024-03-08 PROBLEM — D18.01 HEMANGIOMA OF SKIN AND SUBCUTANEOUS TISSUE: Status: ACTIVE | Noted: 2024-03-08

## 2024-03-08 PROCEDURE — ? BIOPSY BY SHAVE METHOD

## 2024-03-08 PROCEDURE — ? TREATMENT REGIMEN

## 2024-03-08 PROCEDURE — 99203 OFFICE O/P NEW LOW 30 MIN: CPT | Mod: 25

## 2024-03-08 PROCEDURE — ? COUNSELING

## 2024-03-08 PROCEDURE — 11102 TANGNTL BX SKIN SINGLE LES: CPT

## 2024-03-08 PROCEDURE — 11103 TANGNTL BX SKIN EA SEP/ADDL: CPT

## 2024-03-08 ASSESSMENT — LOCATION DETAILED DESCRIPTION DERM
LOCATION DETAILED: LEFT ANTERIOR DISTAL UPPER ARM
LOCATION DETAILED: LEFT NASAL ALA
LOCATION DETAILED: SUBXIPHOID
LOCATION DETAILED: PERIUMBILICAL SKIN
LOCATION DETAILED: EPIGASTRIC SKIN

## 2024-03-08 ASSESSMENT — LOCATION SIMPLE DESCRIPTION DERM
LOCATION SIMPLE: ABDOMEN
LOCATION SIMPLE: LEFT NOSE
LOCATION SIMPLE: LEFT UPPER ARM

## 2024-03-08 ASSESSMENT — LOCATION ZONE DERM
LOCATION ZONE: ARM
LOCATION ZONE: NOSE
LOCATION ZONE: TRUNK

## 2024-03-29 ENCOUNTER — APPOINTMENT (RX ONLY)
Dept: URBAN - METROPOLITAN AREA CLINIC 341 | Facility: CLINIC | Age: 64
Setting detail: DERMATOLOGY
End: 2024-03-29

## 2024-03-29 PROBLEM — C44.619 BASAL CELL CARCINOMA OF SKIN OF LEFT UPPER LIMB, INCLUDING SHOULDER: Status: ACTIVE | Noted: 2024-03-29

## 2024-03-29 PROBLEM — D23.39 OTHER BENIGN NEOPLASM OF SKIN OF OTHER PARTS OF FACE: Status: ACTIVE | Noted: 2024-03-29

## 2024-03-29 PROCEDURE — ? SURGICAL DECISION MAKING

## 2024-03-29 PROCEDURE — ? COUNSELING

## 2024-03-29 PROCEDURE — 99213 OFFICE O/P EST LOW 20 MIN: CPT

## 2024-03-29 PROCEDURE — ? PATHOLOGY DISCUSSION

## 2024-03-29 PROCEDURE — ? DEFER

## 2024-03-29 NOTE — PROCEDURE: DEFER
Size Of Lesion In Cm (Optional): 0
Detail Level: Detailed
Introduction Text (Please End With A Colon): The following procedure was deferred:
Instructions (Optional): 30 minute excision

## 2024-04-06 DIAGNOSIS — E78.5 HYPERLIPIDEMIA, UNSPECIFIED HYPERLIPIDEMIA TYPE: ICD-10-CM

## 2024-04-08 RX ORDER — BUPROPION HYDROCHLORIDE 300 MG/1
300 TABLET ORAL DAILY
Qty: 90 TABLET | Refills: 0 | Status: SHIPPED | OUTPATIENT
Start: 2024-04-08

## 2024-04-14 DIAGNOSIS — E78.5 HYPERLIPIDEMIA, UNSPECIFIED HYPERLIPIDEMIA TYPE: ICD-10-CM

## 2024-04-15 RX ORDER — DULOXETIN HYDROCHLORIDE 20 MG/1
20 CAPSULE, DELAYED RELEASE ORAL DAILY
Qty: 90 CAPSULE | Refills: 0 | Status: SHIPPED | OUTPATIENT
Start: 2024-04-15

## 2024-05-06 ENCOUNTER — APPOINTMENT (RX ONLY)
Dept: URBAN - METROPOLITAN AREA CLINIC 341 | Facility: CLINIC | Age: 64
Setting detail: DERMATOLOGY
End: 2024-05-06

## 2024-05-06 PROBLEM — C44.619 BASAL CELL CARCINOMA OF SKIN OF LEFT UPPER LIMB, INCLUDING SHOULDER: Status: ACTIVE | Noted: 2024-05-06

## 2024-05-06 PROCEDURE — 11603 EXC TR-EXT MAL+MARG 2.1-3 CM: CPT

## 2024-05-06 PROCEDURE — ? COUNSELING

## 2024-05-06 PROCEDURE — 12032 INTMD RPR S/A/T/EXT 2.6-7.5: CPT

## 2024-05-06 PROCEDURE — ? EXCISION

## 2024-05-06 NOTE — PROCEDURE: EXCISION
Accession #: [id number]
Size Of Lesion In Cm: 1.4
X Size Of Lesion In Cm (Optional): 0
Size Of Margin In Cm: 0.4
Was An Eye Clamp Used?: No
Eye Clamp Note Details: An eye clamp was used during the procedure.
Excision Method: Fusiform
Saucerization Depth: dermis and superficial adipose tissue
Repair Type: Intermediate
Intermediate / Complex Repair - Final Wound Length In Cm: 4.1
Suturegard Retention Suture: 2-0 Nylon
Retention Suture Bite Size: 3 mm
Length To Time In Minutes Device Was In Place: 10
Number Of Hemigard Strips Per Side: 1
Undermining Type: Entire Wound
Debridement Text: The wound edges were debrided prior to proceeding with the closure to facilitate wound healing.
Helical Rim Text: The closure involved the helical rim.
Vermilion Border Text: The closure involved the vermilion border.
Nostril Rim Text: The closure involved the nostril rim.
Retention Suture Text: Retention sutures were placed to support the closure and prevent dehiscence.
Suture Removal: 14 days
Lab: 6
Graft Donor Site Bandage (Optional-Leave Blank If You Don't Want In Note): Steri-strips and a pressure bandage were applied to the donor site.
Epidermal Closure Graft Donor Site (Optional): simple interrupted
Billing Type: Third-Party Bill
Excision Depth: adipose tissue
Scalpel Size: 15 blade
Anesthesia Type: 1% lidocaine with epinephrine
Hemostasis: Electrocautery
Estimated Blood Loss (Cc): minimal
Detail Level: Detailed
Repair Depth: use same depth as excision depth
Deep Sutures: 4-0 Vicryl
Epidermal Sutures: 4-0 Monosof
Epidermal Closure: horizontal mattress
Wound Care: Petrolatum
Dressing: dry sterile dressing
Suturegard Intro: Intraoperative tissue expansion was performed, utilizing the SUTUREGARD device, in order to reduce wound tension.
Suturegard Body: The suture ends were repeatedly re-tightened and re-clamped to achieve the desired tissue expansion.
Hemigard Intro: Due to skin fragility and wound tension, it was decided to use HEMIGARD adhesive retention suture devices to permit a linear closure. The skin was cleaned and dried for a 6cm distance away from the wound. Excessive hair, if present, was removed to allow for adhesion.
Hemigard Postcare Instructions: The HEMIGARD strips are to remain completely dry for at least 5-7 days.
Positioning (Leave Blank If You Do Not Want): The patient was placed in a comfortable position exposing the surgical site.
Pre-Excision Curettage Text (Leave Blank If You Do Not Want): Prior to drawing the surgical margin the visible lesion was removed with curettage to clearly define the lesion size.
Complex Repair Preamble Text (Leave Blank If You Do Not Want): Extensive wide undermining was performed.
Intermediate Repair Preamble Text (Leave Blank If You Do Not Want): Undermining was performed with blunt dissection.
Curvilinear Excision Additional Text (Leave Blank If You Do Not Want): The margin was drawn around the clinically apparent lesion.  A curvilinear shape was then drawn on the skin incorporating the lesion and margins.  Incisions were then made along these lines to the appropriate tissue plane and the lesion was extirpated.
Fusiform Excision Additional Text (Leave Blank If You Do Not Want): The margin was drawn around the clinically apparent lesion.  A fusiform shape was then drawn on the skin incorporating the lesion and margins.  Incisions were then made along these lines to the appropriate tissue plane and the lesion was extirpated.
Elliptical Excision Additional Text (Leave Blank If You Do Not Want): The margin was drawn around the clinically apparent lesion.  An elliptical shape was then drawn on the skin incorporating the lesion and margins.  Incisions were then made along these lines to the appropriate tissue plane and the lesion was extirpated.
Saucerization Excision Additional Text (Leave Blank If You Do Not Want): The margin was drawn around the clinically apparent lesion.  Incisions were then made along these lines, in a tangential fashion, to the appropriate tissue plane and the lesion was extirpated.
Slit Excision Additional Text (Leave Blank If You Do Not Want): A linear line was drawn on the skin overlying the lesion. An incision was made slowly until the lesion was visualized.  Once visualized, the lesion was removed with blunt dissection.
Excisional Biopsy Additional Text (Leave Blank If You Do Not Want): The margin was drawn around the clinically apparent lesion. An elliptical shape was then drawn on the skin incorporating the lesion and margins.  Incisions were then made along these lines to the appropriate tissue plane and the lesion was extirpated.
Perilesional Excision Additional Text (Leave Blank If You Do Not Want): The margin was drawn around the clinically apparent lesion. Incisions were then made along these lines to the appropriate tissue plane and the lesion was extirpated.
Repair Performed By Another Provider Text (Leave Blank If You Do Not Want): After the tissue was excised the defect was repaired by another provider.
No Repair - Repaired With Adjacent Surgical Defect Text (Leave Blank If You Do Not Want): After the excision the defect was repaired concurrently with another surgical defect which was in close approximation.
Adjacent Tissue Transfer Text: The defect edges were debeveled with a #15 scalpel blade. Given the location of the defect and the proximity to free margins an adjacent tissue transfer was deemed most appropriate. Using a sterile surgical marker, an appropriate flap was drawn incorporating the defect and placing the expected incisions within the relaxed skin tension lines where possible. The area thus outlined was incised deep to adipose tissue with a #15 scalpel blade. The skin margins were undermined to an appropriate distance in all directions utilizing iris scissors and carried over to close the primary defect.
Advancement Flap (Single) Text: The defect edges were debeveled with a #15 scalpel blade. Given the location of the defect and the proximity to free margins a single advancement flap was deemed most appropriate. Using a sterile surgical marker, an appropriate advancement flap was drawn incorporating the defect and placing the expected incisions within the relaxed skin tension lines where possible. The area thus outlined was incised deep to adipose tissue with a #15 scalpel blade. The skin margins were undermined to an appropriate distance in all directions utilizing iris scissors. Following this, the designed flap was advanced and carried over into the primary defect and sutured into place.
Advancement Flap (Double) Text: The defect edges were debeveled with a #15 scalpel blade. Given the location of the defect and the proximity to free margins a double advancement flap was deemed most appropriate. Using a sterile surgical marker, the appropriate advancement flaps were drawn incorporating the defect and placing the expected incisions within the relaxed skin tension lines where possible. The area thus outlined was incised deep to adipose tissue with a #15 scalpel blade. The skin margins were undermined to an appropriate distance in all directions utilizing iris scissors. Following this, the designed flaps were advanced and carried over into the primary defect and sutured into place.
Burow's Advancement Flap Text: The defect edges were debeveled with a #15 scalpel blade. Given the location of the defect and the proximity to free margins a Burow's advancement flap was deemed most appropriate. Using a sterile surgical marker, the appropriate advancement flap was drawn incorporating the defect and placing the expected incisions within the relaxed skin tension lines where possible. The area thus outlined was incised deep to adipose tissue with a #15 scalpel blade. The skin margins were undermined to an appropriate distance in all directions utilizing iris scissors. Following this, the designed flap was advanced and carried over into the primary defect and sutured into place.
Chonodrocutaneous Helical Advancement Flap Text: The defect edges were debeveled with a #15 scalpel blade. Given the location of the defect and the proximity to free margins a chondrocutaneous helical advancement flap was deemed most appropriate. Using a sterile surgical marker, the appropriate advancement flap was drawn incorporating the defect and placing the expected incisions within the relaxed skin tension lines where possible. The area thus outlined was incised deep to adipose tissue with a #15 scalpel blade. The skin margins were undermined to an appropriate distance in all directions utilizing iris scissors. Following this, the designed flap was advanced and carried over into the primary defect and sutured into place.
Crescentic Advancement Flap Text: The defect edges were debeveled with a #15 scalpel blade. Given the location of the defect and the proximity to free margins a crescentic advancement flap was deemed most appropriate. Using a sterile surgical marker, the appropriate advancement flap was drawn incorporating the defect and placing the expected incisions within the relaxed skin tension lines where possible. The area thus outlined was incised deep to adipose tissue with a #15 scalpel blade. The skin margins were undermined to an appropriate distance in all directions utilizing iris scissors. Following this, the designed flap was advanced and carried over into the primary defect and sutured into place.
A-T Advancement Flap Text: The defect edges were debeveled with a #15 scalpel blade. Given the location of the defect, shape of the defect and the proximity to free margins an A-T advancement flap was deemed most appropriate. Using a sterile surgical marker, an appropriate advancement flap was drawn incorporating the defect and placing the expected incisions within the relaxed skin tension lines where possible. The area thus outlined was incised deep to adipose tissue with a #15 scalpel blade. The skin margins were undermined to an appropriate distance in all directions utilizing iris scissors. Following this, the designed flap was advanced and carried over into the primary defect and sutured into place.
O-T Advancement Flap Text: The defect edges were debeveled with a #15 scalpel blade. Given the location of the defect, shape of the defect and the proximity to free margins an O-T advancement flap was deemed most appropriate. Using a sterile surgical marker, an appropriate advancement flap was drawn incorporating the defect and placing the expected incisions within the relaxed skin tension lines where possible. The area thus outlined was incised deep to adipose tissue with a #15 scalpel blade. The skin margins were undermined to an appropriate distance in all directions utilizing iris scissors. Following this, the designed flap was advanced and carried over into the primary defect and sutured into place.
O-L Flap Text: The defect edges were debeveled with a #15 scalpel blade. Given the location of the defect, shape of the defect and the proximity to free margins an O-L flap was deemed most appropriate. Using a sterile surgical marker, an appropriate advancement flap was drawn incorporating the defect and placing the expected incisions within the relaxed skin tension lines where possible. The area thus outlined was incised deep to adipose tissue with a #15 scalpel blade. The skin margins were undermined to an appropriate distance in all directions utilizing iris scissors. Following this, the designed flap was advanced and carried over into the primary defect and sutured into place.
O-Z Flap Text: The defect edges were debeveled with a #15 scalpel blade. Given the location of the defect, shape of the defect and the proximity to free margins an O-Z flap was deemed most appropriate. Using a sterile surgical marker, an appropriate transposition flap was drawn incorporating the defect and placing the expected incisions within the relaxed skin tension lines where possible. The area thus outlined was incised deep to adipose tissue with a #15 scalpel blade. The skin margins were undermined to an appropriate distance in all directions utilizing iris scissors. Following this, the designed flap was carried over into the primary defect and sutured into place.
Double O-Z Flap Text: The defect edges were debeveled with a #15 scalpel blade. Given the location of the defect, shape of the defect and the proximity to free margins a Double O-Z flap was deemed most appropriate. Using a sterile surgical marker, an appropriate transposition flap was drawn incorporating the defect and placing the expected incisions within the relaxed skin tension lines where possible. The area thus outlined was incised deep to adipose tissue with a #15 scalpel blade. The skin margins were undermined to an appropriate distance in all directions utilizing iris scissors. Following this, the designed flap was carried over into the primary defect and sutured into place.
V-Y Flap Text: The defect edges were debeveled with a #15 scalpel blade. Given the location of the defect, shape of the defect and the proximity to free margins a V-Y flap was deemed most appropriate. Using a sterile surgical marker, an appropriate advancement flap was drawn incorporating the defect and placing the expected incisions within the relaxed skin tension lines where possible. The area thus outlined was incised deep to adipose tissue with a #15 scalpel blade. The skin margins were undermined to an appropriate distance in all directions utilizing iris scissors. Following this, the designed flap was advanced and carried over into the primary defect and sutured into place.
Advancement-Rotation Flap Text: The defect edges were debeveled with a #15 scalpel blade. Given the location of the defect, shape of the defect and the proximity to free margins an advancement-rotation flap was deemed most appropriate. Using a sterile surgical marker, an appropriate flap was drawn incorporating the defect and placing the expected incisions within the relaxed skin tension lines where possible. The area thus outlined was incised deep to adipose tissue with a #15 scalpel blade. The skin margins were undermined to an appropriate distance in all directions utilizing iris scissors. Following this, the designed flap was carried over into the primary defect and sutured into place.
Mercedes Flap Text: The defect edges were debeveled with a #15 scalpel blade. Given the location of the defect, shape of the defect and the proximity to free margins a Mercedes flap was deemed most appropriate. Using a sterile surgical marker, an appropriate advancement flap was drawn incorporating the defect and placing the expected incisions within the relaxed skin tension lines where possible. The area thus outlined was incised deep to adipose tissue with a #15 scalpel blade. The skin margins were undermined to an appropriate distance in all directions utilizing iris scissors. Following this, the designed flap was advanced and carried over into the primary defect and sutured into place.
Modified Advancement Flap Text: The defect edges were debeveled with a #15 scalpel blade. Given the location of the defect, shape of the defect and the proximity to free margins a modified advancement flap was deemed most appropriate. Using a sterile surgical marker, an appropriate advancement flap was drawn incorporating the defect and placing the expected incisions within the relaxed skin tension lines where possible. The area thus outlined was incised deep to adipose tissue with a #15 scalpel blade. The skin margins were undermined to an appropriate distance in all directions utilizing iris scissors. Following this, the designed flap was advanced and carried over into the primary defect and sutured into place.
Mucosal Advancement Flap Text: Given the location of the defect, shape of the defect and the proximity to free margins a mucosal advancement flap was deemed most appropriate. Incisions were made with a 15 blade scalpel in the appropriate fashion along the cutaneous vermilion border and the mucosal lip. The remaining actinically damaged mucosal tissue was excised.  The mucosal advancement flap was then elevated to the gingival sulcus with care taken to preserve the neurovascular structures and advanced into the primary defect. Care was taken to ensure that precise realignment of the vermilion border was achieved.
Peng Advancement Flap Text: The defect edges were debeveled with a #15 scalpel blade. Given the location of the defect, shape of the defect and the proximity to free margins a Peng advancement flap was deemed most appropriate. Using a sterile surgical marker, an appropriate advancement flap was drawn incorporating the defect and placing the expected incisions within the relaxed skin tension lines where possible. The area thus outlined was incised deep to adipose tissue with a #15 scalpel blade. The skin margins were undermined to an appropriate distance in all directions utilizing iris scissors. Following this, the designed flap was advanced and carried over into the primary defect and sutured into place.
Hatchet Flap Text: The defect edges were debeveled with a #15 scalpel blade. Given the location of the defect, shape of the defect and the proximity to free margins a hatchet flap was deemed most appropriate. Using a sterile surgical marker, an appropriate hatchet flap was drawn incorporating the defect and placing the expected incisions within the relaxed skin tension lines where possible. The area thus outlined was incised deep to adipose tissue with a #15 scalpel blade. The skin margins were undermined to an appropriate distance in all directions utilizing iris scissors. Following this, the designed flap was carried over into the primary defect and sutured into place.
Rotation Flap Text: The defect edges were debeveled with a #15 scalpel blade. Given the location of the defect, shape of the defect and the proximity to free margins a rotation flap was deemed most appropriate. Using a sterile surgical marker, an appropriate rotation flap was drawn incorporating the defect and placing the expected incisions within the relaxed skin tension lines where possible. The area thus outlined was incised deep to adipose tissue with a #15 scalpel blade. The skin margins were undermined to an appropriate distance in all directions utilizing iris scissors. Following this, the designed flap was carried over into the primary defect and sutured into place.
Bilateral Rotation Flap Text: The defect edges were debeveled with a #15 scalpel blade. Given the location of the defect, shape of the defect and the proximity to free margins a bilateral rotation flap was deemed most appropriate. Using a sterile surgical marker, an appropriate rotation flap was drawn incorporating the defect and placing the expected incisions within the relaxed skin tension lines where possible. The area thus outlined was incised deep to adipose tissue with a #15 scalpel blade. The skin margins were undermined to an appropriate distance in all directions utilizing iris scissors. Following this, the designed flap was carried over into the primary defect and sutured into place.
Spiral Flap Text: The defect edges were debeveled with a #15 scalpel blade. Given the location of the defect, shape of the defect and the proximity to free margins a spiral flap was deemed most appropriate. Using a sterile surgical marker, an appropriate rotation flap was drawn incorporating the defect and placing the expected incisions within the relaxed skin tension lines where possible. The area thus outlined was incised deep to adipose tissue with a #15 scalpel blade. The skin margins were undermined to an appropriate distance in all directions utilizing iris scissors. Following this, the designed flap was carried over into the primary defect and sutured into place.
Staged Advancement Flap Text: The defect edges were debeveled with a #15 scalpel blade. Given the location of the defect, shape of the defect and the proximity to free margins a staged advancement flap was deemed most appropriate. Using a sterile surgical marker, an appropriate advancement flap was drawn incorporating the defect and placing the expected incisions within the relaxed skin tension lines where possible. The area thus outlined was incised deep to adipose tissue with a #15 scalpel blade. The skin margins were undermined to an appropriate distance in all directions utilizing iris scissors. Following this, the designed flap was carried over into the primary defect and sutured into place.
Star Wedge Flap Text: The defect edges were debeveled with a #15 scalpel blade. Given the location of the defect, shape of the defect and the proximity to free margins a star wedge flap was deemed most appropriate. Using a sterile surgical marker, an appropriate rotation flap was drawn incorporating the defect and placing the expected incisions within the relaxed skin tension lines where possible. The area thus outlined was incised deep to adipose tissue with a #15 scalpel blade. The skin margins were undermined to an appropriate distance in all directions utilizing iris scissors. Following this, the designed flap was carried over into the primary defect and sutured into place.
Transposition Flap Text: The defect edges were debeveled with a #15 scalpel blade. Given the location of the defect and the proximity to free margins a transposition flap was deemed most appropriate. Using a sterile surgical marker, an appropriate transposition flap was drawn incorporating the defect. The area thus outlined was incised deep to adipose tissue with a #15 scalpel blade. The skin margins were undermined to an appropriate distance in all directions utilizing iris scissors. Following this, the designed flap was carried over into the primary defect and sutured into place.
Muscle Hinge Flap Text: The defect edges were debeveled with a #15 scalpel blade.  Given the size, depth and location of the defect and the proximity to free margins a muscle hinge flap was deemed most appropriate. Using a sterile surgical marker, an appropriate hinge flap was drawn incorporating the defect. The area thus outlined was incised with a #15 scalpel blade. The skin margins were undermined to an appropriate distance in all directions utilizing iris scissors. Following this, the designed flap was carried into the primary defect and sutured into place.
Mustarde Flap Text: The defect edges were debeveled with a #15 scalpel blade.  Given the size, depth and location of the defect and the proximity to free margins a Mustarde flap was deemed most appropriate. Using a sterile surgical marker, an appropriate flap was drawn incorporating the defect. The area thus outlined was incised with a #15 scalpel blade. The skin margins were undermined to an appropriate distance in all directions utilizing iris scissors. Following this, the designed flap was carried into the primary defect and sutured into place.
Nasal Turnover Hinge Flap Text: The defect edges were debeveled with a #15 scalpel blade.  Given the size, depth, location of the defect and the defect being full thickness a nasal turnover hinge flap was deemed most appropriate. Using a sterile surgical marker, an appropriate hinge flap was drawn incorporating the defect. The area thus outlined was incised with a #15 scalpel blade. The flap was designed to recreate the nasal mucosal lining and the alar rim. The skin margins were undermined to an appropriate distance in all directions utilizing iris scissors. Following this, the designed flap was carried over into the primary defect and sutured into place
Nasalis-Muscle-Based Myocutaneous Island Pedicle Flap Text: Using a #15 blade, an incision was made around the donor flap to the level of the nasalis muscle. Wide lateral undermining was then performed in both the subcutaneous plane above the nasalis muscle, and in a submuscular plane just above periosteum. This allowed the formation of a free nasalis muscle axial pedicle (based on the angular artery) which was still attached to the actual cutaneous flap, increasing its mobility and vascular viability. Hemostasis was obtained with pinpoint electrocoagulation. The flap was mobilized into position and the pivotal anchor points positioned and stabilized with buried interrupted sutures. Subcutaneous and dermal tissues were closed in a multilayered fashion with sutures. Tissue redundancies were excised, and the epidermal edges were apposed without significant tension and sutured with sutures.
Nasalis Myocutaneous Flap Text: Using a #15 blade, an incision was made around the donor flap to the level of the nasalis muscle. Wide lateral undermining was then performed in both the subcutaneous plane above the nasalis muscle, and in a submuscular plane just above periosteum. This allowed the formation of a free nasalis muscle axial pedicle which was still attached to the actual cutaneous flap, increasing its mobility and vascular viability. Hemostasis was obtained with pinpoint electrocoagulation. The flap was mobilized into position and the pivotal anchor points positioned and stabilized with buried interrupted sutures. Subcutaneous and dermal tissues were closed in a multilayered fashion with sutures. Tissue redundancies were excised, and the epidermal edges were apposed without significant tension and sutured with sutures.
Orbicularis Oris Muscle Flap Text: The defect edges were debeveled with a #15 scalpel blade.  Given that the defect affected the competency of the oral sphincter an orbicularis oris muscle flap was deemed most appropriate to restore this competency and normal muscle function.  Using a sterile surgical marker, an appropriate flap was drawn incorporating the defect. The area thus outlined was incised with a #15 scalpel blade. Following this, the designed flap was carried over into the primary defect and sutured into place.
Melolabial Transposition Flap Text: The defect edges were debeveled with a #15 scalpel blade. Given the location of the defect and the proximity to free margins a melolabial flap was deemed most appropriate. Using a sterile surgical marker, an appropriate melolabial transposition flap was drawn incorporating the defect. The area thus outlined was incised deep to adipose tissue with a #15 scalpel blade. The skin margins were undermined to an appropriate distance in all directions utilizing iris scissors. Following this, the designed flap was carried over into the primary defect and sutured into place.
Rectangular Flap Text: The defect edges were debeveled with a #15 scalpel blade. Given the location of the defect and the proximity to free margins a rectangular flap was deemed most appropriate. Using a sterile surgical marker, an appropriate rectangular flap was drawn incorporating the defect. The area thus outlined was incised deep to adipose tissue with a #15 scalpel blade. The skin margins were undermined to an appropriate distance in all directions utilizing iris scissors. Following this, the designed flap was carried over into the primary defect and sutured into place.
Rhombic Flap Text: The defect edges were debeveled with a #15 scalpel blade. Given the location of the defect and the proximity to free margins a rhombic flap was deemed most appropriate. Using a sterile surgical marker, an appropriate rhombic flap was drawn incorporating the defect. The area thus outlined was incised deep to adipose tissue with a #15 scalpel blade. The skin margins were undermined to an appropriate distance in all directions utilizing iris scissors. Following this, the designed flap was carried over into the primary defect and sutured into place.
Rhomboid Transposition Flap Text: The defect edges were debeveled with a #15 scalpel blade. Given the location of the defect and the proximity to free margins a rhomboid transposition flap was deemed most appropriate. Using a sterile surgical marker, an appropriate rhomboid flap was drawn incorporating the defect. The area thus outlined was incised deep to adipose tissue with a #15 scalpel blade. The skin margins were undermined to an appropriate distance in all directions utilizing iris scissors. Following this, the designed flap was carried over into the primary defect and sutured into place.
Bi-Rhombic Flap Text: The defect edges were debeveled with a #15 scalpel blade. Given the location of the defect and the proximity to free margins a bi-rhombic flap was deemed most appropriate. Using a sterile surgical marker, an appropriate rhombic flap was drawn incorporating the defect. The area thus outlined was incised deep to adipose tissue with a #15 scalpel blade. The skin margins were undermined to an appropriate distance in all directions utilizing iris scissors. Following this, the designed flap was carried over into the primary defect and sutured into place.
Helical Rim Advancement Flap Text: The defect edges were debeveled with a #15 blade scalpel.  Given the location of the defect and the proximity to free margins (helical rim) a double helical rim advancement flap was deemed most appropriate. Using a sterile surgical marker, the appropriate advancement flaps were drawn incorporating the defect and placing the expected incisions between the helical rim and antihelix where possible.  The area thus outlined was incised through and through with a #15 scalpel blade.  With a skin hook and iris scissors, the flaps were gently and sharply undermined and freed up. Folllowing this, the designed flaps were carried over into the primary defect and sutured into place.
Bilateral Helical Rim Advancement Flap Text: The defect edges were debeveled with a #15 blade scalpel.  Given the location of the defect and the proximity to free margins (helical rim) a bilateral helical rim advancement flap was deemed most appropriate. Using a sterile surgical marker, the appropriate advancement flaps were drawn incorporating the defect and placing the expected incisions between the helical rim and antihelix where possible.  The area thus outlined was incised through and through with a #15 scalpel blade.  With a skin hook and iris scissors, the flaps were gently and sharply undermined and freed up. Following this, the designed flaps were placed into the primary defect and sutured into place.
Ear Star Wedge Flap Text: The defect edges were debeveled with a #15 blade scalpel.  Given the location of the defect and the proximity to free margins (helical rim) an ear star wedge flap was deemed most appropriate. Using a sterile surgical marker, the appropriate flap was drawn incorporating the defect and placing the expected incisions between the helical rim and antihelix where possible.  The area thus outlined was incised through and through with a #15 scalpel blade. Following this, the designed flap was carried over into the primary defect and sutured into place.
Banner Transposition Flap Text: The defect edges were debeveled with a #15 scalpel blade. Given the location of the defect and the proximity to free margins a Banner transposition flap was deemed most appropriate. Using a sterile surgical marker, an appropriate flap was drawn around the defect. The area thus outlined was incised deep to adipose tissue with a #15 scalpel blade. The skin margins were undermined to an appropriate distance in all directions utilizing iris scissors. Following this, the designed flap was carried into the primary defect and sutured into place.
Bilobed Flap Text: The defect edges were debeveled with a #15 scalpel blade. Given the location of the defect and the proximity to free margins a bilobe flap was deemed most appropriate. Using a sterile surgical marker, an appropriate bilobe flap drawn around the defect. The area thus outlined was incised deep to adipose tissue with a #15 scalpel blade. The skin margins were undermined to an appropriate distance in all directions utilizing iris scissors. Following this, the designed flap was carried over into the primary defect and sutured into place.
Bilobed Transposition Flap Text: The defect edges were debeveled with a #15 scalpel blade. Given the location of the defect and the proximity to free margins a bilobed transposition flap was deemed most appropriate. Using a sterile surgical marker, an appropriate bilobe flap drawn around the defect. The area thus outlined was incised deep to adipose tissue with a #15 scalpel blade. The skin margins were undermined to an appropriate distance in all directions utilizing iris scissors. Following this, the designed flap was carried over into the primary defect and sutured into place.
Trilobed Flap Text: The defect edges were debeveled with a #15 scalpel blade. Given the location of the defect and the proximity to free margins a trilobed flap was deemed most appropriate. Using a sterile surgical marker, an appropriate trilobed flap was drawn around the defect. The area thus outlined was incised deep to adipose tissue with a #15 scalpel blade. The skin margins were undermined to an appropriate distance in all directions utilizing iris scissors. Following this, the designed flap was carried into the primary defect and sutured into place.
Dorsal Nasal Flap Text: The defect edges were debeveled with a #15 scalpel blade. Given the location of the defect and the proximity to free margins a dorsal nasal flap was deemed most appropriate. Using a sterile surgical marker, an appropriate dorsal nasal flap was drawn around the defect. The area thus outlined was incised deep to adipose tissue with a #15 scalpel blade. The skin margins were undermined to an appropriate distance in all directions utilizing iris scissors. Following this, the designed flap was carried into the primary defect and sutured into place.
Island Pedicle Flap Text: The defect edges were debeveled with a #15 scalpel blade. Given the location of the defect, shape of the defect and the proximity to free margins an island pedicle advancement flap was deemed most appropriate. Using a sterile surgical marker, an appropriate advancement flap was drawn incorporating the defect, outlining the appropriate donor tissue and placing the expected incisions within the relaxed skin tension lines where possible. The area thus outlined was incised deep to adipose tissue with a #15 scalpel blade. The skin margins were undermined to an appropriate distance in all directions around the primary defect and laterally outward around the island pedicle utilizing iris scissors.  There was minimal undermining beneath the pedicle flap. Following this, the flap was carried over into the primary defect and sutured into place.
Island Pedicle Flap With Canthal Suspension Text: The defect edges were debeveled with a #15 scalpel blade. Given the location of the defect, shape of the defect and the proximity to free margins an island pedicle advancement flap was deemed most appropriate. Using a sterile surgical marker, an appropriate advancement flap was drawn incorporating the defect, outlining the appropriate donor tissue and placing the expected incisions within the relaxed skin tension lines where possible. The area thus outlined was incised deep to adipose tissue with a #15 scalpel blade. The skin margins were undermined to an appropriate distance in all directions around the primary defect and laterally outward around the island pedicle utilizing iris scissors.  There was minimal undermining beneath the pedicle flap. A suspension suture was placed in the canthal tendon to prevent tension and prevent ectropion. Following this, the designed flap was placed into the primary defect and sutured into place.
Alar Island Pedicle Flap Text: The defect edges were debeveled with a #15 scalpel blade. Given the location of the defect, shape of the defect and the proximity to the alar rim an island pedicle advancement flap was deemed most appropriate. Using a sterile surgical marker, an appropriate advancement flap was drawn incorporating the defect, outlining the appropriate donor tissue and placing the expected incisions within the nasal ala running parallel to the alar rim. The area thus outlined was incised with a #15 scalpel blade. The skin margins were undermined minimally to an appropriate distance in all directions around the primary defect and laterally outward around the island pedicle utilizing iris scissors.  There was minimal undermining beneath the pedicle flap. Following this, the designed flap was carried over into the primary defect and sutured into place.
Double Island Pedicle Flap Text: The defect edges were debeveled with a #15 scalpel blade. Given the location of the defect, shape of the defect and the proximity to free margins a double island pedicle advancement flap was deemed most appropriate. Using a sterile surgical marker, an appropriate advancement flap was drawn incorporating the defect, outlining the appropriate donor tissue and placing the expected incisions within the relaxed skin tension lines where possible. The area thus outlined was incised deep to adipose tissue with a #15 scalpel blade. The skin margins were undermined to an appropriate distance in all directions around the primary defect and laterally outward around the island pedicle utilizing iris scissors.  There was minimal undermining beneath the pedicle flap. Following this, the flap was carried over into the primary defect and sutured into place.
Island Pedicle Flap-Requiring Vessel Identification Text: The defect edges were debeveled with a #15 scalpel blade. Given the location of the defect, shape of the defect and the proximity to free margins an island pedicle advancement flap was deemed most appropriate. Using a sterile surgical marker, an appropriate advancement flap was drawn, based on the axial vessel mentioned above, incorporating the defect, outlining the appropriate donor tissue and placing the expected incisions within the relaxed skin tension lines where possible. The area thus outlined was incised deep to adipose tissue with a #15 scalpel blade. The skin margins were undermined to an appropriate distance in all directions around the primary defect and laterally outward around the island pedicle utilizing iris scissors.  There was minimal undermining beneath the pedicle flap. Following this, the designed flap was carried over into the primary defect and sutured into place.
Keystone Flap Text: The defect edges were debeveled with a #15 scalpel blade. Given the location of the defect, shape of the defect a keystone flap was deemed most appropriate. Using a sterile surgical marker, an appropriate keystone flap was drawn incorporating the defect, outlining the appropriate donor tissue and placing the expected incisions within the relaxed skin tension lines where possible. The area thus outlined was incised deep to adipose tissue with a #15 scalpel blade. The skin margins were undermined to an appropriate distance in all directions around the primary defect and laterally outward around the flap utilizing iris scissors. Following this, the designed flap was carried into the primary defect and sutured into place.
O-T Plasty Text: The defect edges were debeveled with a #15 scalpel blade. Given the location of the defect, shape of the defect and the proximity to free margins an O-T plasty was deemed most appropriate. Using a sterile surgical marker, an appropriate O-T plasty was drawn incorporating the defect and placing the expected incisions within the relaxed skin tension lines where possible. The area thus outlined was incised deep to adipose tissue with a #15 scalpel blade. The skin margins were undermined to an appropriate distance in all directions utilizing iris scissors. Following this, the designed flap was carried over into the primary defect and sutured into place.
O-Z Plasty Text: The defect edges were debeveled with a #15 scalpel blade. Given the location of the defect, shape of the defect and the proximity to free margins an O-Z plasty (double transposition flap) was deemed most appropriate. Using a sterile surgical marker, the appropriate transposition flaps were drawn incorporating the defect and placing the expected incisions within the relaxed skin tension lines where possible. The area thus outlined was incised deep to adipose tissue with a #15 scalpel blade. The skin margins were undermined to an appropriate distance in all directions utilizing iris scissors. Hemostasis was achieved with electrocautery. The flaps were then transposed and carried over into place, one clockwise and the other counterclockwise, and anchored with interrupted buried subcutaneous sutures.
Double O-Z Plasty Text: The defect edges were debeveled with a #15 scalpel blade. Given the location of the defect, shape of the defect and the proximity to free margins a Double O-Z plasty (double transposition flap) was deemed most appropriate. Using a sterile surgical marker, the appropriate transposition flaps were drawn incorporating the defect and placing the expected incisions within the relaxed skin tension lines where possible. The area thus outlined was incised deep to adipose tissue with a #15 scalpel blade. The skin margins were undermined to an appropriate distance in all directions utilizing iris scissors. Hemostasis was achieved with electrocautery. The flaps were then transposed and carried over into place, one clockwise and the other counterclockwise, and anchored with interrupted buried subcutaneous sutures.
V-Y Plasty Text: The defect edges were debeveled with a #15 scalpel blade. Given the location of the defect, shape of the defect and the proximity to free margins an V-Y advancement flap was deemed most appropriate. Using a sterile surgical marker, an appropriate advancement flap was drawn incorporating the defect and placing the expected incisions within the relaxed skin tension lines where possible. The area thus outlined was incised deep to adipose tissue with a #15 scalpel blade. The skin margins were undermined to an appropriate distance in all directions utilizing iris scissors. Following this, the designed flap was advanced and carried over into the primary defect and sutured into place.
H Plasty Text: Given the location of the defect, shape of the defect and the proximity to free margins a H-plasty was deemed most appropriate for repair. Using a sterile surgical marker, the appropriate advancement arms of the H-plasty were drawn incorporating the defect and placing the expected incisions within the relaxed skin tension lines where possible. The area thus outlined was incised deep to adipose tissue with a #15 scalpel blade. The skin margins were undermined to an appropriate distance in all directions utilizing iris scissors.  The opposing advancement arms were then advanced and carried over into place in opposite direction and anchored with interrupted buried subcutaneous sutures.
W Plasty Text: The lesion was extirpated to the level of the fat with a #15 scalpel blade. Given the location of the defect, shape of the defect and the proximity to free margins a W-plasty was deemed most appropriate for repair. Using a sterile surgical marker, the appropriate transposition arms of the W-plasty were drawn incorporating the defect and placing the expected incisions within the relaxed skin tension lines where possible. The area thus outlined was incised deep to adipose tissue with a #15 scalpel blade. The skin margins were undermined to an appropriate distance in all directions utilizing iris scissors. The opposing transposition arms were then transposed and carried over into place in opposite direction and anchored with interrupted buried subcutaneous sutures.
Z Plasty Text: The lesion was extirpated to the level of the fat with a #15 scalpel blade. Given the location of the defect, shape of the defect and the proximity to free margins a Z-plasty was deemed most appropriate for repair. Using a sterile surgical marker, the appropriate transposition arms of the Z-plasty were drawn incorporating the defect and placing the expected incisions within the relaxed skin tension lines where possible. The area thus outlined was incised deep to adipose tissue with a #15 scalpel blade. The skin margins were undermined to an appropriate distance in all directions utilizing iris scissors. The opposing transposition arms were then transposed and carried over into place in opposite direction and anchored with interrupted buried subcutaneous sutures.
Double Z Plasty Text: The lesion was extirpated to the level of the fat with a #15 scalpel blade. Given the location of the defect, shape of the defect and the proximity to free margins a double Z-plasty was deemed most appropriate for repair. Using a sterile surgical marker, the appropriate transposition arms of the double Z-plasty were drawn incorporating the defect and placing the expected incisions within the relaxed skin tension lines where possible. The area thus outlined was incised deep to adipose tissue with a #15 scalpel blade. The skin margins were undermined to an appropriate distance in all directions utilizing iris scissors. The opposing transposition arms were then transposed and carried over into place in opposite direction and anchored with interrupted buried subcutaneous sutures.
Zygomaticofacial Flap Text: Given the location of the defect, shape of the defect and the proximity to free margins a zygomaticofacial flap was deemed most appropriate for repair. Using a sterile surgical marker, the appropriate flap was drawn incorporating the defect and placing the expected incisions within the relaxed skin tension lines where possible. The area thus outlined was incised deep to adipose tissue with a #15 scalpel blade with preservation of a vascular pedicle.  The skin margins were undermined to an appropriate distance in all directions utilizing iris scissors. The flap was then carried over into the defect and anchored with interrupted buried subcutaneous sutures.
Cheek Interpolation Flap Text: A decision was made to reconstruct the defect utilizing an interpolation axial flap and a staged reconstruction.  A telfa template was made of the defect.  This telfa template was then used to outline the Cheek Interpolation flap.  The donor area for the pedicle flap was then injected with anesthesia.  The flap was excised through the skin and subcutaneous tissue down to the layer of the underlying musculature.  The interpolation flap was carefully excised within this deep plane to maintain its blood supply.  The edges of the donor site were undermined.   The donor site was closed in a primary fashion.  The pedicle was then rotated into position and sutured.  Once the tube was sutured into place, adequate blood supply was confirmed with blanching and refill.  The pedicle was then wrapped with xeroform gauze and dressed appropriately with a telfa and gauze bandage to ensure continued blood supply and protect the attached pedicle.
Cheek-To-Nose Interpolation Flap Text: A decision was made to reconstruct the defect utilizing an interpolation axial flap and a staged reconstruction.  A telfa template was made of the defect.  This telfa template was then used to outline the Cheek-To-Nose Interpolation flap.  The donor area for the pedicle flap was then injected with anesthesia.  The flap was excised through the skin and subcutaneous tissue down to the layer of the underlying musculature.  The interpolation flap was carefully excised within this deep plane to maintain its blood supply.  The edges of the donor site were undermined.   The donor site was closed in a primary fashion.  The pedicle was then rotated into position and sutured.  Once the tube was sutured into place, adequate blood supply was confirmed with blanching and refill.  The pedicle was then wrapped with xeroform gauze and dressed appropriately with a telfa and gauze bandage to ensure continued blood supply and protect the attached pedicle.
Interpolation Flap Text: A decision was made to reconstruct the defect utilizing an interpolation axial flap and a staged reconstruction.  A telfa template was made of the defect.  This telfa template was then used to outline the interpolation flap.  The donor area for the pedicle flap was then injected with anesthesia.  The flap was excised through the skin and subcutaneous tissue down to the layer of the underlying musculature.  The interpolation flap was carefully excised within this deep plane to maintain its blood supply.  The edges of the donor site were undermined.   The donor site was closed in a primary fashion.  The pedicle was then rotated into position and sutured.  Once the tube was sutured into place, adequate blood supply was confirmed with blanching and refill.  The pedicle was then wrapped with xeroform gauze and dressed appropriately with a telfa and gauze bandage to ensure continued blood supply and protect the attached pedicle.
Melolabial Interpolation Flap Text: A decision was made to reconstruct the defect utilizing an interpolation axial flap and a staged reconstruction.  A telfa template was made of the defect.  This telfa template was then used to outline the melolabial interpolation flap.  The donor area for the pedicle flap was then injected with anesthesia.  The flap was excised through the skin and subcutaneous tissue down to the layer of the underlying musculature.  The pedicle flap was carefully excised within this deep plane to maintain its blood supply.  The edges of the donor site were undermined.   The donor site was closed in a primary fashion.  The pedicle was then rotated into position and sutured.  Once the tube was sutured into place, adequate blood supply was confirmed with blanching and refill.  The pedicle was then wrapped with xeroform gauze and dressed appropriately with a telfa and gauze bandage to ensure continued blood supply and protect the attached pedicle.
Mastoid Interpolation Flap Text: A decision was made to reconstruct the defect utilizing an interpolation axial flap and a staged reconstruction.  A telfa template was made of the defect.  This telfa template was then used to outline the mastoid interpolation flap.  The donor area for the pedicle flap was then injected with anesthesia.  The flap was excised through the skin and subcutaneous tissue down to the layer of the underlying musculature.  The pedicle flap was carefully excised within this deep plane to maintain its blood supply.  The edges of the donor site were undermined.   The donor site was closed in a primary fashion.  The pedicle was then rotated into position and sutured.  Once the tube was sutured into place, adequate blood supply was confirmed with blanching and refill.  The pedicle was then wrapped with xeroform gauze and dressed appropriately with a telfa and gauze bandage to ensure continued blood supply and protect the attached pedicle.
Posterior Auricular Interpolation Flap Text: A decision was made to reconstruct the defect utilizing an interpolation axial flap and a staged reconstruction.  A telfa template was made of the defect.  This telfa template was then used to outline the posterior auricular interpolation flap.  The donor area for the pedicle flap was then injected with anesthesia.  The flap was excised through the skin and subcutaneous tissue down to the layer of the underlying musculature.  The pedicle flap was carefully excised within this deep plane to maintain its blood supply.  The edges of the donor site were undermined.   The donor site was closed in a primary fashion.  The pedicle was then rotated into position and sutured.  Once the tube was sutured into place, adequate blood supply was confirmed with blanching and refill.  The pedicle was then wrapped with xeroform gauze and dressed appropriately with a telfa and gauze bandage to ensure continued blood supply and protect the attached pedicle.
Paramedian Forehead Flap Text: A decision was made to reconstruct the defect utilizing an interpolation axial flap and a staged reconstruction.  A telfa template was made of the defect.  This telfa template was then used to outline the paramedian forehead pedicle flap.  The donor area for the pedicle flap was then injected with anesthesia.  The flap was excised through the skin and subcutaneous tissue down to the layer of the underlying musculature.  The pedicle flap was carefully excised within this deep plane to maintain its blood supply.  The edges of the donor site were undermined.   The donor site was closed in a primary fashion.  The pedicle was then rotated into position and sutured.  Once the tube was sutured into place, adequate blood supply was confirmed with blanching and refill.  The pedicle was then wrapped with xeroform gauze and dressed appropriately with a telfa and gauze bandage to ensure continued blood supply and protect the attached pedicle.
Abbe Flap (Upper To Lower Lip) Text: The defect of the lower lip was assessed and measured.  Given the location and size of the defect, an Abbe flap was deemed most appropriate. Using a sterile surgical marker, an appropriate Abbe flap was measured and drawn on the upper lip. Local anesthesia was then infiltrated.  A scalpel was then used to incise the upper lip through and through the skin, vermilion, muscle and mucosa, leaving the flap pedicled on the opposite side.  The flap was then rotated and transferred to the lower lip defect.  The flap was then sutured into place with a three layer technique, closing the orbicularis oris muscle layer with subcutaneous buried sutures, followed by a mucosal layer and an epidermal layer.
Abbe Flap (Lower To Upper Lip) Text: The defect of the upper lip was assessed and measured.  Given the location and size of the defect, an Abbe flap was deemed most appropriate. Using a sterile surgical marker, an appropriate Abbe flap was measured and drawn on the lower lip. Local anesthesia was then infiltrated. A scalpel was then used to incise the upper lip through and through the skin, vermilion, muscle and mucosa, leaving the flap pedicled on the opposite side.  The flap was then rotated and transferred to the lower lip defect.  The flap was then sutured into place with a three layer technique, closing the orbicularis oris muscle layer with subcutaneous buried sutures, followed by a mucosal layer and an epidermal layer.
Estlander Flap (Upper To Lower Lip) Text: The defect of the lower lip was assessed and measured.  Given the location and size of the defect, an Estlander flap was deemed most appropriate. Using a sterile surgical marker, an appropriate Estlander flap was measured and drawn on the upper lip. Local anesthesia was then infiltrated. A scalpel was then used to incise the lateral aspect of the flap, through skin, muscle and mucosa, leaving the flap pedicled medially.  The flap was then rotated and positioned to fill the lower lip defect.  The flap was then sutured into place with a three layer technique, closing the orbicularis oris muscle layer with subcutaneous buried sutures, followed by a mucosal layer and an epidermal layer.
Lip Wedge Excision Repair Text: Given the location of the defect and the proximity to free margins a full thickness wedge repair was deemed most appropriate. Using a sterile surgical marker, the appropriate repair was drawn incorporating the defect and placing the expected incisions perpendicular to the vermilion border.  The vermilion border was also meticulously outlined to ensure appropriate reapproximation during the repair.  The area thus outlined was incised through and through with a #15 scalpel blade.  The muscularis and dermis were reaproximated with deep sutures following hemostasis. Care was taken to realign the vermilion border before proceeding with the superficial closure.  Once the vermilion was realigned the superfical and mucosal closure was finished.
Ftsg Text: The defect edges were debeveled with a #15 scalpel blade. Given the location of the defect, shape of the defect and the proximity to free margins a full thickness skin graft was deemed most appropriate. Using a sterile surgical marker, the primary defect shape was transferred to the donor site. The area thus outlined was incised deep to adipose tissue with a #15 scalpel blade.  The harvested graft was then trimmed of adipose tissue until only dermis and epidermis was left.  The skin margins of the secondary defect were undermined to an appropriate distance in all directions utilizing iris scissors.  The secondary defect was closed with interrupted buried subcutaneous sutures.  The skin edges were then re-apposed with running  sutures.  The skin graft was then placed in the primary defect and oriented appropriately.
Split-Thickness Skin Graft Text: The defect edges were debeveled with a #15 scalpel blade. Given the location of the defect, shape of the defect and the proximity to free margins a split thickness skin graft was deemed most appropriate. Using a sterile surgical marker, the primary defect shape was transferred to the donor site. The split thickness graft was then harvested.  The skin graft was then placed in the primary defect and oriented appropriately.
Pinch Graft Text: The defect edges were debeveled with a #15 scalpel blade. Given the location of the defect, shape of the defect and the proximity to free margins a pinch graft was deemed most appropriate. Using a sterile surgical marker, the primary defect shape was transferred to the donor site. The area thus outlined was incised deep to adipose tissue with a #15 scalpel blade.  The harvested graft was then trimmed of adipose tissue until only dermis and epidermis was left. The skin margins of the secondary defect were undermined to an appropriate distance in all directions utilizing iris scissors.  The secondary defect was closed with interrupted buried subcutaneous sutures.  The skin edges were then re-apposed with running  sutures.  The skin graft was then placed in the primary defect and oriented appropriately.
Burow's Graft Text: The defect edges were debeveled with a #15 scalpel blade. Given the location of the defect, shape of the defect, the proximity to free margins and the presence of a standing cone deformity a Burow's skin graft was deemed most appropriate. The standing cone was removed and this tissue was then trimmed to the shape of the primary defect. The adipose tissue was also removed until only dermis and epidermis were left.  The skin margins of the secondary defect were undermined to an appropriate distance in all directions utilizing iris scissors.  The secondary defect was closed with interrupted buried subcutaneous sutures.  The skin edges were then re-apposed with running  sutures.  The skin graft was then placed in the primary defect and oriented appropriately.
Cartilage Graft Text: The defect edges were debeveled with a #15 scalpel blade. Given the location of the defect, shape of the defect, the fact the defect involved a full thickness cartilage defect a cartilage graft was deemed most appropriate.  An appropriate donor site was identified, cleansed, and anesthetized. The cartilage graft was then harvested and transferred to the recipient site, oriented appropriately and then sutured into place.  The secondary defect was then repaired using a primary closure.
Composite Graft Text: The defect edges were debeveled with a #15 scalpel blade. Given the location of the defect, shape of the defect, the proximity to free margins and the fact the defect was full thickness a composite graft was deemed most appropriate.  The defect was outline and then transferred to the donor site.  A full thickness graft was then excised from the donor site. The graft was then placed in the primary defect, oriented appropriately and then sutured into place.  The secondary defect was then repaired using a primary closure.
Epidermal Autograft Text: The defect edges were debeveled with a #15 scalpel blade. Given the location of the defect, shape of the defect and the proximity to free margins an epidermal autograft was deemed most appropriate. Using a sterile surgical marker, the primary defect shape was transferred to the donor site. The epidermal graft was then harvested.  The skin graft was then placed in the primary defect and oriented appropriately.
Dermal Autograft Text: The defect edges were debeveled with a #15 scalpel blade. Given the location of the defect, shape of the defect and the proximity to free margins a dermal autograft was deemed most appropriate. Using a sterile surgical marker, the primary defect shape was transferred to the donor site. The area thus outlined was incised deep to adipose tissue with a #15 scalpel blade.  The harvested graft was then trimmed of adipose and epidermal tissue until only dermis was left.  The skin graft was then placed in the primary defect and oriented appropriately.
Skin Substitute Text: The defect edges were debeveled with a #15 scalpel blade. Given the location of the defect, shape of the defect and the proximity to free margins a skin substitute graft was deemed most appropriate.  The graft material was trimmed to fit the size of the defect. The graft was then placed in the primary defect and oriented appropriately.
Tissue Cultured Epidermal Autograft Text: The defect edges were debeveled with a #15 scalpel blade. Given the location of the defect, shape of the defect and the proximity to free margins a tissue cultured epidermal autograft was deemed most appropriate.  The graft was then trimmed to fit the size of the defect.  The graft was then placed in the primary defect and oriented appropriately.
Xenograft Text: The defect edges were debeveled with a #15 scalpel blade. Given the location of the defect, shape of the defect and the proximity to free margins a xenograft was deemed most appropriate.  The graft was then trimmed to fit the size of the defect.  The graft was then placed in the primary defect and oriented appropriately.
Purse String (Intermediate) Text: Given the location of the defect and the characteristics of the surrounding skin a purse string intermediate closure was deemed most appropriate.  Undermining was performed circumferentially around the surgical defect.  A purse string suture was then placed and tightened.
Purse String (Simple) Text: Given the location of the defect and the characteristics of the surrounding skin a purse string simple closure was deemed most appropriate.  Undermining was performed circumferentially around the surgical defect.  A purse string suture was then placed and tightened.
Partial Purse String (Intermediate) Text: Given the location of the defect and the characteristics of the surrounding skin an intermediate purse string closure was deemed most appropriate.  Undermining was performed circumferentially around the surgical defect.  A purse string suture was then placed and tightened. Wound tension of the circular defect prevented complete closure of the wound.
Partial Purse String (Simple) Text: Given the location of the defect and the characteristics of the surrounding skin a simple purse string closure was deemed most appropriate.  Undermining was performed circumferentially around the surgical defect.  A purse string suture was then placed and tightened. Wound tension of the circular defect prevented complete closure of the wound.
Complex Repair And Single Advancement Flap Text: The defect edges were debeveled with a #15 scalpel blade.  The primary defect was closed partially with a complex linear closure.  Given the location of the remaining defect, shape of the defect and the proximity to free margins a single advancement flap was deemed most appropriate for complete closure of the defect.  Using a sterile surgical marker, an appropriate advancement flap was drawn incorporating the defect and placing the expected incisions within the relaxed skin tension lines where possible. The area thus outlined was incised deep to adipose tissue with a #15 scalpel blade. The skin margins were undermined to an appropriate distance in all directions utilizing iris scissors and carried over to close the primary defect.
Complex Repair And Double Advancement Flap Text: The defect edges were debeveled with a #15 scalpel blade.  The primary defect was closed partially with a complex linear closure.  Given the location of the remaining defect, shape of the defect and the proximity to free margins a double advancement flap was deemed most appropriate for complete closure of the defect.  Using a sterile surgical marker, an appropriate advancement flap was drawn incorporating the defect and placing the expected incisions within the relaxed skin tension lines where possible. The area thus outlined was incised deep to adipose tissue with a #15 scalpel blade. The skin margins were undermined to an appropriate distance in all directions utilizing iris scissors and carried over to close the primary defect.
Complex Repair And Modified Advancement Flap Text: The defect edges were debeveled with a #15 scalpel blade.  The primary defect was closed partially with a complex linear closure.  Given the location of the remaining defect, shape of the defect and the proximity to free margins a modified advancement flap was deemed most appropriate for complete closure of the defect.  Using a sterile surgical marker, an appropriate advancement flap was drawn incorporating the defect and placing the expected incisions within the relaxed skin tension lines where possible. The area thus outlined was incised deep to adipose tissue with a #15 scalpel blade. The skin margins were undermined to an appropriate distance in all directions utilizing iris scissors and carried over to close the primary defect.
Complex Repair And A-T Advancement Flap Text: The defect edges were debeveled with a #15 scalpel blade.  The primary defect was closed partially with a complex linear closure.  Given the location of the remaining defect, shape of the defect and the proximity to free margins an A-T advancement flap was deemed most appropriate for complete closure of the defect.  Using a sterile surgical marker, an appropriate advancement flap was drawn incorporating the defect and placing the expected incisions within the relaxed skin tension lines where possible. The area thus outlined was incised deep to adipose tissue with a #15 scalpel blade. The skin margins were undermined to an appropriate distance in all directions utilizing iris scissors and carried over to close the primary defect.
Complex Repair And O-T Advancement Flap Text: The defect edges were debeveled with a #15 scalpel blade.  The primary defect was closed partially with a complex linear closure.  Given the location of the remaining defect, shape of the defect and the proximity to free margins an O-T advancement flap was deemed most appropriate for complete closure of the defect.  Using a sterile surgical marker, an appropriate advancement flap was drawn incorporating the defect and placing the expected incisions within the relaxed skin tension lines where possible. The area thus outlined was incised deep to adipose tissue with a #15 scalpel blade. The skin margins were undermined to an appropriate distance in all directions utilizing iris scissors and carried over to close the primary defect.
Complex Repair And O-L Flap Text: The defect edges were debeveled with a #15 scalpel blade.  The primary defect was closed partially with a complex linear closure.  Given the location of the remaining defect, shape of the defect and the proximity to free margins an O-L flap was deemed most appropriate for complete closure of the defect.  Using a sterile surgical marker, an appropriate flap was drawn incorporating the defect and placing the expected incisions within the relaxed skin tension lines where possible. The area thus outlined was incised deep to adipose tissue with a #15 scalpel blade. The skin margins were undermined to an appropriate distance in all directions utilizing iris scissors and carried over to close the primary defect.
Complex Repair And Bilobe Flap Text: The defect edges were debeveled with a #15 scalpel blade.  The primary defect was closed partially with a complex linear closure.  Given the location of the remaining defect, shape of the defect and the proximity to free margins a bilobe flap was deemed most appropriate for complete closure of the defect.  Using a sterile surgical marker, an appropriate advancement flap was drawn incorporating the defect and placing the expected incisions within the relaxed skin tension lines where possible. The area thus outlined was incised deep to adipose tissue with a #15 scalpel blade. The skin margins were undermined to an appropriate distance in all directions utilizing iris scissors and carried over to close the primary defect.
Complex Repair And Melolabial Flap Text: The defect edges were debeveled with a #15 scalpel blade.  The primary defect was closed partially with a complex linear closure.  Given the location of the remaining defect, shape of the defect and the proximity to free margins a melolabial flap was deemed most appropriate for complete closure of the defect.  Using a sterile surgical marker, an appropriate advancement flap was drawn incorporating the defect and placing the expected incisions within the relaxed skin tension lines where possible. The area thus outlined was incised deep to adipose tissue with a #15 scalpel blade. The skin margins were undermined to an appropriate distance in all directions utilizing iris scissors and carried over to close the primary defect.
Complex Repair And Rotation Flap Text: The defect edges were debeveled with a #15 scalpel blade.  The primary defect was closed partially with a complex linear closure.  Given the location of the remaining defect, shape of the defect and the proximity to free margins a rotation flap was deemed most appropriate for complete closure of the defect.  Using a sterile surgical marker, an appropriate advancement flap was drawn incorporating the defect and placing the expected incisions within the relaxed skin tension lines where possible. The area thus outlined was incised deep to adipose tissue with a #15 scalpel blade. The skin margins were undermined to an appropriate distance in all directions utilizing iris scissors and carried over to close the primary defect.
Complex Repair And Rhombic Flap Text: The defect edges were debeveled with a #15 scalpel blade.  The primary defect was closed partially with a complex linear closure.  Given the location of the remaining defect, shape of the defect and the proximity to free margins a rhombic flap was deemed most appropriate for complete closure of the defect.  Using a sterile surgical marker, an appropriate advancement flap was drawn incorporating the defect and placing the expected incisions within the relaxed skin tension lines where possible. The area thus outlined was incised deep to adipose tissue with a #15 scalpel blade. The skin margins were undermined to an appropriate distance in all directions utilizing iris scissors and carried over to close the primary defect.
Complex Repair And Transposition Flap Text: The defect edges were debeveled with a #15 scalpel blade.  The primary defect was closed partially with a complex linear closure.  Given the location of the remaining defect, shape of the defect and the proximity to free margins a transposition flap was deemed most appropriate for complete closure of the defect.  Using a sterile surgical marker, an appropriate advancement flap was drawn incorporating the defect and placing the expected incisions within the relaxed skin tension lines where possible. The area thus outlined was incised deep to adipose tissue with a #15 scalpel blade. The skin margins were undermined to an appropriate distance in all directions utilizing iris scissors and carried over to close the primary defect.
Complex Repair And V-Y Plasty Text: The defect edges were debeveled with a #15 scalpel blade.  The primary defect was closed partially with a complex linear closure.  Given the location of the remaining defect, shape of the defect and the proximity to free margins a V-Y plasty was deemed most appropriate for complete closure of the defect.  Using a sterile surgical marker, an appropriate advancement flap was drawn incorporating the defect and placing the expected incisions within the relaxed skin tension lines where possible. The area thus outlined was incised deep to adipose tissue with a #15 scalpel blade. The skin margins were undermined to an appropriate distance in all directions utilizing iris scissors and carried over to close the primary defect.
Complex Repair And M Plasty Text: The defect edges were debeveled with a #15 scalpel blade.  The primary defect was closed partially with a complex linear closure.  Given the location of the remaining defect, shape of the defect and the proximity to free margins an M plasty was deemed most appropriate for complete closure of the defect.  Using a sterile surgical marker, an appropriate advancement flap was drawn incorporating the defect and placing the expected incisions within the relaxed skin tension lines where possible. The area thus outlined was incised deep to adipose tissue with a #15 scalpel blade. The skin margins were undermined to an appropriate distance in all directions utilizing iris scissors and carried over to close the primary defect.
Complex Repair And Double M Plasty Text: The defect edges were debeveled with a #15 scalpel blade.  The primary defect was closed partially with a complex linear closure.  Given the location of the remaining defect, shape of the defect and the proximity to free margins a double M plasty was deemed most appropriate for complete closure of the defect.  Using a sterile surgical marker, an appropriate advancement flap was drawn incorporating the defect and placing the expected incisions within the relaxed skin tension lines where possible. The area thus outlined was incised deep to adipose tissue with a #15 scalpel blade. The skin margins were undermined to an appropriate distance in all directions utilizing iris scissors and carried over to close the primary defect.
Complex Repair And W Plasty Text: The defect edges were debeveled with a #15 scalpel blade.  The primary defect was closed partially with a complex linear closure.  Given the location of the remaining defect, shape of the defect and the proximity to free margins a W plasty was deemed most appropriate for complete closure of the defect.  Using a sterile surgical marker, an appropriate advancement flap was drawn incorporating the defect and placing the expected incisions within the relaxed skin tension lines where possible. The area thus outlined was incised deep to adipose tissue with a #15 scalpel blade. The skin margins were undermined to an appropriate distance in all directions utilizing iris scissors and carried over to close the primary defect.
Complex Repair And Z Plasty Text: The defect edges were debeveled with a #15 scalpel blade.  The primary defect was closed partially with a complex linear closure.  Given the location of the remaining defect, shape of the defect and the proximity to free margins a Z plasty was deemed most appropriate for complete closure of the defect.  Using a sterile surgical marker, an appropriate advancement flap was drawn incorporating the defect and placing the expected incisions within the relaxed skin tension lines where possible. The area thus outlined was incised deep to adipose tissue with a #15 scalpel blade. The skin margins were undermined to an appropriate distance in all directions utilizing iris scissors and carried over to close the primary defect.
Complex Repair And Dorsal Nasal Flap Text: The defect edges were debeveled with a #15 scalpel blade.  The primary defect was closed partially with a complex linear closure.  Given the location of the remaining defect, shape of the defect and the proximity to free margins a dorsal nasal flap was deemed most appropriate for complete closure of the defect.  Using a sterile surgical marker, an appropriate flap was drawn incorporating the defect and placing the expected incisions within the relaxed skin tension lines where possible. The area thus outlined was incised deep to adipose tissue with a #15 scalpel blade. The skin margins were undermined to an appropriate distance in all directions utilizing iris scissors and carried over to close the primary defect.
Complex Repair And Ftsg Text: The defect edges were debeveled with a #15 scalpel blade.  The primary defect was closed partially with a complex linear closure.  Given the location of the defect, shape of the defect and the proximity to free margins a full thickness skin graft was deemed most appropriate to repair the remaining defect.  The graft was trimmed to fit the size of the remaining defect.  The graft was then placed in the primary defect, oriented appropriately, and sutured into place.
Complex Repair And Burow's Graft Text: The defect edges were debeveled with a #15 scalpel blade.  The primary defect was closed partially with a complex linear closure.  Given the location of the defect, shape of the defect, the proximity to free margins and the presence of a standing cone deformity a Burow's graft was deemed most appropriate to repair the remaining defect.  The graft was trimmed to fit the size of the remaining defect.  The graft was then placed in the primary defect, oriented appropriately, and sutured into place.
Complex Repair And Split-Thickness Skin Graft Text: The defect edges were debeveled with a #15 scalpel blade.  The primary defect was closed partially with a complex linear closure.  Given the location of the defect, shape of the defect and the proximity to free margins a split thickness skin graft was deemed most appropriate to repair the remaining defect.  The graft was trimmed to fit the size of the remaining defect.  The graft was then placed in the primary defect, oriented appropriately, and sutured into place.
Complex Repair And Epidermal Autograft Text: The defect edges were debeveled with a #15 scalpel blade.  The primary defect was closed partially with a complex linear closure.  Given the location of the defect, shape of the defect and the proximity to free margins an epidermal autograft was deemed most appropriate to repair the remaining defect.  The graft was trimmed to fit the size of the remaining defect.  The graft was then placed in the primary defect, oriented appropriately, and sutured into place.
Complex Repair And Dermal Autograft Text: The defect edges were debeveled with a #15 scalpel blade.  The primary defect was closed partially with a complex linear closure.  Given the location of the defect, shape of the defect and the proximity to free margins an dermal autograft was deemed most appropriate to repair the remaining defect.  The graft was trimmed to fit the size of the remaining defect.  The graft was then placed in the primary defect, oriented appropriately, and sutured into place.
Complex Repair And Tissue Cultured Epidermal Autograft Text: The defect edges were debeveled with a #15 scalpel blade.  The primary defect was closed partially with a complex linear closure.  Given the location of the defect, shape of the defect and the proximity to free margins an tissue cultured epidermal autograft was deemed most appropriate to repair the remaining defect.  The graft was trimmed to fit the size of the remaining defect.  The graft was then placed in the primary defect, oriented appropriately, and sutured into place.
Complex Repair And Xenograft Text: The defect edges were debeveled with a #15 scalpel blade.  The primary defect was closed partially with a complex linear closure.  Given the location of the defect, shape of the defect and the proximity to free margins a xenograft was deemed most appropriate to repair the remaining defect.  The graft was trimmed to fit the size of the remaining defect.  The graft was then placed in the primary defect, oriented appropriately, and sutured into place.
Complex Repair And Skin Substitute Graft Text: The defect edges were debeveled with a #15 scalpel blade.  The primary defect was closed partially with a complex linear closure.  Given the location of the remaining defect, shape of the defect and the proximity to free margins a skin substitute graft was deemed most appropriate to repair the remaining defect.  The graft was trimmed to fit the size of the remaining defect.  The graft was then placed in the primary defect, oriented appropriately, and sutured into place.
Path Notes (To The Dermatopathologist): Please check margins.
Consent was obtained from the patient. The risks and benefits to therapy were discussed in detail. Specifically, the risks of infection, scarring, bleeding, prolonged wound healing, incomplete removal, allergy to anesthesia, nerve injury and recurrence were addressed. Prior to the procedure, the treatment site was clearly identified and confirmed by the patient. All components of Universal Protocol/PAUSE Rule completed.
Render Post-Care Instructions In Note?: yes
Post-Care Instructions: I reviewed with the patient in detail post-care instructions. Patient is not to engage in any heavy lifting, exercise, or swimming for the next 14 days. Should the patient develop any fevers, chills, bleeding, severe pain patient will contact the office immediately.
Home Suture Removal Text: Patient was provided a home suture removal kit and will remove their sutures at home.  If they have any questions or difficulties they will call the office.
Where Do You Want The Question To Include Opioid Counseling Located?: Case Summary Tab
Information: Selecting Yes will display possible errors in your note based on the variables you have selected. This validation is only offered as a suggestion for you. PLEASE NOTE THAT THE VALIDATION TEXT WILL BE REMOVED WHEN YOU FINALIZE YOUR NOTE. IF YOU WANT TO FAX A PRELIMINARY NOTE YOU WILL NEED TO TOGGLE THIS TO 'NO' IF YOU DO NOT WANT IT IN YOUR FAXED NOTE.

## 2024-05-20 ENCOUNTER — APPOINTMENT (RX ONLY)
Dept: URBAN - METROPOLITAN AREA CLINIC 341 | Facility: CLINIC | Age: 64
Setting detail: DERMATOLOGY
End: 2024-05-20

## 2024-05-20 DIAGNOSIS — Z48.02 ENCOUNTER FOR REMOVAL OF SUTURES: ICD-10-CM | Status: RESOLVED

## 2024-05-20 PROCEDURE — ? SUTURE REMOVAL

## 2024-05-20 ASSESSMENT — LOCATION ZONE DERM: LOCATION ZONE: ARM

## 2024-05-20 ASSESSMENT — LOCATION DETAILED DESCRIPTION DERM: LOCATION DETAILED: LEFT ANTERIOR DISTAL UPPER ARM

## 2024-05-20 ASSESSMENT — LOCATION SIMPLE DESCRIPTION DERM: LOCATION SIMPLE: LEFT UPPER ARM

## 2024-05-24 ENCOUNTER — OFFICE VISIT (OUTPATIENT)
Dept: OBGYN CLINIC | Facility: MEDICAL CENTER | Age: 64
End: 2024-05-24
Payer: MEDICARE

## 2024-05-24 VITALS — BODY MASS INDEX: 22.05 KG/M2 | WEIGHT: 137.2 LBS | HEIGHT: 66 IN

## 2024-05-24 DIAGNOSIS — M19.012 OSTEOARTHRITIS OF LEFT AC (ACROMIOCLAVICULAR) JOINT: ICD-10-CM

## 2024-05-24 DIAGNOSIS — M75.52 ACUTE BURSITIS OF LEFT SHOULDER: ICD-10-CM

## 2024-05-24 DIAGNOSIS — M75.82 ROTATOR CUFF TENDINITIS, LEFT: Primary | ICD-10-CM

## 2024-05-24 PROCEDURE — 99214 OFFICE O/P EST MOD 30 MIN: CPT | Performed by: ORTHOPAEDIC SURGERY

## 2024-05-24 PROCEDURE — 20610 DRAIN/INJ JOINT/BURSA W/O US: CPT | Performed by: ORTHOPAEDIC SURGERY

## 2024-05-24 RX ORDER — BUPIVACAINE HYDROCHLORIDE 2.5 MG/ML
4 INJECTION, SOLUTION INFILTRATION; PERINEURAL
Status: COMPLETED | OUTPATIENT
Start: 2024-05-24 | End: 2024-05-24

## 2024-05-24 RX ORDER — METHYLPREDNISOLONE ACETATE 40 MG/ML
1 INJECTION, SUSPENSION INTRA-ARTICULAR; INTRALESIONAL; INTRAMUSCULAR; SOFT TISSUE
Status: COMPLETED | OUTPATIENT
Start: 2024-05-24 | End: 2024-05-24

## 2024-05-24 RX ORDER — LETROZOLE 2.5 MG/1
2.5 TABLET, FILM COATED ORAL DAILY
COMMUNITY

## 2024-05-24 RX ADMIN — METHYLPREDNISOLONE ACETATE 1 ML: 40 INJECTION, SUSPENSION INTRA-ARTICULAR; INTRALESIONAL; INTRAMUSCULAR; SOFT TISSUE at 09:00

## 2024-05-24 RX ADMIN — BUPIVACAINE HYDROCHLORIDE 4 ML: 2.5 INJECTION, SOLUTION INFILTRATION; PERINEURAL at 09:00

## 2024-05-24 NOTE — PROGRESS NOTES
Orthopaedic Surgery - Office Note  Corrina Castelan (63 y.o. female)   : 1960   MRN: [id number]  Encounter Date: 2024    Assessment / Plan    Left shoulder AC joint osteoarthritis with rotator cuff tendonitis and subacromial bursitis     We did have a lengthy discussion on possibility of surgical intervention given her continued pain and discomfort despite 5 prior cortisone injections.  I discussed with her that if he does not have any significant improvement from this cortisone injection we should consider surgery.  CSI of left subacromial bursa  was performed  Activity as tolerated  Begin outpatient PT for shoulder, scapular, and rotator cuff strengthen and range of motion  Anti-inflammatories or Tylenol prn pain  Ice and heat as needed for pain   Follow-up:  No follow-ups on file.      Chief Complaint / Date of Onset  Left shoulder pain  Injury Mechanism / Date  Chronic  Surgery / Date  None    History of Present Illness   Corrina Castelan is a 63 y.o. female who presents for left shoulder pain.  She states that she continues to have anterior lateral shoulder pain that is worse with repetitive overhead lifting.  She currently rates her pain a 6 out of 10.  She states that the cortisone injections that she previously received on 2023 provided her relief of her symptoms.  She also reports that she underwent treatment for breast cancer following that injection and has been unable to attend outpatient physical therapy.  She does mention that she has been compliant with a home exercise program using [employer]'s.  She denies any new injury or trauma to her shoulder.  She denies any numbness or tingling.    Treatment Summary  Medications / Modalities  Ice and over-the-counter medication  Bracing / Immobilization  None  Physical Therapy  Home exercise program  Injections  Multiple left subacromial bursal injections with appropriate response  Prior Surgeries  None  Other Treatments  Previously  "undergone treatment for breast cancer    Employment / Current Status  Nurse    Sport / Organization / Current Status  Active      Review of Systems  Pertinent items are noted in HPI.  All other systems were reviewed and are negative.      Physical Exam  Ht 5' 6\" (1.676 m)   Wt 62.2 kg (137 lb 3.2 oz)   LMP  (LMP Unknown)   BMI 22.14 kg/m²   Cons: Appears well.  No apparent distress.  Psych: Alert. Oriented x3.  Mood and affect normal.  Eyes: PERRLA, EOMI  Resp: Normal effort.  No audible wheezing or stridor.  CV: Palpable pulse.  No discernable arrhythmia.  No LE edema.  Lymph:  No palpable cervical, axillary, or inguinal lymphadenopathy.  Skin: Warm.  No palpable masses.  No visible lesions.  Neuro: Normal muscle tone.  Normal and symmetric DTR's.     Left Shoulder Exam  Alignment / Posture:  Normal shoulder posture.  Inspection:  No swelling. No edema. No ecchymosis. No pain with crossbody Adduction  Palpation:   AC and subacromial tenderness. No effusion.  ROM:  Shoulder . Shoulder ER 70. Shoulder IR T6.   Strength:   Forward Elevation 5-/5. External Rotation 4+/5. Internal Rotation 5/5.  Stability:  No objective shoulder instability.  Tests: (+) Lehman. (+) Neer. (-) Belly press. (-) Speed.  Neurovascular:  Sensation intact in Ax/R/M/U nerve distributions. 2+ radial pulse.        Studies Reviewed  No studies to review  MRI of left shoulder - performed on 8/8/2020 demonstrates supraspinatus tendinosis without evidence of full-thickness rotator cuff tear.      Large joint arthrocentesis: L subacromial bursa  Universal Protocol:  Consent: Verbal consent obtained.  Risks and benefits: risks, benefits and alternatives were discussed  Consent given by: patient  Time out: Immediately prior to procedure a \"time out\" was called to verify the correct patient, procedure, equipment, support staff and site/side marked as required.  Timeout called at: 5/24/2024 9:56 AM.  Patient understanding: patient states " understanding of the procedure being performed  Patient consent: the patient's understanding of the procedure matches consent given  Site marked: the operative site was marked  Patient identity confirmed: verbally with patient  Supporting Documentation  Indications: pain and diagnostic evaluation   Procedure Details  Location: shoulder - L subacromial bursa  Needle size: 22 G  Ultrasound guidance: no  Approach: posterolateral  Medications administered: 4 mL bupivacaine 0.25 %; 1 mL methylPREDNISolone acetate 40 mg/mL    Patient tolerance: patient tolerated the procedure well with no immediate complications  Dressing:  Sterile dressing applied           Medical, Surgical, Family, and Social History  The patient's medical history, family history, and social history, were reviewed and updated as appropriate.    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    Anxiety     Arthritis     Calculus of gallbladder     Tiburcio ulcer     Depression     Hyperlipidemia     Migraine     Mitral valve prolapse     Murmur     Neck pain     at times    Pituitary adenoma (HCC)     Pituitary adenoma (HCC)     Shoulder injury related to vaccine administration (SIRVA) 09/2020       Past Surgical History:   Procedure Laterality Date    BREAST EXCISIONAL BIOPSY Left 1996    benign    BREAST SURGERY      L breast cyst removal-benign    CATARACT EXTRACTION Bilateral     CATARACT EXTRACTION, BILATERAL  01/2020    COLONOSCOPY      DE QUERVAIN'S RELEASE Bilateral     EGD AND COLONOSCOPY N/A 5/11/2016    Procedure: EGD AND COLONOSCOPY;  Surgeon: Jj Savage MD;  Location: [facility];  Service:     LAPAROSCOPIC MAGNETIC SPHINCTER AUGMENTATION N/A 7/20/2020    Procedure: MAGNETIC SPHINCTER AUGMENT, LINX PLACEMENT;  Surgeon: Fabiola Gaytan MD;  Location: [facility] OR;  Service: Bariatrics    PARAESOPHAGEAL HERNIA REPAIR N/A 7/20/2020    Procedure: REPAIR HERNIA PARAESOPHAGEAL  LAPAROSCOPIC, PARTIAL  GASTRECTOMY;  Surgeon: Fabiola Gaytan MD;  Location: [facility];  Service: Bariatrics    MN ARTHRP INTERPOS INTERCARPAL/METACARPAL JOINTS Left 11/21/2019    Procedure: THUMB CMC ARTHROPLASTY;  Surgeon: Tk Garay MD;  Location[facility];  Service: Orthopedics    MN INCISION EXTENSOR TENDON SHEATH WRIST Left 11/21/2019    Procedure: RELEASE DEQUERVAINS AND TENDON GRAFT;  Surgeon: Tk Garay MD;  Location[facility];  Service: Orthopedics    MN LAPAROSCOPY SURG CHOLECYSTECTOMY N/A 3/4/2021    Procedure: Robotic cholecystectomy ;  Surgeon: Derrick Nation MD;  Location: [facility];  Service: General    MN LAPS RPR PARAESPHGL HRNA INCL FUNDPLSTY W/MESH N/A 8/31/2016    Procedure: REPAIR HERNIA PARAESOPHAGEAL  with bio A mesh LAPAROSCOPIC NISSEN FUNDOPLICATION Laparoscopic Gastroplexy Intraop EGD #[id number];  Surgeon: Fabiola Gaytan MD;  Location[facility];  Service: Bariatrics    TONSILLECTOMY      US GUIDED BREAST BIOPSY LEFT COMPLETE Left 11/1/2023    WRIST SURGERY Bilateral     daquero vein release       Family History   Problem Relation Age of Onset    Anxiety disorder Mother     Arthritis Mother     Hyperlipidemia Mother     Hypertension Mother     Prostate cancer Father 75    Dementia Father     Parkinsonism Father     Other Father 90        bladder cancer    No Known Problems Sister     No Known Problems Daughter     Ovarian cancer Maternal Grandmother 65    No Known Problems Maternal Grandfather     No Known Problems Paternal Grandmother     No Known Problems Paternal Grandfather     No Known Problems Maternal Aunt     Breast cancer Paternal Aunt 78    Colon cancer Paternal Aunt 50    Pancreatic cancer Paternal Aunt 72    Breast cancer Cousin 40       Social History     Occupational History    Not on file   Tobacco Use    Smoking status: Never     Passive exposure: Never    Smokeless tobacco: Never   Vaping Use    Vaping status: Never Used   Substance and Sexual Activity    Alcohol use: Yes      Comment: liquor, social drinker    Drug use: No    Sexual activity: Yes     Partners: Male     Comment:        No Known Allergies      Current Outpatient Medications:     ascorbic acid (VITAMIN C) 500 mg tablet, Take 500 mg by mouth 2 (two) times a day , Disp: , Rfl:     aspirin 81 MG tablet, Take 1 tablet by mouth daily , Disp: , Rfl:     atorvastatin (LIPITOR) 10 mg tablet, Take 1 tablet (10 mg total) by mouth every evening, Disp: 90 tablet, Rfl: 3    buPROPion (WELLBUTRIN XL) 300 mg 24 hr tablet, TAKE 1 TABLET BY MOUTH EVERY DAY, Disp: 90 tablet, Rfl: 0    Cholecalciferol (VITAMIN D) 2000 UNITS CAPS, Take 1 tablet by mouth daily , Disp: , Rfl:     DULoxetine (CYMBALTA) 20 mg capsule, TAKE 1 CAPSULE BY MOUTH EVERY DAY, Disp: 90 capsule, Rfl: 0    estradiol (ESTRACE) 0.1 mg/g vaginal cream, INSERT 2 GRAMS INTO THE VAGINA DAILY AT BEDTIME FOR 2 WEEKS, THEN TWICE WEEKLY, Disp: 42.5 g, Rfl: 9    letrozole (FEMARA) 2.5 mg tablet, Take 2.5 mg by mouth daily, Disp: , Rfl:     multivitamin (THERAGRAN) TABS, Take 1 tablet by mouth daily., Disp: , Rfl:     SUMAtriptan (IMITREX) 100 mg tablet, TAKE 1 TABLET BY MOUTH FOR MIGRAINE RELIEF. MAY REPEAT 2 HOURS LATER. MAX 2 TABLETS/DAY., Disp: 36 tablet, Rfl: 0    vitamin E, tocopherol, 400 units capsule, Take 400 Units by mouth daily Pt stopped, Disp: , Rfl:     dextromethorphan-guaiFENesin (ROBITUSSIN-DM)  mg/5 mL oral liquid, Take 5 mL by mouth every 12 (twelve) hours (Patient not taking: Reported on 5/24/2024), Disp: 180 mL, Rfl: 0    estradiol (VAGIFEM, YUVAFEM) 10 MCG TABS vaginal tablet, Insert 1 tablet (10 mcg total) into the vagina 2 (two) times a week, Disp: 24 tablet, Rfl: 3    lidocaine (XYLOCAINE) 1 %, Take 1 mL by injection route. (Patient not taking: Reported on 5/24/2024), Disp: , Rfl:       Juwan Rondon    Scribe Attestation      I,:  Juwan Rondon am acting as a scribe while in the presence of the attending physician.:       I,:  Derrick Rogers  MD Brant personally performed the services described in this documentation    as scribed in my presence.:

## 2024-06-04 ENCOUNTER — APPOINTMENT (OUTPATIENT)
Dept: LAB | Facility: MEDICAL CENTER | Age: 64
End: 2024-06-04
Payer: MEDICARE

## 2024-06-04 DIAGNOSIS — Z17.0 ESTROGEN RECEPTOR POSITIVE: ICD-10-CM

## 2024-06-04 DIAGNOSIS — C50.212 MALIGNANT NEOPLASM OF UPPER-INNER QUADRANT OF LEFT BREAST IN FEMALE, ESTROGEN RECEPTOR NEGATIVE (HCC): ICD-10-CM

## 2024-06-04 DIAGNOSIS — Z17.1 MALIGNANT NEOPLASM OF UPPER-INNER QUADRANT OF LEFT BREAST IN FEMALE, ESTROGEN RECEPTOR NEGATIVE (HCC): ICD-10-CM

## 2024-06-04 LAB
ALBUMIN SERPL BCP-MCNC: 4.2 G/DL (ref 3.5–5)
ALP SERPL-CCNC: 73 U/L (ref 34–104)
ALT SERPL W P-5'-P-CCNC: 20 U/L (ref 7–52)
ANION GAP SERPL CALCULATED.3IONS-SCNC: 7 MMOL/L (ref 4–13)
AST SERPL W P-5'-P-CCNC: 24 U/L (ref 13–39)
BASOPHILS # BLD AUTO: 0.04 THOUSANDS/ÂΜL (ref 0–0.1)
BASOPHILS NFR BLD AUTO: 1 % (ref 0–1)
BILIRUB SERPL-MCNC: 0.48 MG/DL (ref 0.2–1)
BUN SERPL-MCNC: 12 MG/DL (ref 5–25)
CALCIUM SERPL-MCNC: 9.1 MG/DL (ref 8.4–10.2)
CHLORIDE SERPL-SCNC: 105 MMOL/L (ref 96–108)
CO2 SERPL-SCNC: 28 MMOL/L (ref 21–32)
CREAT SERPL-MCNC: 0.61 MG/DL (ref 0.6–1.3)
EOSINOPHIL # BLD AUTO: 0.16 THOUSAND/ÂΜL (ref 0–0.61)
EOSINOPHIL NFR BLD AUTO: 4 % (ref 0–6)
ERYTHROCYTE [DISTWIDTH] IN BLOOD BY AUTOMATED COUNT: 12.2 % (ref 11.6–15.1)
GFR SERPL CREATININE-BSD FRML MDRD: 95 ML/MIN/1.73SQ M
GLUCOSE P FAST SERPL-MCNC: 85 MG/DL (ref 65–99)
HCT VFR BLD AUTO: 41.5 % (ref 34.8–46.1)
HGB BLD-MCNC: 13.6 G/DL (ref 11.5–15.4)
IMM GRANULOCYTES # BLD AUTO: 0.01 THOUSAND/UL (ref 0–0.2)
IMM GRANULOCYTES NFR BLD AUTO: 0 % (ref 0–2)
LYMPHOCYTES # BLD AUTO: 1.19 THOUSANDS/ÂΜL (ref 0.6–4.47)
LYMPHOCYTES NFR BLD AUTO: 28 % (ref 14–44)
MCH RBC QN AUTO: 31.4 PG (ref 26.8–34.3)
MCHC RBC AUTO-ENTMCNC: 32.8 G/DL (ref 31.4–37.4)
MCV RBC AUTO: 96 FL (ref 82–98)
MONOCYTES # BLD AUTO: 0.48 THOUSAND/ÂΜL (ref 0.17–1.22)
MONOCYTES NFR BLD AUTO: 11 % (ref 4–12)
NEUTROPHILS # BLD AUTO: 2.44 THOUSANDS/ÂΜL (ref 1.85–7.62)
NEUTS SEG NFR BLD AUTO: 56 % (ref 43–75)
NRBC BLD AUTO-RTO: 0 /100 WBCS
PLATELET # BLD AUTO: 178 THOUSANDS/UL (ref 149–390)
PMV BLD AUTO: 11.1 FL (ref 8.9–12.7)
POTASSIUM SERPL-SCNC: 3.9 MMOL/L (ref 3.5–5.3)
PROT SERPL-MCNC: 6.8 G/DL (ref 6.4–8.4)
RBC # BLD AUTO: 4.33 MILLION/UL (ref 3.81–5.12)
SODIUM SERPL-SCNC: 140 MMOL/L (ref 135–147)
WBC # BLD AUTO: 4.32 THOUSAND/UL (ref 4.31–10.16)

## 2024-06-04 PROCEDURE — 80053 COMPREHEN METABOLIC PANEL: CPT

## 2024-06-04 PROCEDURE — 36415 COLL VENOUS BLD VENIPUNCTURE: CPT

## 2024-06-04 PROCEDURE — 85025 COMPLETE CBC W/AUTO DIFF WBC: CPT

## 2024-06-05 NOTE — TELEPHONE ENCOUNTER
Covid screening was negative and patient is aware of 1 person rule, masking policy for upcoming appointment  7

## 2024-06-17 DIAGNOSIS — Z00.6 ENCOUNTER FOR EXAMINATION FOR NORMAL COMPARISON OR CONTROL IN CLINICAL RESEARCH PROGRAM: ICD-10-CM

## 2024-07-11 DIAGNOSIS — E78.5 HYPERLIPIDEMIA, UNSPECIFIED HYPERLIPIDEMIA TYPE: ICD-10-CM

## 2024-07-11 RX ORDER — DULOXETIN HYDROCHLORIDE 20 MG/1
20 CAPSULE, DELAYED RELEASE ORAL DAILY
Qty: 30 CAPSULE | Refills: 0 | Status: SHIPPED | OUTPATIENT
Start: 2024-07-11

## 2024-07-31 DIAGNOSIS — E78.5 HYPERLIPIDEMIA, UNSPECIFIED HYPERLIPIDEMIA TYPE: ICD-10-CM

## 2024-07-31 RX ORDER — DULOXETIN HYDROCHLORIDE 20 MG/1
20 CAPSULE, DELAYED RELEASE ORAL DAILY
Qty: 30 CAPSULE | Refills: 5 | Status: SHIPPED | OUTPATIENT
Start: 2024-07-31

## 2024-08-13 ENCOUNTER — TELEPHONE (OUTPATIENT)
Age: 64
End: 2024-08-13

## 2024-08-13 NOTE — TELEPHONE ENCOUNTER
Patient would like 90 day supply instead of 30 days supplies for  DULoxetine (CYMBALTA) 20 mg capsule   Please update the script and send it to   [id number]  Any questions please reach out to the patient.  Please reach out to the patient when the script is changed to 90 days. Patient would like Dr. Francisco to update this script.  Thank you!!

## 2024-08-14 DIAGNOSIS — E78.5 HYPERLIPIDEMIA, UNSPECIFIED HYPERLIPIDEMIA TYPE: ICD-10-CM

## 2024-08-14 RX ORDER — DULOXETIN HYDROCHLORIDE 20 MG/1
20 CAPSULE, DELAYED RELEASE ORAL DAILY
Qty: 90 CAPSULE | Refills: 1 | Status: SHIPPED | OUTPATIENT
Start: 2024-08-14

## 2024-08-28 ENCOUNTER — APPOINTMENT (OUTPATIENT)
Dept: LAB | Facility: MEDICAL CENTER | Age: 64
End: 2024-08-28
Payer: MEDICARE

## 2024-08-28 DIAGNOSIS — Z00.6 ENCOUNTER FOR EXAMINATION FOR NORMAL COMPARISON OR CONTROL IN CLINICAL RESEARCH PROGRAM: ICD-10-CM

## 2024-08-28 DIAGNOSIS — E78.5 DYSLIPIDEMIA: ICD-10-CM

## 2024-08-28 DIAGNOSIS — C50.212 MALIGNANT NEOPLASM OF UPPER-INNER QUADRANT OF LEFT BREAST IN FEMALE, ESTROGEN RECEPTOR POSITIVE (HCC): ICD-10-CM

## 2024-08-28 DIAGNOSIS — Z92.21 PERSONAL HISTORY OF ANTINEOPLASTIC CHEMOTHERAPY: ICD-10-CM

## 2024-08-28 DIAGNOSIS — Z17.0 MALIGNANT NEOPLASM OF UPPER-INNER QUADRANT OF LEFT BREAST IN FEMALE, ESTROGEN RECEPTOR POSITIVE (HCC): ICD-10-CM

## 2024-08-28 DIAGNOSIS — Z92.3 PERSONAL HISTORY OF RADIATION THERAPY: ICD-10-CM

## 2024-08-28 LAB
ALBUMIN SERPL BCG-MCNC: 3.7 G/DL (ref 3.5–5)
ALP SERPL-CCNC: 119 U/L (ref 34–104)
ALT SERPL W P-5'-P-CCNC: 19 U/L (ref 7–52)
ANION GAP SERPL CALCULATED.3IONS-SCNC: 10 MMOL/L (ref 4–13)
AST SERPL W P-5'-P-CCNC: 17 U/L (ref 13–39)
BILIRUB SERPL-MCNC: 0.5 MG/DL (ref 0.2–1)
BUN SERPL-MCNC: 14 MG/DL (ref 5–25)
CALCIUM SERPL-MCNC: 8.9 MG/DL (ref 8.4–10.2)
CHLORIDE SERPL-SCNC: 104 MMOL/L (ref 96–108)
CHOLEST SERPL-MCNC: 150 MG/DL
CO2 SERPL-SCNC: 27 MMOL/L (ref 21–32)
CREAT SERPL-MCNC: 0.58 MG/DL (ref 0.6–1.3)
GFR SERPL CREATININE-BSD FRML MDRD: 96 ML/MIN/1.73SQ M
GLUCOSE P FAST SERPL-MCNC: 80 MG/DL (ref 65–99)
HDLC SERPL-MCNC: 54 MG/DL
LDLC SERPL CALC-MCNC: 84 MG/DL (ref 0–100)
NONHDLC SERPL-MCNC: 96 MG/DL
POTASSIUM SERPL-SCNC: 4.1 MMOL/L (ref 3.5–5.3)
PROT SERPL-MCNC: 6.8 G/DL (ref 6.4–8.4)
SODIUM SERPL-SCNC: 141 MMOL/L (ref 135–147)
TRIGL SERPL-MCNC: 60 MG/DL

## 2024-08-28 PROCEDURE — 80061 LIPID PANEL: CPT

## 2024-08-28 PROCEDURE — 80053 COMPREHEN METABOLIC PANEL: CPT

## 2024-08-28 PROCEDURE — 36415 COLL VENOUS BLD VENIPUNCTURE: CPT

## 2024-09-09 ENCOUNTER — PREP FOR PROCEDURE (OUTPATIENT)
Age: 64
End: 2024-09-09

## 2024-09-09 ENCOUNTER — TELEPHONE (OUTPATIENT)
Age: 64
End: 2024-09-09

## 2024-09-09 DIAGNOSIS — E78.5 HYPERLIPIDEMIA, UNSPECIFIED HYPERLIPIDEMIA TYPE: ICD-10-CM

## 2024-09-09 DIAGNOSIS — K62.5 RECTAL BLEEDING: Primary | ICD-10-CM

## 2024-09-10 ENCOUNTER — TELEPHONE (OUTPATIENT)
Age: 64
End: 2024-09-10

## 2024-09-10 RX ORDER — BUPROPION HYDROCHLORIDE 300 MG/1
300 TABLET ORAL DAILY
Qty: 30 TABLET | Refills: 0 | Status: SHIPPED | OUTPATIENT
Start: 2024-09-10 | End: 2024-09-13 | Stop reason: SDUPTHER

## 2024-09-12 ENCOUNTER — PATIENT MESSAGE (OUTPATIENT)
Dept: GASTROENTEROLOGY | Facility: CLINIC | Age: 64
End: 2024-09-12

## 2024-09-12 DIAGNOSIS — Z12.11 COLON CANCER SCREENING: Primary | ICD-10-CM

## 2024-09-12 NOTE — TELEPHONE ENCOUNTER
09/09/24  Screened by: Soila Pennington    Referring Provider Dr. Francisco    Pre- Screening: screening for colon cancer    There is no height or weight on file to calculate BMI.22.14  Has patient been referred for a routine screening Colonoscopy? yes  Is the patient between 45-75 years old? yes      Previous Colonoscopy yes   If yes:    Date: 4/27/2020    Facility:     Reason:       Does the patient want to see a Gastroenterologist prior to their procedure OR are they having any GI symptoms? no    Has the patient been hospitalized or had abdominal surgery in the past 6 months? no    Does the patient use supplemental oxygen? no    Does the patient take Coumadin, Lovenox, Plavix, Elliquis, Xarelto, or other blood thinning medication? no    Has the patient had a stroke, cardiac event, or stent placed in the past year? no      If patient is between 45yrs - 49yrs, please advise patient that we will have to confirm benefits & coverage with their insurance company for a routine screening colonoscopy.    
Called and left a message for the patient to call back to schedule office visit due to cardiac clearance needed before we can schedule colonoscopy. Also stated that at this time the colonoscopy has been canceled. The reason I had stated that the patient needed Cardiac Clearance for the procedure is because the original message stated the patient needed Cardiac Clearance and it was sent with Erroneous information. The original message was amended to correct the mistake.   
Confirmed with Dr. Savage, clearance from cardio-oncologist not needed.    
Patients GI provider:  Dr. RUSSELL    Number to return call: ([phone]    Reason for call: Pt returning call regarding cardiac clearance being needed prior to procedure. Patient is questioning why cardiac clearance is needed as she states she does not have any cardiac history.    Please contact patient patient as per patient request.    
Patients GI provider:  Dr. Savage    Number to return call: [phone]    Reason for call: Pt is scheduled for colonoscopy and is requesting pill form prep. Please send prep instructions to [facility], Thank you.    Scheduled procedure/appointment date if applicable: Apt/procedure 11/11/2024      
Pt returned call in regard to cardiac clearance question. Call was transferred to [facility] to assist pt.  
Scheduled date of colonoscopy (as of today): 10/07/2024  Physician performing colonoscopy: Dr. Savage   Location of colonoscopy:   Bowel prep reviewed with patient: MAIA  Instructions reviewed with patient by: Digna YANCEY   Clearances: N/A     Pt having procedure same day as  Cricket Castelan. Procedure to take place consecutively.     Message has been sent to Dr. Savage to inquire about prep.        
Scheduled date of colonoscopy (as of today):11/11/2024  Physician performing colonoscopy:Dr. Savage  Location of colonoscopy:[facility]  Bowel prep reviewed with patient:TBD  Instructions reviewed with patient by:CB-TBD  Clearances:n/a  
Spoke with pt, matter resolved.   
.

## 2024-09-13 ENCOUNTER — TELEPHONE (OUTPATIENT)
Age: 64
End: 2024-09-13

## 2024-09-13 DIAGNOSIS — E78.5 HYPERLIPIDEMIA, UNSPECIFIED HYPERLIPIDEMIA TYPE: ICD-10-CM

## 2024-09-13 RX ORDER — BUPROPION HYDROCHLORIDE 300 MG/1
300 TABLET ORAL DAILY
Qty: 90 TABLET | Refills: 0 | Status: SHIPPED | OUTPATIENT
Start: 2024-09-13

## 2024-09-13 NOTE — TELEPHONE ENCOUNTER
Patient called and would like to know why her prescription for buPROPion (WELLBUTRIN XL) 300 mg 24 hr tablet was only sent for a 30 day supply when she specifically requested a 90 day supply  Please review and advise, she also questioned who is calling in her prescriptions or who is approving them

## 2024-09-13 NOTE — TELEPHONE ENCOUNTER
Pt called in reference to  buPROPion (WELLBUTRIN XL) 300 mg 24 hr tablet stating she only wants a 90 dy supply and is requesting a call back when sent to pharmacy  Pt Phone #[phone].

## 2024-09-13 NOTE — TELEPHONE ENCOUNTER
Patient called stating she wanted a 90 day supply of the Wellbutrin instead of the 30 days sent. She is on the road traveling and when she gets an opportunity she will schedule an appointment.

## 2024-10-05 RX ORDER — SODIUM CHLORIDE, SODIUM LACTATE, POTASSIUM CHLORIDE, CALCIUM CHLORIDE 600; 310; 30; 20 MG/100ML; MG/100ML; MG/100ML; MG/100ML
50 INJECTION, SOLUTION INTRAVENOUS CONTINUOUS
Status: CANCELLED | OUTPATIENT
Start: 2024-10-05

## 2024-10-07 ENCOUNTER — ANESTHESIA (OUTPATIENT)
Dept: ANESTHESIOLOGY | Facility: HOSPITAL | Age: 64
End: 2024-10-07

## 2024-10-07 ENCOUNTER — HOSPITAL ENCOUNTER (OUTPATIENT)
Dept: GASTROENTEROLOGY | Facility: HOSPITAL | Age: 64
Setting detail: OUTPATIENT SURGERY
Discharge: HOME | End: 2024-10-07
Attending: INTERNAL MEDICINE
Payer: MEDICARE

## 2024-10-07 ENCOUNTER — ANESTHESIA EVENT (OUTPATIENT)
Dept: ANESTHESIOLOGY | Facility: HOSPITAL | Age: 64
End: 2024-10-07

## 2024-10-07 ENCOUNTER — RA CDI HCC (OUTPATIENT)
Dept: OTHER | Facility: HOSPITAL | Age: 64
End: 2024-10-07

## 2024-10-07 ENCOUNTER — ANESTHESIA EVENT (OUTPATIENT)
Dept: GASTROENTEROLOGY | Facility: HOSPITAL | Age: 64
End: 2024-10-07
Payer: MEDICARE

## 2024-10-07 ENCOUNTER — ANESTHESIA (OUTPATIENT)
Dept: GASTROENTEROLOGY | Facility: HOSPITAL | Age: 64
End: 2024-10-07
Payer: MEDICARE

## 2024-10-07 VITALS
HEART RATE: 80 BPM | WEIGHT: 137 LBS | DIASTOLIC BLOOD PRESSURE: 67 MMHG | BODY MASS INDEX: 22.02 KG/M2 | SYSTOLIC BLOOD PRESSURE: 110 MMHG | TEMPERATURE: 97.4 F | HEIGHT: 66 IN | OXYGEN SATURATION: 98 % | RESPIRATION RATE: 16 BRPM

## 2024-10-07 DIAGNOSIS — K62.5 RECTAL BLEEDING: ICD-10-CM

## 2024-10-07 DIAGNOSIS — Z86.0100 HISTORY OF COLON POLYPS: ICD-10-CM

## 2024-10-07 PROBLEM — C50.919 BREAST CANCER (HCC): Status: ACTIVE | Noted: 2024-10-07

## 2024-10-07 PROCEDURE — G0105 COLORECTAL SCRN; HI RISK IND: HCPCS | Performed by: INTERNAL MEDICINE

## 2024-10-07 RX ORDER — SODIUM CHLORIDE, SODIUM LACTATE, POTASSIUM CHLORIDE, CALCIUM CHLORIDE 600; 310; 30; 20 MG/100ML; MG/100ML; MG/100ML; MG/100ML
75 INJECTION, SOLUTION INTRAVENOUS CONTINUOUS
Status: DISCONTINUED | OUTPATIENT
Start: 2024-10-07 | End: 2024-10-11 | Stop reason: HOSPADM

## 2024-10-07 RX ORDER — SODIUM CHLORIDE, SODIUM LACTATE, POTASSIUM CHLORIDE, CALCIUM CHLORIDE 600; 310; 30; 20 MG/100ML; MG/100ML; MG/100ML; MG/100ML
INJECTION, SOLUTION INTRAVENOUS CONTINUOUS PRN
Status: DISCONTINUED | OUTPATIENT
Start: 2024-10-07 | End: 2024-10-07

## 2024-10-07 RX ORDER — PROPOFOL 10 MG/ML
INJECTION, EMULSION INTRAVENOUS AS NEEDED
Status: DISCONTINUED | OUTPATIENT
Start: 2024-10-07 | End: 2024-10-07

## 2024-10-07 RX ORDER — PROPOFOL 10 MG/ML
INJECTION, EMULSION INTRAVENOUS CONTINUOUS PRN
Status: DISCONTINUED | OUTPATIENT
Start: 2024-10-07 | End: 2024-10-07

## 2024-10-07 RX ORDER — LIDOCAINE HYDROCHLORIDE 10 MG/ML
INJECTION, SOLUTION EPIDURAL; INFILTRATION; INTRACAUDAL; PERINEURAL AS NEEDED
Status: DISCONTINUED | OUTPATIENT
Start: 2024-10-07 | End: 2024-10-07

## 2024-10-07 RX ORDER — SODIUM CHLORIDE, SODIUM LACTATE, POTASSIUM CHLORIDE, CALCIUM CHLORIDE 600; 310; 30; 20 MG/100ML; MG/100ML; MG/100ML; MG/100ML
50 INJECTION, SOLUTION INTRAVENOUS CONTINUOUS
Status: DISCONTINUED | OUTPATIENT
Start: 2024-10-07 | End: 2024-10-11 | Stop reason: HOSPADM

## 2024-10-07 RX ADMIN — PROPOFOL 100 MCG/KG/MIN: 10 INJECTION, EMULSION INTRAVENOUS at 09:18

## 2024-10-07 RX ADMIN — PROPOFOL 50 MG: 10 INJECTION, EMULSION INTRAVENOUS at 09:11

## 2024-10-07 RX ADMIN — LIDOCAINE HYDROCHLORIDE 50 MG: 10 SOLUTION INTRAVENOUS at 09:08

## 2024-10-07 RX ADMIN — PROPOFOL 30 MG: 10 INJECTION, EMULSION INTRAVENOUS at 09:32

## 2024-10-07 RX ADMIN — PROPOFOL 50 MG: 10 INJECTION, EMULSION INTRAVENOUS at 09:15

## 2024-10-07 RX ADMIN — SODIUM CHLORIDE, SODIUM LACTATE, POTASSIUM CHLORIDE, AND CALCIUM CHLORIDE 50 ML/HR: .6; .31; .03; .02 INJECTION, SOLUTION INTRAVENOUS at 09:01

## 2024-10-07 RX ADMIN — PROPOFOL 100 MG: 10 INJECTION, EMULSION INTRAVENOUS at 09:08

## 2024-10-07 RX ADMIN — SODIUM CHLORIDE, SODIUM LACTATE, POTASSIUM CHLORIDE, AND CALCIUM CHLORIDE: .6; .31; .03; .02 INJECTION, SOLUTION INTRAVENOUS at 08:59

## 2024-10-07 NOTE — ANESTHESIA PREPROCEDURE EVALUATION
Procedure:  COLONOSCOPY    Relevant Problems   CARDIO   (+) Hyperlipidemia   (+) Migraine   (+) Migraine, unspecified, not intractable, without status migrainosus      GI/HEPATIC   (+) Tiburcio ulcer   (+) Liver cyst      GYN   (+) Breast cancer (HCC)      HEMATOLOGY   (+) Iron deficiency anemia   (+) Iron deficiency anemia due to chronic blood loss      MUSCULOSKELETAL   (+) Osteoarthritis of carpometacarpal (CMC) joint of thumb      NEURO/PSYCH   (+) Migraine   (+) Migraine, unspecified, not intractable, without status migrainosus        Physical Exam    Airway    Mallampati score: II  TM Distance: >3 FB  Neck ROM: full     Dental   No notable dental hx     Cardiovascular  Cardiovascular exam normal    Pulmonary  Pulmonary exam normal     Other Findings  post-pubertal.      Anesthesia Plan  ASA Score- 2     Anesthesia Type- IV sedation with anesthesia with ASA Monitors.         Additional Monitors:     Airway Plan:            Plan Factors-Exercise tolerance (METS): >4 METS.    Chart reviewed. EKG reviewed.  Existing labs reviewed. Patient summary reviewed.    Patient is not a current smoker. Patient not instructed to abstain from smoking on day of procedure. Patient did not smoke on day of surgery.            Induction-     Postoperative Plan-     Perioperative Resuscitation Plan - Level 1 - Full Code.       Informed Consent- Anesthetic plan and risks discussed with patient.  I personally reviewed this patient with the CRNA. Discussed and agreed on the Anesthesia Plan with the CRNA..      Lab Results   Component Value Date    HGBA1C 5.8 (H) 08/02/2023       Lab Results   Component Value Date     06/27/2015    K 4.1 08/28/2024     08/28/2024    CO2 27 08/28/2024    ANIONGAP 6 06/27/2015    BUN 14 08/28/2024    CREATININE 0.58 (L) 08/28/2024    GLUCOSE 97 06/27/2015    GLUF 80 08/28/2024    CALCIUM 8.9 08/28/2024    CORRECTEDCA 9.5 08/02/2023    AST 17 08/28/2024    ALT 19 08/28/2024    ALKPHOS 119 (H)  08/28/2024    PROT 7.2 06/27/2015    BILITOT 0.16 (L) 06/27/2015    EGFR 96 08/28/2024       Lab Results   Component Value Date    WBC 4.32 06/04/2024    HGB 13.6 06/04/2024    HCT 41.5 06/04/2024    MCV 96 06/04/2024     06/04/2024     Echo jan 2024    Left Ventricle: Left ventricle is normal in size. Systolic function is   normal with an ejection fraction of 60-65%. There is probably normal   diastolic function.     Right Ventricle: Right ventricle cavity is normal. Systolic function is   normal.    No significant valvular abnormalities.

## 2024-10-07 NOTE — ANESTHESIA PREPROCEDURE EVALUATION
Procedure:  PRE-OP ONLY    Relevant Problems   CARDIO   (+) Hyperlipidemia   (+) Migraine   (+) Migraine, unspecified, not intractable, without status migrainosus      GI/HEPATIC   (+) Tiburcio ulcer   (+) Liver cyst      HEMATOLOGY   (+) Iron deficiency anemia   (+) Iron deficiency anemia due to chronic blood loss      MUSCULOSKELETAL   (+) Osteoarthritis of carpometacarpal (CMC) joint of thumb      NEURO/PSYCH   (+) Migraine   (+) Migraine, unspecified, not intractable, without status migrainosus             Anesthesia Plan  ASA Score- 2     Anesthesia Type- IV sedation with anesthesia with ASA Monitors.         Additional Monitors:     Airway Plan:            Plan Factors-    Chart reviewed. EKG reviewed.  Existing labs reviewed. Patient summary reviewed.                  Induction-     Postoperative Plan-     Perioperative Resuscitation Plan - Level 1 - Full Code.       Informed Consent- Anesthetic plan and risks discussed with patient.  I personally reviewed this patient with the CRNA. Discussed and agreed on the Anesthesia Plan with the CRNA..      Lab Results   Component Value Date    HGBA1C 5.8 (H) 08/02/2023       Lab Results   Component Value Date     06/27/2015    K 4.1 08/28/2024     08/28/2024    CO2 27 08/28/2024    ANIONGAP 6 06/27/2015    BUN 14 08/28/2024    CREATININE 0.58 (L) 08/28/2024    GLUCOSE 97 06/27/2015    GLUF 80 08/28/2024    CALCIUM 8.9 08/28/2024    CORRECTEDCA 9.5 08/02/2023    AST 17 08/28/2024    ALT 19 08/28/2024    ALKPHOS 119 (H) 08/28/2024    PROT 7.2 06/27/2015    BILITOT 0.16 (L) 06/27/2015    EGFR 96 08/28/2024       Lab Results   Component Value Date    WBC 4.32 06/04/2024    HGB 13.6 06/04/2024    HCT 41.5 06/04/2024    MCV 96 06/04/2024     06/04/2024     Echo jan 2024    Left Ventricle: Left ventricle is normal in size. Systolic function is   normal with an ejection fraction of 60-65%. There is probably normal   diastolic function.     Right Ventricle:  Right ventricle cavity is normal. Systolic function is   normal.    No significant valvular abnormalities.

## 2024-10-07 NOTE — H&P
History and Physical - [facility]  Corrina Castelan 64 y.o. female MRN: [id number]                  HPI: Corrina Castelan is a 64 y.o. year old female who presents for rectal bleeding.      REVIEW OF SYSTEMS: Per the HPI, and otherwise unremarkable.    Historical Information   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    Anxiety     Arthritis     Calculus of gallbladder     Tiburcio ulcer     Depression     Hyperlipidemia     Migraine     Mitral valve prolapse     Murmur     Neck pain     at times    Pituitary adenoma (HCC)     Pituitary adenoma (HCC)     Shoulder injury related to vaccine administration (SIRVA) 09/2020     Past Surgical History:   Procedure Laterality Date    BREAST EXCISIONAL BIOPSY Left 1996    benign    BREAST SURGERY      L breast cyst removal-benign    CATARACT EXTRACTION Bilateral     CATARACT EXTRACTION, BILATERAL  01/2020    COLONOSCOPY      DE QUERVAIN'S RELEASE Bilateral     EGD AND COLONOSCOPY N/A 5/11/2016    Procedure: EGD AND COLONOSCOPY;  Surgeon: Jj Savage MD;  Location: [facility];  Service:     LAPAROSCOPIC MAGNETIC SPHINCTER AUGMENTATION N/A 7/20/2020    Procedure: MAGNETIC SPHINCTER AUGMENT, LINX PLACEMENT;  Surgeon: Fabiola Gaytan MD;  Location: [facility];  Service: Bariatrics    PARAESOPHAGEAL HERNIA REPAIR N/A 7/20/2020    Procedure: REPAIR HERNIA PARAESOPHAGEAL  LAPAROSCOPIC, PARTIAL GASTRECTOMY;  Surgeon: Fabiola Gaytan MD;  Location: [facility];  Service: Bariatrics    DE ARTHRP INTERPOS INTERCARPAL/METACARPAL JOINTS Left 11/21/2019    Procedure: THUMB CMC ARTHROPLASTY;  Surgeon: Tk Garay MD;  Location: [facility];  Service: Orthopedics    DE INCISION EXTENSOR TENDON SHEATH WRIST Left 11/21/2019    Procedure: RELEASE DEQUERVAINS AND TENDON GRAFT;  Surgeon: Tk Garay MD;  [facility];  Service: Orthopedics    DE LAPAROSCOPY SURG CHOLECYSTECTOMY N/A 3/4/2021    Procedure: Robotic  cholecystectomy ;  Surgeon: Derrick Nation MD;  Location: [facility];  Service: General    AZ LAPS RPR PARAESPHGL HRNA INCL FUNDPLSTY W/MESH N/A 8/31/2016    Procedure: REPAIR HERNIA PARAESOPHAGEAL  with bio A mesh LAPAROSCOPIC NISSEN FUNDOPLICATION Laparoscopic Gastroplexy Intraop EGD #[id number];  Surgeon: Fabiola Gaytan MD;  [facility];  Service: Bariatrics    TONSILLECTOMY      US GUIDED BREAST BIOPSY LEFT COMPLETE Left 11/1/2023    WRIST SURGERY Bilateral     daquero vein release     Social History   Social History     Substance and Sexual Activity   Alcohol Use Yes    Comment: liquor, social drinker     Social History     Substance and Sexual Activity   Drug Use No     Social History     Tobacco Use   Smoking Status Never    Passive exposure: Never   Smokeless Tobacco Never     Family History   Problem Relation Age of Onset    Anxiety disorder Mother     Arthritis Mother     Hyperlipidemia Mother     Hypertension Mother     Prostate cancer Father 75    Dementia Father     Parkinsonism Father     Other Father 90        bladder cancer    No Known Problems Sister     No Known Problems Daughter     Ovarian cancer Maternal Grandmother 65    No Known Problems Maternal Grandfather     No Known Problems Paternal Grandmother     No Known Problems Paternal Grandfather     No Known Problems Maternal Aunt     Breast cancer Paternal Aunt 78    Colon cancer Paternal Aunt 50    Pancreatic cancer Paternal Aunt 72    Breast cancer Cousin 40       Meds/Allergies       Current Outpatient Medications:     ascorbic acid (VITAMIN C) 500 mg tablet    aspirin 81 MG tablet    atorvastatin (LIPITOR) 10 mg tablet    buPROPion (WELLBUTRIN XL) 300 mg 24 hr tablet    Cholecalciferol (VITAMIN D) 2000 UNITS CAPS    dextromethorphan-guaiFENesin (ROBITUSSIN-DM)  mg/5 mL oral liquid    DULoxetine (CYMBALTA) 20 mg capsule    estradiol (ESTRACE) 0.1 mg/g vaginal cream    estradiol (VAGIFEM, YUVAFEM) 10 MCG TABS vaginal tablet     letrozole (FEMARA) 2.5 mg tablet    lidocaine (XYLOCAINE) 1 %    multivitamin (THERAGRAN) TABS    SUMAtriptan (IMITREX) 100 mg tablet    vitamin E, tocopherol, 400 units capsule    No Known Allergies    Objective     LMP  (LMP Unknown)       PHYSICAL EXAM    Gen: NAD  Head: NCAT  CV: RRR  CHEST: Clear  ABD: soft, NT/ND  EXT: no edema      ASSESSMENT/PLAN:  This is a 64 y.o. year old female here for colonoscopy, and she is stable and optimized for her procedure.

## 2024-10-08 NOTE — ANESTHESIA POSTPROCEDURE EVALUATION
Post-Op Assessment Note    CV Status:  Stable           Post Op Vitals Reviewed: Yes    No anethesia notable event occurred.    Staff: Anesthesiologist           Last Filed PACU Vitals:  Vitals Value Taken Time   Temp 97.4 °F (36.3 °C) 10/07/24 0942   Pulse 80 10/07/24 0957   /67 10/07/24 0957   Resp 16 10/07/24 0957   SpO2 98 % 10/07/24 0957       Modified Kat:  Activity: 2 (10/7/2024  9:57 AM)  Respiration: 2 (10/7/2024  9:57 AM)  Circulation: 1 (10/7/2024  9:57 AM)  Consciousness: 2 (10/7/2024  9:57 AM)  Oxygen Saturation: 2 (10/7/2024  9:57 AM)  Modified Kat Score: 9 (10/7/2024  9:57 AM)

## 2024-10-11 ENCOUNTER — OFFICE VISIT (OUTPATIENT)
Dept: INTERNAL MEDICINE CLINIC | Facility: CLINIC | Age: 64
End: 2024-10-11
Payer: MEDICARE

## 2024-10-11 VITALS
HEIGHT: 66 IN | SYSTOLIC BLOOD PRESSURE: 94 MMHG | BODY MASS INDEX: 21.38 KG/M2 | TEMPERATURE: 98.2 F | WEIGHT: 133 LBS | OXYGEN SATURATION: 97 % | HEART RATE: 74 BPM | DIASTOLIC BLOOD PRESSURE: 60 MMHG | RESPIRATION RATE: 14 BRPM

## 2024-10-11 DIAGNOSIS — Z17.0 MALIGNANT NEOPLASM OF LOWER-OUTER QUADRANT OF RIGHT BREAST OF FEMALE, ESTROGEN RECEPTOR POSITIVE (HCC): ICD-10-CM

## 2024-10-11 DIAGNOSIS — C50.511 MALIGNANT NEOPLASM OF LOWER-OUTER QUADRANT OF RIGHT BREAST OF FEMALE, ESTROGEN RECEPTOR POSITIVE (HCC): ICD-10-CM

## 2024-10-11 DIAGNOSIS — Z23 ENCOUNTER FOR IMMUNIZATION: Primary | ICD-10-CM

## 2024-10-11 DIAGNOSIS — M75.52 ACUTE BURSITIS OF LEFT SHOULDER: ICD-10-CM

## 2024-10-11 PROCEDURE — G0008 ADMIN INFLUENZA VIRUS VAC: HCPCS | Performed by: HOSPITALIST

## 2024-10-11 PROCEDURE — 90662 IIV NO PRSV INCREASED AG IM: CPT | Performed by: HOSPITALIST

## 2024-10-11 PROCEDURE — 99214 OFFICE O/P EST MOD 30 MIN: CPT | Performed by: HOSPITALIST

## 2024-10-11 NOTE — PROGRESS NOTES
"INTERNAL MEDICINE FOLLOW-UP OFFICE VISIT  [facility] - [facility] INTERNAL MEDICINE BINU    NAME: Corrina Castelan  AGE: 64 y.o. SEX: female  : 1960     DATE: 10/11/2024     Assessment and Plan:     1. Encounter for immunization  Will get a flu shot today.  - influenza vaccine, high-dose, PF 0.5 mL (Fluzone High Dose)    2. 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  Stable    3. Acute bursitis of left shoulder  Still has left shoulder pain with left shoulder AC joint osteoarthritis with rotator cuff tendinitis and subacromial bursitis.  Surgical intervention was discussed by orthopedic surgery since she continued to have symptoms despite 5 cortisone injections.  However patient wishes to hold off on surgery for the present..    No follow-ups on file.     Chief Complaint:     No chief complaint on file.       History of Present Illness:     Elsa presents for a follow-up examination.  No specific complaints since her last visit.  pmhx of HPL, MVP, and was diagnosed with left sided breast cancer 2023 (stage Ia, ER+, MA+, HER2-) treatment modality  2024- LV docetaxel/ cytoxan x 4  2024: completed adjuvant radiation treatment (with breath hold)  5/15/2024: letrozole 2.5 mg a day (AI)   Has completed her chemo regimen.  Has lost weight.  No loss of appetite, nausea, diarrhea.  Had colonoscopy recently with Dr. Marr with findings of mild severity in the sigmoid colon and internal small hemorrhoids.  Repeat colonoscopy in 5 years.  Still has left shoulder pain with left shoulder AC joint osteoarthritis with " "rotator cuff tendinitis and subacromial bursitis.  Surgical intervention was discussed since she continued to have symptoms despite 5 cortisone injections.  However patient wishes to hold off on surgery for the present..  No fall risks.  The following portions of the patient's history were reviewed and updated as appropriate: allergies, current medications, past family history, past medical history, past social history, past surgical history and problem list.     Review of Systems:     Review of Systems all other review of symptoms negative unless specified otherwise     Problem List:     Patient Active Problem List   Diagnosis    Migraine    Pituitary adenoma (HCC)    Iron deficiency anemia    Tiburcio ulcer    Dyslipidemia    Hyperlipidemia    Iron deficiency anemia due to chronic blood loss    Menopausal symptoms    Migraine, unspecified, not intractable, without status migrainosus    Osteoarthritis of carpometacarpal (CMC) joint of thumb    Liver cyst    Status post laparoscopic cholecystectomy    Disease of pituitary gland (HCC)    Acute bursitis of left shoulder    Breast cancer (HCC)        Objective:     BP 94/60 (BP Location: Left arm, Patient Position: Sitting, Cuff Size: Adult)   Pulse 74   Temp 98.2 °F (36.8 °C) (Tympanic)   Resp 14   Ht 5' 6\" (1.676 m)   Wt 60.3 kg (133 lb)   LMP  (LMP Unknown)   SpO2 97%   BMI 21.47 kg/m²     Physical Exam  General Appearance:    Alert, cooperative, no distress   Head:    Normocephalic, without obvious abnormality, atraumatic   Eyes:    PERRL, conjunctiva/corneas clear, EOM's intact, fundi     benign, both eyes        Neck:   Supple, no adenopathy, no JVD   Back:     Symmetric, no curvature, ROM normal, no CVA tenderness   Lungs:     Clear to auscultation bilaterally, no wheezing or rhonchi   Heart:    Regular rate and rhythm, S1 and S2 normal, no murmur, rub   or gallop   Abdomen:     Soft, non-tender, bowel sounds active    Extremities:   Extremities normal, " atraumatic, no cyanosis or edema   Psych:   Normal Affect   Neurologic:   CNII-XII intact. Normal strength, sensation and reflexes       Throughout     PHQ-2/9 Depression Screening    Little interest or pleasure in doing things: 0 - not at all  Feeling down, depressed, or hopeless: 0 - not at all        Pertinent Laboratory/Diagnostic Studies:    Laboratory Results: I have personally reviewed the pertinent laboratory results/reports             Odalis Francisco MD  [facility] INTERNAL MEDICINE [place]

## 2024-10-23 ENCOUNTER — ANNUAL EXAM (OUTPATIENT)
Dept: OBGYN CLINIC | Facility: MEDICAL CENTER | Age: 64
End: 2024-10-23
Payer: MEDICARE

## 2024-10-23 VITALS
DIASTOLIC BLOOD PRESSURE: 60 MMHG | BODY MASS INDEX: 21.18 KG/M2 | HEIGHT: 66 IN | SYSTOLIC BLOOD PRESSURE: 100 MMHG | WEIGHT: 131.8 LBS

## 2024-10-23 DIAGNOSIS — Z92.89 PERSONAL HISTORY OF MEDICAL TREATMENT: Primary | ICD-10-CM

## 2024-10-23 DIAGNOSIS — C50.511 MALIGNANT NEOPLASM OF LOWER-OUTER QUADRANT OF RIGHT BREAST OF FEMALE, ESTROGEN RECEPTOR POSITIVE (HCC): ICD-10-CM

## 2024-10-23 DIAGNOSIS — Z01.419 ENCOUNTER FOR WELL WOMAN EXAM WITH ROUTINE GYNECOLOGICAL EXAM: ICD-10-CM

## 2024-10-23 DIAGNOSIS — Z12.4 PAP SMEAR FOR CERVICAL CANCER SCREENING: ICD-10-CM

## 2024-10-23 DIAGNOSIS — Z17.0 MALIGNANT NEOPLASM OF LOWER-OUTER QUADRANT OF RIGHT BREAST OF FEMALE, ESTROGEN RECEPTOR POSITIVE (HCC): ICD-10-CM

## 2024-10-23 PROCEDURE — G0476 HPV COMBO ASSAY CA SCREEN: HCPCS | Performed by: OBSTETRICS & GYNECOLOGY

## 2024-10-23 PROCEDURE — G0145 SCR C/V CYTO,THINLAYER,RESCR: HCPCS | Performed by: OBSTETRICS & GYNECOLOGY

## 2024-10-23 PROCEDURE — G0101 CA SCREEN;PELVIC/BREAST EXAM: HCPCS | Performed by: OBSTETRICS & GYNECOLOGY

## 2024-10-23 NOTE — PROGRESS NOTES
ASSESSMENT & PLAN: Pat was seen today for gynecologic exam.    Diagnoses and all orders for this visit:    Encounter for well woman exam with routine gynecological exam    Pap smear for cervical cancer screening  -     Liquid-based pap, screening    Malignant neoplasm of lower-outer quadrant of right breast of female, estrogen receptor positive (HCC)    Discussion/Summary:  Patient here for yearly gyn preventive exam  1.  Routine well woman exam done today  2.  Pap and HPV:  The patient's pap is up to date.  Pap was done today.  Current ASCCP Guidelines reviewed.  3.  Mammogram was ordered  4.  Colorectal cancer screening is up to date.    4. The following were reviewed in today's visit: breast self exam, adequate intake of calcium and vitamin D, exercise and healthy diet.  5. F/u 1 year.  CC:  Annual Gynecologic Examination    HPI: Corrina Castelan is a 64 y.o. who presents for annual gynecologic examination.  She has the following concerns: Patient was diagnosed with breast cancer this past year.  Underwent lumpectomy, chemo, radiation.  Stopped her vaginal estrogen.  Still reports vaginal dryness.  Discussed the use of hyaluronic acid    Last pap:   Last mammogram:   Last colorectal cancer screenin    Patient Active Problem List   Diagnosis    Migraine    Pituitary adenoma (HCC)    Iron deficiency anemia    Tiburcio ulcer    Dyslipidemia    Hyperlipidemia    Iron deficiency anemia due to chronic blood loss    Menopausal symptoms    Migraine, unspecified, not intractable, without status migrainosus    Osteoarthritis of carpometacarpal (CMC) joint of thumb    Liver cyst    Status post laparoscopic cholecystectomy    Disease of pituitary gland (HCC)    Acute bursitis of left shoulder    Breast cancer (HCC)       Past Medical History:   Diagnosis Date    Adenomyomatosis of gallbladder 2021    Anxiety     Arthritis     Calculus of gallbladder     Tiburcio ulcer     Cancer (HCC) 2023    L  Breast    Cancer (HCC) 11/1/2023    L Breast    Depression     Hyperlipidemia     Migraine     Miscarriage 1981    Mitral valve prolapse     Murmur     Neck pain     at times    Pituitary adenoma (HCC)     Pituitary adenoma (HCC)     Shoulder injury related to vaccine administration (SIRVA) 09/2020       Past Surgical History:   Procedure Laterality Date    BREAST EXCISIONAL BIOPSY Left 1996    benign    BREAST SURGERY      L breast cyst removal-benign    CATARACT EXTRACTION Bilateral     CATARACT EXTRACTION, BILATERAL  01/2020    CHOLECYSTECTOMY  2021    COLONOSCOPY      DE QUERVAIN'S RELEASE Bilateral     EGD AND COLONOSCOPY N/A 05/11/2016    Procedure: EGD AND COLONOSCOPY;  Surgeon: Jj Savage MD;  Location: [facility];  Service:     LAPAROSCOPIC MAGNETIC SPHINCTER AUGMENTATION N/A 07/20/2020    Procedure: MAGNETIC SPHINCTER AUGMENT, LINX PLACEMENT;  Surgeon: Fabiola Gaytan MD;  Location: [facility];  Service: Bariatrics    PARAESOPHAGEAL HERNIA REPAIR N/A 07/20/2020    Procedure: REPAIR HERNIA PARAESOPHAGEAL  LAPAROSCOPIC, PARTIAL GASTRECTOMY;  Surgeon: Fabiola Gaytan MD;  Location: [facility];  Service: Bariatrics    KY ARTHRP INTERPOS INTERCARPAL/METACARPAL JOINTS Left 11/21/2019    Procedure: THUMB CMC ARTHROPLASTY;  Surgeon: Tk Garay MD;  Location: [facility];  Service: Orthopedics    KY INCISION EXTENSOR TENDON SHEATH WRIST Left 11/21/2019    Procedure: RELEASE DEQUERVAINS AND TENDON GRAFT;  Surgeon: Tk Garay MD;  [facility]: [facility];  Service: Orthopedics    KY LAPAROSCOPY SURG CHOLECYSTECTOMY N/A 03/04/2021    Procedure: Robotic cholecystectomy ;  Surgeon: Derrick Nation MD;  Location: [facility];  Service: General    KY LAPS RPR PARAESPHGL HRNA INCL FUNDPLSTY W/MESH N/A 08/31/2016    Procedure: REPAIR HERNIA PARAESOPHAGEAL  with bio A mesh LAPAROSCOPIC NISSEN FUNDOPLICATION Laparoscopic Gastroplexy Intraop EGD #[id number];  Surgeon: Fabiola Gaytan MD;  [facility];   Service: Bariatrics    TONSILLECTOMY      US GUIDED BREAST BIOPSY LEFT COMPLETE Left 2023    WRIST SURGERY Bilateral     daquero vein release       Past OB/Gyn History:    History of abnormal pap smears: No.   Patient is currently sexually active.   monogamous   Patient's family planning method is post menopausal status.    Family History   Problem Relation Age of Onset    Anxiety disorder Mother     Arthritis Mother     Hyperlipidemia Mother     Hypertension Mother          @ age 98    Osteoporosis Mother     Prostate cancer Father 75    Dementia Father     Parkinsonism Father     Other Father 90        bladder cancer    Cancer Father         Prostate & Bladder    No Known Problems Sister     No Known Problems Daughter     Ovarian cancer Maternal Grandmother 65        Dec. age 68    Diabetes Maternal Grandmother     Stroke Maternal Grandfather         Dec. age 95    Breast cancer Paternal Grandmother         Dec. age 81    Stroke Paternal Grandfather         Dec. age 65    No Known Problems Maternal Aunt     Breast cancer Paternal Aunt 78    Colon cancer Paternal Aunt 50    Pancreatic cancer Paternal Aunt 72    Breast cancer Cousin 40    Hyperlipidemia Sister     Migraines Sister        Social History:  Social History     Socioeconomic History    Marital status: /Civil Union     Spouse name: Not on file    Number of children: Not on file    Years of education: Not on file    Highest education level: Not on file   Occupational History    Not on file   Tobacco Use    Smoking status: Never     Passive exposure: Never    Smokeless tobacco: Never   Vaping Use    Vaping status: Never Used   Substance and Sexual Activity    Alcohol use: Yes     Comment: Social    Drug use: No    Sexual activity: Yes     Partners: Male     Birth control/protection: Post-menopausal     Comment:    Other Topics Concern    Not on file   Social History Narrative    Not on file     Social Determinants of Health      Financial Resource Strain: Low Risk  (10/31/2023)    Overall Financial Resource Strain (CARDIA)     Difficulty of Paying Living Expenses: Not hard at all   Food Insecurity: Not on file   Transportation Needs: No Transportation Needs (10/31/2023)    PRAPARE - Transportation     Lack of Transportation (Medical): No     Lack of Transportation (Non-Medical): No   Physical Activity: Not on file   Stress: Not on file   Social Connections: Not on file   Intimate Partner Violence: Not on file   Housing Stability: Not on file     Presently lives with family.  Patient is currently employed .    No Known Allergies      Current Outpatient Medications:     ascorbic acid (VITAMIN C) 500 mg tablet, Take 500 mg by mouth 2 (two) times a day , Disp: , Rfl:     atorvastatin (LIPITOR) 10 mg tablet, Take 1 tablet (10 mg total) by mouth every evening, Disp: 90 tablet, Rfl: 3    buPROPion (WELLBUTRIN XL) 300 mg 24 hr tablet, Take 1 tablet (300 mg total) by mouth daily, Disp: 90 tablet, Rfl: 0    Cholecalciferol (VITAMIN D) 2000 UNITS CAPS, Take 1 tablet by mouth daily , Disp: , Rfl:     DULoxetine (CYMBALTA) 20 mg capsule, Take 1 capsule (20 mg total) by mouth daily, Disp: 90 capsule, Rfl: 1    letrozole (FEMARA) 2.5 mg tablet, Take 2.5 mg by mouth daily, Disp: , Rfl:     multivitamin (THERAGRAN) TABS, Take 1 tablet by mouth daily., Disp: , Rfl:     SUMAtriptan (IMITREX) 100 mg tablet, TAKE 1 TABLET BY MOUTH FOR MIGRAINE RELIEF. MAY REPEAT 2 HOURS LATER. MAX 2 TABLETS/DAY., Disp: 36 tablet, Rfl: 0    vitamin E, tocopherol, 400 units capsule, Take 400 Units by mouth daily Pt stopped, Disp: , Rfl:     Review of Systems  Constitutional :no fever, feels well, no tiredness, no recent weight gain or loss  ENT: no ear ache, no loss of hearing, no nosebleeds or nasal discharge, no sore throat or hoarseness.  Cardiovascular: no complaints of slow or fast heart beat, no chest pain, no palpitations, no leg claudication or lower extremity  "edema.  Respiratory: no complaints of shortness of shortness of breath, no AGUAYO  Breasts:no complaints of breast pain, breast lump, or nipple discharge  Gastrointestinal: no complaints of abdominal pain, constipation, nausea, vomiting, or diarrhea or bloody stools  Genitourinary : no complaints of dysuria, incontinence, pelvic pain, no dysmenorrhea, vaginal discharge or abnormal vaginal bleeding and as noted in HPI.  Musculoskeletal: no complaints of arthralgia, no myalgia, no joint swelling or stiffness, no limb pain or swelling.  Integumentary: no complaints of skin rash or lesion, itching or dry skin  Neurological: no complaints of headache, no confusion, no numbness or tingling, no dizziness or fainting    Physical Exam:     /60   Ht 5' 6\" (1.676 m)   Wt 59.8 kg (131 lb 12.8 oz)   LMP  (LMP Unknown)   BMI 21.27 kg/m²     General: appears stated age, cooperative, alert normal mood and affect   Psychiatric oriented to person, place and time.  Mood and affect normal   Neck: normal, supple,trachea midline, no masses.  Thyroid: normal, no thyromegaly   Heart: regular rate and rhythm, S1, S2 normal, no murmur, click, rub or gallop   Lungs: clear to auscultation bilaterally, no increased work of breathing or signs of respiratory distress   Breasts: normal, no dimpling or skin changes noted   Abdomen: soft, non-tender, without masses or organomegaly   Vulva: normal , no lesions   Vagina: normal , no lesions or atrophy   Urethra: normal   Urethal meatus normal   Bladder Normal, soft, non-tender and no prolapse or masses appreciated   Cervix: normal, no palpable masses ; pap done    Uterus: normal , non-tender, not enlarged, no palpable masses   Adnexa: normal, non-tender without fullness or masses   Lymphatic Palpation of lymph nodes in neck, axilla, groin and/or other locations: no lymphadenopathy or masses noted   Skin Normal skin turgor and no rashes.  Palpation of skin and subcutaneous tissue normal.     "

## 2024-10-29 LAB
LAB AP GYN PRIMARY INTERPRETATION: NORMAL
Lab: NORMAL

## 2024-11-30 DIAGNOSIS — E78.5 HYPERLIPIDEMIA, UNSPECIFIED HYPERLIPIDEMIA TYPE: ICD-10-CM

## 2024-12-02 ENCOUNTER — TELEMEDICINE (OUTPATIENT)
Dept: INTERNAL MEDICINE CLINIC | Facility: CLINIC | Age: 64
End: 2024-12-02
Payer: MEDICARE

## 2024-12-02 DIAGNOSIS — J06.9 UPPER RESPIRATORY TRACT INFECTION, UNSPECIFIED TYPE: Primary | ICD-10-CM

## 2024-12-02 PROCEDURE — 99423 OL DIG E/M SVC 21+ MIN: CPT

## 2024-12-02 RX ORDER — AMOXICILLIN 500 MG/1
500 CAPSULE ORAL EVERY 8 HOURS SCHEDULED
Qty: 21 CAPSULE | Refills: 0 | Status: SHIPPED | OUTPATIENT
Start: 2024-12-02 | End: 2024-12-09

## 2024-12-02 RX ORDER — FLUTICASONE PROPIONATE 50 MCG
1 SPRAY, SUSPENSION (ML) NASAL DAILY
Qty: 9.9 ML | Refills: 0 | Status: SHIPPED | OUTPATIENT
Start: 2024-12-02

## 2024-12-02 RX ORDER — BUPROPION HYDROCHLORIDE 300 MG/1
300 TABLET ORAL DAILY
Qty: 90 TABLET | Refills: 1 | Status: SHIPPED | OUTPATIENT
Start: 2024-12-02

## 2024-12-02 RX ORDER — PROMETHAZINE HYDROCHLORIDE 6.25 MG/5ML
12.5 SYRUP ORAL EVERY 6 HOURS PRN
Qty: 118 ML | Refills: 0 | Status: SHIPPED | OUTPATIENT
Start: 2024-12-02 | End: 2024-12-09

## 2024-12-02 NOTE — PROGRESS NOTES
Virtual Regular Visit  Name: Corrina Castelan      : 1960      MRN: [id number]  Encounter Provider: Beronica Sommers DO  Encounter Date: 2024   Encounter department: [facility] INTERNAL MEDICINE [place]      Verification of patient location:  Patient is located at Home in the following state in which I hold an active license [place] :  Assessment & Plan  Upper respiratory tract infection, unspecified type  Symptoms consisting of fever with max of 101.9, nasal and sinus congestion, headache, dry cough, rhinorrhea, loss of appetite, and generalized achiness since Friday ().  Patient had recent sick contact with daughter who recently recovered from pneumonia.  Suspect most likely viral upper respiratory infection vs rhino sinusitis, however cannot rule out bacterial etiology.    Plan:  Amoxicillin 500 mg every 8 hours for 7 days  Promethazine every 6 hours as needed for anti-tussive  Fluticasone topical nasal spray as needed for sinus and nasal congestion  Instructed patient to continue supportive care measures including daily hydration.  Instructed patient to call our office back to schedule another virtual appointment or office visit if symptoms are not improving or worsening.    Orders:    amoxicillin (AMOXIL) 500 mg capsule; Take 1 capsule (500 mg total) by mouth every 8 (eight) hours for 7 days    promethazine (PHENERGAN) 12.5 mg/10 mL oral solution; Take 10 mL (12.5 mg total) by mouth every 6 (six) hours as needed (For cough)    fluticasone (FLONASE) 50 mcg/act nasal spray; 1 spray into each nostril daily        Encounter provider Beronica Sommers DO    The patient was identified by name and date of birth. Corrina Castelan was informed that this is a telemedicine visit and that the visit is being conducted through the Epic Embedded platform. She agrees to proceed..  My office door was closed. No one else was in the room.  She acknowledged consent and understanding of privacy and security of  the video platform. The patient has agreed to participate and understands they can discontinue the visit at any time.    Patient is aware this is a billable service.     History of Present Illness     Elsa Martinez is a 64 year old female with past medical history of breast cancer and migraine who presents for virtual appointment with compliant of upper respiratory infection symptoms. She reports onset of symptoms on Friday afternoon, with sensation of achiness with chills, fever max of 101.9, lack of appetite, and rhinorrhea. She does endorse a dry cough with sinus pressure and congestion. She had headaches as well that responded to as needed use of Imitrex, which she uses on occasion for her history of migraines. She endorses feeling more winded with mild exertion such as going up the steps but denies any significant dyspnea at rest and chest pain. She had tested herself for COVID-19, which was negative. She reports she had recent contact with her daughter who had pneumonia recently and daughter's  who had been dealing with gastroenteritis, She has tried using mucinex and pseudophed without significant improvement of symptoms.       Review of Systems   Constitutional:  Positive for appetite change and fever. Negative for chills.   HENT:  Positive for congestion, rhinorrhea and sinus pressure. Negative for ear pain, postnasal drip and sore throat.    Eyes:  Negative for pain and visual disturbance.   Respiratory:  Positive for cough. Negative for shortness of breath.    Cardiovascular:  Negative for chest pain and palpitations.   Gastrointestinal:  Negative for abdominal pain, diarrhea and vomiting.   Genitourinary:  Negative for dysuria and hematuria.   Musculoskeletal:  Negative for arthralgias and back pain.   Skin:  Negative for color change and rash.   Neurological:  Positive for headaches. Negative for weakness.   Psychiatric/Behavioral:  Negative for agitation and confusion.    All other systems  reviewed and are negative.      Objective   LMP  (LMP Unknown)     Physical Exam  Constitutional:       General: She is not in acute distress.     Appearance: Normal appearance. She is not ill-appearing.   HENT:      Nose: Nose normal.   Eyes:      Extraocular Movements: Extraocular movements intact.   Musculoskeletal:      Cervical back: Neck supple.   Neurological:      Mental Status: She is alert and oriented to person, place, and time.   Psychiatric:         Mood and Affect: Mood normal.         Behavior: Behavior normal. Behavior is cooperative.         Visit Time  Total Visit Duration: 30 minutes

## 2024-12-09 ENCOUNTER — APPOINTMENT (OUTPATIENT)
Dept: LAB | Facility: MEDICAL CENTER | Age: 64
End: 2024-12-09
Payer: MEDICARE

## 2024-12-09 DIAGNOSIS — Z79.899 ENCOUNTER FOR LONG-TERM (CURRENT) USE OF MEDICATIONS: ICD-10-CM

## 2024-12-09 DIAGNOSIS — C50.212 MALIGNANT NEOPLASM OF UPPER-INNER QUADRANT OF LEFT BREAST IN FEMALE, ESTROGEN RECEPTOR POSITIVE (HCC): ICD-10-CM

## 2024-12-09 DIAGNOSIS — J20.9 ACUTE WHEEZY BRONCHITIS: Primary | ICD-10-CM

## 2024-12-09 DIAGNOSIS — Z17.0 MALIGNANT NEOPLASM OF UPPER-INNER QUADRANT OF LEFT BREAST IN FEMALE, ESTROGEN RECEPTOR POSITIVE (HCC): ICD-10-CM

## 2024-12-09 DIAGNOSIS — Z79.811 USE OF LETROZOLE (FEMARA): ICD-10-CM

## 2024-12-09 DIAGNOSIS — J20.9 ACUTE BRONCHITIS, UNSPECIFIED ORGANISM: Primary | ICD-10-CM

## 2024-12-09 LAB
25(OH)D3 SERPL-MCNC: 28.7 NG/ML (ref 30–100)
ALBUMIN SERPL BCG-MCNC: 4 G/DL (ref 3.5–5)
ALP SERPL-CCNC: 91 U/L (ref 34–104)
ALT SERPL W P-5'-P-CCNC: 11 U/L (ref 7–52)
ANION GAP SERPL CALCULATED.3IONS-SCNC: 10 MMOL/L (ref 4–13)
AST SERPL W P-5'-P-CCNC: 18 U/L (ref 13–39)
BILIRUB SERPL-MCNC: 0.55 MG/DL (ref 0.2–1)
BUN SERPL-MCNC: 12 MG/DL (ref 5–25)
CALCIUM SERPL-MCNC: 9.4 MG/DL (ref 8.4–10.2)
CHLORIDE SERPL-SCNC: 102 MMOL/L (ref 96–108)
CO2 SERPL-SCNC: 29 MMOL/L (ref 21–32)
CREAT SERPL-MCNC: 0.59 MG/DL (ref 0.6–1.3)
GFR SERPL CREATININE-BSD FRML MDRD: 95 ML/MIN/1.73SQ M
GLUCOSE P FAST SERPL-MCNC: 83 MG/DL (ref 65–99)
POTASSIUM SERPL-SCNC: 3.6 MMOL/L (ref 3.5–5.3)
PROT SERPL-MCNC: 6.9 G/DL (ref 6.4–8.4)
SODIUM SERPL-SCNC: 141 MMOL/L (ref 135–147)

## 2024-12-09 PROCEDURE — 82306 VITAMIN D 25 HYDROXY: CPT

## 2024-12-09 PROCEDURE — 80053 COMPREHEN METABOLIC PANEL: CPT

## 2024-12-09 PROCEDURE — 36415 COLL VENOUS BLD VENIPUNCTURE: CPT

## 2024-12-09 RX ORDER — CODEINE PHOSPHATE AND GUAIFENESIN 10; 100 MG/5ML; MG/5ML
5 SOLUTION ORAL 3 TIMES DAILY PRN
Qty: 118 ML | Refills: 0 | Status: SHIPPED | OUTPATIENT
Start: 2024-12-09

## 2024-12-09 RX ORDER — PROMETHAZINE HYDROCHLORIDE AND CODEINE PHOSPHATE 6.25; 1 MG/5ML; MG/5ML
5 SYRUP ORAL EVERY 4 HOURS PRN
Qty: 118 ML | Refills: 0 | Status: SHIPPED | OUTPATIENT
Start: 2024-12-09 | End: 2024-12-09

## 2024-12-09 RX ORDER — AZITHROMYCIN 250 MG/1
TABLET, FILM COATED ORAL
Qty: 6 TABLET | Refills: 0 | Status: SHIPPED | OUTPATIENT
Start: 2024-12-09 | End: 2024-12-14

## 2024-12-19 LAB
APOB+LDLR+PCSK9 GENE MUT ANL BLD/T: NOT DETECTED
BRCA1+BRCA2 DEL+DUP + FULL MUT ANL BLD/T: NOT DETECTED
MLH1+MSH2+MSH6+PMS2 GN DEL+DUP+FUL M: NOT DETECTED

## 2024-12-20 ENCOUNTER — TELEPHONE (OUTPATIENT)
Age: 64
End: 2024-12-20

## 2024-12-20 NOTE — TELEPHONE ENCOUNTER
Patient called wanting to speak to Gonzalez, or Shannan. Patient didn't give much information as to why. Please reach out to patient once available.

## 2025-01-13 DIAGNOSIS — J06.9 UPPER RESPIRATORY TRACT INFECTION, UNSPECIFIED TYPE: ICD-10-CM

## 2025-01-14 RX ORDER — FLUTICASONE PROPIONATE 50 MCG
1 SPRAY, SUSPENSION (ML) NASAL DAILY
Qty: 16 ML | Refills: 3 | Status: SHIPPED | OUTPATIENT
Start: 2025-01-14

## 2025-01-28 DIAGNOSIS — E78.5 HYPERLIPIDEMIA, UNSPECIFIED HYPERLIPIDEMIA TYPE: ICD-10-CM

## 2025-02-03 RX ORDER — ATORVASTATIN CALCIUM 10 MG/1
10 TABLET, FILM COATED ORAL EVERY EVENING
Qty: 90 TABLET | Refills: 3 | Status: SHIPPED | OUTPATIENT
Start: 2025-02-03

## 2025-02-04 ENCOUNTER — TELEPHONE (OUTPATIENT)
Age: 65
End: 2025-02-04

## 2025-02-04 NOTE — TELEPHONE ENCOUNTER
Patient called she needs her pathology report mailed to her from 11/1/2023.        She has the pathology after surgery.          She needs the one from 11/1/2023    Address verified

## 2025-03-15 ENCOUNTER — APPOINTMENT (OUTPATIENT)
Dept: LAB | Facility: MEDICAL CENTER | Age: 65
End: 2025-03-15
Payer: MEDICARE

## 2025-03-15 DIAGNOSIS — Z17.0 ESTROGEN RECEPTOR POSITIVE: ICD-10-CM

## 2025-03-15 DIAGNOSIS — C50.212 MALIGNANT NEOPLASM OF UPPER-INNER QUADRANT OF LEFT FEMALE BREAST, UNSPECIFIED ESTROGEN RECEPTOR STATUS (HCC): ICD-10-CM

## 2025-03-15 LAB
ANION GAP SERPL CALCULATED.3IONS-SCNC: 10 MMOL/L (ref 4–13)
BUN SERPL-MCNC: 13 MG/DL (ref 5–25)
CALCIUM SERPL-MCNC: 9.4 MG/DL (ref 8.4–10.2)
CHLORIDE SERPL-SCNC: 104 MMOL/L (ref 96–108)
CO2 SERPL-SCNC: 29 MMOL/L (ref 21–32)
CREAT SERPL-MCNC: 0.6 MG/DL (ref 0.6–1.3)
GFR SERPL CREATININE-BSD FRML MDRD: 95 ML/MIN/1.73SQ M
GLUCOSE P FAST SERPL-MCNC: 93 MG/DL (ref 65–99)
POTASSIUM SERPL-SCNC: 3.9 MMOL/L (ref 3.5–5.3)
SODIUM SERPL-SCNC: 143 MMOL/L (ref 135–147)

## 2025-03-15 PROCEDURE — 36415 COLL VENOUS BLD VENIPUNCTURE: CPT

## 2025-03-15 PROCEDURE — 80048 BASIC METABOLIC PNL TOTAL CA: CPT

## 2025-03-18 DIAGNOSIS — E78.5 HYPERLIPIDEMIA, UNSPECIFIED HYPERLIPIDEMIA TYPE: ICD-10-CM

## 2025-03-19 RX ORDER — DULOXETIN HYDROCHLORIDE 20 MG/1
20 CAPSULE, DELAYED RELEASE ORAL DAILY
Qty: 90 CAPSULE | Refills: 1 | Status: SHIPPED | OUTPATIENT
Start: 2025-03-19

## 2025-04-10 ENCOUNTER — VBI (OUTPATIENT)
Dept: ADMINISTRATIVE | Facility: OTHER | Age: 65
End: 2025-04-10

## 2025-04-10 NOTE — TELEPHONE ENCOUNTER
Patient contacted to schedule Annual Wellness Visit.   Patient declined to schedule appointment.     Thank you.  Viji Welsh  [facility]

## 2025-04-30 DIAGNOSIS — E55.9 HYPOVITAMINOSIS D: Primary | ICD-10-CM

## 2025-06-01 DIAGNOSIS — E78.5 HYPERLIPIDEMIA, UNSPECIFIED HYPERLIPIDEMIA TYPE: ICD-10-CM

## 2025-06-01 RX ORDER — BUPROPION HYDROCHLORIDE 300 MG/1
300 TABLET ORAL DAILY
Qty: 30 TABLET | Refills: 0 | Status: SHIPPED | OUTPATIENT
Start: 2025-06-01

## 2025-06-29 DIAGNOSIS — E78.5 HYPERLIPIDEMIA, UNSPECIFIED HYPERLIPIDEMIA TYPE: ICD-10-CM

## 2025-07-01 RX ORDER — BUPROPION HYDROCHLORIDE 300 MG/1
300 TABLET ORAL DAILY
Qty: 30 TABLET | Refills: 5 | Status: SHIPPED | OUTPATIENT
Start: 2025-07-01

## 2025-08-12 ENCOUNTER — VBI (OUTPATIENT)
Dept: ADMINISTRATIVE | Facility: OTHER | Age: 65
End: 2025-08-12

## (undated) DEVICE — NEEDLE HYPO 22G X 1-1/2 IN

## (undated) DEVICE — PERMANENT CAUTERY HOOK: Brand: ENDOWRIST;DAVINCI SI

## (undated) DEVICE — INTENDED FOR TISSUE SEPARATION, AND OTHER PROCEDURES THAT REQUIRE A SHARP SURGICAL BLADE TO PUNCTURE OR CUT.: Brand: BARD-PARKER SAFETY BLADES SIZE 11, STERILE

## (undated) DEVICE — COLUMN DRAPE

## (undated) DEVICE — 2000CC GUARDIAN II: Brand: GUARDIAN

## (undated) DEVICE — ANTI-FOG SOLUTION WITH FOAM PAD: Brand: DEVON

## (undated) DEVICE — STAPLER CANNULA SEAL: Brand: ENDOWRIST

## (undated) DEVICE — 3000CC GUARDIAN II: Brand: GUARDIAN

## (undated) DEVICE — 3M™ STERI-STRIP™ REINFORCED ADHESIVE SKIN CLOSURES, R1542, 1/4 IN X 1-1/2 IN (6 MM X 38 MM), 6 STRIPS/ENVELOPE: Brand: 3M™ STERI-STRIP™

## (undated) DEVICE — SUT VICRYL 0 UR-6 27 IN J603H

## (undated) DEVICE — SYRINGE 20ML LL

## (undated) DEVICE — STRETCH BANDAGE: Brand: CURITY

## (undated) DEVICE — ABDOMINAL PAD: Brand: DERMACEA

## (undated) DEVICE — MAYO STAND COVER: Brand: CONVERTORS

## (undated) DEVICE — TAPE ELASTOPLAST 1 IN

## (undated) DEVICE — SIZING TOOL LINX SYS

## (undated) DEVICE — 3M™ STERI-STRIP™ REINFORCED ADHESIVE SKIN CLOSURES, R1546, 1/4 IN X 4 IN (6 MM X 100 MM), 10 STRIPS/ENVELOPE: Brand: 3M™ STERI-STRIP™

## (undated) DEVICE — SUT MONOCRYL 4-0 PS-2 27 IN Y426H

## (undated) DEVICE — SUT ETHILON 5-0 PS-3 18 IN 1668H

## (undated) DEVICE — GLOVE INDICATOR PI UNDERGLOVE SZ 6.5 BLUE

## (undated) DEVICE — PADDING CAST 2IN COTTON STRL

## (undated) DEVICE — GLOVE SRG BIOGEL ECLIPSE 7.5

## (undated) DEVICE — INSUFFLATION NEEDLE TO ESTABLISH PNEUMOPERITONEUM.: Brand: INSUFFLATION NEEDLE

## (undated) DEVICE — SUTURETAPE 1.3MM W/NEEDLE WHITE/BLUE

## (undated) DEVICE — VIOLET BRAIDED (POLYGLACTIN 910), SYNTHETIC ABSORBABLE SUTURE: Brand: COATED VICRYL

## (undated) DEVICE — TRAVELKIT CONTAINS FIRST STEP KIT (200ML EP-4 KIT) AND SOILED SCOPE BAG - 1 KIT: Brand: TRAVELKIT CONTAINS FIRST STEP KIT AND SOILED SCOPE BAG

## (undated) DEVICE — CANNULA SEAL

## (undated) DEVICE — PADDING CAST 4 IN  COTTON STRL

## (undated) DEVICE — GLOVE SRG BIOGEL 7.5

## (undated) DEVICE — DRAPE EQUIPMENT RF WAND

## (undated) DEVICE — NEEDLE SPINAL18G X 3.5 IN QUINCKE

## (undated) DEVICE — IRRIG ENDO FLO TUBING

## (undated) DEVICE — HEM-O-LOK CLIP CARTRIDGE MED/LARGE DA VINCI SI/XI

## (undated) DEVICE — BIAS CUT STOCKINETTE 3 IN

## (undated) DEVICE — SUT PDS II 2-0 SH 27 IN Z317H

## (undated) DEVICE — SPONGE LAP 18 X 4 IN STRL RFD

## (undated) DEVICE — TROCAR: Brand: KII FIOS FIRST ENTRY

## (undated) DEVICE — CHLORAPREP HI-LITE 26ML ORANGE

## (undated) DEVICE — OCCLUSIVE GAUZE STRIP,3% BISMUTH TRIBROMOPHENATE IN PETROLATUM BLEND: Brand: XEROFORM

## (undated) DEVICE — URETERAL CATHETER ADAPTOR TIP

## (undated) DEVICE — DRAPE LAPAROTOMY W/POUCHES

## (undated) DEVICE — TISSUE RETRIEVAL SYSTEM: Brand: INZII RETRIEVAL SYSTEM

## (undated) DEVICE — SUT MONOCRYL 3-0 PS-2 27 IN Y427H

## (undated) DEVICE — STRL PENROSE DRAIN 18" X 1/4": Brand: CARDINAL HEALTH

## (undated) DEVICE — U-DRAPE: Brand: CONVERTORS

## (undated) DEVICE — SUT PROLENE 4-0 PS-2 18 IN 8682H

## (undated) DEVICE — PMI DISPOSABLE PUNCTURE CLOSURE DEVICE / SUTURE GRASPER: Brand: PMI

## (undated) DEVICE — MEDIUM-LARGE CLIP APPLIER: Brand: ENDOWRIST

## (undated) DEVICE — BLUE HEAT SCOPE WARMER

## (undated) DEVICE — STOCKINETTE REGULAR

## (undated) DEVICE — ALLENTOWN LAP CHOLE APP PACK: Brand: CARDINAL HEALTH

## (undated) DEVICE — SUT FIBERLOOP 4-0 3/8 CIRCLE TPR 12IN AR-7229-12

## (undated) DEVICE — ADHESIVE SKIN HIGH VISCOSITY EXOFIN 1ML

## (undated) DEVICE — PADDING CAST 3IN COTTON STRL

## (undated) DEVICE — GLOVE SRG BIOGEL 8

## (undated) DEVICE — GLOVE INDICATOR PI UNDERGLOVE SZ 8 BLUE

## (undated) DEVICE — ACE WRAP 3 IN UNSTERILE

## (undated) DEVICE — TIBURON LAPAROSCOPIC ABDOMINAL DRAPE: Brand: CONVERTORS

## (undated) DEVICE — PENCIL ELECTROSURG E-Z CLEAN -0035H

## (undated) DEVICE — BAG DECANTER

## (undated) DEVICE — [HIGH FLOW INSUFFLATOR,  DO NOT USE IF PACKAGE IS DAMAGED,  KEEP DRY,  KEEP AWAY FROM SUNLIGHT,  PROTECT FROM HEAT AND RADIOACTIVE SOURCES.]: Brand: PNEUMOSURE

## (undated) DEVICE — ADHESIVE SKIN CLSR DERMABOND NX

## (undated) DEVICE — BLADELESS OBTURATOR: Brand: WECK VISTA

## (undated) DEVICE — SYRINGE 30ML LL

## (undated) DEVICE — ARM DRAPE

## (undated) DEVICE — 5 MM CURVED DISSECTORS WITH MONOPOLAR CAUTERY: Brand: ENDOPATH

## (undated) DEVICE — ENDOPATH 5MM CURVED SCISSORS WITH MONOPOLAR CAUTERY: Brand: ENDOPATH

## (undated) DEVICE — WET SKIN PREP TRAY: Brand: MEDLINE INDUSTRIES, INC.

## (undated) DEVICE — ACE WRAP 2 IN UNSTERILE

## (undated) DEVICE — BLADE SAGITTAL 25.6 X 9.5MM

## (undated) DEVICE — ASTOUND STANDARD SURGICAL GOWN, XL: Brand: CONVERTORS

## (undated) DEVICE — BETHLEHEM UNIVERSAL MINOR GEN: Brand: CARDINAL HEALTH

## (undated) DEVICE — SCD SEQUENTIAL COMPRESSION COMFORT SLEEVE MEDIUM KNEE LENGTH: Brand: KENDALL SCD

## (undated) DEVICE — SKIN MARKER DUAL TIP WITH RULER CAP, FLEXIBLE RULER AND LABELS: Brand: DEVON

## (undated) DEVICE — INTENDED FOR TISSUE SEPARATION, AND OTHER PROCEDURES THAT REQUIRE A SHARP SURGICAL BLADE TO PUNCTURE OR CUT.: Brand: BARD-PARKER SAFETY BLADES SIZE 15, STERILE

## (undated) DEVICE — WEBRIL 6 IN UNSTERILE

## (undated) DEVICE — STERILE BETHLEHEM PLASTIC HAND: Brand: CARDINAL HEALTH

## (undated) DEVICE — GAUZE SPONGES,16 PLY: Brand: CURITY

## (undated) DEVICE — NEEDLE 25G X 1 1/2

## (undated) DEVICE — CADIERE FORCEPS: Brand: ENDOWRIST

## (undated) DEVICE — TUBING SMOKE EVAC W/FILTRATION DEVICE PLUMEPORT ACTIV

## (undated) DEVICE — GLOVE SRG BIOGEL 6.5

## (undated) DEVICE — HARMONIC ACE 5MM DIAMETER SHEARS 36CM SHAFT LENGTH + ADAPTIVE TISSUE TECHNOLOGY FOR USE WITH GENERATOR G11: Brand: HARMONIC ACE

## (undated) DEVICE — PERMANENT CAUTERY HOOK: Brand: ENDOWRIST

## (undated) DEVICE — BULB SYRINGE,IRRIGATION WITH PROTECTIVE CAP: Brand: DOVER

## (undated) DEVICE — KNIFE LIGHT 10,PK: Brand: KNIFELIGHT

## (undated) DEVICE — INTENDED FOR TISSUE SEPARATION, AND OTHER PROCEDURES THAT REQUIRE A SHARP SURGICAL BLADE TO PUNCTURE OR CUT.: Brand: BARD-PARKER ® CARBON RIB-BACK BLADES

## (undated) DEVICE — KIT F/SWIVELOCK SL 3.5 X 8.5MM DISP

## (undated) DEVICE — SUT PDS II 3-0 SH 27 IN Z316H

## (undated) DEVICE — SURGICAL GOWN, XL SMARTSLEEVE: Brand: CONVERTORS

## (undated) DEVICE — DRAPE SHEET X-LG

## (undated) DEVICE — SUT ETHIBOND 4-0 RB-1 30 IN 871H

## (undated) DEVICE — 3M™ STERI-STRIP™ COMPOUND BENZOIN TINCTURE 40 BAGS/CARTON 4 CARTONS/CASE C1544: Brand: 3M™ STERI-STRIP™

## (undated) DEVICE — MEDIUM-LARGE CLIP APPLIER: Brand: ENDOWRIST;DAVINCI SI

## (undated) DEVICE — COVIDIEN ENDO GIA PURPLE (MED) RELOAD 60MM

## (undated) DEVICE — LAPAROSCOPIC DUAL RIGID APPLICATOR: Brand: ETHICON

## (undated) DEVICE — ABSORBABLE WOUND CLOSURE DEVICE: Brand: V-LOC 90

## (undated) DEVICE — DRAPE TOWEL: Brand: CONVERTORS

## (undated) DEVICE — CAST PADDING 4 IN SYNTHETIC NON-STRL

## (undated) DEVICE — LAPAROSCOPIC TROCAR SLEEVE/SINGLE USE: Brand: KII® SLEEVE